# Patient Record
Sex: FEMALE | Race: WHITE | NOT HISPANIC OR LATINO | Employment: OTHER | URBAN - METROPOLITAN AREA
[De-identification: names, ages, dates, MRNs, and addresses within clinical notes are randomized per-mention and may not be internally consistent; named-entity substitution may affect disease eponyms.]

---

## 2017-01-04 ENCOUNTER — OFFICE VISIT (OUTPATIENT)
Dept: PULMONOLOGY | Facility: HOSPICE | Age: 77
End: 2017-01-04
Payer: MEDICARE

## 2017-01-04 VITALS
RESPIRATION RATE: 16 BRPM | HEIGHT: 63 IN | SYSTOLIC BLOOD PRESSURE: 120 MMHG | OXYGEN SATURATION: 94 % | HEART RATE: 80 BPM | TEMPERATURE: 97.9 F | DIASTOLIC BLOOD PRESSURE: 80 MMHG

## 2017-01-04 DIAGNOSIS — J90 PLEURAL EFFUSION, RIGHT: ICD-10-CM

## 2017-01-04 DIAGNOSIS — J94.2 HEMOTHORAX ON RIGHT: ICD-10-CM

## 2017-01-04 DIAGNOSIS — G47.33 OSA (OBSTRUCTIVE SLEEP APNEA): ICD-10-CM

## 2017-01-04 PROCEDURE — 99214 OFFICE O/P EST MOD 30 MIN: CPT | Performed by: NURSE PRACTITIONER

## 2017-01-04 NOTE — PROGRESS NOTES
Chief Complaint   Patient presents with   • Follow-Up     [facility] DC 12/12/16/ Consult tod Wilson and Gonda         HPI:  This is a 76 y.o. 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 The patient underwent CT-guided needle biopsy 11/20/1916. The patient was doing quite well and then suddenly became very short of breath. A chest x-ray indicated complete opacification of the right lung. Patient was intubated and underwent bronchoscopy. Bronchoscopy indicated blood in the right middle and right lower lobe with edematous airways. Pathology indicates no malignancy, however numerous ciliated bronchial columnar cells and histiocytes, a few squamous epithelial cells with associated bacterial organisms, small lymphocytes, and moderately increased numbers of neutrophils with admixed mucus. The patient underwent coil embolization of the right inferior intercostal artery without evidence of significant aneurysmal filling. The patient was found to have a hemothorax thus underwent thoracoscopy with decortication and evacuation of the right hemithorax. During hospitalization the patient was anemic and underwent transfusion of 4 units of packed red blood cells.     The patient also has a history of obstructive sleep apnea. Polysomnogram indicates AHI 31.9 and a minimum saturation of 72%. I believe the patient is on CPAP 9 cm of water pressure. She has had no follow-up for this so we will need to get her in contact with a DME company for new supplies.      Past Medical History   Diagnosis Date   • Hypertension    • Indigestion    • Snoring    • S/P bilateral mastectomy 4/4/2011   • Hyperlipidemia 4/4/2011   • Sleep apnea      CPAP   • Depression with anxiety 5/3/2011   • History of cholecystectomy    • CATARACT      surgical correcttion bilateral   • Heart burn    • Pain      left hip   • Range of motion deficit      left elbow,  unable to completely extend   • Arthritis    • BMI 38.0-38.9,adult 9/18/2013   • Chronic nausea 1/12/2015     Has had GI work up done Dr voss   • MYESHA (obstructive sleep apnea) 4/4/2011     On cPAP    • Pleural effusion, right 1/4/2017     Status post thoracotomy and decortication   • Hemothorax on right 1/4/2017     Status post thoracotomy and decortication       Past Surgical History   Procedure Laterality Date   • Hip arthroplasty total  3/26/2009     Right; Perfmed by THERESE LEMA at SURGERY [facility]   • Lizette by laparoscopy  2/1/2011     Performed by DORON HINOJOSA at SURGERY SAME DAY [facility] ORS   • Other  1988     bilateral mastectomies with implants   • Other  1989     tummy karlack   • Uvulopharyngopalatoplasty  1990   • Cataract phaco with iol  10/20/2011     Performed by DEENA BRADEN at SURGERY SAME DAY [facility] ORS   • Cataract phaco with iol  11/3/2011     Performed by DEENA BRADEN at SURGERY [facility]   • Orif, humerus  1950's     left   • Hip arthroplasty total  12/3/2012     Performed by Jamie Kenney M.D. at SURGERY [facility] ORS   • Breast biopsy  1980     bilateral benign mastectomy   • Pr enlarge breast with implant     • Thoracoscopy Right 12/6/2016     Procedure: RIGHT THORACOSCOPY ,DECORTICATION, EVACUATION OF HEMOTHORAX;  Surgeon: Ehsan De Leon D.O.;  Location: SURGERY [facility];  Service:        Social History   Substance Use Topics   • Smoking status: Former Smoker -- 1.00 packs/day for 15 years     Types: Cigarettes     Quit date: 01/01/1975   • Smokeless tobacco: Never Used   • Alcohol Use: No      Comment: once in a great while       ROS:   Constitutional: Denies fevers, chills, sweats, fatigue, and weight loss.  Eyes: Denies glasses.  Ears/nose/mouth/throat: Denies injury.  Cardiovascular: Denies chest pain, tightness.  Respiratory: See history of present illness.  GI: Denies heartburn, difficulty swallowing, nausea, and  "vomiting.  Neurological: Denies frequent headaches, dizziness, weakness.    Vitals:  Filed Vitals:    01/04/17 0813   Height: 1.6 m (5' 2.99\")   BP: 120/80   Pulse: 80   Resp: 16   Temp: 36.6 °C (97.9 °F)   O2 sat % room air: 94 %       Allergies:  Pcn; Clindamycin; Influenza virus vacc; Norco; Pneumococcal vaccine; Tetracycline; and Xarelto    Medications:  Current Outpatient Prescriptions   Medication Sig Dispense Refill   • metoprolol SR (TOPROL XL) 50 MG TABLET SR 24 HR Take 1 Tab by mouth every day. 30 Tab 2   • oxycodone immediate-release (ROXICODONE) 5 MG Tab Take 1-2 Tabs by mouth every 6 hours as needed. 60 Tab 0   • aspirin (ASA) 325 MG Tab Take 1 Tab by mouth every day. 100 Tab 3   • furosemide (LASIX) 40 MG Tab Take 1 Tab by mouth every day. 30 Tab 2   • potassium chloride SA (K-DUR) 20 MEQ Tab CR Take 2 Tabs by mouth every day. 60 Tab 2   • simethicone (MYLICON) 80 MG Chew Tab Take 1 Tab by mouth 3 times a day as needed (Gas). 30 Tab 3   • busPIRone (BUSPAR) 10 MG Tab TAKE 1 TAB BY MOUTH 2 TIMES A DAY. 180 Tab 0   • acetaminophen (TYLENOL) 500 MG Tab Take 1,000 mg by mouth every 6 hours as needed.     • trazodone (DESYREL) 50 MG Tab Take 2 tablets orally every morning and 1 tablet every evening. 270 Tab 0   • omeprazole (PRILOSEC) 40 MG delayed-release capsule Take 1 Cap by mouth every day. 90 Cap 1   • hydrochlorothiazide (HYDRODIURIL) 25 MG Tab Take 1 Tab by mouth every day. 90 Tab 0   • sertraline (ZOLOFT) 50 MG Tab Take 2 Tabs by mouth every day. 180 Tab 0   • simvastatin (ZOCOR) 40 MG Tab Take 1 Tab by mouth every evening. 90 Tab 2   • diltiazem CD (CARDIZEM CD) 180 MG CAPSULE SR 24 HR Take 1 Cap by mouth every day. 90 Cap 2     No current facility-administered medications for this visit.       PHYSICAL EXAM:  Appearance: Well-developed, well-nourished, no acute distress.  Eyes. PERRL.  Hearing: Grossly intact.  Oropharynx: Tongue normal, posterior pharynx without erythema or exudate.  Respiratory " effort: No intercostal retractions or use of accessory muscles.  Lung auscultation: No crackles, wheezing.  Heart auscultation: No murmur, gallop, or rub. Regular rate and rhythm.  Extremities: No cyanosis or edema.  Gait and Station: Normal  Orientation: Oriented to time, place, and person.    Assessment:  1. MYESHA (obstructive sleep apnea)     2. Pleural effusion, right  AMB PULMONARY FUNCTION TEST/LAB   3. Hemothorax on right  AMB PULMONARY FUNCTION TEST/LAB         Plan:  1. Mask and Supplies to [facility].  2. Follow up in approximately 2 months with pulmonary function tests for evaluation of possible COPD.  3. Make sure to follow-up with Dr. Reyes.    Return in about 2 months (around 3/4/2017) for With results, With JULIO Ledbetter.

## 2017-01-04 NOTE — PATIENT INSTRUCTIONS
1. Mask and Supplies to [facility].  2. Follow up in approximately 2 months with pulmonary function tests.  3. Make sure to follow-up with Dr. Reyes.

## 2017-01-04 NOTE — MR AVS SNAPSHOT
"        Sharon Callahan   2017 8:20 AM   Office Visit   MRN: [id number]    Department:  [facility]   Dept Phone:  [phone]    Description:  Female : 1940   Provider:  JEAN Rush           Reason for Visit     Follow-Up [facility] DC 16/ Consult w/ Steve and Gonda      Allergies as of 2017     Allergen Noted Reactions    Pcn [Penicillins] 2010   Anaphylaxis    Skin turns black    Clindamycin 2015   Rash    Rash      Influenza Virus Vacc 2016   Rash    Red in face    Norco [Hydrocodone-Acetaminophen] 2013       Pneumococcal Vaccine 2012   Rash    Tetracycline 2015   Rash    Rash     Xarelto [Rivaroxaban] 2012   Rash      You were diagnosed with     MYESHA (obstructive sleep apnea)   [488684]       Pleural effusion, right   [939772]       Hemothorax on right   [167597]         Vital Signs     Blood Pressure Pulse Temperature Respirations Height Oxygen Saturation    120/80 mmHg 80 36.6 °C (97.9 °F) 16 1.6 m (5' 2.99\") 94%    Smoking Status                   Former Smoker           Basic Information     Date Of Birth Sex Race Ethnicity Preferred Language    1940 Female White Non- English      Your appointments     2017  4:25 PM   Established Patient with Autumn Yin D.O.   [facility])    [address] [place] [address], [address]  [facility] [address] [phone]           You will be receiving a confirmation call a few days before your appointment from our automated call confirmation system.              Problem List              ICD-10-CM Priority Class Noted - Resolved    Benzodiazepine dependence (HCC) F13.20   2011 - Present    S/P bilateral mastectomy Z90.13   2011 - Present    MYESHA (obstructive sleep apnea) G47.33   2011 - Present    Hypertension I10   2011 - Present    Hyperlipidemia E78.5   2011 - Present    Depression with anxiety F41.8   5/3/2011 - Present   "    Routine health maintenance Z00.00   1/19/2012 - Present    Localized osteoarthrosis not specified whether primary or secondary, pelvic region and thigh M16.9   12/3/2012 - Present    BMI 40.0-44.9, adult (HCC) Z68.41   9/18/2013 - Present    Chronic nausea (Chronic) R11.0   1/12/2015 - Present    Tremor R25.1   1/12/2015 - Present    Osteopenia M85.80   3/25/2016 - Present    Chest pain R07.9   10/7/2016 - Present    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   11/28/2016 - Present    Hypokalemia E87.6   11/29/2016 - Present    Chest pain R07.9   12/1/2016 - Present    Elevated troponin R79.89   12/1/2016 - Present    Hemothorax, right J94.2   12/9/2016 - Present    Pulmonary hypertension, moderate to severe (HCC) I27.2   12/9/2016 - Present    LVH (left ventricular hypertrophy) I51.7   12/14/2016 - Present    Paroxysmal atrial fibrillation (HCC) I48.0   12/14/2016 - Present    Pleural effusion, right J90   1/4/2017 - Present    Hemothorax on right J94.2   1/4/2017 - Present      Health Maintenance        Date Due Completion Dates    IMM DTaP/Tdap/Td Vaccine (1 - Tdap) 10/30/1960 ---    IMM ZOSTER VACCINE 10/30/2001 ---    MAMMOGRAM 5/19/2015 5/19/2014    COLONOSCOPY 4/10/2017 4/10/2014, 3/1/2011    BONE DENSITY 4/16/2020 4/16/2015            Current Immunizations     Influenza Vaccine Quad Inj (Preserved) 11/6/2016, 9/15/2012, 9/1/2011    Pneumococcal polysaccharide vaccine (PPSV-23) 12/4/2012 10:56 AM      Below and/or attached are the medications your provider expects you to take. Review all of your home medications and newly ordered medications with your provider and/or pharmacist. Follow medication instructions as directed by your provider and/or pharmacist. Please keep your medication list with you and share with your provider. Update the information when medications are discontinued, doses are changed, or new medications (including over-the-counter products) are added; and carry medication information at all times in  the event of emergency situations     Allergies:  PCN - Anaphylaxis     CLINDAMYCIN - Rash     INFLUENZA VIRUS VACC - Rash     NORCO - (reactions not documented)     PNEUMOCOCCAL VACCINE - Rash     TETRACYCLINE - Rash     XARELTO - Rash               Medications  Valid as of: January 04, 2017 -  9:21 AM    Generic Name Brand Name Tablet Size Instructions for use    Acetaminophen (Tab) TYLENOL 500 MG Take 1,000 mg by mouth every 6 hours as needed.        Aspirin (Tab)  MG Take 1 Tab by mouth every day.        BusPIRone HCl (Tab) BUSPAR 10 MG TAKE 1 TAB BY MOUTH 2 TIMES A DAY.        DiltiaZEM HCl Coated Beads (CAPSULE SR 24 HR) CARDIZEM  MG Take 1 Cap by mouth every day.        Furosemide (Tab) LASIX 40 MG Take 1 Tab by mouth every day.        HydroCHLOROthiazide (Tab) HYDRODIURIL 25 MG Take 1 Tab by mouth every day.        Metoprolol Succinate (TABLET SR 24 HR) TOPROL XL 50 MG Take 1 Tab by mouth every day.        Omeprazole (CAPSULE DELAYED RELEASE) PRILOSEC 40 MG Take 1 Cap by mouth every day.        OxyCODONE HCl (Tab) ROXICODONE 5 MG Take 1-2 Tabs by mouth every 6 hours as needed.        Potassium Chloride Sweta CR (Tab CR) K-DUR 20 MEQ Take 2 Tabs by mouth every day.        Sertraline HCl (Tab) ZOLOFT 50 MG Take 2 Tabs by mouth every day.        Simethicone (Chew Tab) MYLICON 80 MG Take 1 Tab by mouth 3 times a day as needed (Gas).        Simvastatin (Tab) ZOCOR 40 MG Take 1 Tab by mouth every evening.        TraZODone HCl (Tab) DESYREL 50 MG Take 2 tablets orally every morning and 1 tablet every evening.        .                 Medicines prescribed today were sent to:     [facility]/PHARMACY #[id number] - [place], [place] - [address]    [address] [address]    Phone: [phone] Fax: [phone]    Open 24 Hours?: No      Medication refill instructions:       If your prescription bottle indicates you have medication refills left, it is not necessary to call your provider’s  office. Please contact your pharmacy and they will refill your medication.    If your prescription bottle indicates you do not have any refills left, you may request refills at any time through one of the following ways: The online [employer] system (except Urgent Care), by calling your provider’s office, or by asking your pharmacy to contact your provider’s office with a refill request. Medication refills are processed only during regular business hours and may not be available until the next business day. Your provider may request additional information or to have a follow-up visit with you prior to refilling your medication.   *Please Note: Medication refills are assigned a new Rx number when refilled electronically. Your pharmacy may indicate that no refills were authorized even though a new prescription for the same medication is available at the pharmacy. Please request the medicine by name with the pharmacy before contacting your provider for a refill.        Other Notes About Your Plan     -per OV 4/13/15, BMI 41.77. ? Morbid obesity code 278.01    -patient has been on Klonopin since 2011 without a diagnosis for this medication.                      [employer] Access Code: [email or web address]  Expires: 1/10/2017  3:06 PM    [employer]  A secure, online tool to manage your health information     [employer]’s Quero Rock® is a secure, online tool that connects you to your personalized health information from the privacy of your home -- day or night - making it very easy for you to manage your healthcare. Once the activation process is completed, you can even access your medical information using the [employer] Rock macrina, which is available for free in the Apple Macrina store or Google Play store.     [employer] provides the following levels of access (as shown below):   My Chart Features   [facility] Doctor [facility]  Specialists [facility]  Urgent  Care Non-[facility]  Primary Care  Doctor   Email your healthcare team securely and  privately 24/7 X X X    Manage appointments: schedule your next appointment; view details of past/upcoming appointments X      Request prescription refills. X      View recent personal medical records, including lab and immunizations X X X X   View health record, including health history, allergies, medications X X X X   Read reports about your outpatient visits, procedures, consult and ER notes X X X X   See your discharge summary, which is a recap of your hospital and/or ER visit that includes your diagnosis, lab results, and care plan. X X       How to register for [employer]:  1. Go to  [email or web address].  2. Click on the Sign Up Now box, which takes you to the New Member Sign Up page. You will need to provide the following information:  a. Enter your [employer] Access Code exactly as it appears at the top of this page. (You will not need to use this code after you’ve completed the sign-up process. If you do not sign up before the expiration date, you must request a new code.)   b. Enter your date of birth.   c. Enter your home email address.   d. Click Submit, and follow the next screen’s instructions.  3. Create a ColorPlaza ID. This will be your [employer] login ID and cannot be changed, so think of one that is secure and easy to remember.  4. Create a [employer] password. You can change your password at any time.  5. Enter your Password Reset Question and Answer. This can be used at a later time if you forget your password.   6. Enter your e-mail address. This allows you to receive e-mail notifications when new information is available in [employer].  7. Click Sign Up. You can now view your health information.    For assistance activating your [employer] account, call [phone]

## 2017-01-05 RX ORDER — DILTIAZEM HYDROCHLORIDE 180 MG/1
CAPSULE, COATED, EXTENDED RELEASE ORAL
Refills: 2 | OUTPATIENT
Start: 2017-01-05

## 2017-01-05 NOTE — TELEPHONE ENCOUNTER
CALLED [facility] 01/05/17 AND CONFIRMED PT STILL HAS 1 MORE REFILL LEFT ON Rx.

## 2017-01-10 DIAGNOSIS — I10 ESSENTIAL HYPERTENSION: ICD-10-CM

## 2017-01-10 RX ORDER — METOPROLOL SUCCINATE 50 MG/1
50 TABLET, EXTENDED RELEASE ORAL DAILY
Qty: 90 TAB | Refills: 1 | Status: SHIPPED | OUTPATIENT
Start: 2017-01-10 | End: 2017-06-16 | Stop reason: SDUPTHER

## 2017-01-11 RX ORDER — DILTIAZEM HYDROCHLORIDE 180 MG/1
CAPSULE, COATED, EXTENDED RELEASE ORAL
Qty: 90 CAP | Refills: 0 | Status: SHIPPED | OUTPATIENT
Start: 2017-01-11 | End: 2017-05-18

## 2017-01-11 NOTE — TELEPHONE ENCOUNTER
1. Caller Name: Sharon                                            Call Back Number: [phone] (home)         Patient approves a detailed voicemail message: N\A    Medication problem: Pt is not sure what medication she should be on for High BP. Metoprolol was last prescribed medication but Diltiazem was prescribed last 6/2016. Please advise.

## 2017-01-11 NOTE — TELEPHONE ENCOUNTER
Was the patient seen in the last year in this department? Yes     Does patient have an active prescription for medications requested? No     Received Request Via: Patient      Pt met protocol?: Yes  OV 3/25/16  BP Readings from Last 1 Encounters:   01/04/17 120/80

## 2017-01-12 NOTE — TELEPHONE ENCOUNTER
D/C med list from hospitalization has her to continue Diltiazem and Metoprolol - but dosage of metoprolol reduced at hospital follow-up appointment with Dr. Freeman on 12/14.

## 2017-01-16 ENCOUNTER — OFFICE VISIT (OUTPATIENT)
Dept: MEDICAL GROUP | Facility: PHYSICIAN GROUP | Age: 77
End: 2017-01-16
Payer: MEDICARE

## 2017-01-16 VITALS
OXYGEN SATURATION: 95 % | WEIGHT: 194 LBS | HEART RATE: 73 BPM | RESPIRATION RATE: 16 BRPM | DIASTOLIC BLOOD PRESSURE: 82 MMHG | SYSTOLIC BLOOD PRESSURE: 120 MMHG | BODY MASS INDEX: 36.63 KG/M2 | HEIGHT: 61 IN | TEMPERATURE: 99.2 F

## 2017-01-16 DIAGNOSIS — D64.9 NORMOCYTIC ANEMIA: ICD-10-CM

## 2017-01-16 DIAGNOSIS — E78.5 HYPERLIPIDEMIA, UNSPECIFIED HYPERLIPIDEMIA TYPE: ICD-10-CM

## 2017-01-16 DIAGNOSIS — G47.33 OSA (OBSTRUCTIVE SLEEP APNEA): ICD-10-CM

## 2017-01-16 DIAGNOSIS — F41.8 DEPRESSION WITH ANXIETY: ICD-10-CM

## 2017-01-16 DIAGNOSIS — I10 ESSENTIAL HYPERTENSION: ICD-10-CM

## 2017-01-16 DIAGNOSIS — E87.6 HYPOKALEMIA: ICD-10-CM

## 2017-01-16 DIAGNOSIS — D3A.090 CARCINOID TUMOR OF LUNG: ICD-10-CM

## 2017-01-16 PROCEDURE — 99214 OFFICE O/P EST MOD 30 MIN: CPT | Performed by: INTERNAL MEDICINE

## 2017-01-16 RX ORDER — ATORVASTATIN CALCIUM 20 MG/1
20 TABLET, FILM COATED ORAL NIGHTLY
COMMUNITY
End: 2017-05-18

## 2017-01-16 ASSESSMENT — PATIENT HEALTH QUESTIONNAIRE - PHQ9: CLINICAL INTERPRETATION OF PHQ2 SCORE: 0

## 2017-01-16 NOTE — MR AVS SNAPSHOT
"        Sharon Callahan   2017 4:25 PM   Office Visit   MRN: [id number]    Department:  [facility]   Dept Phone:  [phone]    Description:  Female : 1940   Provider:  Autumn Yin D.O.           Reason for Visit     Follow-Up HFU Lung mass      Allergies as of 2017     Allergen Noted Reactions    Pcn [Penicillins] 2010   Anaphylaxis    Skin turns black    Clindamycin 2015   Rash    Rash      Influenza Virus Vacc 2016   Rash    Red in face    Norco [Hydrocodone-Acetaminophen] 2013       Pneumococcal Vaccine 2012   Rash    Tetracycline 2015   Rash    Rash     Xarelto [Rivaroxaban] 2012   Rash      You were diagnosed with     Benzodiazepine dependence (CMS-HCC)   [775999]       Carcinoid tumor of lung   [851421]         Vital Signs     Blood Pressure Pulse Temperature Respirations Height Weight    120/82 mmHg 73 37.3 °C (99.2 °F) 16 1.549 m (5' 1\") 87.998 kg (194 lb)    Body Mass Index Oxygen Saturation Smoking Status             36.67 kg/m2 95% Former Smoker         Basic Information     Date Of Birth Sex Race Ethnicity Preferred Language    1940 Female White Non- English      Problem List              ICD-10-CM Priority Class Noted - Resolved    Benzodiazepine dependence (CMS-HCC) F13.20   2011 - Present    S/P bilateral mastectomy Z90.13   2011 - Present    MYESHA (obstructive sleep apnea) G47.33   2011 - Present    Hypertension I10   2011 - Present    Hyperlipidemia E78.5   2011 - Present    Depression with anxiety F41.8   5/3/2011 - Present    Routine health maintenance Z00.00   2012 - Present    Localized osteoarthrosis not specified whether primary or secondary, pelvic region and thigh M16.9   12/3/2012 - Present    BMI 36.0-36.9,adult Z68.36   2013 - Present    Chronic nausea (Chronic) R11.0   2015 - Present    Tremor R25.1   2015 - Present    Osteopenia M85.80   3/25/2016 - Present   " Chest pain R07.9   10/7/2016 - Present    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   11/28/2016 - Present    Hypokalemia E87.6   11/29/2016 - Present    Chest pain R07.9   12/1/2016 - Present    Elevated troponin R79.89   12/1/2016 - Present    Hemothorax, right J94.2   12/9/2016 - Present    Pulmonary hypertension, moderate to severe (CMS-HCC) I27.2   12/9/2016 - Present    LVH (left ventricular hypertrophy) I51.7   12/14/2016 - Present    Paroxysmal atrial fibrillation (CMS-HCC) I48.0   12/14/2016 - Present    Pleural effusion, right J90   1/4/2017 - Present    Hemothorax on right J94.2   1/4/2017 - Present      Health Maintenance        Date Due Completion Dates    IMM DTaP/Tdap/Td Vaccine (1 - Tdap) 10/30/1960 ---    IMM ZOSTER VACCINE 10/30/2001 ---    MAMMOGRAM 5/19/2015 5/19/2014    COLONOSCOPY 4/10/2017 4/10/2014, 3/1/2011    BONE DENSITY 4/16/2020 4/16/2015            Current Immunizations     Influenza Vaccine Quad Inj (Preserved) 11/6/2016, 9/15/2012, 9/1/2011    Pneumococcal polysaccharide vaccine (PPSV-23) 12/4/2012 10:56 AM      Below and/or attached are the medications your provider expects you to take. Review all of your home medications and newly ordered medications with your provider and/or pharmacist. Follow medication instructions as directed by your provider and/or pharmacist. Please keep your medication list with you and share with your provider. Update the information when medications are discontinued, doses are changed, or new medications (including over-the-counter products) are added; and carry medication information at all times in the event of emergency situations     Allergies:  PCN - Anaphylaxis     CLINDAMYCIN - Rash     INFLUENZA VIRUS VACC - Rash     NORCO - (reactions not documented)     PNEUMOCOCCAL VACCINE - Rash     TETRACYCLINE - Rash     XARELTO - Rash               Medications  Valid as of: January 16, 2017 -  6:46 PM    Generic Name Brand Name Tablet Size Instructions for use     Acetaminophen (Tab) TYLENOL 500 MG Take 1,000 mg by mouth every 6 hours as needed.        Aspirin (Tab)  MG Take 1 Tab by mouth every day.        Atorvastatin Calcium (Tab) LIPITOR 20 MG Take 20 mg by mouth every evening. Indications: Elevation of Both Cholesterol and Triglycerides in Blood        BusPIRone HCl (Tab) BUSPAR 10 MG TAKE 1 TAB BY MOUTH 2 TIMES A DAY.        DiltiaZEM HCl Coated Beads (CAPSULE SR 24 HR) CARDIZEM  MG TAKE 1 CAP BY MOUTH EVERY DAY.        Furosemide (Tab) LASIX 40 MG Take 1 Tab by mouth every day.        HydroCHLOROthiazide (Tab) HYDRODIURIL 25 MG Take 1 Tab by mouth every day.        Metoprolol Succinate (TABLET SR 24 HR) TOPROL XL 50 MG Take 1 Tab by mouth every day.        Omeprazole (CAPSULE DELAYED RELEASE) PRILOSEC 40 MG Take 1 Cap by mouth every day.        Potassium Chloride Sweta CR (Tab CR) K-DUR 20 MEQ Take 2 Tabs by mouth every day.        Sertraline HCl (Tab) ZOLOFT 50 MG Take 2 Tabs by mouth every day.        TraZODone HCl (Tab) DESYREL 50 MG Take 2 tablets orally every morning and 1 tablet every evening.        .                 Medicines prescribed today were sent to:     [id number] - [place], [place] - [id number] [facility] [address]    Phone: [phone] Fax: [phone]    Open 24 Hours?: No      Medication refill instructions:       If your prescription bottle indicates you have medication refills left, it is not necessary to call your provider’s office. Please contact your pharmacy and they will refill your medication.    If your prescription bottle indicates you do not have any refills left, you may request refills at any time through one of the following ways: The online [employer] system (except Urgent Care), by calling your provider’s office, or by asking your pharmacy to contact your provider’s office with a refill request. Medication refills are processed only during regular business hours and may not be  available until the next business day. Your provider may request additional information or to have a follow-up visit with you prior to refilling your medication.   *Please Note: Medication refills are assigned a new Rx number when refilled electronically. Your pharmacy may indicate that no refills were authorized even though a new prescription for the same medication is available at the pharmacy. Please request the medicine by name with the pharmacy before contacting your provider for a refill.        Other Notes About Your Plan     -per OV 4/13/15, BMI 41.77. ? Morbid obesity code 278.01    -patient has been on Klonopin since 2011 without a diagnosis for this medication.                      [email or web address] Access Code: [email or web address]  Expires: 2/15/2017  6:46 PM    [email or web address]  A secure, online tool to manage your health information     [employer]Â®’s [employer]com® is a secure, online tool that connects you to your personalized health information from the privacy of your home -- day or night - making it very easy for you to manage your healthcare. Once the activation process is completed, you can even access your medical information using the [employer]com macrina, which is available for free in the Apple Macrina store or Google Play store.     [email or web address] provides the following levels of access (as shown below):   My Chart Features   [facility] Doctor [facility]  Specialists [facility]  Urgent  Care Non-[facility]  Primary Care  Doctor   Email your healthcare team securely and privately 24/7 X X X    Manage appointments: schedule your next appointment; view details of past/upcoming appointments X      Request prescription refills. X      View recent personal medical records, including lab and immunizations X X X X   View health record, including health history, allergies, medications X X X X   Read reports about your outpatient visits, procedures, consult and ER notes X X X X   See your discharge summary, which is a recap of your hospital and/or  ER visit that includes your diagnosis, lab results, and care plan. X X       How to register for [employer]:  1. Go to  [email or web address].  2. Click on the Sign Up Now box, which takes you to the New Member Sign Up page. You will need to provide the following information:  a. Enter your [employer] Access Code exactly as it appears at the top of this page. (You will not need to use this code after you’ve completed the sign-up process. If you do not sign up before the expiration date, you must request a new code.)   b. Enter your date of birth.   c. Enter your home email address.   d. Click Submit, and follow the next screen’s instructions.  3. Create a [employer] ID. This will be your [employer] login ID and cannot be changed, so think of one that is secure and easy to remember.  4. Create a [employer] password. You can change your password at any time.  5. Enter your Password Reset Question and Answer. This can be used at a later time if you forget your password.   6. Enter your e-mail address. This allows you to receive e-mail notifications when new information is available in [employer].  7. Click Sign Up. You can now view your health information.    For assistance activating your [employer] account, call [phone]

## 2017-01-17 PROBLEM — J94.2 HEMOTHORAX ON RIGHT: Status: RESOLVED | Noted: 2017-01-04 | Resolved: 2017-01-17

## 2017-01-17 PROBLEM — J90 PLEURAL EFFUSION, RIGHT: Status: RESOLVED | Noted: 2017-01-04 | Resolved: 2017-01-17

## 2017-01-17 NOTE — PROGRESS NOTES
Subjective:   Sharon Callahan is a 76 y.o. female here today for multiple problems as listed below.      Carcinoid tumor of lung  Patient has followed up with Dr. Reyes about carcinoid tumor-no progress notes on file. She states that no surgery recommended at this time-he is monitoring the tumor and she has a follow-up appointment in February. No flushing, diarrhea or cramping.    Hypertension  Chronic condition. She indicates that she is feeling well and denies any symptoms referable to her elevated blood pressure. Specifically denies chest pain, palpitations, dyspnea, orthopnea, PND or peripheral edema. Current medication regimen is as listed below. Taking medicines as directed daily and is taking aspirin. She is monitoring BP at home. Patient has these side effects of medication: none. Currently on metoprolol SR 50mg daily. Furosemide 40mg daily + K-dur 20Meq, HCTZ 25mg daily, ditiazem CD 180mg daily.   Lab Results   Component Value Date/Time    SODIUM 135 12/12/2016 04:55 AM    POTASSIUM 3.1* 12/12/2016 04:55 AM    CHLORIDE 97 12/12/2016 04:55 AM    CO2 34* 12/12/2016 04:55 AM    GLUCOSE 100* 12/12/2016 04:55 AM    BUN 13 12/12/2016 04:55 AM    CREATININE 0.47* 12/12/2016 04:55 AM    BUN-CREATININE RATIO 13 04/04/2011 12:00 AM    ALKALINE PHOSPHATASE 44 12/12/2016 04:55 AM    AST(SGOT) 22 12/12/2016 04:55 AM    ALT(SGPT) 27 12/12/2016 04:55 AM    TOTAL BILIRUBIN 0.5 12/12/2016 04:55 AM       Hyperlipidemia  Chronic condition. She is taking medicine as directed. Current side effects: none. Atorvastatin 20mg daily.  Patients most recent cholesterol was reviewed:  Lab Results   Component Value Date/Time    CHOLESTEROL, 10/08/2016 04:10 AM    CHOLESTEROL, 02/25/2016 07:09 AM     Lab Results   Component Value Date/Time    LDL 56 10/08/2016 04:10 AM    LDL 67 02/25/2016 07:09 AM     Lab Results   Component Value Date/Time    HDL 59 10/08/2016 04:10 AM    HDL 66 02/25/2016 07:09 AM     Lab Results    Component Value Date/Time    TRIGLYCERIDES 142 12/07/2016 05:00 AM    TRIGLYCERIDES 109 12/04/2016 01:32 PM         Depression with anxiety  Not following with  at this time. Continues to be on sertraline, buspirone. Reports her mood is overall well controlled on this regimen. She also reports anxiety is manageable. Was previously on clonazepam but no longer on this. Pt also has insomnia which is managed with trazodone which she has been using nightly. Denies side effects.    MYESHA (obstructive sleep apnea)  Chronic condition. Compliant with CPAP. Following with pulmonology.      Lab Results   Component Value Date/Time    WBC 11.0* 12/12/2016 04:55 AM    RBC 3.27* 12/12/2016 04:55 AM    HEMOGLOBIN 8.8* 12/12/2016 04:55 AM    HEMATOCRIT 28.7* 12/12/2016 04:55 AM    MCV 87.8 12/12/2016 04:55 AM    MCH 26.9* 12/12/2016 04:55 AM    MCHC 30.7* 12/12/2016 04:55 AM    MPV 9.5 12/12/2016 04:55 AM    NEUTROPHILS-POLYS 76.90* 12/12/2016 04:55 AM    LYMPHOCYTES 11.00* 12/12/2016 04:55 AM    MONOCYTES 7.20 12/12/2016 04:55 AM    EOSINOPHILS 2.40 12/12/2016 04:55 AM    BASOPHILS 0.50 12/12/2016 04:55 AM    HYPOCHROMIA 2+ 07/04/2013 07:30 AM    ANISOCYTOSIS 1+ 12/01/2016 06:45 AM      Current medicines (including changes today)  Current Outpatient Prescriptions   Medication Sig Dispense Refill   • atorvastatin (LIPITOR) 20 MG Tab Take 20 mg by mouth every evening. Indications: Elevation of Both Cholesterol and Triglycerides in Blood     • diltiazem CD (CARDIZEM CD) 180 MG CAPSULE SR 24 HR TAKE 1 CAP BY MOUTH EVERY DAY. 90 Cap 0   • metoprolol SR (TOPROL XL) 50 MG TABLET SR 24 HR Take 1 Tab by mouth every day. 90 Tab 1   • aspirin (ASA) 325 MG Tab Take 1 Tab by mouth every day. 100 Tab 3   • furosemide (LASIX) 40 MG Tab Take 1 Tab by mouth every day. 30 Tab 2   • potassium chloride SA (K-DUR) 20 MEQ Tab CR Take 2 Tabs by mouth every day. 60 Tab 2   • busPIRone (BUSPAR) 10 MG Tab TAKE 1 TAB BY MOUTH 2 TIMES A DAY. 180 Tab 0   •  "acetaminophen (TYLENOL) 500 MG Tab Take 1,000 mg by mouth every 6 hours as needed.     • trazodone (DESYREL) 50 MG Tab Take 2 tablets orally every morning and 1 tablet every evening. 270 Tab 0   • omeprazole (PRILOSEC) 40 MG delayed-release capsule Take 1 Cap by mouth every day. 90 Cap 1   • hydrochlorothiazide (HYDRODIURIL) 25 MG Tab Take 1 Tab by mouth every day. 90 Tab 0   • sertraline (ZOLOFT) 50 MG Tab Take 2 Tabs by mouth every day. 180 Tab 0     No current facility-administered medications for this visit.     She  has a past medical history of Hypertension; Indigestion; Snoring; S/P bilateral mastectomy (4/4/2011); Hyperlipidemia (4/4/2011); Sleep apnea; Depression with anxiety (5/3/2011); History of cholecystectomy; CATARACT; Heart burn; Pain; Range of motion deficit; Arthritis; BMI 38.0-38.9,adult (9/18/2013); Chronic nausea (1/12/2015); MYESHA (obstructive sleep apnea) (4/4/2011); Pleural effusion, right (1/4/2017); and Hemothorax on right (1/4/2017).    ROS   No chest pain, no shortness of breath, no abdominal pain, no diarrhea/constipation, no urinary symptoms.     Objective:     Blood pressure 120/82, pulse 73, temperature 37.3 °C (99.2 °F), resp. rate 16, height 1.549 m (5' 1\"), weight 87.998 kg (194 lb), SpO2 95 %. Body mass index is 36.67 kg/(m^2).   Physical Exam:  Alert, oriented in no acute distress.  Eye contact is good, speech goal directed, affect calm  HEENT: conjunctiva non-injected, sclera non-icteric.  Neck No adenopathy or masses in the neck or supraclavicular regions.  Lungs: clear to auscultation bilaterally with good excursion.  CV: regular rate and rhythm.  Ext: no edema, color normal, vascularity normal, temperature normal    Assessment and Plan:   The following treatment plan was discussed   1. Carcinoid tumor of lung  Follow-up with Dr. Reyes as scheduled - records requested.    2. Essential hypertension  Controlled on current medications - continue.    3. Hyperlipidemia, unspecified " hyperlipidemia type  Controlled on current medications - continue.  - atorvastatin (LIPITOR) 20 MG Tab; Take 20 mg by mouth every evening. Indications: Elevation of Both Cholesterol and Triglycerides in Blood    4. Hypokalemia  On furosemide and potassium - [facility], follow-up results.     5. Depression with anxiety  Controlled on current medications - continue.    6. MYESHA (obstructive sleep apnea)  Managed on CPAP.     7. Normocytic anemia  Patient reports [facility]. Follow-up results when they come in for resolution of anemia.    Reviewed and reconciled med list - reviewed with patient indications for medications.     Followup: Return in about 6 months (around 7/16/2017) for follow-up with PCP.         Please note that dictation has been dictated using voice recognition soft ware. I have made every reasonable attempt to correct obvious errors, but I expect that there are errors of grammar and possibly content that I did not discover before finalizing the note.

## 2017-01-17 NOTE — ASSESSMENT & PLAN NOTE
Patient has followed up with Dr. Reyes about carcinoid tumor-no progress notes on file. She states that no surgery recommended at this time-he is monitoring the tumor and she has a follow-up appointment in February. No flushing, diarrhea or cramping.

## 2017-01-18 NOTE — ASSESSMENT & PLAN NOTE
Chronic condition. She indicates that she is feeling well and denies any symptoms referable to her elevated blood pressure. Specifically denies chest pain, palpitations, dyspnea, orthopnea, PND or peripheral edema. Current medication regimen is as listed below. Taking medicines as directed daily and is taking aspirin. She is monitoring BP at home. Patient has these side effects of medication: none. Currently on metoprolol SR 50mg daily. Furosemide 40mg daily + K-dur 20Meq, HCTZ 25mg daily, ditiazem CD 180mg daily.   Lab Results   Component Value Date/Time    SODIUM 135 12/12/2016 04:55 AM    POTASSIUM 3.1* 12/12/2016 04:55 AM    CHLORIDE 97 12/12/2016 04:55 AM    CO2 34* 12/12/2016 04:55 AM    GLUCOSE 100* 12/12/2016 04:55 AM    BUN 13 12/12/2016 04:55 AM    CREATININE 0.47* 12/12/2016 04:55 AM    BUN-CREATININE RATIO 13 04/04/2011 12:00 AM    ALKALINE PHOSPHATASE 44 12/12/2016 04:55 AM    AST(SGOT) 22 12/12/2016 04:55 AM    ALT(SGPT) 27 12/12/2016 04:55 AM    TOTAL BILIRUBIN 0.5 12/12/2016 04:55 AM

## 2017-01-18 NOTE — ASSESSMENT & PLAN NOTE
Not following with  at this time. Continues to be on sertraline, buspirone. Reports her mood is overall well controlled on this regimen. She also reports anxiety is manageable. Was previously on clonazepam but no longer on this. Pt also has insomnia which is managed with trazodone which she has been using nightly. Denies side effects.

## 2017-01-18 NOTE — ASSESSMENT & PLAN NOTE
Chronic condition. She is taking medicine as directed. Current side effects: none. Atorvastatin 20mg daily.  Patients most recent cholesterol was reviewed:  Lab Results   Component Value Date/Time    CHOLESTEROL, 10/08/2016 04:10 AM    CHOLESTEROL, 02/25/2016 07:09 AM     Lab Results   Component Value Date/Time    LDL 56 10/08/2016 04:10 AM    LDL 67 02/25/2016 07:09 AM     Lab Results   Component Value Date/Time    HDL 59 10/08/2016 04:10 AM    HDL 66 02/25/2016 07:09 AM     Lab Results   Component Value Date/Time    TRIGLYCERIDES 142 12/07/2016 05:00 AM    TRIGLYCERIDES 109 12/04/2016 01:32 PM

## 2017-01-30 ENCOUNTER — HOSPITAL ENCOUNTER (OUTPATIENT)
Dept: RADIOLOGY | Facility: MEDICAL CENTER | Age: 77
End: 2017-01-30
Attending: INTERNAL MEDICINE
Payer: MEDICARE

## 2017-01-30 DIAGNOSIS — C7A.090 MALIGNANT CARCINOID TUMOR OF THE BRONCHUS AND LUNG (HCC): ICD-10-CM

## 2017-01-31 ENCOUNTER — HOSPITAL ENCOUNTER (OUTPATIENT)
Dept: RADIOLOGY | Facility: MEDICAL CENTER | Age: 77
End: 2017-01-31
Attending: INTERNAL MEDICINE
Payer: MEDICARE

## 2017-02-01 ENCOUNTER — HOSPITAL ENCOUNTER (OUTPATIENT)
Dept: RADIOLOGY | Facility: MEDICAL CENTER | Age: 77
End: 2017-02-01
Attending: INTERNAL MEDICINE
Payer: MEDICARE

## 2017-02-01 NOTE — TELEPHONE ENCOUNTER
Was the patient seen in the last year in this department? Yes     Does patient have an active prescription for medications requested? No     Received Request Via: Pharmacy      Pt met protocol?: Yes  OV 1/16/17

## 2017-02-02 ENCOUNTER — HOSPITAL ENCOUNTER (OUTPATIENT)
Dept: RADIOLOGY | Facility: MEDICAL CENTER | Age: 77
End: 2017-02-02
Attending: INTERNAL MEDICINE
Payer: MEDICARE

## 2017-02-02 DIAGNOSIS — I10 ESSENTIAL HYPERTENSION: ICD-10-CM

## 2017-02-02 PROCEDURE — A9572 INDIUM IN-111 PENTETREOTIDE: HCPCS

## 2017-02-02 RX ORDER — HYDROCHLOROTHIAZIDE 25 MG/1
25 TABLET ORAL
Qty: 90 TAB | Refills: 1 | Status: SHIPPED | OUTPATIENT
Start: 2017-02-02 | End: 2017-05-18

## 2017-02-02 NOTE — TELEPHONE ENCOUNTER
Refill X 6 months, sent to pharmacy.Pt. Seen in the last 6 months per protocol.   Lab Results   Component Value Date/Time    SODIUM 135 12/12/2016 04:55 AM    POTASSIUM 3.1* 12/12/2016 04:55 AM    CHLORIDE 97 12/12/2016 04:55 AM    CO2 34* 12/12/2016 04:55 AM    GLUCOSE 100* 12/12/2016 04:55 AM    BUN 13 12/12/2016 04:55 AM    CREATININE 0.47* 12/12/2016 04:55 AM    BUN-CREATININE RATIO 13 04/04/2011 12:00 AM

## 2017-02-02 NOTE — TELEPHONE ENCOUNTER
Was the patient seen in the last year in this department? Yes     Does patient have an active prescription for medications requested? No     Received Request Via: Pharmacy      Pt met protocol?: Yes    LAST OV 01/16/17    BP Readings from Last 1 Encounters:   01/16/17 120/82

## 2017-02-06 ENCOUNTER — TELEPHONE (OUTPATIENT)
Dept: URGENT CARE | Facility: PHYSICIAN GROUP | Age: 77
End: 2017-02-06

## 2017-02-06 NOTE — TELEPHONE ENCOUNTER
Trice Boyd  Female, 76 y.o., 1940  Last Weight:  87.998 kg (194 lb)  Phone:  [phone]  PCP:  Autumn Yin  Language:  English  Need Interp:  No  AllergiesPcn [Penicillins]  Clindamycin  Influenza Virus Vacc  Norco [Hydrocodone-acetaminophen]  Pneumococcal Vaccine  Tetracycline  Xarelto [Rivaroxaban]  Health Maintenance:  Due  FYIGeneral  Primary Ins:  MEDICARE  MRN:  [id number]  MyChart:  Pending  Next Appt:  None    Message  Received: Today       KAILA Silverman       Caller: Unspecified (Today, 11:59 AM)                     Patient had a lung scan ordered by Dr. Reyes recommend patient follow-up with Dr. Reyes for results.            Previous Messages       ----- Message -----      From: Vannessa Bustillo      Sent: 2/6/2017  12:01 PM        To: Autumn Yin D.O.                         Reason for Call      Results     PT calling for results of her scan           Call Documentation      Vannessa Bustillo at 2/6/2017 12:00 PM      Status: Signed         Expand All Collapse All    PT called requesting results of her scan. Please advise.                       Contacts         Type     02/06/2017 11:59 AM Phone (Incoming)     Contact: Trice Boyd (Self)     Phone: [phone] (H)           Routing History      Priority Sent On From To Message Type      2/6/2017 12:37 PM KAILA Silverman Patient Call      2/6/2017 12:01 PM Vannessa Yin D.O. Patient Call       Created by      Vannessa Bustillo on 02/06/2017 11:59 AM         Called pt and advised her to contact Dr Reyes's office for her results.

## 2017-02-23 RX ORDER — TRAZODONE HYDROCHLORIDE 50 MG/1
TABLET ORAL
Qty: 270 TAB | Refills: 0 | Status: SHIPPED | OUTPATIENT
Start: 2017-02-23 | End: 2017-05-29 | Stop reason: SDUPTHER

## 2017-02-23 NOTE — TELEPHONE ENCOUNTER
Was the patient seen in the last year in this department? Yes     Does patient have an active prescription for medications requested? No     Received Request Via: Pharmacy      Pt met protocol?: Yes, OV last month, dx discussed.

## 2017-03-01 RX ORDER — BUSPIRONE HYDROCHLORIDE 10 MG/1
TABLET ORAL
Qty: 180 TAB | Refills: 1 | Status: SHIPPED | OUTPATIENT
Start: 2017-03-01 | End: 2017-07-20

## 2017-03-01 NOTE — TELEPHONE ENCOUNTER
Was the patient seen in the last year in this department? Yes     Does patient have an active prescription for medications requested? No     Received Request Via: Pharmacy      Pt met protocol?: Yes, last ov 1/16/17, last labs 12/16

## 2017-03-20 DIAGNOSIS — I10 ESSENTIAL HYPERTENSION: ICD-10-CM

## 2017-03-20 DIAGNOSIS — R11.0 CHRONIC NAUSEA: Chronic | ICD-10-CM

## 2017-03-20 RX ORDER — FUROSEMIDE 40 MG/1
40 TABLET ORAL DAILY
Qty: 90 TAB | Refills: 1 | Status: SHIPPED | OUTPATIENT
Start: 2017-03-20 | End: 2017-06-01

## 2017-03-20 RX ORDER — OMEPRAZOLE 40 MG/1
40 CAPSULE, DELAYED RELEASE ORAL
Qty: 90 CAP | Refills: 1 | Status: ON HOLD | OUTPATIENT
Start: 2017-03-20 | End: 2019-02-26

## 2017-05-04 ENCOUNTER — TELEPHONE (OUTPATIENT)
Dept: MEDICAL GROUP | Facility: PHYSICIAN GROUP | Age: 77
End: 2017-05-04

## 2017-05-04 NOTE — TELEPHONE ENCOUNTER
ESTABLISHED PATIENT PRE-VISIT PLANNING     Note: Patient will not be contacted if there is no indication to call.     1.  Reviewed note from last office visit with PCP and/or other med group provider: Yes    2.  If any orders were placed at last visit, do we have Results/Consult Notes?        •  Labs - Labs were not ordered at last office visit.       •  Imaging - Imaging was not ordered at last office visit.       •  Referrals - Referral ordered, patient was seen and consult notes are in chart. Care Teams updated  YES.    3.  Immunizations were updated in [facility] using [employer]?: Yes       •  [employer] Recommendations: TDAP ZOSTAVAX (Shingles)    4.  Patient is due for the following Health Maintenance Topics:   Health Maintenance Due   Topic Date Due   • Annual Wellness Visit  1940   • IMM DTaP/Tdap/Td Vaccine (1 - Tdap) 10/30/1960   • IMM ZOSTER VACCINE  10/30/2001   • MAMMOGRAM  05/19/2015     DUE   • COLONOSCOPY  04/10/2017     DUE       5.  Patient was not informed to arrive 15 min prior to their scheduled appointment and bring in their medication bottles.

## 2017-05-05 ENCOUNTER — OFFICE VISIT (OUTPATIENT)
Dept: MEDICAL GROUP | Facility: PHYSICIAN GROUP | Age: 77
End: 2017-05-05
Payer: MEDICARE

## 2017-05-05 ENCOUNTER — HOSPITAL ENCOUNTER (OUTPATIENT)
Dept: LAB | Facility: MEDICAL CENTER | Age: 77
End: 2017-05-05
Attending: INTERNAL MEDICINE
Payer: MEDICARE

## 2017-05-05 VITALS
BODY MASS INDEX: 35.3 KG/M2 | OXYGEN SATURATION: 97 % | TEMPERATURE: 97.5 F | WEIGHT: 187 LBS | HEIGHT: 61 IN | RESPIRATION RATE: 16 BRPM | HEART RATE: 67 BPM | SYSTOLIC BLOOD PRESSURE: 120 MMHG | DIASTOLIC BLOOD PRESSURE: 80 MMHG

## 2017-05-05 DIAGNOSIS — D64.9 ANEMIA, UNSPECIFIED TYPE: ICD-10-CM

## 2017-05-05 DIAGNOSIS — M19.019 SHOULDER ARTHRITIS: ICD-10-CM

## 2017-05-05 DIAGNOSIS — E87.6 HYPOKALEMIA: ICD-10-CM

## 2017-05-05 DIAGNOSIS — Z79.899 POLYPHARMACY: ICD-10-CM

## 2017-05-05 DIAGNOSIS — Z12.31 SCREENING MAMMOGRAM, ENCOUNTER FOR: ICD-10-CM

## 2017-05-05 DIAGNOSIS — Z98.890 H/O BREAST SURGERY: ICD-10-CM

## 2017-05-05 DIAGNOSIS — I10 ESSENTIAL HYPERTENSION: ICD-10-CM

## 2017-05-05 LAB
ANION GAP SERPL CALC-SCNC: 14 MMOL/L (ref 0–11.9)
BASOPHILS # BLD AUTO: 0.6 % (ref 0–1.8)
BASOPHILS # BLD: 0.06 K/UL (ref 0–0.12)
BUN SERPL-MCNC: 12 MG/DL (ref 8–22)
CALCIUM SERPL-MCNC: 9.2 MG/DL (ref 8.5–10.5)
CHLORIDE SERPL-SCNC: 92 MMOL/L (ref 96–112)
CO2 SERPL-SCNC: 29 MMOL/L (ref 20–33)
CREAT SERPL-MCNC: 0.71 MG/DL (ref 0.5–1.4)
EOSINOPHIL # BLD AUTO: 0.12 K/UL (ref 0–0.51)
EOSINOPHIL NFR BLD: 1.3 % (ref 0–6.9)
ERYTHROCYTE [DISTWIDTH] IN BLOOD BY AUTOMATED COUNT: 59.7 FL (ref 35.9–50)
GFR SERPL CREATININE-BSD FRML MDRD: >60 ML/MIN/1.73 M 2
GLUCOSE SERPL-MCNC: 74 MG/DL (ref 65–99)
HCT VFR BLD AUTO: 36.5 % (ref 37–47)
HGB BLD-MCNC: 11 G/DL (ref 12–16)
IMM GRANULOCYTES # BLD AUTO: 0.03 K/UL (ref 0–0.11)
IMM GRANULOCYTES NFR BLD AUTO: 0.3 % (ref 0–0.9)
LYMPHOCYTES # BLD AUTO: 1.07 K/UL (ref 1–4.8)
LYMPHOCYTES NFR BLD: 11.2 % (ref 22–41)
MCH RBC QN AUTO: 23.4 PG (ref 27–33)
MCHC RBC AUTO-ENTMCNC: 30.1 G/DL (ref 33.6–35)
MCV RBC AUTO: 77.7 FL (ref 81.4–97.8)
MONOCYTES # BLD AUTO: 0.6 K/UL (ref 0–0.85)
MONOCYTES NFR BLD AUTO: 6.3 % (ref 0–13.4)
NEUTROPHILS # BLD AUTO: 7.7 K/UL (ref 2–7.15)
NEUTROPHILS NFR BLD: 80.3 % (ref 44–72)
NRBC # BLD AUTO: 0 K/UL
NRBC BLD AUTO-RTO: 0 /100 WBC
PLATELET # BLD AUTO: 368 K/UL (ref 164–446)
PMV BLD AUTO: 10.1 FL (ref 9–12.9)
POTASSIUM SERPL-SCNC: 2.6 MMOL/L (ref 3.6–5.5)
RBC # BLD AUTO: 4.7 M/UL (ref 4.2–5.4)
SODIUM SERPL-SCNC: 135 MMOL/L (ref 135–145)
WBC # BLD AUTO: 9.6 K/UL (ref 4.8–10.8)

## 2017-05-05 PROCEDURE — 36415 COLL VENOUS BLD VENIPUNCTURE: CPT

## 2017-05-05 PROCEDURE — 99214 OFFICE O/P EST MOD 30 MIN: CPT | Performed by: FAMILY MEDICINE

## 2017-05-05 PROCEDURE — 1101F PT FALLS ASSESS-DOCD LE1/YR: CPT | Performed by: FAMILY MEDICINE

## 2017-05-05 PROCEDURE — 4040F PNEUMOC VAC/ADMIN/RCVD: CPT | Performed by: FAMILY MEDICINE

## 2017-05-05 PROCEDURE — 3017F COLORECTAL CA SCREEN DOC REV: CPT | Performed by: FAMILY MEDICINE

## 2017-05-05 PROCEDURE — 80048 BASIC METABOLIC PNL TOTAL CA: CPT

## 2017-05-05 PROCEDURE — 85025 COMPLETE CBC W/AUTO DIFF WBC: CPT

## 2017-05-05 PROCEDURE — 1036F TOBACCO NON-USER: CPT | Performed by: FAMILY MEDICINE

## 2017-05-05 PROCEDURE — G8432 DEP SCR NOT DOC, RNG: HCPCS | Performed by: FAMILY MEDICINE

## 2017-05-05 PROCEDURE — G8419 CALC BMI OUT NRM PARAM NOF/U: HCPCS | Performed by: FAMILY MEDICINE

## 2017-05-05 RX ORDER — TRIAMCINOLONE ACETONIDE 40 MG/ML
40 INJECTION, SUSPENSION INTRA-ARTICULAR; INTRAMUSCULAR ONCE
Status: COMPLETED | OUTPATIENT
Start: 2017-05-05 | End: 2017-05-05

## 2017-05-05 RX ADMIN — TRIAMCINOLONE ACETONIDE 40 MG: 40 INJECTION, SUSPENSION INTRA-ARTICULAR; INTRAMUSCULAR at 11:06

## 2017-05-05 NOTE — PATIENT INSTRUCTIONS
Do labs today:    5/5/2017 Your right shoulder was injected today with cortisone (Kenalog 40 mg)  Call [phone] to schedule your mammogram.  The following referrals were completed:    Orthopedics: Please call Sven Leblanc to schedule.  Pharmacist: My office will contact you to see a pharmacist to review your medicines to see if any reductions can be made.

## 2017-05-05 NOTE — MR AVS SNAPSHOT
"        Sharon Callahan   2017 10:00 AM   Office Visit   MRN: [id number]    Department:  [facility]   Dept Phone:  [phone]    Description:  Female : 1940   Provider:  Analy Gómez M.D.           Reason for Visit     Shoulder Pain Right, bone on bone, 2 days ago couldnt raise arm, x few years      Allergies as of 2017     Allergen Noted Reactions    Pcn [Penicillins] 2010   Anaphylaxis    Skin turns black    Clindamycin 2015   Rash    Rash      Influenza Virus Vacc 2016   Rash    Red in face    Norco [Hydrocodone-Acetaminophen] 2013       Pneumococcal Vaccine 2012   Rash    Tetracycline 2015   Rash    Rash     Xarelto [Rivaroxaban] 2012   Rash      You were diagnosed with     Screening mammogram, encounter for   [1708113]       Polypharmacy   [[id number]]       Shoulder arthritis   [[id number]]         Vital Signs     Blood Pressure Pulse Temperature Respirations Height Weight    120/80 mmHg 67 36.4 °C (97.5 °F) 16 1.549 m (5' 0.98\") 84.823 kg (187 lb)    Body Mass Index Oxygen Saturation Smoking Status             35.35 kg/m2 97% Former Smoker         Basic Information     Date Of Birth Sex Race Ethnicity Preferred Language    1940 Female White Non- English      Your appointments     2017 10:00 AM   CT BODY WITH with [address]   [facility] - CT - [address]    [address]  [place] [id number] [phone]           Taking medications as regularly scheduled is strongly encouraged.  *For Abdominal CT-Patient needs to  oral contrast and instruction from the department at least 2 hours prior to exam. Patient may  contrast at any imaging facility.              Problem List              ICD-10-CM Priority Class Noted - Resolved    Benzodiazepine dependence (CMS-HCC) F13.20   2011 - Present    S/P bilateral mastectomy Z90.13   2011 - Present    MYESHA (obstructive sleep apnea) G47.33   " 4/4/2011 - Present    Hypertension I10   4/4/2011 - Present    Hyperlipidemia E78.5   4/4/2011 - Present    Depression with anxiety F41.8   5/3/2011 - Present    Routine health maintenance Z00.00   1/19/2012 - Present    Localized osteoarthrosis not specified whether primary or secondary, pelvic region and thigh M16.9   12/3/2012 - Present    BMI 36.0-36.9,adult Z68.36   9/18/2013 - Present    Chronic nausea (Chronic) R11.0   1/12/2015 - Present    Tremor R25.1   1/12/2015 - Present    Osteopenia M85.80   3/25/2016 - Present    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   11/28/2016 - Present    Hypokalemia E87.6   11/29/2016 - Present    Elevated troponin R74.8   12/1/2016 - Present    Pulmonary hypertension, moderate to severe (CMS-HCC) I27.2   12/9/2016 - Present    LVH (left ventricular hypertrophy) I51.7   12/14/2016 - Present    Paroxysmal atrial fibrillation (CMS-HCC) I48.0   12/14/2016 - Present      Health Maintenance        Date Due Completion Dates    IMM DTaP/Tdap/Td Vaccine (1 - Tdap) 10/30/1960 ---    IMM ZOSTER VACCINE 10/30/2001 ---    MAMMOGRAM 5/19/2015 5/19/2014    BONE DENSITY 4/16/2020 4/16/2015            Current Immunizations     Influenza Vaccine Quad Inj (Preserved) 11/6/2016, 9/15/2012, 9/1/2011    Pneumococcal polysaccharide vaccine (PPSV-23) 12/4/2012 10:56 AM      Below and/or attached are the medications your provider expects you to take. Review all of your home medications and newly ordered medications with your provider and/or pharmacist. Follow medication instructions as directed by your provider and/or pharmacist. Please keep your medication list with you and share with your provider. Update the information when medications are discontinued, doses are changed, or new medications (including over-the-counter products) are added; and carry medication information at all times in the event of emergency situations     Allergies:  PCN - Anaphylaxis     CLINDAMYCIN - Rash     INFLUENZA VIRUS VACC -  Rash     NORCO - (reactions not documented)     PNEUMOCOCCAL VACCINE - Rash     TETRACYCLINE - Rash     XARELTO - Rash               Medications  Valid as of: May 05, 2017 - 10:49 AM    Generic Name Brand Name Tablet Size Instructions for use    Acetaminophen (Tab) TYLENOL 500 MG Take 1,000 mg by mouth every 6 hours as needed.        Aspirin (Tab)  MG Take 1 Tab by mouth every day.        Atorvastatin Calcium (Tab) LIPITOR 20 MG Take 20 mg by mouth every evening. Indications: Elevation of Both Cholesterol and Triglycerides in Blood        BusPIRone HCl (Tab) BUSPAR 10 MG TAKE 1 TAB BY MOUTH 2 TIMES A DAY.        DilTIAZem HCl Coated Beads (CAPSULE SR 24 HR) CARDIZEM  MG TAKE 1 CAP BY MOUTH EVERY DAY.        Furosemide (Tab) LASIX 40 MG Take 1 Tab by mouth every day.        HydroCHLOROthiazide (Tab) HYDRODIURIL 25 MG Take 1 Tab by mouth every day.        Metoprolol Succinate (TABLET SR 24 HR) TOPROL XL 50 MG Take 1 Tab by mouth every day.        Omeprazole (CAPSULE DELAYED RELEASE) PRILOSEC 40 MG Take 1 Cap by mouth every day.        Potassium Chloride Sweta CR (Tab CR) Kdur 20 MEQ Take 2 Tabs by mouth every day.        Sertraline HCl (Tab) ZOLOFT 50 MG TAKE 2 TABS BY MOUTH EVERY DAY.        TraZODone HCl (Tab) DESYREL 50 MG TAKE 2 TABLETS ORALLY EVERY MORNING AND 1 TABLET EVERY EVENING.        .                 Medicines prescribed today were sent to:     [id number] - [place] - [address]    [address] [address]    Phone: [phone] Fax: [phone]    Open 24 Hours?: No    [facility]    [address] [facility] [address]    Phone: [phone] Fax: [phone]      Medication refill instructions:       If your prescription bottle indicates you have medication refills left, it is not necessary to call your provider’s office. Please contact your pharmacy and they will refill your medication.    If your prescription bottle indicates you do not have any  refills left, you may request refills at any time through one of the following ways: The online [employer] system (except Urgent Care), by calling your provider’s office, or by asking your pharmacy to contact your provider’s office with a refill request. Medication refills are processed only during regular business hours and may not be available until the next business day. Your provider may request additional information or to have a follow-up visit with you prior to refilling your medication.   *Please Note: Medication refills are assigned a new Rx number when refilled electronically. Your pharmacy may indicate that no refills were authorized even though a new prescription for the same medication is available at the pharmacy. Please request the medicine by name with the pharmacy before contacting your provider for a refill.        Your To Do List     Future Labs/Procedures Complete By Expires    MA-MAMMO SCREEN BILAT IMPLANTS SHAN CAD  As directed 5/5/2018      Instructions    Do labs today:    5/5/2017 Your right shoulder was injected today with cortisone (Kenalog 40 mg)  Call [phone] to schedule your mammogram.  The following referrals were completed:    Orthopedics: Please call [place] to schedule.  Pharmacist: My office will contact you to see a pharmacist to review your medicines to see if any reductions can be made.         Other Notes About Your Plan     -per OV 4/13/15, BMI 41.77. ? Morbid obesity code 278.01    -patient has been on Klonopin since 2011 without a diagnosis for this medication.                      [employer] Access Code: [email or web address]  Expires: 5/24/2017  1:27 PM    [employer]  A secure, online tool to manage your health information     [employer]’s [employer]® is a secure, online tool that connects you to your personalized health information from the privacy of your home -- day or night - making it very easy for you to manage your healthcare. Once the activation process is completed, you  can even access your medical information using the [employer] macrina, which is available for free in the Apple Macrina store or Google Play store.     [employer] provides the following levels of access (as shown below):   My Chart Features   [facility] Doctor [facility]  Specialists [facility]  Urgent  Care [facility]  Primary Care  Doctor   Email your healthcare team securely and privately 24/7 X X X    Manage appointments: schedule your next appointment; view details of past/upcoming appointments X      Request prescription refills. X      View recent personal medical records, including lab and immunizations X X X X   View health record, including health history, allergies, medications X X X X   Read reports about your outpatient visits, procedures, consult and ER notes X X X X   See your discharge summary, which is a recap of your hospital and/or ER visit that includes your diagnosis, lab results, and care plan. X X       How to register for [employer]:  1. Go to  [email or web address].  2. Click on the Sign Up Now box, which takes you to the New Member Sign Up page. You will need to provide the following information:  a. Enter your [employer] Access Code exactly as it appears at the top of this page. (You will not need to use this code after you’ve completed the sign-up process. If you do not sign up before the expiration date, you must request a new code.)   b. Enter your date of birth.   c. Enter your home email address.   d. Click Submit, and follow the next screen’s instructions.  3. Create a [employer] ID. This will be your [employer] login ID and cannot be changed, so think of one that is secure and easy to remember.  4. Create a [employer] password. You can change your password at any time.  5. Enter your Password Reset Question and Answer. This can be used at a later time if you forget your password.   6. Enter your e-mail address. This allows you to receive e-mail notifications when new information is available in  [employer].  7. Click Sign Up. You can now view your health information.    For assistance activating your [employer] account, call [phone]

## 2017-05-05 NOTE — PROGRESS NOTES
Subjective:     Chief Complaint   Patient presents with   • Shoulder Pain     Right, bone on bone, 2 days ago couldnt raise arm, x few years     Sharon Callahan is a 76 y.o. female here today for issues listed below    Known extensive degenerative change involving the right glenohumeral joint with near complete loss of the joint space and numerous intra-articular bodies per prior x-ray.   Pt is very unclear: Says unknown provider gave her a right shoulder injection maybe 2-3 months ago.   I am unable to verify who this might have been. No documentation of shoulder injection at this office. And last OV at [facility] was 2015  Was told benefit might last 3-4 months. But only had benefit a few weeks. She is now having severe pain even worse than before the injection. Any and all movement of arm is painful.  Pt is under impression she was told to come here today for another injection.   She has moved to [facility] [place]  HTN - Chronic condition stable. Currently taking all meds  But not certain of names. WOnders why she is on so many meds and if any can be stopped.   She is not monitoring BP at home.   Denies symptoms low BP: light-headed, tunnel-vision, unusual fatigue.   Denies symptoms high BP:pounding headache, visual changes, palpitations, flushed face.   Denies medicine side effects: unusual fatigue, slow heartbeat, foot/leg swelling, cough.   She had hypokalemia on prior labs. Was due for recheck in December but never did the labs.  H/O breast surgery. Pt states she had bilateral mastectomy (nipples retained) then had implants. Last mammo here was 2014. Report indicates scattered fibroglandular densities.       Allergies: Pcn; Clindamycin; Influenza virus vacc; Norco; Pneumococcal vaccine; Tetracycline; and Xarelto  Current medicines (including changes today)  Current Outpatient Prescriptions   Medication Sig Dispense Refill   • furosemide (LASIX) 40 MG Tab Take 1 Tab by mouth every day.  90 Tab 1   • omeprazole (PRILOSEC) 40 MG delayed-release capsule Take 1 Cap by mouth every day. 90 Cap 1   • busPIRone (BUSPAR) 10 MG Tab TAKE 1 TAB BY MOUTH 2 TIMES A DAY. 180 Tab 1   • trazodone (DESYREL) 50 MG Tab TAKE 2 TABLETS ORALLY EVERY MORNING AND 1 TABLET EVERY EVENING. 270 Tab 0   • sertraline (ZOLOFT) 50 MG Tab TAKE 2 TABS BY MOUTH EVERY DAY. 180 Tab 1   • hydrochlorothiazide (HYDRODIURIL) 25 MG Tab Take 1 Tab by mouth every day. 90 Tab 1   • atorvastatin (LIPITOR) 20 MG Tab Take 20 mg by mouth every evening. Indications: Elevation of Both Cholesterol and Triglycerides in Blood     • diltiazem CD (CARDIZEM CD) 180 MG CAPSULE SR 24 HR TAKE 1 CAP BY MOUTH EVERY DAY. 90 Cap 0   • metoprolol SR (TOPROL XL) 50 MG TABLET SR 24 HR Take 1 Tab by mouth every day. 90 Tab 1   • aspirin (ASA) 325 MG Tab Take 1 Tab by mouth every day. 100 Tab 3   • potassium chloride SA (K-DUR) 20 MEQ Tab CR Take 2 Tabs by mouth every day. 60 Tab 2   • acetaminophen (TYLENOL) 500 MG Tab Take 1,000 mg by mouth every 6 hours as needed.       No current facility-administered medications for this visit.       She  has a past medical history of Hypertension; Indigestion; Snoring; S/P bilateral mastectomy (4/4/2011); Hyperlipidemia (4/4/2011); Sleep apnea; Depression with anxiety (5/3/2011); History of cholecystectomy; CATARACT; Heart burn; Pain; Range of motion deficit; Arthritis; BMI 38.0-38.9,adult (9/18/2013); Chronic nausea (1/12/2015); MYESHA (obstructive sleep apnea) (4/4/2011); Pleural effusion, right (1/4/2017); and Hemothorax on right (1/4/2017).  Health Maintenance: due for mammogram  ROS  Constitutional: Negative for fever, chills/sweats   Respiratory: Negative for shortness of breath, CHAIREZ  Cardiovascular: Negative for chest pain or pressure  Denies memory trouble generally but reports can't come up with names on the spot.  All other systems reviewed and are negative except as per HPI.        Objective:     Blood pressure 120/80,  "pulse 67, temperature 36.4 °C (97.5 °F), resp. rate 16, height 1.549 m (5' 0.98\"), weight 84.823 kg (187 lb), SpO2 97 %. Body mass index is 35.35 kg/(m^2).  Physical Exam:  Alert, oriented in no acute distress.  Eye contact is good, speech goal directed, affect calm.  Right shoulder ROM in all directions is limited by pain. Uses no-dominant hand to reach for items.   Lungs: clear to auscultation bilaterally with good excursion.  CV: regular rate and rhythm.      Results reviewed from last labs December 2016 - low K    Discussed various options and patient opts to proceed with right shoulder joint injection.  Risks reviewed including but not limited to: lack of clinical benefit, temporary increased pain or increased pain as medicines wear off, local irritation, allergic reaction, skin or joint infection, tendon disruption.   Procedure:  right shoulder prepped with alcohol and injected with 1mL kenalog, 2mL 5% marcaine and 2mL 2% lidocaine using posterior approach.   Some slight relief in pain immediately and minimal improvement of ROM      Assessment and Plan:     Pt has moved to [facility]. Will establish with new PCP in [facility].  Trice was seen today for shoulder pain.    Diagnoses and all orders for this visit:    Shoulder arthritis  Comments:  Discussed likely lack of benefit from steroid injections with severe arthritis. Injected today and referred back to orthopedic.  Orders:  -     triamcinolone acetonide (KENALOG-40) injection 40 mg; 1 mL by Intra-articular route Once.    Screening mammogram, encounter for  Comments:  Previous mammogram indicates breast tissue present. Updated mammogram ordered  Orders:  -     MA-MAMMO SCREEN BILAT IMPLANTS SHAN CAD; Future    Polypharmacy  Comments:  Patient very confused about her medicines. Refer to pharmacist for polypharmacy review  Orders:  -     REFERRAL TO PHARMACOTHERAPY SERVICE    H/O breast surgery    Essential hypertension  Comments:  At goal. Continue current " medicines.    Hypokalemia  Comments:  Overdue for labs. Previous orders reprinted.        Followup: Return in about 6 weeks (around 6/16/2017) for change PCP to Dr Lou gilliam [facility]. sooner should new symptoms or problems arise.

## 2017-05-12 ENCOUNTER — TELEPHONE (OUTPATIENT)
Dept: MEDICAL GROUP | Facility: CLINIC | Age: 77
End: 2017-05-12

## 2017-05-12 NOTE — TELEPHONE ENCOUNTER
Result Notes      Notes Recorded by Trice Gonzalez M.D. on 5/10/2017 at 5:18 PM  Call patient. Potassium level is very low. Is she taking potassium as listed on her med list??

## 2017-05-18 ENCOUNTER — ANTICOAGULATION MONITORING (OUTPATIENT)
Dept: VASCULAR LAB | Facility: MEDICAL CENTER | Age: 77
End: 2017-05-18

## 2017-05-18 ENCOUNTER — NON-PROVIDER VISIT (OUTPATIENT)
Dept: VASCULAR LAB | Facility: MEDICAL CENTER | Age: 77
End: 2017-05-18
Attending: INTERNAL MEDICINE
Payer: MEDICARE

## 2017-05-18 VITALS — SYSTOLIC BLOOD PRESSURE: 121 MMHG | HEART RATE: 56 BPM | DIASTOLIC BLOOD PRESSURE: 71 MMHG

## 2017-05-18 DIAGNOSIS — E87.6 HYPOKALEMIA: ICD-10-CM

## 2017-05-18 PROCEDURE — 99212 OFFICE O/P EST SF 10 MIN: CPT

## 2017-05-18 RX ORDER — SIMVASTATIN 40 MG
40 TABLET ORAL NIGHTLY
COMMUNITY
End: 2017-08-07 | Stop reason: SDUPTHER

## 2017-05-18 NOTE — PROGRESS NOTES
Start taking     Metoprolol 50 mg - take 1/2 tablet by mouth once daily.   Furosemide 40 mg - take 1/2 tablet by mouth once daily in AM  Potassium 20 mEq - take 2 tablets by mouth once daily     Monitory your blood pressure at home if greater than 150/90 or if heart rate greater than 100 please call our clinic [phone]    Get your labwork done to recheck your potassium in 1 week.     Follow up with our clinic in 2 weeks. June 1st at 9 AM at [facility]    Gloria Baca, Pharm D

## 2017-05-18 NOTE — PROGRESS NOTES
Start taking     Metoprolol 50 mg - take 1/2 tablet by mouth once daily.   Furosemide 40 mg - take 1/2 tablet by mouth once daily in AM   Potassium 20 mEq - take 2 tablets by mouth once daily     Monitory your blood pressure at home if greater than 150/90 or if heart rate greater than 100 please call our clinic [phone]     Get your labwork done to recheck your potassium in 1 week.     Follow up with our clinic in 2 weeks. June 1st at 9 AM at [facility]     Gloria Baca, Pharm D

## 2017-05-18 NOTE — PROGRESS NOTES
Pharmacotherapy     S/O: Patient presents today with her brother for a polypharmacy consult. Patient was referred by Dr. Gómez. Primary purpose for visit today is to discuss indications and help patient organize medications. Patient has expressed concerns about feeling that she is on too many medications. Her brother helps her with her medications weekly and sets up a pill box for pt weekly. She forgets her potassium because it does not fit in pill box.        CrCl: > 60 ml/min  Filed Vitals:    05/18/17 1111   BP: 121/71   Pulse: 56         Current outpatient prescriptions:   •  simvastatin (ZOCOR) 40 MG Tab, Take 40 mg by mouth every evening., Disp: , Rfl:   •  furosemide (LASIX) 40 MG Tab, Take 1 Tab by mouth every day. (Patient taking differently: Take 20 mg by mouth every day.), Disp: 90 Tab, Rfl: 1  •  omeprazole (PRILOSEC) 40 MG delayed-release capsule, Take 1 Cap by mouth every day., Disp: 90 Cap, Rfl: 1  •  busPIRone (BUSPAR) 10 MG Tab, TAKE 1 TAB BY MOUTH 2 TIMES A DAY., Disp: 180 Tab, Rfl: 1  •  trazodone (DESYREL) 50 MG Tab, TAKE 2 TABLETS ORALLY EVERY MORNING AND 1 TABLET EVERY EVENING., Disp: 270 Tab, Rfl: 0  •  sertraline (ZOLOFT) 50 MG Tab, TAKE 2 TABS BY MOUTH EVERY DAY., Disp: 180 Tab, Rfl: 1  •  hydrochlorothiazide (HYDRODIURIL) 25 MG Tab, Take 1 Tab by mouth every day., Disp: 90 Tab, Rfl: 1  •  diltiazem CD (CARDIZEM CD) 180 MG CAPSULE SR 24 HR, TAKE 1 CAP BY MOUTH EVERY DAY., Disp: 90 Cap, Rfl: 0  •  metoprolol SR (TOPROL XL) 50 MG TABLET SR 24 HR, Take 1 Tab by mouth every day. (Patient taking differently: Take 25 mg by mouth every day.), Disp: 90 Tab, Rfl: 1  •  potassium chloride SA (K-DUR) 20 MEQ Tab CR, Take 2 Tabs by mouth every day., Disp: 60 Tab, Rfl: 2  •  acetaminophen (TYLENOL) 500 MG Tab, Take 1,000 mg by mouth every 6 hours as needed., Disp: , Rfl:     Assessment   Reviewed medication for indication, potential drug interactions, side effects and generic switches for cost savings.    · Provided pt with medication list which include why she is taking medications. She will refer to this list to help her. Her brother will continue to set up her medications.   · Patient with hx of atrial fibrillation during hospitalization in December of 2016. She was not started on anticoagulation at the time d/t bleed risk from her hemothorax. She was instructed to start  mg daily upon discharge but appears pt did not. She has had no cardiology follow up  · Patient is on furosemide and metoprolol for blood pressure control. HR today was 56. She is feeling light headed after she takes her medications and some fatigue. Will decrease both metoprolol and furosemide. Plan to taper pt off furosemide if she tolerates. WIll monitor BP. Will start an ACE or ARB if BP not controlled after furosemide stops.    Educated patient on proper storage of medication (cool dry place), to fill all prescription at one pharmacy        Drug/Drug interaction  · Patient is on several drugs which affect serotonin (buspirone, trazodone and sertraline) which increase her risk of serotonin syndrome- patient states anxiety is much more controlled at this time as several factors in her life have resolved her anxiety. Could consider tapering pt off of buspirone then sertraline over time. Will address after adjust blood pressure medications.     Plan  1. Decrease metoprolol to 25 mg (1/2 tablet) and decrease furosemide to 20 mg once daily. Monitor HR and blood pressure at home  2. Pt with hx of atrial fibrillation during hospital stay , not currently on anticoagulation CHADS-VASC = 4. She was discharged on ASA but she never started. She has not had follow up with cardiology. Will verify with PCP if she would like her to have follow up  3. Cut potassium pills in half to fit in your pill box.  Check potassium in 1 week.   4. Use medication list to remember what pills are for. Continue to have brother set up medication box  5. In the  future consider tapering either sertraline or buspirone since stress in life is much better which pt feels was causing anxiety.    Follow up 2 weeks in clinic       JEYSON EstevezD

## 2017-05-18 NOTE — Clinical Note
Dr. Gómez,  I saw this pt today for polypharmacy consult. I noticed the pt was discharged on  mg in December for Atrial fibrillation after she was extubated in the hospital.  She has not started ASA or had follow up with cardiology. CHADS-VASC = 4. Would you like her to start ASA 81 mg and/or follow up with cardiology?   Thank you, Gloria Baca, PHARMD

## 2017-05-18 NOTE — MR AVS SNAPSHOT
Sharon Callahan   2017 11:00 AM   Non-Provider Visit   MRN: [id number]    Department:  Vascular Medicine   Dept Phone:  [phone]    Description:  Female : 1940   Provider:  [facility] EXAM 4           Allergies as of 2017     Allergen Noted Reactions    Pcn [Penicillins] 2010   Anaphylaxis    Skin turns black    Clindamycin 2015   Rash    Rash      Influenza Virus Vacc 2016   Rash    Red in face    Norco [Hydrocodone-Acetaminophen] 2013       Pneumococcal Vaccine 2012   Rash    Tetracycline 2015   Rash    Rash     Xarelto [Rivaroxaban] 2012   Rash      Vital Signs     Blood Pressure Pulse Smoking Status             121/71 mmHg 56 Former Smoker         Basic Information     Date Of Birth Sex Race Ethnicity Preferred Language    1940 Female White Non- English      Your appointments     2017  9:00 AM   Anti-Coag Routine with [facility] PAV PHARMACIST   [facility]    [address]  [place] [address]   [phone]            2017 11:40 AM   NEW TO YOU with Xochitl Franklin M.D.   [facility]    [address]  [address]  [place] [address]   [phone]            2017  1:30 PM   MA SCRN10 with [facility] MG 1   [facility] ([place]    [address]  [place] [address] [phone]           No deodorant, powder, perfume or lotion under the arm or breast area.            2017 10:00 AM   CT BODY WITH with [address]   [facility] IMAGING - CT - [address]    [address]  [place] [address] [phone]           Taking medications as regularly scheduled is strongly encouraged.  *For Abdominal CT-Patient needs to  oral contrast and instruction from the department at least 2 hours prior to exam. Patient may   contrast at any imaging facility.              Problem List              ICD-10-CM Priority Class Noted - Resolved    H/O breast surgery Z98.890   4/4/2011 - Present    MYESHA (obstructive sleep apnea) G47.33   4/4/2011 - Present    Hypertension I10   4/4/2011 - Present    Hyperlipidemia E78.5   4/4/2011 - Present    Depression with anxiety F41.8   5/3/2011 - Present    Routine health maintenance Z00.00   1/19/2012 - Present    Localized osteoarthrosis not specified whether primary or secondary, pelvic region and thigh M16.9   12/3/2012 - Present    BMI 36.0-36.9,adult Z68.36   9/18/2013 - Present    Chronic nausea (Chronic) R11.0   1/12/2015 - Present    Tremor R25.1   1/12/2015 - Present    Osteopenia M85.80   3/25/2016 - Present    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   11/28/2016 - Present    Hypokalemia E87.6   11/29/2016 - Present    Pulmonary hypertension, moderate to severe (CMS-[facility]) I27.2   12/9/2016 - Present    LVH (left ventricular hypertrophy) I51.7   12/14/2016 - Present    Paroxysmal atrial fibrillation (CMS-HCC) I48.0   12/14/2016 - Present      Health Maintenance        Date Due Completion Dates    IMM DTaP/Tdap/Td Vaccine (1 - Tdap) 10/30/1960 ---    IMM ZOSTER VACCINE 10/30/2001 ---    MAMMOGRAM 5/19/2015 5/19/2014    BONE DENSITY 4/16/2020 4/16/2015            Current Immunizations     Influenza Vaccine Quad Inj (Preserved) 11/6/2016, 9/15/2012, 9/1/2011    Pneumococcal polysaccharide vaccine (PPSV-23) 12/4/2012 10:56 AM      Below and/or attached are the medications your provider expects you to take. Review all of your home medications and newly ordered medications with your provider and/or pharmacist. Follow medication instructions as directed by your provider and/or pharmacist. Please keep your medication list with you and share with your provider. Update the information when medications are discontinued, doses are changed, or new medications (including over-the-counter products) are added; and carry  medication information at all times in the event of emergency situations     Allergies:  PCN - Anaphylaxis     CLINDAMYCIN - Rash     INFLUENZA VIRUS VACC - Rash     NORCO - (reactions not documented)     PNEUMOCOCCAL VACCINE - Rash     TETRACYCLINE - Rash     XARELTO - Rash               Medications  Valid as of: May 18, 2017 - 11:57 AM    Generic Name Brand Name Tablet Size Instructions for use    Acetaminophen (Tab) TYLENOL 500 MG Take 1,000 mg by mouth every 6 hours as needed.        BusPIRone HCl (Tab) BUSPAR 10 MG TAKE 1 TAB BY MOUTH 2 TIMES A DAY.        DilTIAZem HCl Coated Beads (CAPSULE SR 24 HR) CARDIZEM  MG TAKE 1 CAP BY MOUTH EVERY DAY.        Furosemide (Tab) LASIX 40 MG Take 1 Tab by mouth every day.        HydroCHLOROthiazide (Tab) HYDRODIURIL 25 MG Take 1 Tab by mouth every day.        Metoprolol Succinate (TABLET SR 24 HR) TOPROL XL 50 MG Take 1 Tab by mouth every day.        Omeprazole (CAPSULE DELAYED RELEASE) PRILOSEC 40 MG Take 1 Cap by mouth every day.        Potassium Chloride Sweta CR (Tab CR) Kdur 20 MEQ Take 2 Tabs by mouth every day.        Sertraline HCl (Tab) ZOLOFT 50 MG TAKE 2 TABS BY MOUTH EVERY DAY.        Simvastatin (Tab) ZOCOR 40 MG Take 40 mg by mouth every evening.        TraZODone HCl (Tab) DESYREL 50 MG TAKE 2 TABLETS ORALLY EVERY MORNING AND 1 TABLET EVERY EVENING.        .                 Medicines prescribed today were sent to:     [id number] - [place] - [address]    [address] [address]    Phone: [phone] Fax: [phone]    Open 24 Hours?: No    [facility]    [address] [facility] [address]    Phone: [phone] Fax: [phone]      Medication refill instructions:       If your prescription bottle indicates you have medication refills left, it is not necessary to call your provider’s office. Please contact your pharmacy and they will refill your medication.    If your prescription bottle indicates you do not  have any refills left, you may request refills at any time through one of the following ways: The online [employer] system (except Urgent Care), by calling your provider’s office, or by asking your pharmacy to contact your provider’s office with a refill request. Medication refills are processed only during regular business hours and may not be available until the next business day. Your provider may request additional information or to have a follow-up visit with you prior to refilling your medication.   *Please Note: Medication refills are assigned a new Rx number when refilled electronically. Your pharmacy may indicate that no refills were authorized even though a new prescription for the same medication is available at the pharmacy. Please request the medicine by name with the pharmacy before contacting your provider for a refill.        Other Notes About Your Plan     -per OV 4/13/15, BMI 41.77. ? Morbid obesity code 278.01    -patient has been on Klonopin since 2011 without a diagnosis for this medication.                      [employer] Access Code: [email or web address]  Expires: 5/24/2017  1:27 PM    [employer]  A secure, online tool to manage your health information     [employer]’s [employer]® is a secure, online tool that connects you to your personalized health information from the privacy of your home -- day or night - making it very easy for you to manage your healthcare. Once the activation process is completed, you can even access your medical information using the [employer] macrina, which is available for free in the Apple Macrina store or Google Play store.     [employer] provides the following levels of access (as shown below):   My Chart Features   [facility]  Specialists [facility]  Urgent  Care Non-[facility]  Primary Care  Doctor   Email your healthcare team securely and privately 24/7 X X X    Manage appointments: schedule your next appointment; view details of past/upcoming appointments X      Request  prescription refills. X      View recent personal medical records, including lab and immunizations X X X X   View health record, including health history, allergies, medications X X X X   Read reports about your outpatient visits, procedures, consult and ER notes X X X X   See your discharge summary, which is a recap of your hospital and/or ER visit that includes your diagnosis, lab results, and care plan. X X       How to register for [facility]:  1. Go to  [email or web address].  2. Click on the Sign Up Now box, which takes you to the New Member Sign Up page. You will need to provide the following information:  a. Enter your [facility] Access Code exactly as it appears at the top of this page. (You will not need to use this code after you’ve completed the sign-up process. If you do not sign up before the expiration date, you must request a new code.)   b. Enter your date of birth.   c. Enter your home email address.   d. Click Submit, and follow the next screen’s instructions.  3. Create a [facility] ID. This will be your [facility] login ID and cannot be changed, so think of one that is secure and easy to remember.  4. Create a [facility] password. You can change your password at any time.  5. Enter your Password Reset Question and Answer. This can be used at a later time if you forget your password.   6. Enter your e-mail address. This allows you to receive e-mail notifications when new information is available in [facility].  7. Click Sign Up. You can now view your health information.    For assistance activating your [facility] account, call [phone]

## 2017-05-19 DIAGNOSIS — I51.7 LVH (LEFT VENTRICULAR HYPERTROPHY): ICD-10-CM

## 2017-05-19 DIAGNOSIS — I48.0 PAROXYSMAL ATRIAL FIBRILLATION (HCC): ICD-10-CM

## 2017-05-22 ENCOUNTER — ANTICOAGULATION MONITORING (OUTPATIENT)
Dept: VASCULAR LAB | Facility: MEDICAL CENTER | Age: 77
End: 2017-05-22

## 2017-05-23 NOTE — PROGRESS NOTES
Left VM for pt instructing to start ASA 81 mg daily for atrial fibrillation. She has follow up with cardiology in 1 month to discuss anticoagulation further.     Gloria Baca, PHARMD

## 2017-05-25 ENCOUNTER — HOSPITAL ENCOUNTER (OUTPATIENT)
Dept: LAB | Facility: MEDICAL CENTER | Age: 77
End: 2017-05-25
Attending: INTERNAL MEDICINE
Payer: MEDICARE

## 2017-05-25 DIAGNOSIS — E87.6 HYPOKALEMIA: ICD-10-CM

## 2017-05-25 LAB
ANION GAP SERPL CALC-SCNC: 14 MMOL/L (ref 0–11.9)
BUN SERPL-MCNC: 17 MG/DL (ref 8–22)
CALCIUM SERPL-MCNC: 9.2 MG/DL (ref 8.5–10.5)
CHLORIDE SERPL-SCNC: 93 MMOL/L (ref 96–112)
CO2 SERPL-SCNC: 27 MMOL/L (ref 20–33)
CREAT SERPL-MCNC: 0.96 MG/DL (ref 0.5–1.4)
GFR SERPL CREATININE-BSD FRML MDRD: 57 ML/MIN/1.73 M 2
GLUCOSE SERPL-MCNC: 66 MG/DL (ref 65–99)
POTASSIUM SERPL-SCNC: 3 MMOL/L (ref 3.6–5.5)
SODIUM SERPL-SCNC: 134 MMOL/L (ref 135–145)

## 2017-05-25 PROCEDURE — 36415 COLL VENOUS BLD VENIPUNCTURE: CPT

## 2017-05-25 PROCEDURE — 80048 BASIC METABOLIC PNL TOTAL CA: CPT

## 2017-05-26 ENCOUNTER — TELEPHONE (OUTPATIENT)
Dept: VASCULAR LAB | Facility: MEDICAL CENTER | Age: 77
End: 2017-05-26

## 2017-05-26 DIAGNOSIS — I10 ESSENTIAL HYPERTENSION: ICD-10-CM

## 2017-05-26 NOTE — TELEPHONE ENCOUNTER
[facility]     Pt recently seen in clinic for polypharmacy.  K+ was being corrected and recent K+ from 5/25/17 was 3.  Pt currently on 40 mEq daily.  Left VM for pt to increase to 80 mEq daily (40 mEq BID) and test BMP in 1 week.    Follow up in 1 week.    Sabas Doyle, PHARMD

## 2017-05-30 RX ORDER — TRAZODONE HYDROCHLORIDE 50 MG/1
TABLET ORAL
Qty: 270 TAB | Refills: 0 | Status: SHIPPED | OUTPATIENT
Start: 2017-05-30 | End: 2017-09-07 | Stop reason: SDUPTHER

## 2017-06-01 ENCOUNTER — HOSPITAL ENCOUNTER (OUTPATIENT)
Dept: LAB | Facility: MEDICAL CENTER | Age: 77
End: 2017-06-01
Attending: INTERNAL MEDICINE
Payer: MEDICARE

## 2017-06-01 ENCOUNTER — TELEPHONE (OUTPATIENT)
Dept: MEDICAL GROUP | Facility: PHYSICIAN GROUP | Age: 77
End: 2017-06-01

## 2017-06-01 ENCOUNTER — ANTICOAGULATION VISIT (OUTPATIENT)
Dept: MEDICAL GROUP | Facility: MEDICAL CENTER | Age: 77
End: 2017-06-01
Payer: MEDICARE

## 2017-06-01 VITALS — HEART RATE: 52 BPM | SYSTOLIC BLOOD PRESSURE: 124 MMHG | DIASTOLIC BLOOD PRESSURE: 89 MMHG

## 2017-06-01 DIAGNOSIS — Z79.899 POLYPHARMACY: ICD-10-CM

## 2017-06-01 DIAGNOSIS — E87.6 HYPOKALEMIA: ICD-10-CM

## 2017-06-01 LAB
ANION GAP SERPL CALC-SCNC: 11 MMOL/L (ref 0–11.9)
BUN SERPL-MCNC: 9 MG/DL (ref 8–22)
CALCIUM SERPL-MCNC: 9.5 MG/DL (ref 8.5–10.5)
CHLORIDE SERPL-SCNC: 100 MMOL/L (ref 96–112)
CO2 SERPL-SCNC: 25 MMOL/L (ref 20–33)
CREAT SERPL-MCNC: 0.84 MG/DL (ref 0.5–1.4)
GFR SERPL CREATININE-BSD FRML MDRD: >60 ML/MIN/1.73 M 2
GLUCOSE SERPL-MCNC: 70 MG/DL (ref 65–99)
POTASSIUM SERPL-SCNC: 3.9 MMOL/L (ref 3.6–5.5)
SODIUM SERPL-SCNC: 136 MMOL/L (ref 135–145)

## 2017-06-01 PROCEDURE — 85610 PROTHROMBIN TIME: CPT | Performed by: FAMILY MEDICINE

## 2017-06-01 PROCEDURE — 36415 COLL VENOUS BLD VENIPUNCTURE: CPT

## 2017-06-01 PROCEDURE — 80048 BASIC METABOLIC PNL TOTAL CA: CPT

## 2017-06-01 RX ORDER — HYDROCHLOROTHIAZIDE 25 MG/1
25 TABLET ORAL DAILY
COMMUNITY
End: 2017-08-10

## 2017-06-01 NOTE — PROGRESS NOTES
Stop taking: Furosemide 40 mg - take 1/2 tablet by mouth once daily in AM      Monitory your blood pressure at home if greater than 150/90 or if heart rate greater than 100 please call our clinic [phone]     Get your labwork done to recheck your potassium today. Depending on the results I may call you to decrease your potassium dose     Follow up with our clinic in 2 weeks. June 15tht at 2 PM at [facility]     Gloria Baca, PHARMD

## 2017-06-01 NOTE — TELEPHONE ENCOUNTER
----- Message from Autumn Yin D.O. sent at 6/1/2017 11:47 AM PDT -----  Lab results show potassium is improved but still low. Pt on HCTZ. Please inquire if she is still taking her potassium supplement (looks like she may need a refill).

## 2017-06-01 NOTE — MR AVS SNAPSHOT
Sharon Callahan   2017 9:00 AM   Anticoagulation Visit   MRN: [id number]    Department:  [facility]   Dept Phone:  [phone]    Description:  Female : 1940   Provider:  [facility]ARMACIST           Allergies as of 2017     Allergen Noted Reactions    Pcn [Penicillins] 2010   Anaphylaxis    Skin turns black    Clindamycin 2015   Rash    Rash      Influenza Virus Vacc 2016   Rash    Red in face    Norco [Hydrocodone-Acetaminophen] 2013       Pneumococcal Vaccine 2012   Rash    Tetracycline 2015   Rash    Rash     Xarelto [Rivaroxaban] 2012   Rash      Vital Signs     Blood Pressure Pulse Smoking Status             124/89 mmHg 53 Former Smoker         Basic Information     Date Of Birth Sex Race Ethnicity Preferred Language    1940 Female White Non- English      Your appointments     2017 10:20 AM   NEW PATIENT with Michael Crockett M.D.   [facility] (--)    [address]  [place] [address]   [phone]            Selvin 15, 2017  2:00 PM   Anti-Coag New Patient with [facility]ARMAC[facility]   [facility] ([place]    [address]  [address]  [place] [address] [phone]            2017 11:40 AM   NEW TO YOU with Xochitl Franklin M.D.   [facility]    [address]  [place] [address] [phone]            2017  1:30 PM   MA SCRN10 with S [facility] MG 1   [facility] ([place]    [address]  [place] [address] [phone]           No deodorant, powder, perfume or lotion under the arm or breast area.            2017 10:00 AM   CT BODY WITH with [address] 1   [facility] - [address]    [address]  [place] [address] [phone]           Some exams require specific prep instructions that would have been given to you at time of scheduling. If you have any additional questions about the prep instructions, please call Imaging Scheduling at [phone] and press #2.              Problem List              ICD-10-CM Priority Class Noted - Resolved    H/O breast surgery Z98.890   4/4/2011 - Present    MYESHA (obstructive sleep apnea) G47.33   4/4/2011 - Present    Hypertension I10   4/4/2011 - Present    Hyperlipidemia E78.5   4/4/2011 - Present    Depression with anxiety F41.8   5/3/2011 - Present    Routine health maintenance Z00.00   1/19/2012 - Present    Localized osteoarthrosis not specified whether primary or secondary, pelvic region and thigh M16.9   12/3/2012 - Present    BMI 36.0-36.9,adult Z68.36   9/18/2013 - Present    Chronic nausea (Chronic) R11.0   1/12/2015 - Present    Tremor R25.1   1/12/2015 - Present    Osteopenia M85.80   3/25/2016 - Present    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   11/28/2016 - Present    Hypokalemia E87.6   11/29/2016 - Present    Pulmonary hypertension, moderate to severe (CMS-HCC) I27.2   12/9/2016 - Present    LVH (left ventricular hypertrophy) I51.7   12/14/2016 - Present    Paroxysmal atrial fibrillation (CMS-HCC) I48.0   12/14/2016 - Present      Health Maintenance        Date Due Completion Dates    IMM DTaP/Tdap/Td Vaccine (1 - Tdap) 10/30/1960 ---    IMM ZOSTER VACCINE 10/30/2001 ---    MAMMOGRAM 5/19/2015 5/19/2014    BONE DENSITY 4/16/2020 4/16/2015            Current Immunizations     Influenza Vaccine Quad Inj (Preserved) 11/6/2016, 9/15/2012, 9/1/2011    Pneumococcal polysaccharide vaccine (PPSV-23) 12/4/2012 10:56 AM      Below and/or attached are the medications your provider expects you to take. Review all of your home medications and newly ordered medications with your provider and/or pharmacist. Follow medication instructions as directed by your provider and/or pharmacist. Please keep your medication list  with you and share with your provider. Update the information when medications are discontinued, doses are changed, or new medications (including over-the-counter products) are added; and carry medication information at all times in the event of emergency situations     Allergies:  PCN - Anaphylaxis     CLINDAMYCIN - Rash     INFLUENZA VIRUS VACC - Rash     NORCO - (reactions not documented)     PNEUMOCOCCAL VACCINE - Rash     TETRACYCLINE - Rash     XARELTO - Rash               Medications  Valid as of: June 01, 2017 -  9:23 AM    Generic Name Brand Name Tablet Size Instructions for use    Acetaminophen (Tab) TYLENOL 500 MG Take 1,000 mg by mouth every 6 hours as needed.        Aspirin (Tablet Delayed Response) ECOTRIN 81 MG Take 81 mg by mouth every day.        BusPIRone HCl (Tab) BUSPAR 10 MG TAKE 1 TAB BY MOUTH 2 TIMES A DAY.        HydroCHLOROthiazide (Tab) HYDRODIURIL 25 MG Take 25 mg by mouth every day.        Metoprolol Succinate (TABLET SR 24 HR) TOPROL XL 50 MG Take 1 Tab by mouth every day.        Omeprazole (CAPSULE DELAYED RELEASE) PRILOSEC 40 MG Take 1 Cap by mouth every day.        Potassium Chloride Sweta CR (Tab CR) Kdur 20 MEQ Take 2 Tabs by mouth every day.        Sertraline HCl (Tab) ZOLOFT 50 MG TAKE 2 TABS BY MOUTH EVERY DAY.        Simvastatin (Tab) ZOCOR 40 MG Take 40 mg by mouth every evening.        TraZODone HCl (Tab) DESYREL 50 MG TAKE 2 TABLETS ORALLY EVERY MORNING AND 1 TABLET EVERY EVENING.        .                 Medicines prescribed today were sent to:     [id number] - [place] - [address]    [address] [address]    Phone: [phone] Fax: [phone]    Open 24 Hours?: No    [facility]    [address] [facility] [address]    Phone: [phone] Fax: [phone]    [id number] [place] - [address]    [address] [address]    Phone: [phone] Fax: [phone]    Open 24 Hours?: No         Medication refill instructions:       If your prescription bottle indicates you have medication refills left, it is not necessary to call your provider’s office. Please contact your pharmacy and they will refill your medication.    If your prescription bottle indicates you do not have any refills left, you may request refills at any time through one of the following ways: The online [employer] system (except Urgent Care), by calling your provider’s office, or by asking your pharmacy to contact your provider’s office with a refill request. Medication refills are processed only during regular business hours and may not be available until the next business day. Your provider may request additional information or to have a follow-up visit with you prior to refilling your medication.   *Please Note: Medication refills are assigned a new Rx number when refilled electronically. Your pharmacy may indicate that no refills were authorized even though a new prescription for the same medication is available at the pharmacy. Please request the medicine by name with the pharmacy before contacting your provider for a refill.        Other Notes About Your Plan     -per OV 4/13/15, BMI 41.77. ? Morbid obesity code 278.01    -patient has been on Klonopin since 2011 without a diagnosis for this medication.                      [employer] Access Code: [email or web address]  Expires: 6/22/2017  4:14 AM    [employer]  A secure, online tool to manage your health information     [employer]® is a secure, online tool that connects you to your personalized health information from the privacy of your home -- day or night - making it very easy for you to manage your healthcare. Once the activation process is completed, you can even access your medical information using the [employer] macrina, which is available for free in the Apple [employer] store or Google Play store.     [employer] provides the following levels of access (as shown below):   My Chart  Features   [facility] Doctor [facility]  Specialists [facility]  Urgent  Care Non-[facility]  Primary Care  Doctor   Email your healthcare team securely and privately 24/7 X X X    Manage appointments: schedule your next appointment; view details of past/upcoming appointments X      Request prescription refills. X      View recent personal medical records, including lab and immunizations X X X X   View health record, including health history, allergies, medications X X X X   Read reports about your outpatient visits, procedures, consult and ER notes X X X X   See your discharge summary, which is a recap of your hospital and/or ER visit that includes your diagnosis, lab results, and care plan. X X       How to register for [employer]:  1. Go to  [email or web address].  2. Click on the Sign Up Now box, which takes you to the New Member Sign Up page. You will need to provide the following information:  a. Enter your [employer] Access Code exactly as it appears at the top of this page. (You will not need to use this code after you’ve completed the sign-up process. If you do not sign up before the expiration date, you must request a new code.)   b. Enter your date of birth.   c. Enter your home email address.   d. Click Submit, and follow the next screen’s instructions.  3. Create a [employer] ID. This will be your [employer] login ID and cannot be changed, so think of one that is secure and easy to remember.  4. Create a [employer] password. You can change your password at any time.  5. Enter your Password Reset Question and Answer. This can be used at a later time if you forget your password.   6. Enter your e-mail address. This allows you to receive e-mail notifications when new information is available in [employer].  7. Click Sign Up. You can now view your health information.    For assistance activating your [employer] account, call [phone]

## 2017-06-01 NOTE — PROGRESS NOTES
Pharmacotherapy     S/O: Patient presents today for a polypharmacy consult follow up. Patient was referred by Dr. Gómez. Primary purpose for visit today is to continue to taper medications. She has logged her blood pressure all are under 140/90, pulse in 50's. She reports feeling some lightheadedness recently while doing yard work. She reports some swelling her her hands and feet related to arthritis. She has started ASA 81 mg as instructed and increased her potasium to 40 mEq BID.       Filed Vitals:    06/01/17 0913   BP: 124/89   Pulse: 52         Current outpatient prescriptions:   •  hydrochlorothiazide (HYDRODIURIL) 25 MG Tab, Take 25 mg by mouth every day., Disp: , Rfl:   •  aspirin EC (ECOTRIN) 81 MG Tablet Delayed Response, Take 81 mg by mouth every day., Disp: , Rfl:   •  trazodone (DESYREL) 50 MG Tab, TAKE 2 TABLETS ORALLY EVERY MORNING AND 1 TABLET EVERY EVENING., Disp: 270 Tab, Rfl: 0  •  simvastatin (ZOCOR) 40 MG Tab, Take 40 mg by mouth every evening., Disp: , Rfl:   •  omeprazole (PRILOSEC) 40 MG delayed-release capsule, Take 1 Cap by mouth every day., Disp: 90 Cap, Rfl: 1  •  busPIRone (BUSPAR) 10 MG Tab, TAKE 1 TAB BY MOUTH 2 TIMES A DAY., Disp: 180 Tab, Rfl: 1  •  sertraline (ZOLOFT) 50 MG Tab, TAKE 2 TABS BY MOUTH EVERY DAY., Disp: 180 Tab, Rfl: 1  •  metoprolol SR (TOPROL XL) 50 MG TABLET SR 24 HR, Take 1 Tab by mouth every day. (Patient taking differently: Take 25 mg by mouth every day.), Disp: 90 Tab, Rfl: 1  •  potassium chloride SA (K-DUR) 20 MEQ Tab CR, Take 2 Tabs by mouth every day., Disp: 60 Tab, Rfl: 2  •  acetaminophen (TYLENOL) 500 MG Tab, Take 1,000 mg by mouth every 6 hours as needed., Disp: , Rfl:     Assessment  1. Blood pressure and pulse within goal after decrease in furosemide and metoprolol dose.   · Will stop furosemide, have pt monitor for swelling and she will monitor her BP. She will notify our clinic if BP is greater than 150/90  · Pt to get potassium checked today, will  follow up with results and will decrease potassium supplementation if potasium WNL today.   · Will not stop metoprolol at this time, she sees cardiology regarding atrial fibrillation in 2 weeks     4. Drug/Drug interaction  · Patient is on several drugs which affect serotonin (buspirone, trazodone and sertraline) which increase her risk of serotonin syndrome- patient states anxiety is much more controlled at this time as several factors in her life have resolved her anxiety. Could consider tapering pt off of buspirone then sertraline over time. Will address after adjust blood pressure medications.     Plan  1. Stop Furosemide. Monitor BP, swelling and weight gain.   2. If Potassium WNL will decrease potassium supplement since she is stopping furosemide  3. After BP medications titrated down, will address anxiety medications.     Follow up 2 weeks    Gloria Baca, PHARMD     CC: Dr. Bloch, Dr. Gómez

## 2017-06-06 ENCOUNTER — ANTICOAGULATION MONITORING (OUTPATIENT)
Dept: VASCULAR LAB | Facility: MEDICAL CENTER | Age: 77
End: 2017-06-06

## 2017-06-07 NOTE — PROGRESS NOTES
Reviewed potassium result. Potassium is up WNL, pt will be stopping furosemide. Called and instructed pt to decrease potasium to 40 mEq once daily. Pt verified understanding.     Gloria Baca, JEYSOND

## 2017-06-14 ENCOUNTER — OFFICE VISIT (OUTPATIENT)
Dept: CARDIOLOGY | Facility: MEDICAL CENTER | Age: 77
End: 2017-06-14
Payer: MEDICARE

## 2017-06-14 VITALS
BODY MASS INDEX: 33.31 KG/M2 | HEART RATE: 54 BPM | SYSTOLIC BLOOD PRESSURE: 122 MMHG | OXYGEN SATURATION: 96 % | HEIGHT: 63 IN | DIASTOLIC BLOOD PRESSURE: 76 MMHG | WEIGHT: 188 LBS

## 2017-06-14 DIAGNOSIS — I48.0 PAROXYSMAL ATRIAL FIBRILLATION (HCC): ICD-10-CM

## 2017-06-14 DIAGNOSIS — I10 ESSENTIAL HYPERTENSION: ICD-10-CM

## 2017-06-14 DIAGNOSIS — D3A.090 CARCINOID TUMOR OF LUNG: ICD-10-CM

## 2017-06-14 LAB — EKG IMPRESSION: NORMAL

## 2017-06-14 PROCEDURE — 99204 OFFICE O/P NEW MOD 45 MIN: CPT | Performed by: INTERNAL MEDICINE

## 2017-06-14 PROCEDURE — 93000 ELECTROCARDIOGRAM COMPLETE: CPT | Performed by: INTERNAL MEDICINE

## 2017-06-14 RX ORDER — FUROSEMIDE 40 MG/1
40 TABLET ORAL
Refills: 1 | COMMUNITY
Start: 2017-03-20 | End: 2017-06-15

## 2017-06-14 ASSESSMENT — ENCOUNTER SYMPTOMS
CONSTIPATION: 1
ORTHOPNEA: 0
BLURRED VISION: 1
LOSS OF CONSCIOUSNESS: 0
PALPITATIONS: 1
MYALGIAS: 0
BRUISES/BLEEDS EASILY: 1
SHORTNESS OF BREATH: 0
FEVER: 0
HEADACHES: 0
PND: 0
COUGH: 0
NAUSEA: 0
DIZZINESS: 0
WEIGHT LOSS: 1
CHILLS: 0
DIARRHEA: 1
ABDOMINAL PAIN: 0

## 2017-06-14 NOTE — PROGRESS NOTES
Subjective:   Sharon Callahan is a 76 y.o. female who presents today being seen in consult on request of Dr Gómez secondary to episode of atrial fib during an intercurrent illness. No palps now. Previous palps occasionally. The patient had no recollection of these events. 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 No chest pain or SOB and no palps at this time. Positive FH of CAD.    Past Medical History   Diagnosis Date   • Hypertension    • Indigestion    • Snoring    • S/P bilateral mastectomy 4/4/2011   • Hyperlipidemia 4/4/2011   • Sleep apnea      CPAP   • Depression with anxiety 5/3/2011   • History of cholecystectomy    • CATARACT      surgical correcttion bilateral   • Heart burn    • Pain      left hip   • Range of motion deficit      left elbow, unable to completely extend   • Arthritis    • BMI 38.0-38.9,adult 9/18/2013   • Chronic nausea 1/12/2015     Has had GI work up done Dr voss   • MYESHA (obstructive sleep apnea) 4/4/2011     On cPAP    • Pleural effusion, right 1/4/2017     Status post thoracotomy and decortication   • Hemothorax on right 1/4/2017     Status post thoracotomy and decortication     Past Surgical History   Procedure Laterality Date   • Hip arthroplasty total  3/26/2009     Right; Perfmed by THERESE LEMA at SURGERY [facility] ORS   • Lizette by laparoscopy  2/1/2011     Performed by DORON HINOJOSA at SURGERY SAME DAY [facility] ORS   • Other  1988     bilateral mastectomies with implants   • Other  1989     tummy tuck   • Uvulopharyngopalatoplasty  1990   • Cataract phaco with iol  10/20/2011     Performed by DEENA BRADEN at SURGERY SAME DAY [facility] ORS   • Cataract phaco with iol  11/3/2011     Performed by DEENA BRADEN at SURGERY [facility] ORS   • Orif, humerus  1950's     left   • Hip arthroplasty total  12/3/2012     Performed by Jamie Kenney M.D. at SURGERY [facility] ORS   • Breast  biopsy  1980     bilateral benign mastectomy   • Pr enlarge breast with implant     • Thoracoscopy Right 12/6/2016     Procedure: RIGHT THORACOSCOPY ,DECORTICATION, EVACUATION OF HEMOTHORAX;  Surgeon: Ehsan De Leon D.O.;  Location: SURGERY [facility];  Service:      Family History   Problem Relation Age of Onset   • Hypertension Mother    • Cancer Father      lung   • Diabetes Father    • Hypertension Father    • Hypertension Brother    • Cancer Paternal Aunt      stomach   • Diabetes Paternal Aunt    • Hypertension Maternal Grandmother    • Hyperlipidemia Neg Hx    • Stroke Neg Hx    • No Known Problems Brother    • Hypertension Brother    • Diabetes Brother      History   Smoking status   • Former Smoker -- 1.00 packs/day for 15 years   • Types: Cigarettes   • Quit date: 01/01/1975   Smokeless tobacco   • Never Used     Allergies   Allergen Reactions   • Pcn [Penicillins] Anaphylaxis     Skin turns black   • Clindamycin Rash     Rash     • Influenza Virus Vacc Rash     Red in face   • Norco [Hydrocodone-Acetaminophen]    • Pneumococcal Vaccine Rash   • Tetracycline Rash     Rash    • Xarelto [Rivaroxaban] Rash     Outpatient Encounter Prescriptions as of 6/14/2017   Medication Sig Dispense Refill   • hydrochlorothiazide (HYDRODIURIL) 25 MG Tab Take 25 mg by mouth every day.     • aspirin EC (ECOTRIN) 81 MG Tablet Delayed Response Take 81 mg by mouth every day.     • trazodone (DESYREL) 50 MG Tab TAKE 2 TABLETS ORALLY EVERY MORNING AND 1 TABLET EVERY EVENING. 270 Tab 0   • simvastatin (ZOCOR) 40 MG Tab Take 40 mg by mouth every evening.     • omeprazole (PRILOSEC) 40 MG delayed-release capsule Take 1 Cap by mouth every day. 90 Cap 1   • busPIRone (BUSPAR) 10 MG Tab TAKE 1 TAB BY MOUTH 2 TIMES A DAY. 180 Tab 1   • sertraline (ZOLOFT) 50 MG Tab TAKE 2 TABS BY MOUTH EVERY DAY. 180 Tab 1   • metoprolol SR (TOPROL XL) 50 MG TABLET SR 24 HR Take 1 Tab by mouth every day. (Patient taking differently: Take 25 mg by  "mouth every day.) 90 Tab 1   • potassium chloride SA (K-DUR) 20 MEQ Tab CR Take 2 Tabs by mouth every day. 60 Tab 2   • furosemide (LASIX) 40 MG Tab Take 40 mg by mouth every day. TAKE 1 TAB BY MOUTH EVERY DAY.  1   • acetaminophen (TYLENOL) 500 MG Tab Take 1,000 mg by mouth every 6 hours as needed.       No facility-administered encounter medications on file as of 6/14/2017.     Review of Systems   Constitutional: Positive for weight loss. Negative for fever and chills.   HENT: Positive for congestion.    Eyes: Positive for blurred vision.   Respiratory: Negative for cough and shortness of breath.    Cardiovascular: Positive for palpitations. Negative for chest pain, orthopnea, leg swelling and PND.   Gastrointestinal: Positive for diarrhea and constipation. Negative for nausea and abdominal pain.   Musculoskeletal: Positive for joint pain. Negative for myalgias.   Skin: Positive for itching. Negative for rash.   Neurological: Negative for dizziness, loss of consciousness and headaches.   Endo/Heme/Allergies: Positive for environmental allergies. Bruises/bleeds easily.        Objective:   /76 mmHg  Pulse 54  Ht 1.6 m (5' 2.99\")  Wt 85.276 kg (188 lb)  BMI 33.31 kg/m2  SpO2 96%    Physical Exam   Constitutional: She is oriented to person, place, and time. She appears well-developed and well-nourished. No distress.   HENT:   Mouth/Throat: Oropharynx is clear and moist.   Eyes: Conjunctivae and EOM are normal.   Neck: Neck supple. No JVD present. No thyroid mass present.   Cardiovascular: Normal rate, regular rhythm, S1 normal, S2 normal and normal pulses.  PMI is not displaced.  Exam reveals no gallop.    No murmur heard.  Pulses:       Carotid pulses are 2+ on the right side, and 2+ on the left side.       Radial pulses are 2+ on the right side, and 2+ on the left side.        Femoral pulses are 2+ on the right side, and 2+ on the left side.       Dorsalis pedis pulses are 2+ on the right side, and 2+ on " the left side.   No peripheral edema.   Pulmonary/Chest: Effort normal and breath sounds normal.   Abdominal: Soft. Normal appearance. She exhibits no abdominal bruit and no mass. There is no hepatosplenomegaly. There is no tenderness.   Musculoskeletal: Normal range of motion. She exhibits no edema.        Lumbar back: She exhibits no tenderness and no spasm.   Neurological: She is alert and oriented to person, place, and time. She has normal strength.   Skin: Skin is warm and dry. No rash noted. No cyanosis. Nails show no clubbing.   Psychiatric: She has a normal mood and affect.       Assessment:     1. Paroxysmal atrial fibrillation (CMS-HCC)  EKG   2. Carcinoid tumor of lung     3. BMI 36.0-36.9,adult     4. Essential hypertension         Medical Decision Making:  Today's Assessment / Status / Plan:     1. Afib in setting of severe intercurrent illness. No recs at this time. If symptomatic in future could consider AA meds and possibly anticoagulation.  2. Carcinoid tumor. Followed.  3. Sinus bradycardia asymptomatic on low dose beta blockers.  4. F/U prn.

## 2017-06-14 NOTE — Clinical Note
[facility]   [address] [place] [address] [address]  [place] [address]  Phone: [phone]  Fax: [phone]              Sharon Callahan  1940    Encounter Date: 6/14/2017    Michael Crockett M.D.          PROGRESS NOTE:  Subjective:   Sharon Callahan is a 76 y.o. female who presents today being seen in consult on request of Dr Gómez secondary to episode of atrial fib during an intercurrent illness. No palps now. Previous palps occasionally. The patient had no recollection of these events. Has a carcinoid tumor which was biopsies and large PTX as a complication which required decortication procedure. No chest p[ain or SOB and no palps at this time. Positive FH of CAD.    Past Medical History   Diagnosis Date   • Hypertension    • Indigestion    • Snoring    • S/P bilateral mastectomy 4/4/2011   • Hyperlipidemia 4/4/2011   • Sleep apnea      CPAP   • Depression with anxiety 5/3/2011   • History of cholecystectomy    • CATARACT      surgical correcttion bilateral   • Heart burn    • Pain      left hip   • Range of motion deficit      left elbow, unable to completely extend   • Arthritis    • BMI 38.0-38.9,adult 9/18/2013   • Chronic nausea 1/12/2015     Has had GI work up done Dr voss   • MYESHA (obstructive sleep apnea) 4/4/2011     On cPAP    • Pleural effusion, right 1/4/2017     Status post thoracotomy and decortication   • Hemothorax on right 1/4/2017     Status post thoracotomy and decortication     Past Surgical History   Procedure Laterality Date   • Hip arthroplasty total  3/26/2009     Right; Perfmed by THERESE LEMA at SURGERY [facility] ORS   • Lizette by laparoscopy  2/1/2011     Performed by DORON HINOJOSA at SURGERY SAME DAY [facility] ORS   • Other  1988     bilateral mastectomies with implants   • Other  1989     tummy tuck   • Uvulopharyngopalatoplasty  1990   • Cataract phaco with iol  10/20/2011     Performed by DEENA BRADEN at SURGERY SAME DAY  [facility] ORS   • Cataract phaco with iol  11/3/2011     Performed by DEENA BRADEN at SURGERY [facility]   • Orif, humerus  1950's     left   • Hip arthroplasty total  12/3/2012     Performed by Jamie Kenney M.D. at SURGERY [facility] ORS   • Breast biopsy  1980     bilateral benign mastectomy   • Pr enlarge breast with implant     • Thoracoscopy Right 12/6/2016     Procedure: RIGHT THORACOSCOPY ,DECORTICATION, EVACUATION OF HEMOTHORAX;  Surgeon: Ehsan De Leon D.O.;  Location: SURGERY [facility];  Service:      Family History   Problem Relation Age of Onset   • Hypertension Mother    • Cancer Father      lung   • Diabetes Father    • Hypertension Father    • Hypertension Brother    • Cancer Paternal Aunt      stomach   • Diabetes Paternal Aunt    • Hypertension Maternal Grandmother    • Hyperlipidemia Neg Hx    • Stroke Neg Hx    • No Known Problems Brother    • Hypertension Brother    • Diabetes Brother      History   Smoking status   • Former Smoker -- 1.00 packs/day for 15 years   • Types: Cigarettes   • Quit date: 01/01/1975   Smokeless tobacco   • Never Used     Allergies   Allergen Reactions   • Pcn [Penicillins] Anaphylaxis     Skin turns black   • Clindamycin Rash     Rash     • Influenza Virus Vacc Rash     Red in face   • Norco [Hydrocodone-Acetaminophen]    • Pneumococcal Vaccine Rash   • Tetracycline Rash     Rash    • Xarelto [Rivaroxaban] Rash     Outpatient Encounter Prescriptions as of 6/14/2017   Medication Sig Dispense Refill   • hydrochlorothiazide (HYDRODIURIL) 25 MG Tab Take 25 mg by mouth every day.     • aspirin EC (ECOTRIN) 81 MG Tablet Delayed Response Take 81 mg by mouth every day.     • trazodone (DESYREL) 50 MG Tab TAKE 2 TABLETS ORALLY EVERY MORNING AND 1 TABLET EVERY EVENING. 270 Tab 0   • simvastatin (ZOCOR) 40 MG Tab Take 40 mg by mouth every evening.     • omeprazole (PRILOSEC) 40 MG delayed-release capsule Take 1 Cap by mouth every day. 90 Cap 1   • busPIRone  "(BUSPAR) 10 MG Tab TAKE 1 TAB BY MOUTH 2 TIMES A DAY. 180 Tab 1   • sertraline (ZOLOFT) 50 MG Tab TAKE 2 TABS BY MOUTH EVERY DAY. 180 Tab 1   • metoprolol SR (TOPROL XL) 50 MG TABLET SR 24 HR Take 1 Tab by mouth every day. (Patient taking differently: Take 25 mg by mouth every day.) 90 Tab 1   • potassium chloride SA (K-DUR) 20 MEQ Tab CR Take 2 Tabs by mouth every day. 60 Tab 2   • furosemide (LASIX) 40 MG Tab Take 40 mg by mouth every day. TAKE 1 TAB BY MOUTH EVERY DAY.  1   • acetaminophen (TYLENOL) 500 MG Tab Take 1,000 mg by mouth every 6 hours as needed.       No facility-administered encounter medications on file as of 6/14/2017.     Review of Systems   Constitutional: Positive for weight loss. Negative for fever and chills.   HENT: Positive for congestion.    Eyes: Positive for blurred vision.   Respiratory: Negative for cough and shortness of breath.    Cardiovascular: Positive for palpitations. Negative for chest pain, orthopnea, leg swelling and PND.   Gastrointestinal: Positive for diarrhea and constipation. Negative for nausea and abdominal pain.   Musculoskeletal: Positive for joint pain. Negative for myalgias.   Skin: Positive for itching. Negative for rash.   Neurological: Negative for dizziness, loss of consciousness and headaches.   Endo/Heme/Allergies: Positive for environmental allergies. Bruises/bleeds easily.        Objective:   /76 mmHg  Pulse 54  Ht 1.6 m (5' 2.99\")  Wt 85.276 kg (188 lb)  BMI 33.31 kg/m2  SpO2 96%    Physical Exam   Constitutional: She is oriented to person, place, and time. She appears well-developed and well-nourished. No distress.   HENT:   Mouth/Throat: Oropharynx is clear and moist.   Eyes: Conjunctivae and EOM are normal.   Neck: Neck supple. No JVD present. No thyroid mass present.   Cardiovascular: Normal rate, regular rhythm, S1 normal, S2 normal and normal pulses.  PMI is not displaced.  Exam reveals no gallop.    No murmur heard.  Pulses:       Carotid " pulses are 2+ on the right side, and 2+ on the left side.       Radial pulses are 2+ on the right side, and 2+ on the left side.        Femoral pulses are 2+ on the right side, and 2+ on the left side.       Dorsalis pedis pulses are 2+ on the right side, and 2+ on the left side.   No peripheral edema.   Pulmonary/Chest: Effort normal and breath sounds normal.   Abdominal: Soft. Normal appearance. She exhibits no abdominal bruit and no mass. There is no hepatosplenomegaly. There is no tenderness.   Musculoskeletal: Normal range of motion. She exhibits no edema.        Lumbar back: She exhibits no tenderness and no spasm.   Neurological: She is alert and oriented to person, place, and time. She has normal strength.   Skin: Skin is warm and dry. No rash noted. No cyanosis. Nails show no clubbing.   Psychiatric: She has a normal mood and affect.       Assessment:     1. Paroxysmal atrial fibrillation (CMS-HCC)  EKG   2. Carcinoid tumor of lung     3. BMI 36.0-36.9,adult     4. Essential hypertension         Medical Decision Making:  Today's Assessment / Status / Plan:     1. Afib in setting of sever intercurrent illness. No recs at this time. If symptomatic in future could consider AA meds and possibly anticoagulation.  2. Carcinoid tumor. Followed.  3. F/U prn.      Autumn Yin D.O.  [address]  [address]  [place] [address]  VIA In Basket     Analy Gómez M.D.  [address] [facility] [address]  [place] [address]  VIA In Basket

## 2017-06-14 NOTE — MR AVS SNAPSHOT
"        Sharon Callahan   2017 10:20 AM   Office Visit   MRN: [id number]    Department:  [facility]   Dept Phone:  [phone]    Description:  Female : 1940   Provider:  Michael Crockett M.D.           Reason for Visit     New Patient           Allergies as of 2017     Allergen Noted Reactions    Pcn [Penicillins] 2010   Anaphylaxis    Skin turns black    Clindamycin 2015   Rash    Rash      Influenza Virus Vacc 2016   Rash    Red in face    Norco [Hydrocodone-Acetaminophen] 2013       Pneumococcal Vaccine 2012   Rash    Tetracycline 2015   Rash    Rash     Xarelto [Rivaroxaban] 2012   Rash      You were diagnosed with     Paroxysmal atrial fibrillation (CMS-HCC)   [578106]       Carcinoid tumor of lung   [674706]       BMI 36.0-36.9,adult   [538654]       Essential hypertension   [7509585]         Vital Signs     Blood Pressure Pulse Height Weight Body Mass Index Oxygen Saturation    122/76 mmHg 54 1.6 m (5' 2.99\") 85.276 kg (188 lb) 33.31 kg/m2 96%    Smoking Status                   Former Smoker           Basic Information     Date Of Birth Sex Race Ethnicity Preferred Language    1940 Female White Non- English      Your appointments     Selvin 15, 2017  2:00 PM   Anti-Coag New Patient with [facility] PAV PHARMACIST   [facility] ([place])    [address]  [place] [address]   [phone]            2017 11:40 AM   NEW TO YOU with Xochitl Franklin M.D.   [facility] ([place]    [address]  [address]  [place] [address]   [phone]            2017  1:30 PM   MA SCRN10 with BRANDON TORRES MG 1   [facility] MAMMOGRAPHY ([place])    [address]  [place] [address]   [phone]           No deodorant, powder, perfume or lotion under the arm or breast area.            2017 " 10:00 AM   CT BODY WITH with [address]   [facility] - [place] - [address]    [address]  [place] [address] [phone]           Some exams require specific prep instructions that would have been given to you at time of scheduling. If you have any additional questions about the prep instructions, please call Imaging Scheduling at [phone] and press #2.              Problem List              ICD-10-CM Priority Class Noted - Resolved    H/O breast surgery Z98.890   4/4/2011 - Present    MYESHA (obstructive sleep apnea) G47.33   4/4/2011 - Present    Hypertension I10   4/4/2011 - Present    Hyperlipidemia E78.5   4/4/2011 - Present    Depression with anxiety F41.8   5/3/2011 - Present    Routine health maintenance Z00.00   1/19/2012 - Present    Localized osteoarthrosis not specified whether primary or secondary, pelvic region and thigh M16.9   12/3/2012 - Present    BMI 36.0-36.9,adult Z68.36   9/18/2013 - Present    Chronic nausea (Chronic) R11.0   1/12/2015 - Present    Tremor R25.1   1/12/2015 - Present    Osteopenia M85.80   3/25/2016 - Present    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   11/28/2016 - Present    Hypokalemia E87.6   11/29/2016 - Present    Pulmonary hypertension, moderate to severe (CMS-HCC) I27.2   12/9/2016 - Present    LVH (left ventricular hypertrophy) I51.7   12/14/2016 - Present    Paroxysmal atrial fibrillation (CMS-HCC) I48.0   12/14/2016 - Present      Health Maintenance        Date Due Completion Dates    IMM DTaP/Tdap/Td Vaccine (1 - Tdap) 10/30/1960 ---    IMM ZOSTER VACCINE 10/30/2001 ---    MAMMOGRAM 5/19/2015 5/19/2014    BONE DENSITY 4/16/2020 4/16/2015            Results       Current Immunizations     Influenza Vaccine Quad Inj (Preserved) 11/6/2016, 9/15/2012, 9/1/2011    Pneumococcal polysaccharide vaccine (PPSV-23) 12/4/2012 10:56 AM      Below and/or attached are the medications your provider expects you to take. Review all of your home medications and newly  ordered medications with your provider and/or pharmacist. Follow medication instructions as directed by your provider and/or pharmacist. Please keep your medication list with you and share with your provider. Update the information when medications are discontinued, doses are changed, or new medications (including over-the-counter products) are added; and carry medication information at all times in the event of emergency situations     Allergies:  PCN - Anaphylaxis     CLINDAMYCIN - Rash     INFLUENZA VIRUS VACC - Rash     NORCO - (reactions not documented)     PNEUMOCOCCAL VACCINE - Rash     TETRACYCLINE - Rash     XARELTO - Rash               Medications  Valid as of: June 14, 2017 - 10:51 AM    Generic Name Brand Name Tablet Size Instructions for use    Acetaminophen (Tab) TYLENOL 500 MG Take 1,000 mg by mouth every 6 hours as needed.        Aspirin (Tablet Delayed Response) ECOTRIN 81 MG Take 81 mg by mouth every day.        BusPIRone HCl (Tab) BUSPAR 10 MG TAKE 1 TAB BY MOUTH 2 TIMES A DAY.        Furosemide (Tab) LASIX 40 MG Take 40 mg by mouth every day. TAKE 1 TAB BY MOUTH EVERY DAY.        HydroCHLOROthiazide (Tab) HYDRODIURIL 25 MG Take 25 mg by mouth every day.        Metoprolol Succinate (TABLET SR 24 HR) TOPROL XL 50 MG Take 1 Tab by mouth every day.        Omeprazole (CAPSULE DELAYED RELEASE) PRILOSEC 40 MG Take 1 Cap by mouth every day.        Potassium Chloride Sweta CR (Tab CR) Kdur 20 MEQ Take 2 Tabs by mouth every day.        Sertraline HCl (Tab) ZOLOFT 50 MG TAKE 2 TABS BY MOUTH EVERY DAY.        Simvastatin (Tab) ZOCOR 40 MG Take 40 mg by mouth every evening.        TraZODone HCl (Tab) DESYREL 50 MG TAKE 2 TABLETS ORALLY EVERY MORNING AND 1 TABLET EVERY EVENING.        .                 Medicines prescribed today were sent to:     [facility]/PHARMACY #[id number] - [place] - [address]    [address] [place] [address]    Phone: [phone] Fax: [phone]    Open 24 Hours?: No    DME  [facility]    [address] [place] [address]    Phone: [phone] Fax: [phone]    [facility]/PHARMACY #[id number] - [place], [place] - [address]    [address] [address]    Phone: [phone] Fax: [phone]    Open 24 Hours?: No      Medication refill instructions:       If your prescription bottle indicates you have medication refills left, it is not necessary to call your provider’s office. Please contact your pharmacy and they will refill your medication.    If your prescription bottle indicates you do not have any refills left, you may request refills at any time through one of the following ways: The online [employer] system (except Urgent Care), by calling your provider’s office, or by asking your pharmacy to contact your provider’s office with a refill request. Medication refills are processed only during regular business hours and may not be available until the next business day. Your provider may request additional information or to have a follow-up visit with you prior to refilling your medication.   *Please Note: Medication refills are assigned a new Rx number when refilled electronically. Your pharmacy may indicate that no refills were authorized even though a new prescription for the same medication is available at the pharmacy. Please request the medicine by name with the pharmacy before contacting your provider for a refill.        Other Notes About Your Plan     -per OV 4/13/15, BMI 41.77. ? Morbid obesity code 278.01    -patient has been on Klonopin since 2011 without a diagnosis for this medication.                      [employer] Access Code: [email or web address]  Expires: 6/22/2017  4:14 AM    [employer]  A secure, online tool to manage your health information     [employer]’s [employer]® is a secure, online tool that connects you to your personalized health information from the privacy of your home -- day or night - making it very easy for you to manage your healthcare.  Once the activation process is completed, you can even access your medical information using the Fetch It macrina, which is available for free in the Apple Macrina store or Google Play store.     Fetch It provides the following levels of access (as shown below):   My Chart Features   [facility] Doctor [facility]  Specialists [facility]  Urgent  Care Non-[facility]  Primary Care  Doctor   Email your healthcare team securely and privately 24/7 X X X    Manage appointments: schedule your next appointment; view details of past/upcoming appointments X      Request prescription refills. X      View recent personal medical records, including lab and immunizations X X X X   View health record, including health history, allergies, medications X X X X   Read reports about your outpatient visits, procedures, consult and ER notes X X X X   See your discharge summary, which is a recap of your hospital and/or ER visit that includes your diagnosis, lab results, and care plan. X X       How to register for Fetch It:  1. Go to  [email or web address].  2. Click on the Sign Up Now box, which takes you to the New Member Sign Up page. You will need to provide the following information:  a. Enter your Fetch It Access Code exactly as it appears at the top of this page. (You will not need to use this code after you’ve completed the sign-up process. If you do not sign up before the expiration date, you must request a new code.)   b. Enter your date of birth.   c. Enter your home email address.   d. Click Submit, and follow the next screen’s instructions.  3. Create a Fetch It ID. This will be your Fetch It login ID and cannot be changed, so think of one that is secure and easy to remember.  4. Create a Fetch It password. You can change your password at any time.  5. Enter your Password Reset Question and Answer. This can be used at a later time if you forget your password.   6. Enter your e-mail address. This allows you to receive e-mail notifications  when new information is available in [employer].  7. Click Sign Up. You can now view your health information.    For assistance activating your [employer] account, call [phone]

## 2017-06-15 ENCOUNTER — ANTICOAGULATION VISIT (OUTPATIENT)
Dept: MEDICAL GROUP | Facility: MEDICAL CENTER | Age: 77
End: 2017-06-15
Payer: MEDICARE

## 2017-06-15 ENCOUNTER — HOSPITAL ENCOUNTER (OUTPATIENT)
Dept: LAB | Facility: MEDICAL CENTER | Age: 77
End: 2017-06-15
Attending: INTERNAL MEDICINE
Payer: MEDICARE

## 2017-06-15 VITALS — SYSTOLIC BLOOD PRESSURE: 124 MMHG | DIASTOLIC BLOOD PRESSURE: 56 MMHG | HEART RATE: 52 BPM

## 2017-06-15 DIAGNOSIS — Z79.899 POLYPHARMACY: ICD-10-CM

## 2017-06-15 DIAGNOSIS — I10 ESSENTIAL HYPERTENSION: ICD-10-CM

## 2017-06-15 LAB
ANION GAP SERPL CALC-SCNC: 8 MMOL/L (ref 0–11.9)
BUN SERPL-MCNC: 9 MG/DL (ref 8–22)
CALCIUM SERPL-MCNC: 8.8 MG/DL (ref 8.5–10.5)
CHLORIDE SERPL-SCNC: 104 MMOL/L (ref 96–112)
CO2 SERPL-SCNC: 26 MMOL/L (ref 20–33)
CREAT SERPL-MCNC: 0.89 MG/DL (ref 0.5–1.4)
GFR SERPL CREATININE-BSD FRML MDRD: >60 ML/MIN/1.73 M 2
GLUCOSE SERPL-MCNC: 86 MG/DL (ref 65–99)
POTASSIUM SERPL-SCNC: 3.7 MMOL/L (ref 3.6–5.5)
SODIUM SERPL-SCNC: 138 MMOL/L (ref 135–145)

## 2017-06-15 PROCEDURE — 36415 COLL VENOUS BLD VENIPUNCTURE: CPT

## 2017-06-15 PROCEDURE — 85610 PROTHROMBIN TIME: CPT | Performed by: INTERNAL MEDICINE

## 2017-06-15 PROCEDURE — 80048 BASIC METABOLIC PNL TOTAL CA: CPT

## 2017-06-15 NOTE — TELEPHONE ENCOUNTER
Was the patient seen in the last year in this department? Yes     Does patient have an active prescription for medications requested? No     Received Request Via: Pharmacy      Pt met protocol?: Yes, LABS 6/17 OV PCP 1/17, OV OP 6/17 /76

## 2017-06-15 NOTE — MR AVS SNAPSHOT
Sharon Callahan   6/15/2017 2:00 PM   Anticoagulation Visit   MRN: [id number]    Department:  [facility]   Dept Phone:  [phone]    Description:  Female : 1940   Provider:  [facility]NICA PHARMACIST           Allergies as of 6/15/2017     Allergen Noted Reactions    Pcn [Penicillins] 2010   Anaphylaxis    Skin turns black    Clindamycin 2015   Rash    Rash      Influenza Virus Vacc 2016   Rash    Red in face    Norco [Hydrocodone-Acetaminophen] 2013       Pneumococcal Vaccine 2012   Rash    Tetracycline 2015   Rash    Rash     Xarelto [Rivaroxaban] 2012   Rash      Vital Signs     Smoking Status                   Former Smoker           Basic Information     Date Of Birth Sex Race Ethnicity Preferred Language    1940 Female White Non- English      Your appointments     2017 11:40 AM   NEW TO YOU with Xochitl Franklin M.D.   [facility] ([place]    [address]  [place] [address] [phone]            2017  1:30 PM   MA SCRN10 with [address] [place] MG 1   [facility] MAMMOGRAPHY ([place])    [address]  [place] [address] [phone]           No deodorant, powder, perfume or lotion under the arm or breast area.            2017 11:30 AM   Anti-Coag New Patient with [facility] P[facility] PHARMACIST   [facility] ([place])    [address]  [place] [address] [phone]            2017 10:00 AM   CT BODY WITH with [address]   [facility] - CT - [address]    [address]  [place] [address] [phone]           Some exams require specific prep instructions that would have been given to you at time of scheduling. If you have any additional questions about the prep instructions, please call Imaging Scheduling at [phone] and  press #2.              Problem List              ICD-10-CM Priority Class Noted - Resolved    H/O breast surgery Z98.890   4/4/2011 - Present    MYESHA (obstructive sleep apnea) G47.33   4/4/2011 - Present    Hypertension I10   4/4/2011 - Present    Hyperlipidemia E78.5   4/4/2011 - Present    Depression with anxiety F41.8   5/3/2011 - Present    Routine health maintenance Z00.00   1/19/2012 - Present    Localized osteoarthrosis not specified whether primary or secondary, pelvic region and thigh M16.9   12/3/2012 - Present    BMI 36.0-36.9,adult Z68.36   9/18/2013 - Present    Chronic nausea (Chronic) R11.0   1/12/2015 - Present    Tremor R25.1   1/12/2015 - Present    Osteopenia M85.80   3/25/2016 - Present    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   11/28/2016 - Present    Hypokalemia E87.6   11/29/2016 - Present    Pulmonary hypertension, moderate to severe (CMS-HCC) I27.2   12/9/2016 - Present    LVH (left ventricular hypertrophy) I51.7   12/14/2016 - Present    Paroxysmal atrial fibrillation (CMS-HCC) I48.0   12/14/2016 - Present      Health Maintenance        Date Due Completion Dates    IMM DTaP/Tdap/Td Vaccine (1 - Tdap) 10/30/1960 ---    IMM ZOSTER VACCINE 10/30/2001 ---    MAMMOGRAM 5/19/2015 5/19/2014    BONE DENSITY 4/16/2020 4/16/2015            Current Immunizations     Influenza Vaccine Quad Inj (Preserved) 11/6/2016, 9/15/2012, 9/1/2011    Pneumococcal polysaccharide vaccine (PPSV-23) 12/4/2012 10:56 AM      Below and/or attached are the medications your provider expects you to take. Review all of your home medications and newly ordered medications with your provider and/or pharmacist. Follow medication instructions as directed by your provider and/or pharmacist. Please keep your medication list with you and share with your provider. Update the information when medications are discontinued, doses are changed, or new medications (including over-the-counter products) are added; and carry medication information  at all times in the event of emergency situations     Allergies:  PCN - Anaphylaxis     CLINDAMYCIN - Rash     INFLUENZA VIRUS VACC - Rash     NORCO - (reactions not documented)     PNEUMOCOCCAL VACCINE - Rash     TETRACYCLINE - Rash     XARELTO - Rash               Medications  Valid as of: Diane 15, 2017 -  2:31 PM    Generic Name Brand Name Tablet Size Instructions for use    Acetaminophen (Tab) TYLENOL 500 MG Take 1,000 mg by mouth every 6 hours as needed.        Aspirin (Tablet Delayed Response) ECOTRIN 81 MG Take 81 mg by mouth every day.        BusPIRone HCl (Tab) BUSPAR 10 MG TAKE 1 TAB BY MOUTH 2 TIMES A DAY.        HydroCHLOROthiazide (Tab) HYDRODIURIL 25 MG Take 25 mg by mouth every day.        Metoprolol Succinate (TABLET SR 24 HR) TOPROL XL 50 MG Take 1 Tab by mouth every day.        Omeprazole (CAPSULE DELAYED RELEASE) PRILOSEC 40 MG Take 1 Cap by mouth every day.        Potassium Chloride Sweta CR (Tab CR) Kdur 20 MEQ Take 2 Tabs by mouth every day.        Sertraline HCl (Tab) ZOLOFT 50 MG TAKE 2 TABS BY MOUTH EVERY DAY.        Simvastatin (Tab) ZOCOR 40 MG Take 40 mg by mouth every evening.        TraZODone HCl (Tab) DESYREL 50 MG TAKE 2 TABLETS ORALLY EVERY MORNING AND 1 TABLET EVERY EVENING.        .                 Medicines prescribed today were sent to:     [id number] - [place] - [address]    [address] [address]    Phone: [phone] Fax: [phone]    Open 24 Hours?: No    DME [facility]    [address] [facility] [address]    Phone: [phone] Fax: [phone]    [id number] - [place], [place] - [address]    [address] [address]    Phone: [phone] Fax: [phone]    Open 24 Hours?: No      Medication refill instructions:       If your prescription bottle indicates you have medication refills left, it is not necessary to call your provider’s office. Please contact your pharmacy and they will refill your  medication.    If your prescription bottle indicates you do not have any refills left, you may request refills at any time through one of the following ways: The online [employer]Sa[employer] system (except Urgent Care), by calling your provider’s office, or by asking your pharmacy to contact your provider’s office with a refill request. Medication refills are processed only during regular business hours and may not be available until the next business day. Your provider may request additional information or to have a follow-up visit with you prior to refilling your medication.   *Please Note: Medication refills are assigned a new Rx number when refilled electronically. Your pharmacy may indicate that no refills were authorized even though a new prescription for the same medication is available at the pharmacy. Please request the medicine by name with the pharmacy before contacting your provider for a refill.        Other Notes About Your Plan     -per OV 4/13/15, BMI 41.77. ? Morbid obesity code 278.01    -patient has been on Klonopin since 2011 without a diagnosis for this medication.                      ZoomSafer Access Code: [email or web address]  Expires: 6/22/2017  4:14 AM    ZoomSafer  A secure, online tool to manage your health information     [employer]’s ZoomSafer® is a secure, online tool that connects you to your personalized health information from the privacy of your home -- day or night - making it very easy for you to manage your healthcare. Once the activation process is completed, you can even access your medical information using the ZoomSafer macrina, which is available for free in the Apple Macrina store or Google Play store.     ZoomSafer provides the following levels of access (as shown below):   My Chart Features   [facility] Doctor [facility]  Specialists [facility]  Urgent  Care Non-[facility]  Primary Care  Doctor   Email your healthcare team securely and privately 24/7 X X X    Manage appointments: schedule your next  appointment; view details of past/upcoming appointments X      Request prescription refills. X      View recent personal medical records, including lab and immunizations X X X X   View health record, including health history, allergies, medications X X X X   Read reports about your outpatient visits, procedures, consult and ER notes X X X X   See your discharge summary, which is a recap of your hospital and/or ER visit that includes your diagnosis, lab results, and care plan. X X       How to register for ENEFpro:  1. Go to  [email or web address].  2. Click on the Sign Up Now box, which takes you to the New Member Sign Up page. You will need to provide the following information:  a. Enter your ENEFpro Access Code exactly as it appears at the top of this page. (You will not need to use this code after you’ve completed the sign-up process. If you do not sign up before the expiration date, you must request a new code.)   b. Enter your date of birth.   c. Enter your home email address.   d. Click Submit, and follow the next screen’s instructions.  3. Create a ENEFpro ID. This will be your ENEFpro login ID and cannot be changed, so think of one that is secure and easy to remember.  4. Create a ENEFpro password. You can change your password at any time.  5. Enter your Password Reset Question and Answer. This can be used at a later time if you forget your password.   6. Enter your e-mail address. This allows you to receive e-mail notifications when new information is available in ENEFpro.  7. Click Sign Up. You can now view your health information.    For assistance activating your ENEFpro account, call [phone]

## 2017-06-15 NOTE — PROGRESS NOTES
Next time you fill your pill box:    Decrease Metoprolol 25 mg to 1/2 tablet every other day for 1 week, then stop (6/28/17 - Wednesday)    Restart Furosmide 40 mg at a lower dose - Furosemide 1/2 tablet (20 mg) once daily in the AM    Stop Aspirin 81 mg daily    Decrease buspirone 10 mg to once daily in the morning.       Monitory your blood pressure at home if greater than 150/90 or if heart rate greater than 100 please call our clinic [phone]     Get your labwork done to recheck your potassium a few days before next appointment.      Follow up with our clinic in 2 weeks. July 6th at 11:30 AM at [facility]     Gloria Baca, JEYSOND

## 2017-06-15 NOTE — PROGRESS NOTES
Pharmacotherapy     S/O: Patient presents today for a follow uppolypharmacy consult. Patient was referred by Dr. Gómez. Primary purpose for visit today is to continue tapering patients medications. She reports since stopping furosemide she notices swelling in her ankles by the end of the day. Confirmed she has made medication adjustment from last visit, her brother helps her manage her medications and sets up her pill box.       Filed Vitals:    06/15/17 1451   BP: 124/56   Pulse: 52         Current outpatient prescriptions:   •  hydrochlorothiazide (HYDRODIURIL) 25 MG Tab, Take 25 mg by mouth every day., Disp: , Rfl:   •  aspirin EC (ECOTRIN) 81 MG Tablet Delayed Response, Take 81 mg by mouth every day., Disp: , Rfl:   •  trazodone (DESYREL) 50 MG Tab, TAKE 2 TABLETS ORALLY EVERY MORNING AND 1 TABLET EVERY EVENING. (Patient taking differently: TAKE 2 TABLETS EVERY EVENING.), Disp: 270 Tab, Rfl: 0  •  simvastatin (ZOCOR) 40 MG Tab, Take 40 mg by mouth every evening., Disp: , Rfl:   •  omeprazole (PRILOSEC) 40 MG delayed-release capsule, Take 1 Cap by mouth every day., Disp: 90 Cap, Rfl: 1  •  busPIRone (BUSPAR) 10 MG Tab, TAKE 1 TAB BY MOUTH 2 TIMES A DAY. (Patient taking differently: TAKE 1 TAB BY MOUTH once daily), Disp: 180 Tab, Rfl: 1  •  sertraline (ZOLOFT) 50 MG Tab, TAKE 2 TABS BY MOUTH EVERY DAY., Disp: 180 Tab, Rfl: 1  •  metoprolol SR (TOPROL XL) 50 MG TABLET SR 24 HR, Take 1 Tab by mouth every day. (Patient taking differently: Take 25 mg by mouth every day.), Disp: 90 Tab, Rfl: 1  •  potassium chloride SA (K-DUR) 20 MEQ Tab CR, Take 2 Tabs by mouth every day., Disp: 60 Tab, Rfl: 2  •  acetaminophen (TYLENOL) 500 MG Tab, Take 1,000 mg by mouth every 6 hours as needed., Disp: , Rfl:     Assessment  1. Blood pressure and pulse within goal after stopping furosemide and decreasing metoprolol. Leg swelling has restarted since stopping furosemide    · Will restart furosemide at lower dose of 20 mg daily. Pt is to  continue potassium 20 mEq - 2 tablets twice daily. Last potassium 3.7 (6-15-17), she does report diarrhea now for about 1 week. She will get potassium checked in 2 weeks since we are restarting furosemdie.     · Will  stop metoprolol at this time, She is to taper 1/2 tablet of metoprolol 50 mg SR every other day x 1 week then stop her metoprolol  · Pt saw cardiologist who was not concerned about bout of atrial fibrillation in hospital. Will stop ASA.   4. Drug/Drug interaction  · Patient is on several drugs which affect serotonin (buspirone, trazodone and sertraline) which increase her risk of serotonin syndrome- patient states anxiety is much more controlled at this time as several factors in her life have resolved her anxiety.  · Will decrease buspirone to 10 mg once daily in the AM x 2 weeks then plan to stop buspirone if pt does not have increase in anxiety symptoms. Once buspirone is tapered if pt tolerates will taper her sertraline to 50 mg x 2 week, then 25 mg x 2 weeks then stop.   · Discussed at length with pt contacting our clinic with any increase in anxiety symptoms, depression or thought of hurting her self or suicide. Pt verbalizes understanding.            Plan  1. Start Furosemide 20 mg daily  2. Taper metoprolol 25 mg to every other day x 1 week  then stop. Contact clinic for BP > 150/90 or HR > 100  3. Stop ASA  4. Decrease buspirone to 10 mg once daily. Contact clinic with any increase in anxiety symptoms, depression or thought of hurting her self or suicide.    Gloria Baca, PHARMD    CC:   Dr. Bloch  Referring provider Dr. Gómez

## 2017-06-16 ENCOUNTER — OFFICE VISIT (OUTPATIENT)
Dept: MEDICAL GROUP | Facility: MEDICAL CENTER | Age: 77
End: 2017-06-16
Payer: MEDICARE

## 2017-06-16 VITALS
DIASTOLIC BLOOD PRESSURE: 60 MMHG | BODY MASS INDEX: 33.52 KG/M2 | HEIGHT: 63 IN | TEMPERATURE: 97.2 F | SYSTOLIC BLOOD PRESSURE: 100 MMHG | RESPIRATION RATE: 16 BRPM | WEIGHT: 189.2 LBS | HEART RATE: 62 BPM | OXYGEN SATURATION: 93 %

## 2017-06-16 DIAGNOSIS — Z00.00 ROUTINE HEALTH MAINTENANCE: ICD-10-CM

## 2017-06-16 DIAGNOSIS — E55.9 HYPOVITAMINOSIS D: ICD-10-CM

## 2017-06-16 DIAGNOSIS — Z12.31 ENCOUNTER FOR SCREENING MAMMOGRAM FOR MALIGNANT NEOPLASM OF BREAST: ICD-10-CM

## 2017-06-16 DIAGNOSIS — I48.0 PAROXYSMAL ATRIAL FIBRILLATION (HCC): ICD-10-CM

## 2017-06-16 DIAGNOSIS — E66.9 OBESITY (BMI 30-39.9): ICD-10-CM

## 2017-06-16 DIAGNOSIS — G47.33 OSA ON CPAP: ICD-10-CM

## 2017-06-16 DIAGNOSIS — I10 ESSENTIAL HYPERTENSION: ICD-10-CM

## 2017-06-16 DIAGNOSIS — I27.20 PULMONARY HYPERTENSION, MODERATE TO SEVERE (HCC): ICD-10-CM

## 2017-06-16 DIAGNOSIS — I51.7 LVH (LEFT VENTRICULAR HYPERTROPHY): ICD-10-CM

## 2017-06-16 DIAGNOSIS — E78.5 DYSLIPIDEMIA: ICD-10-CM

## 2017-06-16 PROCEDURE — 99215 OFFICE O/P EST HI 40 MIN: CPT | Performed by: INTERNAL MEDICINE

## 2017-06-16 RX ORDER — METOPROLOL SUCCINATE 25 MG/1
25 TABLET, EXTENDED RELEASE ORAL DAILY
Qty: 90 TAB | Refills: 1 | Status: SHIPPED | OUTPATIENT
Start: 2017-06-16 | End: 2017-08-10

## 2017-06-16 RX ORDER — METOPROLOL SUCCINATE 50 MG/1
50 TABLET, EXTENDED RELEASE ORAL DAILY
Qty: 90 TAB | Refills: 1 | OUTPATIENT
Start: 2017-06-16

## 2017-06-16 NOTE — MR AVS SNAPSHOT
"        Sharon Callahan   2017 11:40 AM   Office Visit   MRN: [id number]    Department[facility]  [facility]   Dept Phone:  [phone]    Description:  Female : 1940   Provider:  Xochitl Franklin M.D.           Reason for Visit     Establish Care           Allergies as of 2017     Allergen Noted Reactions    Pcn [Penicillins] 2010   Anaphylaxis    Skin turns black    Clindamycin 2015   Rash    Rash      Influenza Virus Vacc 2016   Rash    Red in face    Norco [Hydrocodone-Acetaminophen] 2013       Pneumococcal Vaccine 2012   Rash    Tetracycline 2015   Rash    Rash     Xarelto [Rivaroxaban] 2012   Rash      You were diagnosed with     Essential hypertension   [1537462]       Paroxysmal atrial fibrillation (CMS-HCC)   [613216]       LVH (left ventricular hypertrophy)   [733544]       Pulmonary hypertension, moderate to severe (CMS-HCC)   [970101]       Dyslipidemia   [629578]       MYESHA on CPAP   [876752]       Obesity (BMI 30-39.9)   [835258]       Hypovitaminosis D   [987975]       Routine health maintenance   [942217]       Encounter for screening mammogram for malignant neoplasm of breast   [844529]         Vital Signs     Blood Pressure Pulse Temperature Respirations Height Weight    100/60 mmHg 62 36.2 °C (97.2 °F) 16 1.6 m (5' 2.99\") 85.821 kg (189 lb 3.2 oz)    Body Mass Index Oxygen Saturation Smoking Status             33.52 kg/m2 93% Former Smoker         Basic Information     Date Of Birth Sex Race Ethnicity Preferred Language    1940 Female White Non- English      Your appointments     2017  1:30 PM   MA [id number] with [facility] MG 1   [facility]    [address]  [place] [address] [phone]           No deodorant, powder, perfume or lotion under the arm or breast area.            2017 11:30 AM   Anti-Coag New Patient with [facility] MÓNICA " PHARMACIST   [facility] [place]    [address]  [place] [address] [phone]            Jul 25, 2017 10:00 AM   CT BODY WITH with [address]   [facility] - CT - [address]    [address]  [place] [address] [phone]           Some exams require specific prep instructions that would have been given to you at time of scheduling. If you have any additional questions about the prep instructions, please call Imaging Scheduling at [phone] and press #2.              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   11/28/2016 - Present    Pulmonary hypertension, moderate to severe (CMS-HCC) I27.2   12/9/2016 - Present    LVH (left ventricular hypertrophy) I51.7   12/14/2016 - Present    Paroxysmal atrial fibrillation (CMS-HCC) I48.0   12/14/2016 - Present    Essential hypertension I10   6/16/2017 - Present    MYESHA on CPAP G47.33, Z99.89   6/16/2017 - Present    Dyslipidemia E78.5   6/16/2017 - Present    Obesity (BMI 30-39.9) E66.9   6/16/2017 - Present      Health Maintenance        Date Due Completion Dates    IMM DTaP/Tdap/Td Vaccine (1 - Tdap) 10/30/1960 ---    IMM ZOSTER VACCINE 10/30/2001 ---    MAMMOGRAM 5/19/2015 5/19/2014    BONE DENSITY 4/16/2020 4/16/2015            Current Immunizations     Influenza Vaccine Quad Inj (Preserved) 11/6/2016, 9/15/2012, 9/1/2011    Pneumococcal polysaccharide  vaccine (PPSV-23) 12/4/2012 10:56 AM      Below and/or attached are the medications your provider expects you to take. Review all of your home medications and newly ordered medications with your provider and/or pharmacist. Follow medication instructions as directed by your provider and/or pharmacist. Please keep your medication list with you and share with your provider. Update the information when medications are discontinued, doses are changed, or new medications (including over-the-counter products) are added; and carry medication information at all times in the event of emergency situations     Allergies:  PCN - Anaphylaxis     CLINDAMYCIN - Rash     INFLUENZA VIRUS VACC - Rash     NORCO - (reactions not documented)     PNEUMOCOCCAL VACCINE - Rash     TETRACYCLINE - Rash     XARELTO - Rash               Medications  Valid as of: June 16, 2017 - 12:45 PM    Generic Name Brand Name Tablet Size Instructions for use    Acetaminophen (Tab) TYLENOL 500 MG Take 1,000 mg by mouth every 6 hours as needed.        BusPIRone HCl (Tab) BUSPAR 10 MG TAKE 1 TAB BY MOUTH 2 TIMES A DAY.        HydroCHLOROthiazide (Tab) HYDRODIURIL 25 MG Take 25 mg by mouth every day.        Metoprolol Succinate (TABLET SR 24 HR) TOPROL XL 50 MG Take 1 Tab by mouth every day.        Omeprazole (CAPSULE DELAYED RELEASE) PRILOSEC 40 MG Take 1 Cap by mouth every day.        Potassium Chloride Sweta CR (Tab CR) Kdur 20 MEQ Take 2 Tabs by mouth every day.        Sertraline HCl (Tab) ZOLOFT 50 MG TAKE 2 TABS BY MOUTH EVERY DAY.        Simvastatin (Tab) ZOCOR 40 MG Take 40 mg by mouth every evening.        TraZODone HCl (Tab) DESYREL 50 MG TAKE 2 TABLETS ORALLY EVERY MORNING AND 1 TABLET EVERY EVENING.        .                 Medicines prescribed today were sent to:     [facility]/PHARMACY #[id number] - [place] - [address]    [address] [place] [address]    Phone: [phone] Fax: [phone]    Open 24 Hours?: No    [facility]     [address] [facility] [address] [place] [address]    Phone: [phone] Fax: [phone]    [facility]ACY [facility][id number] - [facility] [place] - [address]    [address] [facility] [address] [facility] [address]    Phone: [phone] Fax: [phone]    Open 24 Hours?: No      Medication refill instructions:       If your prescription bottle indicates you have medication refills left, it is not necessary to call your provider’s office. Please contact your pharmacy and they will refill your medication.    If your prescription bottle indicates you do not have any refills left, you may request refills at any time through one of the following ways: The online TrustAlert system (except Urgent Care), by calling your provider’s office, or by asking your pharmacy to contact your provider’s office with a refill request. Medication refills are processed only during regular business hours and may not be available until the next business day. Your provider may request additional information or to have a follow-up visit with you prior to refilling your medication.   *Please Note: Medication refills are assigned a new Rx number when refilled electronically. Your pharmacy may indicate that no refills were authorized even though a new prescription for the same medication is available at the pharmacy. Please request the medicine by name with the pharmacy before contacting your provider for a refill.        Your To Do List     Future Labs/Procedures Complete By Expires    BASIC METABOLIC PANEL  As directed 6/17/2018    CBC WITH DIFFERENTIAL  As directed 6/17/2018      Referral     A referral request has been sent to our patient care coordination department. Please allow 3-5 business days for us to process this request and contact you either by phone or mail. If you do not hear from us by the 5th business day, please call us at [phone].        Other Notes About Your Plan     -per OV 4/13/15, BMI 41.77. ? Morbid obesity code 278.01    -patient has been on  Klonopin since 2011 without a diagnosis for this medication.                      DescribeMe Access Code: [email or web address]  Expires: 6/22/2017  4:14 AM    DescribeMe  A secure, online tool to manage your health information     [employer]’s DescribeMe® is a secure, online tool that connects you to your personalized health information from the privacy of your home -- day or night - making it very easy for you to manage your healthcare. Once the activation process is completed, you can even access your medical information using the DescribeMe macrina, which is available for free in the Apple Macrina store or Google Play store.     DescribeMe provides the following levels of access (as shown below):   My Chart Features   [facility] Doctor [facility]  Specialists [facility]  Urgent  [facility]  Primary Care  Doctor   Email your healthcare team securely and privately 24/7 X X X    Manage appointments: schedule your next appointment; view details of past/upcoming appointments X      Request prescription refills. X      View recent personal medical records, including lab and immunizations X X X X   View health record, including health history, allergies, medications X X X X   Read reports about your outpatient visits, procedures, consult and ER notes X X X X   See your discharge summary, which is a recap of your hospital and/or ER visit that includes your diagnosis, lab results, and care plan. X X       How to register for DescribeMe:  1. Go to  [email or web address].  2. Click on the Sign Up Now box, which takes you to the New Member Sign Up page. You will need to provide the following information:  a. Enter your DescribeMe Access Code exactly as it appears at the top of this page. (You will not need to use this code after you’ve completed the sign-up process. If you do not sign up before the expiration date, you must request a new code.)   b. Enter your date of birth.   c. Enter your home email address.   d. Click Submit, and follow  the next screen’s instructions.  3. Create a ZoweeTV ID. This will be your ZoweeTV login ID and cannot be changed, so think of one that is secure and easy to remember.  4. Create a Remarkt password. You can change your password at any time.  5. Enter your Password Reset Question and Answer. This can be used at a later time if you forget your password.   6. Enter your e-mail address. This allows you to receive e-mail notifications when new information is available in ZoweeTV.  7. Click Sign Up. You can now view your health information.    For assistance activating your ZoweeTV account, call [phone]

## 2017-06-16 NOTE — PROGRESS NOTES
CHIEF COMPLAINT  Chief Complaint   Patient presents with   • Establish Care   HTN    HPI  Patient is a 76 y.o. female patient who presents today for the following     HYPERTENSION, LVH, pulmonary hypertension  Meds: HCTZ, 25 mg QD, taking as prescribed.   Measuring BP at home: yes, it has been < 150/90.  Denies:  -  headaches, vision problems, tinnitus.                 -  chest pain/pressure, palpitations, irregular heart beats, exertional, dyspnea, peripheral edema.      - medication side effect: unusual fatigue, slow heartbeat, foot/leg swelling, cough.  Low salt diet: yes  Diet: healthy  Exercise: daily yard work  BMI: Body mass index is 33.52 kg/(m^2).  FH of HTN: Multiple    Reviewed the last echocardiography, as below.    Paroxysmal Afib  She has history of afibrillation, no episode for > 5 yrs.   She has been followed up by cardiology, on metoprolol, 50 mg daily.  FH of CAD: father and brother with MI    DYSLIPIDEMIA  The patient is on simvastatin, 40 mg, taking daily, as prescribed. No myalgias, muscle cramps or pain.   Diet /Exercise:  As above  BMI: There is no weight on file to calculate BMI.  FH: neg    MYESHA, on CPAP  She has been on CPAP for > 5 yrs.  She has been compliant with CPAP.  Denies daily fatigue and daytime sleepiness.    No recent pulmonology f/u.      OBESITY, Body mass index is 33.52 kg/(m^2).  Onset: since menopause  Diet: healthy  Exercise:   No temperature intolerance. No change in hair/skin quality, BMs.   No HTN, buffalo hump, purple striae, flushing.  FH of obesity: M-GM,     Hypovitaminosis D  The patient had a vitamin D level at 21 on 2/25/16.  No Vit D supplement.     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     • trazodone (DESYREL) 50 MG Tab TAKE 2 TABLETS ORALLY EVERY MORNING AND 1 TABLET EVERY EVENING. (Patient taking differently: TAKE 2 TABLETS EVERY EVENING.) 270 Tab 0   • simvastatin (ZOCOR) 40 MG Tab Take 40 mg by mouth every evening.     • busPIRone (BUSPAR) 10 MG Tab TAKE 1 TAB BY MOUTH 2 TIMES A DAY. (Patient taking differently: TAKE 1 TAB BY MOUTH once daily) 180 Tab 1   • sertraline (ZOLOFT) 50 MG Tab TAKE 2 TABS BY MOUTH EVERY DAY. 180 Tab 1   • metoprolol SR (TOPROL XL) 50 MG TABLET SR 24 HR Take 1 Tab by mouth every day. (Patient taking differently: Take 25 mg by mouth every day. Every other day x 1 week then stop) 90 Tab 1   • potassium chloride SA (K-DUR) 20 MEQ Tab CR Take 2 Tabs by mouth every day. 60 Tab 2   • omeprazole (PRILOSEC) 40 MG delayed-release capsule Take 1 Cap by mouth every day. 90 Cap 1   • acetaminophen (TYLENOL) 500 MG Tab Take 1,000 mg by mouth every 6 hours as needed.       No current facility-administered medications for this visit.     ALLERGIES  Allergies: Pcn; Clindamycin; Influenza virus vacc; Norco; Pneumococcal vaccine; Tetracycline; and Xarelto  PAST MEDICAL HISTORY  Past Medical History   Diagnosis Date   • Hypertension    • Indigestion    • Snoring    • S/P bilateral mastectomy 4/4/2011   • Hyperlipidemia 4/4/2011   •  Sleep apnea      CPAP   • Depression with anxiety 5/3/2011   • History of cholecystectomy    • CATARACT      surgical correcttion bilateral   • Heart burn    • Pain      left hip   • Range of motion deficit      left elbow, unable to completely extend   • Arthritis    • BMI 38.0-38.9,adult 2013   • Chronic nausea 2015     Has had GI work up done Dr voss   • MYESHA (obstructive sleep apnea) 2011     On cPAP    • Pleural effusion, right 2017     Status post thoracotomy and decortication   • Hemothorax on right 2017     Status post thoracotomy and decortication     SURGICAL HISTORY  She  has past surgical history that includes hip arthroplasty total (3/26/2009); lisa by laparoscopy (2011); other (); other (); uvulopharyngopalatoplasty (); cataract phaco with iol (10/20/2011); cataract phaco with iol (11/3/2011); orif, humerus (s); hip arthroplasty total (12/3/2012); breast biopsy (); enlarge breast with implant; and thoracoscopy (Right, 2016).  SOCIAL HISTORY  Social History   Substance Use Topics   • Smoking status: Former Smoker -- 1.00 packs/day for 15 years     Types: Cigarettes     Quit date: 1975   • Smokeless tobacco: Never Used   • Alcohol Use: No      Comment: once in a great while     Social History     Social History Narrative     FAMILY HISTORY  Family History   Problem Relation Age of Onset   • Hypertension Mother    • Cancer Father      lung   • Diabetes Father    • Hypertension Father    • Hypertension Brother    • Cancer Paternal Aunt      stomach   • Diabetes Paternal Aunt    • Hypertension Maternal Grandmother    • Hyperlipidemia Neg Hx    • Stroke Neg Hx    • No Known Problems Brother    • Hypertension Brother    • Diabetes Brother    • Heart Disease Brother      MI   • Heart Disease Father      MI     Family Status   Relation Status Death Age   • Mother     • Father     • Sister     • Brother Alive    • Maternal  "Grandmother  65   • Brother Alive    • Brother         ROS   Constitutional: Negative for fever, chills.  HENT: Negative for congestion, sore throat.  Eyes: Negative for blurred vision.   Respiratory: Negative for cough, shortness of breath.  Cardiovascular: Negative for chest pain, palpitations.   Gastrointestinal: Negative for heartburn, nausea, abdominal pain.   Genitourinary: Negative for dysuria.  Musculoskeletal: Negative for significant myalgias, back pain and joint pain.   Skin: Negative for rash and itching.   Neuro: Negative for dizziness, weakness and headaches.   Endo/Heme/Allergies: Does not bruise/bleed easily.   Psychiatric/Behavioral: Negative for depression, anxiety    PHYSICAL EXAM   Blood pressure 100/60, pulse 62, temperature 36.2 °C (97.2 °F), resp. rate 16, height 1.6 m (5' 2.99\"), weight 85.821 kg (189 lb 3.2 oz), SpO2 93 %. Body mass index is 33.52 kg/(m^2).  General:  NAD, well appearing.   Obese.  HEENT:   NC/AT, PERRLA, EOMI, TMs are clear. Oropharyngeal mucosa is pink,  without lesions;  no cervical / supraclavicular  lymphadenopathy, no thyromegaly.    Cardiovascular: RRR.   No m/r/g. No carotid bruits .      Lungs:   CTAB, no w/r/r, no respiratory distress.  Abdomen: Soft, NT/ND + BS; could not palpate liver and spleen due to obesity.  Extremities:  2+ DP and radial pulses bilaterally.  No c/c/e.   Skin:  Warm, dry.  No erythema. No rash.   Neurologic: Alert & oriented x 3. No focal deficits.  Psychiatric:  Affect normal, mood normal, judgment normal.    LABS     Labs are reviewed and discussed with a patient    Lab Results   Component Value Date/Time    CHOLESTEROL, 10/08/2016 04:10 AM    LDL 56 10/08/2016 04:10 AM    HDL 59 10/08/2016 04:10 AM    TRIGLYCERIDES 142 2016 05:00 AM       Lab Results   Component Value Date/Time    SODIUM 138 06/15/2017 09:11 AM    POTASSIUM 3.7 06/15/2017 09:11 AM    CHLORIDE 104 06/15/2017 09:11 AM    CO2 26 06/15/2017 09:11 " AM    GLUCOSE 86 06/15/2017 09:11 AM    BUN 9 06/15/2017 09:11 AM    CREATININE 0.89 06/15/2017 09:11 AM    BUN-CREATININE RATIO 13 04/04/2011 12:00 AM     Lab Results   Component Value Date/Time    ALKALINE PHOSPHATASE 44 12/12/2016 04:55 AM    AST(SGOT) 22 12/12/2016 04:55 AM    ALT(SGPT) 27 12/12/2016 04:55 AM    TOTAL BILIRUBIN 0.5 12/12/2016 04:55 AM      Lab Results   Component Value Date/Time    GLYCOHEMOGLOBIN 5.6 02/25/2016 07:09 AM    GLYCOHEMOGLOBIN 5.4 06/25/2012 10:00 PM    GLYCOHEMOGLOBIN 5.4 07/27/2006 03:25 AM     No results found for: TSH  Lab Results   Component Value Date/Time    FREE T-4 0.82 02/14/2013 01:45 PM    FREE T-4 0.95 07/08/2011 03:37 PM     Lab Results   Component Value Date/Time    WBC 9.6 05/05/2017 11:18 AM    RBC 4.70 05/05/2017 11:18 AM    HEMOGLOBIN 11.0* 05/05/2017 11:18 AM    HEMATOCRIT 36.5* 05/05/2017 11:18 AM    MCV 77.7* 05/05/2017 11:18 AM    MCH 23.4* 05/05/2017 11:18 AM    MCHC 30.1* 05/05/2017 11:18 AM    MPV 10.1 05/05/2017 11:18 AM    NEUTROPHILS-POLYS 80.30* 05/05/2017 11:18 AM    LYMPHOCYTES 11.20* 05/05/2017 11:18 AM    MONOCYTES 6.30 05/05/2017 11:18 AM    EOSINOPHILS 1.30 05/05/2017 11:18 AM    BASOPHILS 0.60 05/05/2017 11:18 AM    HYPOCHROMIA 2+ 07/04/2013 07:30 AM    ANISOCYTOSIS 1+ 12/01/2016 06:45 AM      IMAGING     Review the last echocardiography from 12/1/2016:  The left ventricle was normal in size and function.  Left ventricular ejection fraction is visually estimated to be 70%.  Mild concentric left ventricular hypertrophy.  Dilated right ventricle.  Normal right ventricular systolic function.  Estimated right ventricular systolic pressure  is 68 mmHg.  Right heart pressures are consistent with severe pulmonary   hypertension.    ASSESMENT AND PLAN        1. Essential hypertension  Controlled, continue current treatment and monitoring blood pressure at home  - BASIC METABOLIC PANEL; Future    2. LVH (left ventricular hypertrophy)  Blood pressure is  controlled    3. Paroxysmal atrial fibrillation (CMS-HCC)  No recent episode, continue current treatment and cardiology follow-up  - CBC WITH DIFFERENTIAL; Future    4. Pulmonary hypertension, moderate to severe (CMS-HCC)  Echocardiography showed severe pulmonary hypertension in echocardiography.  6 months ago.   Advised to continue cardiology follow-up    5. Dyslipidemia  Controlled, continue current treatment    6. MYESHA on CPAP  Need re-evaluation by pulmonology  - REFERRAL TO PULMONOLOGY    7. Obesity (BMI 30-39.9)  - Patient identified as having weight management issue.  Appropriate orders and counseling given.    8. Hypovitaminosis D  Advised to take 2000 units of vitamin D daily    9. Routine health maintenance  Advise shingles shot.    10. Encounter for screening mammogram for malignant neoplasm of breast  - MA-SCREEN MAMMO W/CAD-BILAT    Counseling:   - Smoking:  Nonsmoker    Followup: Return in about 3 months (around 9/16/2017) for Short.    All questions are answered.    Time spent 40 minutes face to face, with > 50% spent counseling and coordinating care.      Please note that this dictation was created using voice recognition software, and that there might be errors of darling and possibly content.

## 2017-06-29 ENCOUNTER — APPOINTMENT (OUTPATIENT)
Dept: RADIOLOGY | Facility: MEDICAL CENTER | Age: 77
End: 2017-06-29
Attending: FAMILY MEDICINE
Payer: MEDICARE

## 2017-07-05 ENCOUNTER — TELEPHONE (OUTPATIENT)
Dept: VASCULAR LAB | Facility: MEDICAL CENTER | Age: 77
End: 2017-07-05

## 2017-07-05 NOTE — TELEPHONE ENCOUNTER
Patient called today to discuss recent high BP reading.  She reports a BP of 163/97 and HR of 69 on 7-2-17.  She has since restarted her metoprolol after that reading, has not seen high BP since.  Asked that she only dose 1/2 tablet of metoprolol 50mg SR today.  She will be following up in pharmacotherapy clinic tomorrow 7-6-17 to discuss further.  Alvaro Bentley, PHARMD

## 2017-07-06 ENCOUNTER — ANTICOAGULATION VISIT (OUTPATIENT)
Dept: MEDICAL GROUP | Facility: MEDICAL CENTER | Age: 77
End: 2017-07-06
Payer: MEDICARE

## 2017-07-06 ENCOUNTER — HOSPITAL ENCOUNTER (OUTPATIENT)
Dept: LAB | Facility: MEDICAL CENTER | Age: 77
End: 2017-07-06
Attending: INTERNAL MEDICINE
Payer: MEDICARE

## 2017-07-06 ENCOUNTER — APPOINTMENT (OUTPATIENT)
Dept: LAB | Facility: MEDICAL CENTER | Age: 77
End: 2017-07-06
Payer: MEDICARE

## 2017-07-06 VITALS — SYSTOLIC BLOOD PRESSURE: 137 MMHG | HEART RATE: 52 BPM | DIASTOLIC BLOOD PRESSURE: 73 MMHG

## 2017-07-06 DIAGNOSIS — Z79.899 POLYPHARMACY: ICD-10-CM

## 2017-07-06 DIAGNOSIS — E87.6 HYPOKALEMIA: ICD-10-CM

## 2017-07-06 LAB
ANION GAP SERPL CALC-SCNC: 10 MMOL/L (ref 0–11.9)
BUN SERPL-MCNC: 14 MG/DL (ref 8–22)
CALCIUM SERPL-MCNC: 9 MG/DL (ref 8.5–10.5)
CHLORIDE SERPL-SCNC: 99 MMOL/L (ref 96–112)
CO2 SERPL-SCNC: 28 MMOL/L (ref 20–33)
CREAT SERPL-MCNC: 1.04 MG/DL (ref 0.5–1.4)
GFR SERPL CREATININE-BSD FRML MDRD: 52 ML/MIN/1.73 M 2
GLUCOSE SERPL-MCNC: 87 MG/DL (ref 65–99)
POTASSIUM SERPL-SCNC: 3.4 MMOL/L (ref 3.6–5.5)
SODIUM SERPL-SCNC: 137 MMOL/L (ref 135–145)

## 2017-07-06 PROCEDURE — 36415 COLL VENOUS BLD VENIPUNCTURE: CPT

## 2017-07-06 PROCEDURE — 80048 BASIC METABOLIC PNL TOTAL CA: CPT

## 2017-07-06 PROCEDURE — 85610 PROTHROMBIN TIME: CPT | Performed by: PHYSICIAN ASSISTANT

## 2017-07-06 RX ORDER — POTASSIUM CHLORIDE 20 MEQ/1
40 TABLET, EXTENDED RELEASE ORAL DAILY
Qty: 180 TAB | Refills: 0 | Status: SHIPPED | OUTPATIENT
Start: 2017-07-06 | End: 2017-07-10 | Stop reason: SDUPTHER

## 2017-07-06 NOTE — MR AVS SNAPSHOT
Sharon Callahan   2017 11:30 AM   Anticoagulation Visit   MRN: [id number]    Department:  [facility]   Dept Phone:  [phone]    Description:  Female : 1940   Provider:  [facility]           Allergies as of 2017     Allergen Noted Reactions    Pcn [Penicillins] 2010   Anaphylaxis    Skin turns black    Clindamycin 2015   Rash    Rash      Influenza Virus Vacc 2016   Rash    Red in face    Norco [Hydrocodone-Acetaminophen] 2013       Pneumococcal Vaccine 2012   Rash    Tetracycline 2015   Rash    Rash     Xarelto [Rivaroxaban] 2012   Rash      Vital Signs     Blood Pressure Pulse Smoking Status             137/73 mmHg 52 Former Smoker         Basic Information     Date Of Birth Sex Race Ethnicity Preferred Language    1940 Female White Non- English      Your appointments     2017 11:30 AM   Anti-Coag New Patient with [facility] MÓNICA PHARMACIST   [facility]    [address]  [place] [address] [phone]            2017 10:30 AM   Anti-Coag Routine with [facility] MÓNICA PHARMACIST   [facility]    [address]  [address]  [place] [id number] [phone]            2017 10:00 AM   CT BODY WITH with [address]   [facility]AGING - CT - [address]    [address]  [place] [id number] [phone]           Some exams require specific prep instructions that would have been given to you at time of scheduling. If you have any additional questions about the prep instructions, please call Imaging Scheduling at [phone] and press #[phone].            Aug 22, 2017 10:20 AM   New Patient with C Rotation   [facility] (--)    [address]  [address]  [place] [id number] [phone]           Please bring enclosed paperwork completed along  with your insurance card and photo ID.              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   11/28/2016 - Present    Pulmonary hypertension, moderate to severe (CMS-HCC) I27.2   12/9/2016 - Present    LVH (left ventricular hypertrophy) I51.7   12/14/2016 - Present    Paroxysmal atrial fibrillation (CMS-HCC) I48.0   12/14/2016 - Present    Essential hypertension I10   6/16/2017 - Present    MYESHA on CPAP G47.33, Z99.89   6/16/2017 - Present    Dyslipidemia E78.5   6/16/2017 - Present    Obesity (BMI 30-39.9) E66.9   6/16/2017 - Present      Health Maintenance        Date Due Completion Dates    IMM DTaP/Tdap/Td Vaccine (1 - Tdap) 10/30/1960 ---    IMM ZOSTER VACCINE 10/30/2001 ---    MAMMOGRAM 5/19/2015 5/19/2014    BONE DENSITY 4/16/2020 4/16/2015            Current Immunizations     Influenza Vaccine Quad Inj (Preserved) 11/6/2016, 9/15/2012, 9/1/2011    Pneumococcal polysaccharide vaccine (PPSV-23) 12/4/2012 10:56 AM      Below and/or attached are the medications your provider expects you to take. Review all of your home medications and newly ordered medications with your provider and/or pharmacist. Follow medication instructions as directed by your provider and/or pharmacist. Please keep your medication list with you and share with your provider. Update the information when medications are discontinued, doses are changed, or new medications (including over-the-counter products) are  added; and carry medication information at all times in the event of emergency situations     Allergies:  PCN - Anaphylaxis     CLINDAMYCIN - Rash     INFLUENZA VIRUS VACC - Rash     NORCO - (reactions not documented)     PNEUMOCOCCAL VACCINE - Rash     TETRACYCLINE - Rash     XARELTO - Rash               Medications  Valid as of: July 06, 2017 - 11:03 AM    Generic Name Brand Name Tablet Size Instructions for use    Acetaminophen (Tab) TYLENOL 500 MG Take 1,000 mg by mouth every 6 hours as needed.        BusPIRone HCl (Tab) BUSPAR 10 MG TAKE 1 TAB BY MOUTH 2 TIMES A DAY.        HydroCHLOROthiazide (Tab) HYDRODIURIL 25 MG Take 25 mg by mouth every day.        Metoprolol Succinate (TABLET SR 24 HR) TOPROL XL 25 MG Take 1 Tab by mouth every day.        Omeprazole (CAPSULE DELAYED RELEASE) PRILOSEC 40 MG Take 1 Cap by mouth every day.        Potassium Chloride Sweta CR (Tab CR) Kdur 20 MEQ Take 2 Tabs by mouth every day.        Sertraline HCl (Tab) ZOLOFT 50 MG TAKE 2 TABS BY MOUTH EVERY DAY.        Simvastatin (Tab) ZOCOR 40 MG Take 40 mg by mouth every evening.        TraZODone HCl (Tab) DESYREL 50 MG TAKE 2 TABLETS ORALLY EVERY MORNING AND 1 TABLET EVERY EVENING.        .                 Medicines prescribed today were sent to:     [id number] - [place], [place] - [address]    [address] [address]    Phone: [phone] Fax: [phone]    Open 24 Hours?: No    [facility]    [address] [facility] [address]    Phone: [phone] Fax: [phone]    [id number] - [place], [place] - [address]    [address] [address]    Phone: [phone] Fax: [phone]    Open 24 Hours?: No      Medication refill instructions:       If your prescription bottle indicates you have medication refills left, it is not necessary to call your provider’s office. Please contact your pharmacy and they will refill your medication.    If your prescription bottle  indicates you do not have any refills left, you may request refills at any time through one of the following ways: The online Clone system (except Urgent Care), by calling your provider’s office, or by asking your pharmacy to contact your provider’s office with a refill request. Medication refills are processed only during regular business hours and may not be available until the next business day. Your provider may request additional information or to have a follow-up visit with you prior to refilling your medication.   *Please Note: Medication refills are assigned a new Rx number when refilled electronically. Your pharmacy may indicate that no refills were authorized even though a new prescription for the same medication is available at the pharmacy. Please request the medicine by name with the pharmacy before contacting your provider for a refill.        Other Notes About Your Plan     -per OV 4/13/15, BMI 41.77. ? Morbid obesity code 278.01    -patient has been on Klonopin since 2011 without a diagnosis for this medication.                      Clone Access Code: [email or web address]  Expires: 7/21/2017  4:01 AM    Clone  A secure, online tool to manage your health information     [employer] Clone® is a secure, online tool that connects you to your personalized health information from the privacy of your home -- day or night - making it very easy for you to manage your healthcare. Once the activation process is completed, you can even access your medical information using the Clone macrina, which is available for free in the Apple Macrina store or Google Play store.     Clone provides the following levels of access (as shown below):   My Chart Features   [facility] Doctor [facility]  Specialists [facility]  Urgent  Care Non-[facility]  Primary Care  Doctor   Email your healthcare team securely and privately 24/7 X X X    Manage appointments: schedule your next appointment; view details of past/upcoming  appointments X      Request prescription refills. X      View recent personal medical records, including lab and immunizations X X X X   View health record, including health history, allergies, medications X X X X   Read reports about your outpatient visits, procedures, consult and ER notes X X X X   See your discharge summary, which is a recap of your hospital and/or ER visit that includes your diagnosis, lab results, and care plan. X X       How to register for [employer] Mobile:  1. Go to  [email or web address].  2. Click on the Sign Up Now box, which takes you to the New Member Sign Up page. You will need to provide the following information:  a. Enter your L4 Mobile Access Code exactly as it appears at the top of this page. (You will not need to use this code after you’ve completed the sign-up process. If you do not sign up before the expiration date, you must request a new code.)   b. Enter your date of birth.   c. Enter your home email address.   d. Click Submit, and follow the next screen’s instructions.  3. Create a L4 Mobile ID. This will be your L4 Mobile login ID and cannot be changed, so think of one that is secure and easy to remember.  4. Create a [employer] Mobile password. You can change your password at any time.  5. Enter your Password Reset Question and Answer. This can be used at a later time if you forget your password.   6. Enter your e-mail address. This allows you to receive e-mail notifications when new information is available in [employer] Mobile.  7. Click Sign Up. You can now view your health information.    For assistance activating your [employer] account, call [phone]

## 2017-07-06 NOTE — PROGRESS NOTES
Pharmacotherapy     S/O: Patient presents today for a polypharmacy consult follow up. She decreased her buspirone to once daily. Her BP was > 160 x 1 so she restarted her metoprolol at the direction of our clinic and has been taking 12.5 mg daily. Her leg swelling is better since restarting furosemide, she went to the lab today for potassium      Filed Vitals:    07/06/17 1047   BP: 137/73   Pulse: 52         Current outpatient prescriptions:   •  potassium chloride SA (KDUR) 20 MEQ Tab CR, Take 2 Tabs by mouth every day., Disp: 180 Tab, Rfl: 0  •  metoprolol SR (TOPROL XL) 25 MG TABLET SR 24 HR, Take 1 Tab by mouth every day. (Patient taking differently: Take 12.5 mg by mouth every day.), Disp: 90 Tab, Rfl: 1  •  hydrochlorothiazide (HYDRODIURIL) 25 MG Tab, Take 25 mg by mouth every day., Disp: , Rfl:   •  trazodone (DESYREL) 50 MG Tab, TAKE 2 TABLETS ORALLY EVERY MORNING AND 1 TABLET EVERY EVENING. (Patient taking differently: TAKE 2 TABLETS EVERY EVENING.), Disp: 270 Tab, Rfl: 0  •  simvastatin (ZOCOR) 40 MG Tab, Take 40 mg by mouth every evening., Disp: , Rfl:   •  omeprazole (PRILOSEC) 40 MG delayed-release capsule, Take 1 Cap by mouth every day., Disp: 90 Cap, Rfl: 1  •  busPIRone (BUSPAR) 10 MG Tab, TAKE 1 TAB BY MOUTH 2 TIMES A DAY. (Patient taking differently: TAKE 1 TAB BY MOUTH once daily), Disp: 180 Tab, Rfl: 1  •  sertraline (ZOLOFT) 50 MG Tab, TAKE 2 TABS BY MOUTH EVERY DAY., Disp: 180 Tab, Rfl: 1  •  acetaminophen (TYLENOL) 500 MG Tab, Take 1,000 mg by mouth every 6 hours as needed., Disp: , Rfl:       Assessment  1. Blood pressure and pulse within goal after stopping furosemide and decreasing metoprolol. Leg swelling has restarted since stopping furosemide    Since restarting furosemide swelling in her legs has improved. She got potassium done today, will follow up when we receive lab.      · Will  will continue with metoprolol 12.5 mg daily. She will monitor her HR as it is 52 today, if below 50 she  will contact clinic. If unable to tolerate will consider switching pt to an ACE  2. Drug/Drug interaction  Patient is on several drugs which affect serotonin (buspirone, trazodone and sertraline) which increase her risk of serotonin syndrome- patient states anxiety is much more controlled at this time as several factors in her life have resolved her anxiety.  · Will stop buspirone as pt has not had increase in anxiety symptoms since decreasing her buspirone to once daily.  Once buspirone is tapered if pt tolerates will taper her sertraline to 50 mg x 2 week, then 25 mg x 2 weeks then stop.    · Discussed at length with pt contacting our clinic with any increase in anxiety symptoms, depression or thought of hurting her self or suicide. Pt verbalizes understanding.            Plan  1. Stop buspirone. Contact clinic with any increase in anxiety symptoms, depression or thought of hurting her self or suicide.    Follow up 2 weeks    Gloria Baca, BELÉN    CC. Dr. Bloch, Dr. Blake

## 2017-07-06 NOTE — PROGRESS NOTES
Stop buspirone. Contact clinic with any increase in anxiety symptoms, depression or thought of hurting yourself or suicide.    [phone]    Gloria Baca, Pharm D

## 2017-07-10 ENCOUNTER — ANTICOAGULATION MONITORING (OUTPATIENT)
Dept: VASCULAR LAB | Facility: MEDICAL CENTER | Age: 77
End: 2017-07-10

## 2017-07-10 DIAGNOSIS — E87.6 HYPOKALEMIA: ICD-10-CM

## 2017-07-10 RX ORDER — POTASSIUM CHLORIDE 20 MEQ/1
TABLET, EXTENDED RELEASE ORAL
Qty: 90 TAB | Refills: 1 | Status: SHIPPED | OUTPATIENT
Start: 2017-07-10 | End: 2017-08-10

## 2017-07-11 NOTE — PROGRESS NOTES
Potassium was low at 3.4. WIll increase potassium to 40 mEq in AM and 20 mEq in evening. Relayed to pt. She will get new lab in 2 weeks.     Gloria Baca, Pharm D

## 2017-07-13 ENCOUNTER — TELEPHONE (OUTPATIENT)
Dept: MEDICAL GROUP | Facility: PHYSICIAN GROUP | Age: 77
End: 2017-07-13

## 2017-07-13 NOTE — TELEPHONE ENCOUNTER
Received a discharge order from [facility] ([phone]) - please have them fax order to Dr. Franklin, her new PCP

## 2017-07-18 NOTE — TELEPHONE ENCOUNTER
Please advise if [employer] has been notified or if this has been fwd to Dr. Núñez-Lou - order was scanned to media. 7/13/2017 8:21 AM : [facility] [employer] [facility] Orders - thanks!

## 2017-07-19 ENCOUNTER — HOSPITAL ENCOUNTER (OUTPATIENT)
Dept: LAB | Facility: MEDICAL CENTER | Age: 77
End: 2017-07-19
Attending: INTERNAL MEDICINE
Payer: MEDICARE

## 2017-07-19 DIAGNOSIS — E87.6 HYPOKALEMIA: ICD-10-CM

## 2017-07-19 LAB
ANION GAP SERPL CALC-SCNC: 9 MMOL/L (ref 0–11.9)
BUN SERPL-MCNC: 14 MG/DL (ref 8–22)
CALCIUM SERPL-MCNC: 9.8 MG/DL (ref 8.5–10.5)
CHLORIDE SERPL-SCNC: 100 MMOL/L (ref 96–112)
CO2 SERPL-SCNC: 28 MMOL/L (ref 20–33)
CREAT SERPL-MCNC: 0.83 MG/DL (ref 0.5–1.4)
GFR SERPL CREATININE-BSD FRML MDRD: >60 ML/MIN/1.73 M 2
GLUCOSE SERPL-MCNC: 110 MG/DL (ref 65–99)
POTASSIUM SERPL-SCNC: 3.8 MMOL/L (ref 3.6–5.5)
SODIUM SERPL-SCNC: 137 MMOL/L (ref 135–145)

## 2017-07-19 PROCEDURE — 80048 BASIC METABOLIC PNL TOTAL CA: CPT

## 2017-07-19 PROCEDURE — 36415 COLL VENOUS BLD VENIPUNCTURE: CPT

## 2017-07-20 ENCOUNTER — ANTICOAGULATION VISIT (OUTPATIENT)
Dept: MEDICAL GROUP | Facility: MEDICAL CENTER | Age: 77
End: 2017-07-20
Payer: MEDICARE

## 2017-07-20 VITALS — SYSTOLIC BLOOD PRESSURE: 156 MMHG | HEART RATE: 47 BPM | DIASTOLIC BLOOD PRESSURE: 74 MMHG

## 2017-07-20 DIAGNOSIS — I10 ESSENTIAL HYPERTENSION: ICD-10-CM

## 2017-07-20 DIAGNOSIS — Z79.899 POLYPHARMACY: ICD-10-CM

## 2017-07-20 PROCEDURE — 85610 PROTHROMBIN TIME: CPT | Performed by: PHYSICIAN ASSISTANT

## 2017-07-20 RX ORDER — LISINOPRIL 20 MG/1
20 TABLET ORAL DAILY
Qty: 30 TAB | Refills: 1 | Status: SHIPPED | OUTPATIENT
Start: 2017-07-20 | End: 2017-08-10

## 2017-07-20 NOTE — MR AVS SNAPSHOT
Sharon Callahan   2017 10:30 AM   Anticoagulation Visit   MRN: [id number]    [facility]   Dept Phone:  [phone]    Description:  Female : 1940   Provider:  [facility]           Allergies as of 2017     Allergen Noted Reactions    Pcn [Penicillins] 2010   Anaphylaxis    Skin turns black    Clindamycin 2015   Rash    Rash      Influenza Virus Vacc 2016   Rash    Red in face    Norco [Hydrocodone-Acetaminophen] 2013       Pneumococcal Vaccine 2012   Rash    Tetracycline 2015   Rash    Rash     Xarelto [Rivaroxaban] 2012   Rash      Vital Signs     Blood Pressure Pulse Smoking Status             156/74 mmHg 47 Former Smoker         Basic Information     Date Of Birth Sex Race Ethnicity Preferred Language    1940 Female White Non- English      Your appointments     2017 10:00 AM   CT BODY WITH with [address]   [facility] IMAGING - CT - [address]    [address]  [place] [id number] [phone]           Some exams require specific prep instructions that would have been given to you at time of scheduling. If you have any additional questions about the prep instructions, please call Imaging Scheduling at [phone] and press #2.            Aug 10, 2017 10:30 AM   Anti-Coag Routine with [facility] PHARMACIST   [facility] ([place]    [address]  [place] [id number] [phone]            Aug 22, 2017 10:20 AM   New Patient with C Rotation   [facility] (--)    [address]  [address]  [place] [id number] [phone]           Please bring enclosed paperwork completed along with your insurance card and photo ID.              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   11/28/2016 - Present    Pulmonary hypertension, moderate to severe (CMS-HCC) I27.2   12/9/2016 - Present    LVH (left ventricular hypertrophy) I51.7   12/14/2016 - Present    Paroxysmal atrial fibrillation (CMS-[facility]) I48.0   12/14/2016 - Present    Essential hypertension I10   6/16/2017 - Present    MYESHA on CPAP G47.33, Z99.89   6/16/2017 - Present    Dyslipidemia E78.5   6/16/2017 - Present    Obesity (BMI 30-39.9) E66.9   6/16/2017 - Present      Health Maintenance        Date Due Completion Dates    IMM DTaP/Tdap/Td Vaccine (1 - Tdap) 10/30/1960 ---    IMM ZOSTER VACCINE 10/30/2001 ---    MAMMOGRAM 5/19/2015 5/19/2014    BONE DENSITY 4/16/2020 4/16/2015            Current Immunizations     Influenza Vaccine Quad Inj (Preserved) 11/6/2016, 9/15/2012, 9/1/2011    Pneumococcal polysaccharide vaccine (PPSV-23) 12/4/2012 10:56 AM      Below and/or attached are the medications your provider expects you to take. Review all of your home medications and newly ordered medications with your provider and/or pharmacist. Follow medication instructions as directed by your provider and/or pharmacist. Please keep your medication list with you and share with your provider. Update the information when medications are discontinued, doses are changed, or new medications (including over-the-counter products) are added; and carry medication information at all times in the event of emergency situations     Allergies:  PCN - Anaphylaxis     CLINDAMYCIN - Rash     INFLUENZA VIRUS VACC - Rash     NORCO - (reactions not documented)      PNEUMOCOCCAL VACCINE - Rash     TETRACYCLINE - Rash     XARELTO - Rash               Medications  Valid as of: July 20, 2017 - 10:52 AM    Generic Name Brand Name Tablet Size Instructions for use    Acetaminophen (Tab) TYLENOL 500 MG Take 1,000 mg by mouth every 6 hours as needed.        HydroCHLOROthiazide (Tab) HYDRODIURIL 25 MG Take 25 mg by mouth every day.        Lisinopril (Tab) PRINIVIL 20 MG Take 1 Tab by mouth every day.        Metoprolol Succinate (TABLET SR 24 HR) TOPROL XL 25 MG Take 1 Tab by mouth every day.        Omeprazole (CAPSULE DELAYED RELEASE) PRILOSEC 40 MG Take 1 Cap by mouth every day.        Potassium Chloride Sweta CR (Tab CR) Kdur 20 MEQ Take 2 tablets by mouth in the morning then 1 tablet by mouth in the evening        Sertraline HCl (Tab) ZOLOFT 50 MG TAKE 2 TABS BY MOUTH EVERY DAY.        Simvastatin (Tab) ZOCOR 40 MG Take 40 mg by mouth every evening.        TraZODone HCl (Tab) DESYREL 50 MG TAKE 2 TABLETS ORALLY EVERY MORNING AND 1 TABLET EVERY EVENING.        .                 Medicines prescribed today were sent to:     [facility][id number] - [place], [place] - [address]    [address] [address]    Phone: [phone] Fax: [phone]    Open 24 Hours?: No    [facility]    [address] [facility] [address]    Phone: [phone] Fax: [phone]    [id number] - [place], [place] - [address]    [address] [address]    Phone: [phone] Fax: [phone]    Open 24 Hours?: No      Medication refill instructions:       If your prescription bottle indicates you have medication refills left, it is not necessary to call your provider’s office. Please contact your pharmacy and they will refill your medication.    If your prescription bottle indicates you do not have any refills left, you may request refills at any time through one of the following ways: The online [employer] system (except Urgent Care), by calling your  provider’s office, or by asking your pharmacy to contact your provider’s office with a refill request. Medication refills are processed only during regular business hours and may not be available until the next business day. Your provider may request additional information or to have a follow-up visit with you prior to refilling your medication.   *Please Note: Medication refills are assigned a new Rx number when refilled electronically. Your pharmacy may indicate that no refills were authorized even though a new prescription for the same medication is available at the pharmacy. Please request the medicine by name with the pharmacy before contacting your provider for a refill.        Other Notes About Your Plan     -per OV 4/13/15, BMI 41.77. ? Morbid obesity code 278.01    -patient has been on Klonopin since 2011 without a diagnosis for this medication.                      [employer] Access Code: Activation code not generated  Current [employer] Status: Active

## 2017-07-20 NOTE — PROGRESS NOTES
Pharmacotherapy     S/O: Patient presents today for a polypharmacy consult. Patient was referred by her PCP. Primary purpose for visit today is to follow up on medication changes at last visit.  At last visit buspirone was stopped. She reports no increase in anxiety, no signs of depression. Her Blood pressure has been in the 140-160s/60-80.     Filed Vitals:    07/20/17 1051   BP: 156/74   Pulse: 47         Current outpatient prescriptions:   •  lisinopril (PRINIVIL) 20 MG Tab, Take 1 Tab by mouth every day., Disp: 30 Tab, Rfl: 1  •  potassium chloride SA (KDUR) 20 MEQ Tab CR, Take 2 tablets by mouth in the morning then 1 tablet by mouth in the evening, Disp: 90 Tab, Rfl: 1  •  metoprolol SR (TOPROL XL) 25 MG TABLET SR 24 HR, Take 1 Tab by mouth every day. (Patient taking differently: Take 12.5 mg by mouth every day.), Disp: 90 Tab, Rfl: 1  •  hydrochlorothiazide (HYDRODIURIL) 25 MG Tab, Take 25 mg by mouth every day., Disp: , Rfl:   •  trazodone (DESYREL) 50 MG Tab, TAKE 2 TABLETS ORALLY EVERY MORNING AND 1 TABLET EVERY EVENING. (Patient taking differently: TAKE 2 TABLETS EVERY EVENING.), Disp: 270 Tab, Rfl: 0  •  simvastatin (ZOCOR) 40 MG Tab, Take 40 mg by mouth every evening., Disp: , Rfl:   •  omeprazole (PRILOSEC) 40 MG delayed-release capsule, Take 1 Cap by mouth every day., Disp: 90 Cap, Rfl: 1  •  sertraline (ZOLOFT) 50 MG Tab, TAKE 2 TABS BY MOUTH EVERY DAY. (Patient taking differently: TAKE 1 TAB BY MOUTH EVERY DAY.), Disp: 180 Tab, Rfl: 1  •  acetaminophen (TYLENOL) 500 MG Tab, Take 1,000 mg by mouth every 6 hours as needed., Disp: , Rfl:     Assessment  1. Blood pressure has slowly increased over the past month. Her HR has been in high 40s to low 50s, she could not tolerate an increase in metoprolol. Today her HR was 47.        · Since unable to increase metoprolol, and pt wanting to minimize medications she takes. Will taper metoprolol and start lisinopril 20 mg once daily for blood pressure control.    2. Low potassium  · Potassium is WNL after increase in potassium. Will continue with increased dose of potassium.   · Will be starting lisinopril 20 mg once daily which will increase potassium. Recheck in 2 weeks after starting lisinopril.   2. Drug/Drug interaction  Patient is on several drugs which affect serotonin (buspirone, trazodone and sertraline) which increase her risk of serotonin syndrome- patient states anxiety is much more controlled at this time as several factors in her life have resolved her anxiety.  · Patient has stopped buspirone with no increase in anxiety or depression or thoughts of hurting her self.     · Will begin tapering sertraline to 50 mg x 2 week, then 25 mg x 2 weeks then stop.    · Discussed at length with pt contacting our clinic with any increase in anxiety symptoms, depression or thought of hurting her self or suicide. Pt verbalizes understanding.            Plan  1. Taper metoprolol to 12.5 mg every other day x 1 week, begin lisinopril 20 mg once daily. Monitor BP daily, contact our clinic for BP > 150/90  2. Recheck potassium in 2 weeks  3. Decrease sertraline to 50 mg once daily.     Gloria Baca, PHARMD    Agree with above.  Agree with addition of lisinopril  Seems reasonable to taper off metoprolol, but will need to watch for any incidence of fast ventricular rate given history of atrial fibrillation  Given history of hypokalemia, would consider stopping hydrochlorothiazide and assuming she has no leg swelling trying amlodipine instead.  Her fairly profound hypokalemia on low-dose Hydrocort thiazide also suggest the possibility of primary aldosteronism. May be reasonable to check a aldosterone and renin level. Can also consider referral to resistant hypertension clinic if PCP desires.  If starts amlodipine would change simvastatin to pravastatin to avoid drug interactions     Michael J. Bloch, MD  [facility]    CC:   Dr. Blake

## 2017-07-20 NOTE — PROGRESS NOTES
Decrease Sertraline 1 tablet (50 mg) by mouth daily. Contact clinic with any increase in anxiety symptoms, depression or thought of hurting yourself or suicide.    Taper metoprolol 12.5 mg - for 1 week take 1/2 tablet by mouth every other day then stop.    Begin Lisinopril 20 mg - 1 tablet once daily.     Monitory Blood pressure, Call if /80 or HR greater than 100    [phone]    Gloria Baca, JEYSOND

## 2017-07-25 ENCOUNTER — HOSPITAL ENCOUNTER (OUTPATIENT)
Dept: RADIOLOGY | Facility: MEDICAL CENTER | Age: 77
End: 2017-07-25
Attending: INTERNAL MEDICINE
Payer: MEDICARE

## 2017-07-25 DIAGNOSIS — C7A.090 MALIGNANT CARCINOID TUMOR OF THE BRONCHUS AND LUNG (HCC): ICD-10-CM

## 2017-07-25 PROCEDURE — 700117 HCHG RX CONTRAST REV CODE 255: Performed by: INTERNAL MEDICINE

## 2017-07-25 PROCEDURE — 71260 CT THORAX DX C+: CPT

## 2017-07-25 RX ADMIN — IOHEXOL 100 ML: 350 INJECTION, SOLUTION INTRAVENOUS at 10:13

## 2017-08-09 ENCOUNTER — HOSPITAL ENCOUNTER (OUTPATIENT)
Dept: LAB | Facility: MEDICAL CENTER | Age: 77
End: 2017-08-09
Attending: INTERNAL MEDICINE
Payer: MEDICARE

## 2017-08-09 DIAGNOSIS — E87.6 HYPOKALEMIA: ICD-10-CM

## 2017-08-09 LAB
ANION GAP SERPL CALC-SCNC: 7 MMOL/L (ref 0–11.9)
BUN SERPL-MCNC: 11 MG/DL (ref 8–22)
CALCIUM SERPL-MCNC: 9.7 MG/DL (ref 8.5–10.5)
CHLORIDE SERPL-SCNC: 94 MMOL/L (ref 96–112)
CO2 SERPL-SCNC: 29 MMOL/L (ref 20–33)
CREAT SERPL-MCNC: 0.79 MG/DL (ref 0.5–1.4)
GFR SERPL CREATININE-BSD FRML MDRD: >60 ML/MIN/1.73 M 2
GLUCOSE SERPL-MCNC: 79 MG/DL (ref 65–99)
POTASSIUM SERPL-SCNC: 4.2 MMOL/L (ref 3.6–5.5)
SODIUM SERPL-SCNC: 130 MMOL/L (ref 135–145)

## 2017-08-09 PROCEDURE — 80048 BASIC METABOLIC PNL TOTAL CA: CPT

## 2017-08-09 PROCEDURE — 36415 COLL VENOUS BLD VENIPUNCTURE: CPT

## 2017-08-09 RX ORDER — SIMVASTATIN 40 MG
TABLET ORAL
Qty: 90 TAB | Refills: 3 | Status: SHIPPED | OUTPATIENT
Start: 2017-08-09 | End: 2017-09-06

## 2017-08-09 NOTE — TELEPHONE ENCOUNTER
Pt currently out of medication     Was the patient seen in the last year in this department? Yes     Does patient have an active prescription for medications requested? No     Received Request Via: Pharmacy

## 2017-08-10 ENCOUNTER — ANTICOAGULATION VISIT (OUTPATIENT)
Dept: MEDICAL GROUP | Facility: MEDICAL CENTER | Age: 77
End: 2017-08-10
Payer: MEDICARE

## 2017-08-10 VITALS — HEART RATE: 92 BPM | SYSTOLIC BLOOD PRESSURE: 117 MMHG | DIASTOLIC BLOOD PRESSURE: 84 MMHG

## 2017-08-10 DIAGNOSIS — E87.6 HYPOKALEMIA: ICD-10-CM

## 2017-08-10 DIAGNOSIS — I10 ESSENTIAL HYPERTENSION: ICD-10-CM

## 2017-08-10 DIAGNOSIS — Z79.899 POLYPHARMACY: ICD-10-CM

## 2017-08-10 PROCEDURE — 85610 PROTHROMBIN TIME: CPT | Performed by: FAMILY MEDICINE

## 2017-08-10 RX ORDER — LISINOPRIL 40 MG/1
40 TABLET ORAL DAILY
Qty: 30 TAB | Refills: 5 | Status: SHIPPED | OUTPATIENT
Start: 2017-08-10 | End: 2018-01-23 | Stop reason: SDUPTHER

## 2017-08-10 RX ORDER — FUROSEMIDE 20 MG/1
20 TABLET ORAL DAILY
COMMUNITY
End: 2018-02-14 | Stop reason: SDUPTHER

## 2017-08-10 RX ORDER — SERTRALINE HYDROCHLORIDE 25 MG/1
25 TABLET, FILM COATED ORAL DAILY
Qty: 14 TAB | Refills: 0 | Status: SHIPPED | OUTPATIENT
Start: 2017-08-10 | End: 2017-10-03

## 2017-08-10 RX ORDER — POTASSIUM CHLORIDE 20 MEQ/1
TABLET, EXTENDED RELEASE ORAL
Qty: 90 TAB | Refills: 1 | Status: SHIPPED | OUTPATIENT
Start: 2017-08-10 | End: 2018-06-29 | Stop reason: SDUPTHER

## 2017-08-10 NOTE — PROGRESS NOTES
Decrease Sertraline to 25 mg (1 tablet) by mouth daily for 2 weeks then stop. Contact clinic with any increase in anxiety symptoms, depression or thought of hurting yourself or suicide.    Stop hydrochlorthiazide     Decrease Potassium to 2 tablets by mouth in the morning    increase Lisinopril to 40 mg -  2 tablets of lisinopril 20 mg daily. Once done with lisinopril 20 mg tablets begin using lisinopril 40 mg tablets - 1 tablet by mouth daily    Monitory Blood pressure, Call if /80 or HR greater than 100    [phone]    Gloria Baca, JEYSOND

## 2017-08-10 NOTE — MR AVS SNAPSHOT
Sharon Callahan   8/10/2017 10:30 AM   Anticoagulation Visit   MRN: [id number]    Department:  [facility]   Dept Phone:  [phone]    Description:  Female : 1940   Provider:  [facility]           Allergies as of 8/10/2017     Allergen Noted Reactions    Pcn [Penicillins] 2010   Anaphylaxis    Skin turns black    Clindamycin 2015   Rash    Rash      Influenza Virus Vacc 2016   Rash    Red in face    Norco [Hydrocodone-Acetaminophen] 2013       Pneumococcal Vaccine 2012   Rash    Tetracycline 2015   Rash    Rash     Xarelto [Rivaroxaban] 2012   Rash      Vital Signs     Smoking Status                   Former Smoker           Basic Information     Date Of Birth Sex Race Ethnicity Preferred Language    1940 Female White Non- English      Your appointments     Aug 22, 2017 10:20 AM   New Patient with C Rotation   [facility] Sleep Medicine (--)    [address]  [address]  [place] [id number] [phone]           Please bring enclosed paperwork completed along with your insurance card and photo ID.            Aug 31, 2017 10:00 AM   Anti-Coag Routine with [facility]   [facility]    [address]  [address]  [place] [id number] [phone]              Problem List              ICD-10-CM Priority Class Noted - Resolved    H/O breast surgery Z98.890   2011 - Present    Depression with anxiety F41.8   5/3/2011 - Present    Routine health maintenance Z00.00   2012 - Present    Localized osteoarthrosis not specified whether primary or secondary, pelvic region and thigh M16.9   12/3/2012 - Present    BMI 36.0-36.9,adult Z68.36   2013 - Present    Chronic nausea (Chronic) R11.0   2015 - Present    Tremor R25.1   2015 - Present    Osteopenia M85.80   3/25/2016 - Present    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   2016 -  Present    Pulmonary hypertension, moderate to severe (CMS-HCC) I27.2   12/9/2016 - Present    LVH (left ventricular hypertrophy) I51.7   12/14/2016 - Present    Paroxysmal atrial fibrillation (CMS-HCC) I48.0   12/14/2016 - Present    Essential hypertension I10   6/16/2017 - Present    MYESHA on CPAP G47.33, Z99.89   6/16/2017 - Present    Dyslipidemia E78.5   6/16/2017 - Present    Obesity (BMI 30-39.9) E66.9   6/16/2017 - Present      Health Maintenance        Date Due Completion Dates    IMM DTaP/Tdap/Td Vaccine (1 - Tdap) 10/30/1960 ---    IMM ZOSTER VACCINE 10/30/2001 ---    MAMMOGRAM 5/19/2015 5/19/2014    BONE DENSITY 4/16/2020 4/16/2015            Current Immunizations     Influenza Vaccine Quad Inj (Preserved) 11/6/2016, 9/15/2012, 9/1/2011    Pneumococcal polysaccharide vaccine (PPSV-23) 12/4/2012 10:56 AM      Below and/or attached are the medications your provider expects you to take. Review all of your home medications and newly ordered medications with your provider and/or pharmacist. Follow medication instructions as directed by your provider and/or pharmacist. Please keep your medication list with you and share with your provider. Update the information when medications are discontinued, doses are changed, or new medications (including over-the-counter products) are added; and carry medication information at all times in the event of emergency situations     Allergies:  PCN - Anaphylaxis     CLINDAMYCIN - Rash     INFLUENZA VIRUS VACC - Rash     NORCO - (reactions not documented)     PNEUMOCOCCAL VACCINE - Rash     TETRACYCLINE - Rash     XARELTO - Rash               Medications  Valid as of: August 10, 2017 - 10:45 AM    Generic Name Brand Name Tablet Size Instructions for use    Acetaminophen (Tab) TYLENOL 500 MG Take 1,000 mg by mouth every 6 hours as needed.        Lisinopril (Tab) PRINIVIL 20 MG Take 1 Tab by mouth every day.        Lisinopril (Tab) PRINIVIL, ZESTRIL 40 MG Take 1 Tab by mouth every  day.        Omeprazole (CAPSULE DELAYED RELEASE) PRILOSEC 40 MG Take 1 Cap by mouth every day.        Potassium Chloride Sweta CR (Tab CR) Kdur 20 MEQ Take 2 tablets by mouth in the morning        Sertraline HCl (Tab) ZOLOFT 25 MG Take 1 Tab by mouth every day.        Simvastatin (Tab) ZOCOR 40 MG TAKE 1 TAB BY MOUTH EVERY EVENING.        TraZODone HCl (Tab) DESYREL 50 MG TAKE 2 TABLETS ORALLY EVERY MORNING AND 1 TABLET EVERY EVENING.        .                 Medicines prescribed today were sent to:     [id number] - [place], [place] - [address]    [address] [address]    Phone: [phone] Fax: [phone]    Open 24 Hours?: No    [facility]    [address] [facility] [address]    Phone: [phone] Fax: [phone]    [id number] - [place], [place] - [address]    [address] [address]    Phone: [phone] Fax: [phone]    Open 24 Hours?: No      Medication refill instructions:       If your prescription bottle indicates you have medication refills left, it is not necessary to call your provider’s office. Please contact your pharmacy and they will refill your medication.    If your prescription bottle indicates you do not have any refills left, you may request refills at any time through one of the following ways: The online [employer] system (except Urgent Care), by calling your provider’s office, or by asking your pharmacy to contact your provider’s office with a refill request. Medication refills are processed only during regular business hours and may not be available until the next business day. Your provider may request additional information or to have a follow-up visit with you prior to refilling your medication.   *Please Note: Medication refills are assigned a new Rx number when refilled electronically. Your pharmacy may indicate that no refills were authorized even though a new prescription for the same medication is available  at the pharmacy. Please request the medicine by name with the pharmacy before contacting your provider for a refill.        Other Notes About Your Plan     -per OV 4/13/15, BMI 41.77. ? Morbid obesity code 278.01    -patient has been on Klonopin since 2011 without a diagnosis for this medication.                      MyChart Access Code: Activation code not generated  Current MyChart Status: Active

## 2017-08-10 NOTE — Clinical Note
Dr. Bloch,  The pt is still on furosemide which is probably why her potassium was low. I did still stop her HCTZ as when I tried to stop her furosemide she felt her ankles swelled. Her BP is well controlled with the addition of lisinoprol. She does not want to try another medication, so I increased the lisinopril with hopes of not needing to add amlodipine.   Thanks,   Gloria

## 2017-08-10 NOTE — PROGRESS NOTES
Pharmacotherapy     S/O: Patient presents today for a polypharmacy consult follow up. Patient was referred by her primary care provider. Primary purpose for visit today is to evaluate changes made at previous visit. She reports no increase in anxiety or depression since tapering her sertraline to 50 mg daily. She reports since stopping metoprolol she feels she thought she felt her HR increase when she was doing yard work but when she checked her HR it was normal. Her HR has been in high 50's since stopping metoprolol. She started the lisinopril her BP is well controlled around 130-140/70-80. Her potassium is WNL at 4.2 which is the highest it has been.     Filed Vitals:    08/10/17 1046   BP: 117/84   Pulse: 92     Lab Results   Component Value Date/Time    SODIUM 130* 08/09/2017 09:17 AM    POTASSIUM 4.2 08/09/2017 09:17 AM    CHLORIDE 94* 08/09/2017 09:17 AM    CO2 29 08/09/2017 09:17 AM    GLUCOSE 79 08/09/2017 09:17 AM    BUN 11 08/09/2017 09:17 AM    CREATININE 0.79 08/09/2017 09:17 AM    BUN-CREATININE RATIO 13 04/04/2011 12:00 AM       Current outpatient prescriptions:   •  furosemide (LASIX) 20 MG Tab, Take 20 mg by mouth every day., Disp: , Rfl:   •  lisinopril (PRINIVIL, ZESTRIL) 40 MG tablet, Take 1 Tab by mouth every day., Disp: 30 Tab, Rfl: 5  •  sertraline (ZOLOFT) 25 MG tablet, Take 1 Tab by mouth every day., Disp: 14 Tab, Rfl: 0  •  potassium chloride SA (KDUR) 20 MEQ Tab CR, Take 2 tablets by mouth in the morning, Disp: 90 Tab, Rfl: 1  •  simvastatin (ZOCOR) 40 MG Tab, TAKE 1 TAB BY MOUTH EVERY EVENING., Disp: 90 Tab, Rfl: 3  •  trazodone (DESYREL) 50 MG Tab, TAKE 2 TABLETS ORALLY EVERY MORNING AND 1 TABLET EVERY EVENING. (Patient taking differently: TAKE 2 TABLETS EVERY EVENING.), Disp: 270 Tab, Rfl: 0  •  omeprazole (PRILOSEC) 40 MG delayed-release capsule, Take 1 Cap by mouth every day., Disp: 90 Cap, Rfl: 1  •  acetaminophen (TYLENOL) 500 MG Tab, Take 1,000 mg by mouth every 6 hours as needed.,  Disp: , Rfl:       Assessment  1. Blood pressure is well controlled since starting lisinopril.  2. Heart rate has remained normal since stopping metoprolol.       3. Potassium WNL.   · Patient is still taking furosemide 20 mg once daily as she feels it helps with swelling in her ankles.   · How ever given hx of hypokalemia will stop HCTZ. Will increase lisinopril to 40 mg once daily as she would like to minimize medications instead of adding amlodipine . Will decrease potassium to 40 mEq daily as anticipate Potassium to increase from stopping HCTZ and increasing lisinopril.   4. Drug/Drug interaction  Patient is on several drugs which affect serotonin (trazodone and sertraline) which increase her risk of serotonin syndrome- patient states anxiety is much more controlled at this time as several factors in her life have resolved her anxiety.  · Patient has tapered sertraline to 50 mg daily with no increase in anxiety or depression or thoughts of hurting her self.   Will decrease setraline to 25 mg daily x 2 weeks then stop if  no increase in anxiety or depression or thoughts of hurting her self  · Discussed at length with pt contacting our clinic with any increase in anxiety symptoms, depression or thought of hurting her self or suicide. Pt verbalizes understanding.            Plan  1. Stop HCTZ,  Increase lisinopril to 40 mg daily  2. Decrease potassium to 2 tablets in AM  3. Decrease sertraline to 25 mg daily x 2 weeks then stop  5. Recheck potassium in 2 weeks        Gloria Baca, JEYSOND

## 2017-08-30 ENCOUNTER — HOSPITAL ENCOUNTER (OUTPATIENT)
Dept: LAB | Facility: MEDICAL CENTER | Age: 77
End: 2017-08-30
Attending: INTERNAL MEDICINE
Payer: MEDICARE

## 2017-08-30 DIAGNOSIS — E87.6 HYPOKALEMIA: ICD-10-CM

## 2017-08-30 LAB
ANION GAP SERPL CALC-SCNC: 8 MMOL/L (ref 0–11.9)
BUN SERPL-MCNC: 10 MG/DL (ref 8–22)
CALCIUM SERPL-MCNC: 9.9 MG/DL (ref 8.5–10.5)
CHLORIDE SERPL-SCNC: 104 MMOL/L (ref 96–112)
CO2 SERPL-SCNC: 28 MMOL/L (ref 20–33)
CREAT SERPL-MCNC: 0.85 MG/DL (ref 0.5–1.4)
GFR SERPL CREATININE-BSD FRML MDRD: >60 ML/MIN/1.73 M 2
GLUCOSE SERPL-MCNC: 84 MG/DL (ref 65–99)
POTASSIUM SERPL-SCNC: 4.8 MMOL/L (ref 3.6–5.5)
SODIUM SERPL-SCNC: 140 MMOL/L (ref 135–145)

## 2017-08-30 PROCEDURE — 36415 COLL VENOUS BLD VENIPUNCTURE: CPT

## 2017-08-30 PROCEDURE — 80048 BASIC METABOLIC PNL TOTAL CA: CPT

## 2017-08-31 ENCOUNTER — ANTICOAGULATION VISIT (OUTPATIENT)
Dept: MEDICAL GROUP | Facility: MEDICAL CENTER | Age: 77
End: 2017-08-31
Payer: MEDICARE

## 2017-08-31 VITALS — DIASTOLIC BLOOD PRESSURE: 72 MMHG | SYSTOLIC BLOOD PRESSURE: 159 MMHG | HEART RATE: 56 BPM

## 2017-08-31 DIAGNOSIS — Z79.899 POLYPHARMACY: ICD-10-CM

## 2017-08-31 PROCEDURE — 85610 PROTHROMBIN TIME: CPT | Performed by: INTERNAL MEDICINE

## 2017-08-31 PROCEDURE — 99211 OFF/OP EST MAY X REQ PHY/QHP: CPT | Performed by: INTERNAL MEDICINE

## 2017-08-31 RX ORDER — AMLODIPINE BESYLATE 5 MG/1
5 TABLET ORAL DAILY
Qty: 30 TAB | Refills: 5 | Status: SHIPPED | OUTPATIENT
Start: 2017-08-31 | End: 2018-02-17 | Stop reason: SDUPTHER

## 2017-08-31 NOTE — PROGRESS NOTES
Start amlodipine 5 mg - take 1 tablet daily for high blood pressure.   Please call our clinic if you notice increased swelling in your legs or dizziness.     Decrease potassium to 1 tablet in the morning    Continue to monitor daily blood pressure. Get lab done prior to next visit.     [phone]    Follow up 3 weeks.     Gloria Baca, Pharm D

## 2017-08-31 NOTE — PROGRESS NOTES
Pharmacotherapy     S/O: Patient presents today for a polypharmacy consult. Patient was referred by her PCP. Primary purpose for visit today is to follow up on medication changes made at previous viti. Patient has expressed concerns about blood pressure elevation since last visit. Home blood pressure has been 160-140s/80s-60s.       Vitals:    08/31/17 1005   BP: 159/72   Pulse: (!) 56     Lab Results   Component Value Date/Time    SODIUM 140 08/30/2017 08:45 AM    POTASSIUM 4.8 08/30/2017 08:45 AM    CHLORIDE 104 08/30/2017 08:45 AM    CO2 28 08/30/2017 08:45 AM    GLUCOSE 84 08/30/2017 08:45 AM    BUN 10 08/30/2017 08:45 AM    CREATININE 0.85 08/30/2017 08:45 AM    CREATININE 0.79 04/04/2011 12:00 AM    BUNCREATRAT 13 04/04/2011 12:00 AM          Current Outpatient Prescriptions:   •  amlodipine (NORVASC) 5 MG Tab, Take 1 Tab by mouth every day., Disp: 30 Tab, Rfl: 5  •  lisinopril (PRINIVIL, ZESTRIL) 40 MG tablet, Take 1 Tab by mouth every day., Disp: 30 Tab, Rfl: 5  •  sertraline (ZOLOFT) 25 MG tablet, Take 1 Tab by mouth every day., Disp: 14 Tab, Rfl: 0  •  potassium chloride SA (KDUR) 20 MEQ Tab CR, Take 2 tablets by mouth in the morning, Disp: 90 Tab, Rfl: 1  •  furosemide (LASIX) 20 MG Tab, Take 20 mg by mouth every day., Disp: , Rfl:   •  simvastatin (ZOCOR) 40 MG Tab, TAKE 1 TAB BY MOUTH EVERY EVENING., Disp: 90 Tab, Rfl: 3  •  trazodone (DESYREL) 50 MG Tab, TAKE 2 TABLETS ORALLY EVERY MORNING AND 1 TABLET EVERY EVENING. (Patient taking differently: TAKE 2 TABLETS EVERY EVENING.), Disp: 270 Tab, Rfl: 0  •  omeprazole (PRILOSEC) 40 MG delayed-release capsule, Take 1 Cap by mouth every day., Disp: 90 Cap, Rfl: 1  •  acetaminophen (TYLENOL) 500 MG Tab, Take 1,000 mg by mouth every 6 hours as needed., Disp: , Rfl:     Assessment  1. Blood pressure has been elevated since stopping HCTZ and beginning increased dose of lisinopril 40 mg daily.   · Will have pt start amlodipine 5 mg daily for blood pressure  control. Pt is to monitor for leg swelling and dizziness.     2. Potassium WNL.   ? Patient is still taking furosemide 20 mg once daily as she feels it helps with swelling in her ankles.   ? Patient does not like taking potassium pills. Potassium WNL, will decrease to potassium 20 mEq once daily. Follow up lab in 3 weeks  4. Drug/Drug interaction  Patient is on was on several drugs which affect serotonin (trazodone and sertraline) which increase her risk of serotonin syndrome- patient states anxiety is much more controlled at this time as several factors in her life have resolved her anxiety.  · Patient has stopped sertraline with no increase in depression, anxiety or thought of hurting herself or suicide.  She does report she had to recently give up her dog which did cause some anxiety and anger but this was short lived   Plan  1. Start amlodipine 5 mg once daily.   2. Decrease potassium to 1 tablet in the AM  3. Recheck potassium in 2 weeks      Gloria Baca, PharmD

## 2017-09-06 ENCOUNTER — TELEPHONE (OUTPATIENT)
Dept: VASCULAR LAB | Facility: MEDICAL CENTER | Age: 77
End: 2017-09-06

## 2017-09-06 DIAGNOSIS — E78.5 DYSLIPIDEMIA: ICD-10-CM

## 2017-09-06 RX ORDER — ATORVASTATIN CALCIUM 20 MG/1
20 TABLET, FILM COATED ORAL
Qty: 30 TAB | Refills: 6 | Status: SHIPPED | OUTPATIENT
Start: 2017-09-06 | End: 2017-10-03

## 2017-09-06 NOTE — TELEPHONE ENCOUNTER
Pt recently started on amlodipine, which has an interaction with simvastatin 40mg, increasing the potential for myopathy/rhabdo.  Will change pt's statin therapy from simvastatin to atorvastatin, both moderate intensity therapies.  Spoke with pt who is in agreement and understands this change.  She is to continue all other medications as prescribed.  Will f/u on 9/21 at [facility].      JULIO Montoya  [facility]

## 2017-09-07 RX ORDER — TRAZODONE HYDROCHLORIDE 50 MG/1
TABLET ORAL
Qty: 90 TAB | Refills: 0 | Status: SHIPPED | OUTPATIENT
Start: 2017-09-07 | End: 2017-09-28 | Stop reason: SDUPTHER

## 2017-09-21 ENCOUNTER — APPOINTMENT (OUTPATIENT)
Dept: MEDICAL GROUP | Facility: MEDICAL CENTER | Age: 77
End: 2017-09-21
Payer: MEDICARE

## 2017-09-28 RX ORDER — TRAZODONE HYDROCHLORIDE 50 MG/1
50 TABLET ORAL 3 TIMES DAILY
Qty: 90 TAB | Refills: 0 | Status: SHIPPED | OUTPATIENT
Start: 2017-09-28 | End: 2018-04-04 | Stop reason: SDUPTHER

## 2017-10-02 ENCOUNTER — HOSPITAL ENCOUNTER (OUTPATIENT)
Dept: LAB | Facility: MEDICAL CENTER | Age: 77
End: 2017-10-02
Attending: INTERNAL MEDICINE
Payer: MEDICARE

## 2017-10-02 LAB
ANION GAP SERPL CALC-SCNC: 8 MMOL/L (ref 0–11.9)
BUN SERPL-MCNC: 9 MG/DL (ref 8–22)
CALCIUM SERPL-MCNC: 9.7 MG/DL (ref 8.5–10.5)
CHLORIDE SERPL-SCNC: 102 MMOL/L (ref 96–112)
CO2 SERPL-SCNC: 26 MMOL/L (ref 20–33)
CREAT SERPL-MCNC: 0.71 MG/DL (ref 0.5–1.4)
GFR SERPL CREATININE-BSD FRML MDRD: >60 ML/MIN/1.73 M 2
GLUCOSE SERPL-MCNC: 80 MG/DL (ref 65–99)
POTASSIUM SERPL-SCNC: 4.1 MMOL/L (ref 3.6–5.5)
SODIUM SERPL-SCNC: 136 MMOL/L (ref 135–145)

## 2017-10-02 PROCEDURE — 80048 BASIC METABOLIC PNL TOTAL CA: CPT

## 2017-10-02 PROCEDURE — 36415 COLL VENOUS BLD VENIPUNCTURE: CPT

## 2017-10-03 ENCOUNTER — OFFICE VISIT (OUTPATIENT)
Dept: MEDICAL GROUP | Facility: MEDICAL CENTER | Age: 77
End: 2017-10-03
Payer: MEDICARE

## 2017-10-03 VITALS
WEIGHT: 181 LBS | SYSTOLIC BLOOD PRESSURE: 134 MMHG | HEIGHT: 63 IN | BODY MASS INDEX: 32.07 KG/M2 | RESPIRATION RATE: 16 BRPM | DIASTOLIC BLOOD PRESSURE: 68 MMHG | TEMPERATURE: 97.9 F | HEART RATE: 96 BPM | OXYGEN SATURATION: 96 %

## 2017-10-03 DIAGNOSIS — G47.33 OSA ON CPAP: ICD-10-CM

## 2017-10-03 DIAGNOSIS — I27.20 PULMONARY HYPERTENSION, MODERATE TO SEVERE (HCC): ICD-10-CM

## 2017-10-03 DIAGNOSIS — I51.7 LVH (LEFT VENTRICULAR HYPERTROPHY): ICD-10-CM

## 2017-10-03 DIAGNOSIS — I10 ESSENTIAL HYPERTENSION: ICD-10-CM

## 2017-10-03 DIAGNOSIS — G47.9 SLEEP DISORDER: ICD-10-CM

## 2017-10-03 DIAGNOSIS — Z00.00 HEALTH CARE MAINTENANCE: ICD-10-CM

## 2017-10-03 DIAGNOSIS — Z23 NEEDS FLU SHOT: ICD-10-CM

## 2017-10-03 DIAGNOSIS — I48.0 PAROXYSMAL A-FIB (HCC): ICD-10-CM

## 2017-10-03 DIAGNOSIS — Z12.31 ENCOUNTER FOR SCREENING MAMMOGRAM FOR MALIGNANT NEOPLASM OF BREAST: ICD-10-CM

## 2017-10-03 PROBLEM — E78.5 DYSLIPIDEMIA: Status: RESOLVED | Noted: 2017-06-16 | Resolved: 2017-10-03

## 2017-10-03 PROCEDURE — G0008 ADMIN INFLUENZA VIRUS VAC: HCPCS | Performed by: INTERNAL MEDICINE

## 2017-10-03 PROCEDURE — 90662 IIV NO PRSV INCREASED AG IM: CPT | Performed by: INTERNAL MEDICINE

## 2017-10-03 PROCEDURE — 99214 OFFICE O/P EST MOD 30 MIN: CPT | Performed by: INTERNAL MEDICINE

## 2017-10-03 RX ORDER — POTASSIUM CHLORIDE 20 MEQ/1
40 TABLET, EXTENDED RELEASE ORAL DAILY
Qty: 180 TAB | Refills: 1 | Status: SHIPPED | OUTPATIENT
Start: 2017-10-03 | End: 2017-10-05

## 2017-10-03 NOTE — PROGRESS NOTES
CHIEF COMPLAINT  Chief Complaint   Patient presents with   • Follow-Up     review labs    HTN    HPI  Patient is a 76 y.o. female patient who presents today for the following     HYPERTENSION, LVH, pulmonary hypertension  Meds: HCTZ, 25 mg QD, taking as prescribed.   Measuring BP at home: yes, it has been < 150/90.  Denies:  -  headaches, vision problems, tinnitus.                 -  chest pain/pressure, palpitations, irregular heart beats, exertional, dyspnea, peripheral edema.                           - medication side effect: unusual fatigue, slow heartbeat, foot/leg swelling, cough.  Low salt diet: yes  Diet: healthy  Exercise: daily yard work  BMI: Body mass index is 32 kg/(m^2).  FH of HTN: Multiple   She has been followed up by cardiology     Paroxysmal Afib  Interval course:  - Resolved, no medications.    Background:  She has history of afibrillation, no episode for > 5 yrs.   She has been followed up by cardiology, on metoprolol, 50 mg daily.  FH of CAD: father and brother with MI     Sleep disorder  The patient has a tablet to go to sleep, use trazodone, 50 mg at bedtime, helped.   Discussed sleep hygiene:   - Go to bed and wake up at consistent times whether work/school day or not.   - Keep room dark, quiet, and comfortable.   - Don't have naps.  - Increase exposure to sunlight during awake times and avoid bright lights before going to sleep.   - Avoid stimulant or caffeine use more than 4 hours after wake time.   - Avoid meal or exercise 2-3 hours prior to going to bed.  Handout provided.     MYESHA, on CPAP  She has been on CPAP for > 5 yrs.  She has been compliant with CPAP.  Denies daily fatigue and daytime sleepiness.    No recent pulmonology f/u.    GERD  On omeprazole, 20 mg prn. Takes medication as prescribed, that controls sx.   Denies:   - heartburn, epigastric pain.   -  nausea, vomiting, or diarrhea  - dysphagia  - abnormal cough  - blood in the stool or dark tarry stools.  - early  satiety.  Nonsmoker.     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 90 Tab 0   • atorvastatin (LIPITOR) 20 MG Tab Take 1 Tab by mouth every bedtime. 30 Tab 6   • amlodipine (NORVASC) 5 MG Tab Take 1 Tab by mouth every day. 30 Tab 5   • lisinopril (PRINIVIL, ZESTRIL) 40 MG tablet Take 1 Tab by mouth every day. 30 Tab 5   • potassium chloride SA (KDUR) 20 MEQ Tab CR Take 2 tablets by mouth in the morning 90 Tab 1   • furosemide (LASIX) 20 MG Tab Take 20 mg by mouth every day.     • acetaminophen (TYLENOL) 500 MG Tab Take 1,000 mg by mouth every 6 hours as needed.     • omeprazole (PRILOSEC) 40 MG delayed-release capsule Take 1 Cap by mouth every day. 90 Cap 1     No current facility-administered medications for this visit.      ALLERGIES  Allergies: Pcn [penicillins]; Clindamycin; Influenza virus vacc; Norco [hydrocodone-acetaminophen]; Pneumococcal vaccine; Tetracycline; and Xarelto [rivaroxaban]    PAST MEDICAL HISTORY  Past Medical History:   Diagnosis Date   • Pleural effusion, right 1/4/2017    Status post thoracotomy and decortication   • Hemothorax on right 1/4/2017    Status post thoracotomy and decortication   • Chronic nausea 1/12/2015    Has had GI work up  done Dr voss   • BMI 38.0-38.9,adult 2013   • Depression with anxiety 5/3/2011   • S/P bilateral mastectomy 2011   • Hyperlipidemia 2011   • MYESHA (obstructive sleep apnea) 2011    On cPAP    • Arthritis    • CATARACT     surgical correcttion bilateral   • Heart burn    • History of cholecystectomy    • Hypertension    • Indigestion    • Pain     left hip   • Range of motion deficit     left elbow, unable to completely extend   • Sleep apnea     CPAP   • Snoring      SURGICAL HISTORY  She  has a past surgical history that includes hip arthroplasty total (3/26/2009); lisa by laparoscopy (2011); other (); other (); uvulopharyngopalatoplasty (); cataract phaco with iol (10/20/2011); cataract phaco with iol (11/3/2011); orif, humerus (s); hip arthroplasty total (12/3/2012); breast biopsy (); enlarge breast with implant; and thoracoscopy (Right, 2016).    SOCIAL HISTORY  Social History   Substance Use Topics   • Smoking status: Former Smoker     Packs/day: 1.00     Years: 15.00     Types: Cigarettes     Quit date: 1975   • Smokeless tobacco: Never Used   • Alcohol use No      Comment: once in a great while     Social History     Social History Narrative   • No narrative on file     FAMILY HISTORY  Family History   Problem Relation Age of Onset   • Hypertension Mother    • Cancer Father      lung   • Diabetes Father    • Hypertension Father    • Heart Disease Father      MI   • Hypertension Brother    • Hypertension Maternal Grandmother    • No Known Problems Brother    • Hypertension Brother    • Diabetes Brother    • Cancer Paternal Aunt      stomach   • Diabetes Paternal Aunt    • Heart Disease Brother      MI   • Hyperlipidemia Neg Hx    • Stroke Neg Hx      Family Status   Relation Status   • Mother    • Father    • Sister    • Brother Alive   • Maternal Grandmother  at age 65   • Brother Alive   • Brother    • Paternal Aunt    •  "Brother    • Neg Hx      ROS   Constitutional: Negative for fever, chills.  HENT: Negative for congestion, sore throat.  Eyes: Negative for blurred vision.   Respiratory: Negative for cough, shortness of breath.  Cardiovascular: Negative for chest pain, palpitations. And per HPI.  Gastrointestinal: Negative for heartburn, nausea, abdominal pain.   Genitourinary: Negative for dysuria.  Musculoskeletal: Negative for significant myalgias, back pain and joint pain.   Skin: Negative for rash and itching.   Neuro: Negative for dizziness, weakness and headaches.   Endo/Heme/Allergies: Does not bruise/bleed easily.   Psychiatric/Behavioral: Negative for depression, anxiety. And per HPI.    PHYSICAL EXAM   Blood pressure 134/68, pulse 96, temperature 36.6 °C (97.9 °F), resp. rate 16, height 1.6 m (5' 2.99\"), weight 82.1 kg (181 lb), SpO2 96 %. Body mass index is 32.07 kg/m².  General:  NAD, well appearing.   Obese.  HEENT:   NC/AT, PERRLA, EOMI, TMs are clear. Oropharyngeal mucosa is pink,  without lesions;  no cervical / supraclavicular  lymphadenopathy, no thyromegaly.    Cardiovascular: RRR.   No m/r/g. No carotid bruits .      Lungs:   CTAB, no w/r/r, no respiratory distress.  Abdomen: Soft, NT/ND + BS; could not palpate liver/spleen due to obesity.  Extremities:  2+ DP and radial pulses bilaterally.  No c/c/e.   Skin:  Warm, dry.  No erythema. No rash.   Neurologic: Alert & oriented x 3. No focal deficits.  Psychiatric:  Affect normal, mood normal, judgment normal.    LABS     Labs are reviewed and discussed with a patient    Lab Results   Component Value Date/Time    CHOLSTRLTOT 130 10/08/2016 04:10 AM    LDL 56 10/08/2016 04:10 AM    HDL 59 10/08/2016 04:10 AM    TRIGLYCERIDE 142 12/07/2016 05:00 AM       Lab Results   Component Value Date/Time    SODIUM 136 10/02/2017 08:42 AM    POTASSIUM 4.1 10/02/2017 08:42 AM    CHLORIDE 102 10/02/2017 08:42 AM    CO2 26 10/02/2017 08:42 AM    GLUCOSE 80 10/02/2017 08:42 AM    " BUN 9 10/02/2017 08:42 AM    CREATININE 0.71 10/02/2017 08:42 AM    CREATININE 0.79 04/04/2011 12:00 AM    BUNCREATRAT 13 04/04/2011 12:00 AM     Lab Results   Component Value Date/Time    ALKPHOSPHAT 44 12/12/2016 04:55 AM    ASTSGOT 22 12/12/2016 04:55 AM    ALTSGPT 27 12/12/2016 04:55 AM    TBILIRUBIN 0.5 12/12/2016 04:55 AM      Lab Results   Component Value Date/Time    HBA1C 5.6 02/25/2016 07:09 AM    HBA1C 5.4 06/25/2012 10:00 PM    HBA1C 5.4 07/27/2006 03:25 AM     No results found for: TSH  Lab Results   Component Value Date/Time    FREET4 0.82 02/14/2013 01:45 PM    FREET4 0.95 07/08/2011 03:37 PM     Lab Results   Component Value Date/Time    WBC 9.6 05/05/2017 11:18 AM    RBC 4.70 05/05/2017 11:18 AM    HEMOGLOBIN 11.0 (L) 05/05/2017 11:18 AM    HEMATOCRIT 36.5 (L) 05/05/2017 11:18 AM    MCV 77.7 (L) 05/05/2017 11:18 AM    MCH 23.4 (L) 05/05/2017 11:18 AM    MCHC 30.1 (L) 05/05/2017 11:18 AM    MPV 10.1 05/05/2017 11:18 AM    NEUTSPOLYS 80.30 (H) 05/05/2017 11:18 AM    LYMPHOCYTES 11.20 (L) 05/05/2017 11:18 AM    MONOCYTES 6.30 05/05/2017 11:18 AM    EOSINOPHILS 1.30 05/05/2017 11:18 AM    BASOPHILS 0.60 05/05/2017 11:18 AM    HYPOCHROMIA 2+ 07/04/2013 07:30 AM    ANISOCYTOSIS 1+ 12/01/2016 06:45 AM      IMAGING     None    ASSESMENT AND PLAN        1. Essential hypertension  Controlled, continue current treatment    2. Pulmonary hypertension, moderate to severe  3. LVH (left ventricular hypertrophy)  Stable, asymptomatic, continue cardiology follow-up and current treatment.   Blood pressure is controlled    4. Paroxysmal a-fib (CMS-HCC)  Resolved    5. Sleep disorder  Stable, controlled, continue trazodone as needed.    6. MYESHA on CPAP  She didn't have recent follow-up, given referral:  - REFERRAL TO PULMONOLOGY    7. Health care maintenance  Pending goal records    8. Encounter for screening mammogram for malignant neoplasm of breast  - MA-SCREEN MAMMO W/CAD-BILAT    9. Needs flu shot  - INFLUENZA  VACCINE, HIGH DOSE (65+ ONLY)    Counseling:   - Smoking:  Nonsmoker    Followup: Return in about 2 months (around 12/3/2017) for Medicare annual.    All questions are answered.    Please note that this dictation was created using voice recognition software, and that there might be errors of darling and possibly content.

## 2017-10-05 ENCOUNTER — ANTICOAGULATION VISIT (OUTPATIENT)
Dept: MEDICAL GROUP | Facility: MEDICAL CENTER | Age: 77
End: 2017-10-05
Payer: MEDICARE

## 2017-10-05 ENCOUNTER — APPOINTMENT (OUTPATIENT)
Dept: MEDICAL GROUP | Facility: MEDICAL CENTER | Age: 77
End: 2017-10-05
Payer: MEDICARE

## 2017-10-05 VITALS — SYSTOLIC BLOOD PRESSURE: 124 MMHG | HEART RATE: 62 BPM | DIASTOLIC BLOOD PRESSURE: 72 MMHG

## 2017-10-05 DIAGNOSIS — Z79.899 POLYPHARMACY: ICD-10-CM

## 2017-10-05 PROCEDURE — 99211 OFF/OP EST MAY X REQ PHY/QHP: CPT | Performed by: INTERNAL MEDICINE

## 2017-10-05 RX ORDER — ATORVASTATIN CALCIUM 20 MG/1
20 TABLET, FILM COATED ORAL NIGHTLY
COMMUNITY
End: 2019-01-09

## 2017-10-05 NOTE — PROGRESS NOTES
Pharmacotherapy     S/O: Patient presents today for a polypharmacy consult. Patient was referred by PCP. Primary purpose for visit today is to review medication changes from her last visit. She brings in blood pressure log today, all BPs below 140/90, most around 120/80    She states she is feeling well.     Vitals:    10/05/17 1554   BP: 124/72   Pulse: 62     Lab Results   Component Value Date/Time    SODIUM 136 10/02/2017 08:42 AM    POTASSIUM 4.1 10/02/2017 08:42 AM    CHLORIDE 102 10/02/2017 08:42 AM    CO2 26 10/02/2017 08:42 AM    GLUCOSE 80 10/02/2017 08:42 AM    BUN 9 10/02/2017 08:42 AM    CREATININE 0.71 10/02/2017 08:42 AM    CREATININE 0.79 04/04/2011 12:00 AM    BUNCREATRAT 13 04/04/2011 12:00 AM          Current Outpatient Prescriptions:   •  atorvastatin (LIPITOR) 20 MG Tab, Take 20 mg by mouth every evening., Disp: , Rfl:   •  trazodone (DESYREL) 50 MG Tab, Take 1 Tab by mouth 3 times a day., Disp: 90 Tab, Rfl: 0  •  amlodipine (NORVASC) 5 MG Tab, Take 1 Tab by mouth every day., Disp: 30 Tab, Rfl: 5  •  lisinopril (PRINIVIL, ZESTRIL) 40 MG tablet, Take 1 Tab by mouth every day., Disp: 30 Tab, Rfl: 5  •  potassium chloride SA (KDUR) 20 MEQ Tab CR, Take 2 tablets by mouth in the morning (Patient taking differently: 20 mEq every day. Take 1 tablets by mouth in the morning), Disp: 90 Tab, Rfl: 1  •  furosemide (LASIX) 20 MG Tab, Take 20 mg by mouth every day., Disp: , Rfl:   •  omeprazole (PRILOSEC) 40 MG delayed-release capsule, Take 1 Cap by mouth every day., Disp: 90 Cap, Rfl: 1  •  acetaminophen (TYLENOL) 500 MG Tab, Take 1,000 mg by mouth every 6 hours as needed., Disp: , Rfl:     Assessment  1. Blood pressure is controlled at this time. Will continue current blood presssure regimen.     2. Potassium WNL.     4. Drug/Drug interaction. Pt is off all serotonin medications except trazodone    Plan  Continue current medication regimen. Blood pressure controlled. Will refer pt back to PCP for further  medical management.       Gloria Baca, PharmD

## 2017-11-15 ENCOUNTER — APPOINTMENT (OUTPATIENT)
Dept: MEDICAL GROUP | Facility: MEDICAL CENTER | Age: 77
End: 2017-11-15
Payer: MEDICARE

## 2017-12-12 DIAGNOSIS — I10 ESSENTIAL HYPERTENSION: ICD-10-CM

## 2017-12-12 RX ORDER — METOPROLOL SUCCINATE 25 MG/1
25 TABLET, EXTENDED RELEASE ORAL DAILY
Qty: 90 TAB | Refills: 1 | Status: SHIPPED | OUTPATIENT
Start: 2017-12-12 | End: 2018-03-05

## 2017-12-12 NOTE — TELEPHONE ENCOUNTER
Was the patient seen in the last year in this department? Yes     Does patient have an active prescription for medications requested? No     Received Request Via: Pharmacy     Last Visit:10/3/17    Last Labs:10/2/17

## 2018-01-04 ENCOUNTER — APPOINTMENT (OUTPATIENT)
Dept: MEDICAL GROUP | Facility: MEDICAL CENTER | Age: 78
End: 2018-01-04
Payer: MEDICARE

## 2018-01-05 ENCOUNTER — HOSPITAL ENCOUNTER (OUTPATIENT)
Dept: RADIOLOGY | Facility: MEDICAL CENTER | Age: 78
End: 2018-01-05
Attending: INTERNAL MEDICINE
Payer: MEDICARE

## 2018-01-05 ENCOUNTER — OFFICE VISIT (OUTPATIENT)
Dept: MEDICAL GROUP | Facility: MEDICAL CENTER | Age: 78
End: 2018-01-05
Payer: MEDICARE

## 2018-01-05 VITALS
OXYGEN SATURATION: 95 % | BODY MASS INDEX: 31.08 KG/M2 | HEART RATE: 63 BPM | HEIGHT: 63 IN | SYSTOLIC BLOOD PRESSURE: 126 MMHG | TEMPERATURE: 97.9 F | WEIGHT: 175.4 LBS | RESPIRATION RATE: 16 BRPM | DIASTOLIC BLOOD PRESSURE: 64 MMHG

## 2018-01-05 DIAGNOSIS — D3A.090 CARCINOID TUMOR OF LUNG: ICD-10-CM

## 2018-01-05 DIAGNOSIS — R05.9 COUGH: ICD-10-CM

## 2018-01-05 DIAGNOSIS — R06.2 WHEEZING: ICD-10-CM

## 2018-01-05 PROCEDURE — 94640 AIRWAY INHALATION TREATMENT: CPT | Performed by: INTERNAL MEDICINE

## 2018-01-05 PROCEDURE — 99214 OFFICE O/P EST MOD 30 MIN: CPT | Mod: 25 | Performed by: INTERNAL MEDICINE

## 2018-01-05 PROCEDURE — 71046 X-RAY EXAM CHEST 2 VIEWS: CPT

## 2018-01-05 RX ORDER — ALBUTEROL SULFATE 2.5 MG/3ML
2.5 SOLUTION RESPIRATORY (INHALATION) ONCE
Status: COMPLETED | OUTPATIENT
Start: 2018-01-05 | End: 2018-01-05

## 2018-01-05 RX ORDER — TRIAMCINOLONE ACETONIDE 40 MG/ML
40 INJECTION, SUSPENSION INTRA-ARTICULAR; INTRAMUSCULAR ONCE
Status: COMPLETED | OUTPATIENT
Start: 2018-01-05 | End: 2018-01-05

## 2018-01-05 RX ORDER — ALBUTEROL SULFATE 90 UG/1
2 AEROSOL, METERED RESPIRATORY (INHALATION) EVERY 6 HOURS PRN
Qty: 8.5 G | Refills: 3 | Status: SHIPPED | OUTPATIENT
Start: 2018-01-05 | End: 2018-01-05

## 2018-01-05 RX ORDER — AZITHROMYCIN 250 MG/1
TABLET, FILM COATED ORAL
Qty: 6 TAB | Refills: 0 | Status: SHIPPED | OUTPATIENT
Start: 2018-01-05 | End: 2018-03-05

## 2018-01-05 RX ORDER — BENZONATATE 100 MG/1
100 CAPSULE ORAL 3 TIMES DAILY PRN
Qty: 60 CAP | Refills: 1 | Status: SHIPPED | OUTPATIENT
Start: 2018-01-05 | End: 2018-03-05

## 2018-01-05 RX ORDER — ALBUTEROL SULFATE 90 UG/1
2 AEROSOL, METERED RESPIRATORY (INHALATION) EVERY 6 HOURS PRN
Qty: 8.5 G | Refills: 3 | Status: SHIPPED | OUTPATIENT
Start: 2018-01-05 | End: 2019-01-09

## 2018-01-05 RX ADMIN — ALBUTEROL SULFATE 2.5 MG: 2.5 SOLUTION RESPIRATORY (INHALATION) at 10:15

## 2018-01-05 RX ADMIN — TRIAMCINOLONE ACETONIDE 40 MG: 40 INJECTION, SUSPENSION INTRA-ARTICULAR; INTRAMUSCULAR at 10:16

## 2018-01-05 ASSESSMENT — PATIENT HEALTH QUESTIONNAIRE - PHQ9: CLINICAL INTERPRETATION OF PHQ2 SCORE: 0

## 2018-01-05 NOTE — PROGRESS NOTES
CHIEF COMPLAINT  Chief Complaint   Patient presents with   • Cough     x 4 weeks with phlem      HPI  Patient is a 77 y.o. female patient who presents today for the following     Cough, lungs carcinoid tu  77-year-old, female, with history of carcinoid of the right lower lungs    She got sick 4 weeks ago with:  · Nasal congestion w clear d/c  · Postnasal drip, pain in sinus areas  · Cough with thick/white sputum, worsening    Denies:   · Fever, chills,  body aches, HA,   · Sore throat  · Swollen glands, difficulty swallowing  · Earache  · Wheezing, chest pain  · Decreased appetite, nausea, vomiting, diarrhea, abdominal pain  · Skin rash.    · Meds used:   Tylenol, dyquil, nyquil  · Sick contact:  N  · Flu vaccine:    Y    Reviewed PMH, PSH, FH, SH, ALL, HCM/IMM, no changes  Reviewed MEDS, no changes    Patient Active Problem List    Diagnosis Date Noted   • Essential hypertension 06/16/2017   • MYESHA on CPAP 06/16/2017   • Obesity (BMI 30-39.9) 06/16/2017   • LVH (left ventricular hypertrophy) 12/14/2016   • Pulmonary hypertension, moderate to severe 12/09/2016   • Carcinoid tumor of lung 11/28/2016   • Osteopenia 03/25/2016   • Tremor 01/12/2015   • BMI 36.0-36.9,adult 09/18/2013   • Localized osteoarthrosis not specified whether primary or secondary, pelvic region and thigh 12/03/2012   • Routine health maintenance 01/19/2012   • H/O breast surgery 04/04/2011     CURRENT MEDICATIONS  Current Outpatient Prescriptions   Medication Sig Dispense Refill   • metoprolol SR (TOPROL XL) 25 MG TABLET SR 24 HR TAKE 1 TAB BY MOUTH EVERY DAY. 90 Tab 1   • atorvastatin (LIPITOR) 20 MG Tab Take 20 mg by mouth every evening.     • trazodone (DESYREL) 50 MG Tab Take 1 Tab by mouth 3 times a day. 90 Tab 0   • amlodipine (NORVASC) 5 MG Tab Take 1 Tab by mouth every day. 30 Tab 5   • lisinopril (PRINIVIL, ZESTRIL) 40 MG tablet Take 1 Tab by mouth every day. 30 Tab 5   • potassium chloride SA (KDUR) 20 MEQ Tab CR Take 2 tablets by mouth  in the morning (Patient taking differently: 20 mEq every day. Take 1 tablets by mouth in the morning) 90 Tab 1   • furosemide (LASIX) 20 MG Tab Take 20 mg by mouth every day.     • acetaminophen (TYLENOL) 500 MG Tab Take 1,000 mg by mouth every 6 hours as needed.     • omeprazole (PRILOSEC) 40 MG delayed-release capsule Take 1 Cap by mouth every day. 90 Cap 1     No current facility-administered medications for this visit.      ALLERGIES  Allergies: Pcn [penicillins]; Clindamycin; Influenza virus vacc; Norco [hydrocodone-acetaminophen]; Pneumococcal vaccine; Tetracycline; and Xarelto [rivaroxaban]  PAST MEDICAL HISTORY  Past Medical History:   Diagnosis Date   • Pleural effusion, right 1/4/2017    Status post thoracotomy and decortication   • Hemothorax on right 1/4/2017    Status post thoracotomy and decortication   • Chronic nausea 1/12/2015    Has had GI work up done Dr voss   • BMI 38.0-38.9,adult 9/18/2013   • Depression with anxiety 5/3/2011   • S/P bilateral mastectomy 4/4/2011   • Hyperlipidemia 4/4/2011   • MYESHA (obstructive sleep apnea) 4/4/2011    On cPAP    • Arthritis    • CATARACT     surgical correcttion bilateral   • Heart burn    • History of cholecystectomy    • Hypertension    • Indigestion    • Pain     left hip   • Range of motion deficit     left elbow, unable to completely extend   • Sleep apnea     CPAP   • Snoring      SURGICAL HISTORY  She  has a past surgical history that includes hip arthroplasty total (3/26/2009); lisa by laparoscopy (2/1/2011); other (1988); other (1989); uvulopharyngopalatoplasty (1990); cataract phaco with iol (10/20/2011); cataract phaco with iol (11/3/2011); orif, humerus (1950's); hip arthroplasty total (12/3/2012); breast biopsy (1980); pr enlarge breast with implant; and thoracoscopy (Right, 12/6/2016).  SOCIAL HISTORY  Social History   Substance Use Topics   • Smoking status: Former Smoker     Packs/day: 1.00     Years: 15.00     Types: Cigarettes     Quit  "date: 1975   • Smokeless tobacco: Never Used   • Alcohol use No      Comment: once in a great while     Social History     Social History Narrative   • No narrative on file     FAMILY HISTORY  Family History   Problem Relation Age of Onset   • Hypertension Mother    • Cancer Father      lung   • Diabetes Father    • Hypertension Father    • Heart Disease Father      MI   • Hypertension Brother    • Hypertension Maternal Grandmother    • No Known Problems Brother    • Hypertension Brother    • Diabetes Brother    • Cancer Paternal Aunt      stomach   • Diabetes Paternal Aunt    • Heart Disease Brother      MI   • Hyperlipidemia Neg Hx    • Stroke Neg Hx      Family Status   Relation Status   • Mother    • Father    • Sister    • Brother Alive   • Maternal Grandmother  at age 65   • Brother Alive   • Brother    • Paternal Aunt    • Brother    • Neg Hx      ROS   Constitutional: as above.  HENT: as above.  Eyes: Negative for blurred vision.   Respiratory: as above.  Cardiovascular: Negative for chest pain, palpitations.   Gastrointestinal: Negative for heartburn, nausea, abdominal pain.   Genitourinary: Negative for dysuria.  Musculoskeletal: Negative for significant myalgias, back pain and joint pain.   Skin: Negative for rash and itching.   Neuro: Negative for dizziness, weakness and headaches.   Endo/Heme/Allergies: Does not bruise/bleed easily.   Psychiatric/Behavioral: Negative for depression.    PHYSICAL EXAM   Blood pressure 126/64, pulse 63, temperature 36.6 °C (97.9 °F), resp. rate 16, height 1.6 m (5' 2.99\"), weight 79.6 kg (175 lb 6.4 oz), SpO2 95 %. Body mass index is 31.08 kg/m².  General:  NAD, appears acutely sick  HEENT:   NC/AT, PERRLA, EOMI, TMs are clear. Pharyngeal mucosa is erythematous,  without lesions;  no cervical / supraclavicular  lymphadenopathy, no thyromegaly.    Cardiovascular: RRR.   No m/r/g. No carotid bruits .      Lungs:   Decreased air entry, " bilateral expiratory wheezing; no r/r, no respiratory distress.  Abdomen: Soft, NT/ND + BS; no suprapubic tenderness; no masses or hepatosplenomegaly.  Extremities:  2+ DP and radial pulses bilaterally.  No c/c/e.   Skin:  Warm, dry.  No erythema. No rash.   Neurologic: Alert & oriented x 3. No focal deficits.  Psychiatric:  Affect normal, mood normal, judgment normal.    LABS     Labs are reviewed and discussed with a patient  Lab Results   Component Value Date/Time    CHOLSTRLTOT 130 10/08/2016 04:10 AM    LDL 56 10/08/2016 04:10 AM    HDL 59 10/08/2016 04:10 AM    TRIGLYCERIDE 142 12/07/2016 05:00 AM       Lab Results   Component Value Date/Time    SODIUM 136 10/02/2017 08:42 AM    POTASSIUM 4.1 10/02/2017 08:42 AM    CHLORIDE 102 10/02/2017 08:42 AM    CO2 26 10/02/2017 08:42 AM    GLUCOSE 80 10/02/2017 08:42 AM    BUN 9 10/02/2017 08:42 AM    CREATININE 0.71 10/02/2017 08:42 AM    CREATININE 0.79 04/04/2011 12:00 AM    BUNCREATRAT 13 04/04/2011 12:00 AM     Lab Results   Component Value Date/Time    ALKPHOSPHAT 44 12/12/2016 04:55 AM    ASTSGOT 22 12/12/2016 04:55 AM    ALTSGPT 27 12/12/2016 04:55 AM    TBILIRUBIN 0.5 12/12/2016 04:55 AM      Lab Results   Component Value Date/Time    HBA1C 5.6 02/25/2016 07:09 AM    HBA1C 5.4 06/25/2012 10:00 PM    HBA1C 5.4 07/27/2006 03:25 AM     No results found for: TSH  Lab Results   Component Value Date/Time    FREET4 0.82 02/14/2013 01:45 PM    FREET4 0.95 07/08/2011 03:37 PM     Lab Results   Component Value Date/Time    WBC 9.6 05/05/2017 11:18 AM    RBC 4.70 05/05/2017 11:18 AM    HEMOGLOBIN 11.0 (L) 05/05/2017 11:18 AM    HEMATOCRIT 36.5 (L) 05/05/2017 11:18 AM    MCV 77.7 (L) 05/05/2017 11:18 AM    MCH 23.4 (L) 05/05/2017 11:18 AM    MCHC 30.1 (L) 05/05/2017 11:18 AM    MPV 10.1 05/05/2017 11:18 AM    NEUTSPOLYS 80.30 (H) 05/05/2017 11:18 AM    LYMPHOCYTES 11.20 (L) 05/05/2017 11:18 AM    MONOCYTES 6.30 05/05/2017 11:18 AM    EOSINOPHILS 1.30 05/05/2017 11:18 AM     BASOPHILS 0.60 05/05/2017 11:18 AM    HYPOCHROMIA 2+ 07/04/2013 07:30 AM    ANISOCYTOSIS 1+ 12/01/2016 06:45 AM      IMAGING     Reviewed the following imaging:    The last CT chest from 7/25/17:  1.  Stable bilobed posterior medial right lower lobe lung mass consistent with the known previously biopsied carcinoid.  2.  There is no evidence of metastatic disease in the chest, abdomen, or pelvis.  3.  Other areas of nodular and linear scarring in both lungs again noted.  4.  There are embolization coils in the posterior medial right hemithorax.  5.  There is sigmoid diverticulosis with bowel wall thickening which is probably chronic.    The last chest x-ray, 12/12/16  Unchanged partially loculated RIGHT pleural fluid collection . This would be unusual appearance for airspace disease. Further assessment can be performed with CT.    ASSESMENT AND PLAN        1. Cough  - DX-CHEST-2 VIEWS; Future  - azithromycin (ZITHROMAX) 250 MG Tab; Take 1 tabl twice daily the first day, then 1 tabl daily, PO.  Dispense: 6 Tab; Refill: 0    Advised to take abx after a meal.   Side effects of abx use discussed with a patient. Advised to stop abx and call if develop any side effect, including diarrhea.     - benzonatate (TESSALON) 100 MG Cap; Take 1 Cap by mouth 3 times a day as needed for Cough.  Dispense: 60 Cap; Refill: 1  - albuterol 108 (90 Base) MCG/ACT Aero Soln inhalation aerosol; Inhale 2 Puffs by mouth every 6 hours as needed for Shortness of Breath.  Dispense: 8.5 g; Refill: 3    2. Wheezing  - DX-CHEST-2 VIEWS; Future  - azithromycin (ZITHROMAX) 250 MG Tab; Take 1 tabl twice daily the first day, then 1 tabl daily, PO.  Dispense: 6 Tab; Refill: 0  - benzonatate (TESSALON) 100 MG Cap; Take 1 Cap by mouth 3 times a day as needed for Cough.  Dispense: 60 Cap; Refill: 1  - albuterol 108 (90 Base) MCG/ACT Aero Soln inhalation aerosol; Inhale 2 Puffs by mouth every 6 hours as needed for Shortness of Breath.  Dispense: 8.5 g;  Refill: 3  - triamcinolone acetonide (KENALOG-40) injection 40 mg; 1 mL by Intramuscular route Once.      Advised   - increase fluid intake;   - may take Tylenol/ibuprofen for fever, pain; nasal decongestant   - may apply warm packs over sinus areas     3. Carcinoid tumor of lung  - DX-CHEST-2 VIEWS; Future    Counseling:   - Smoking:  Nonsmoker    Followup: as needed    All questions are answered.    Please note that this dictation was created using voice recognition software, and that there might be errors of darling and possibly content.

## 2018-01-10 ENCOUNTER — TELEPHONE (OUTPATIENT)
Dept: MEDICAL GROUP | Facility: MEDICAL CENTER | Age: 78
End: 2018-01-10

## 2018-01-11 NOTE — TELEPHONE ENCOUNTER
Discussed with patient about chest x-ray finding in that area is no metal on the x-ray.   She verbalized understanding.  Dr Blake

## 2018-01-11 NOTE — TELEPHONE ENCOUNTER
Phone Number Called: [phone] (home)        Message: Pt called stated that on her x-ray they found metal on her lungs.  She would like you to call her back as soon as possible please.    Thank you     Called patient back, Per Dr. Blake the findings below doesn't state metal in the lungs.    Pt was advised and understands.    Left Message for patient to call back: N\A    Cardiomediastinal contours are normal.    No pulmonary consolidation is seen. Lung volumes are large.    No pleural space process is evident.    Right lower thoracic posterior coil embolization    Mamillary implants

## 2018-02-14 ENCOUNTER — TELEPHONE (OUTPATIENT)
Dept: VASCULAR LAB | Facility: MEDICAL CENTER | Age: 78
End: 2018-02-14

## 2018-02-14 DIAGNOSIS — I51.7 LVH (LEFT VENTRICULAR HYPERTROPHY): ICD-10-CM

## 2018-02-14 DIAGNOSIS — I10 ESSENTIAL HYPERTENSION: ICD-10-CM

## 2018-02-14 RX ORDER — FUROSEMIDE 20 MG/1
20 TABLET ORAL DAILY
Qty: 30 TAB | Refills: 0 | Status: SHIPPED | OUTPATIENT
Start: 2018-02-14 | End: 2018-03-15 | Stop reason: SDUPTHER

## 2018-02-14 NOTE — TELEPHONE ENCOUNTER
[facility]    Pt called stating her BP has been consistently in 170's /90's the past few days.  States she has gained 5 lbs in the past couple of days.  She discontinued Lasix on her own.  Sent new Rx for pt to take 20 mg Lasix today and tomorrow morning and follow up in 48 hours.      Sabas Doyle, PharmD   CC Gloria Gurries PharmD

## 2018-02-20 DIAGNOSIS — I10 ESSENTIAL HYPERTENSION: ICD-10-CM

## 2018-02-20 RX ORDER — AMLODIPINE BESYLATE 5 MG/1
5 TABLET ORAL DAILY
Qty: 30 TAB | Refills: 5 | Status: SHIPPED | OUTPATIENT
Start: 2018-02-20 | End: 2018-03-05

## 2018-02-20 NOTE — PROGRESS NOTES
[facility]    Pt states her BP has improved to <140/80 and all her symptoms have improved.  Will have pt follow up with a new YANETH ASAP and follow up in our clinic on 2/26/18.    Sabas Doyle, PharmD

## 2018-02-23 ENCOUNTER — EH NON-PROVIDER (OUTPATIENT)
Dept: OCCUPATIONAL MEDICINE | Facility: CLINIC | Age: 78
End: 2018-02-23

## 2018-02-23 ENCOUNTER — HOSPITAL ENCOUNTER (OUTPATIENT)
Facility: MEDICAL CENTER | Age: 78
End: 2018-02-23
Attending: PREVENTIVE MEDICINE
Payer: COMMERCIAL

## 2018-02-23 DIAGNOSIS — Z02.89 ENCOUNTER FOR OCCUPATIONAL HEALTH EXAMINATION: ICD-10-CM

## 2018-02-23 LAB
MEV IGG SER IA-ACNC: POSITIVE
MUV IGG SER IA-ACNC: POSITIVE
RUBV AB SER QL: >500 IU/ML
VZV IGG SER IA-ACNC: POSITIVE

## 2018-02-23 PROCEDURE — 86480 TB TEST CELL IMMUN MEASURE: CPT | Performed by: PREVENTIVE MEDICINE

## 2018-02-23 PROCEDURE — 90715 TDAP VACCINE 7 YRS/> IM: CPT | Performed by: PREVENTIVE MEDICINE

## 2018-02-23 PROCEDURE — 86735 MUMPS ANTIBODY: CPT | Performed by: PREVENTIVE MEDICINE

## 2018-02-23 PROCEDURE — 86765 RUBEOLA ANTIBODY: CPT | Performed by: PREVENTIVE MEDICINE

## 2018-02-23 PROCEDURE — 86762 RUBELLA ANTIBODY: CPT | Performed by: PREVENTIVE MEDICINE

## 2018-02-23 PROCEDURE — 86787 VARICELLA-ZOSTER ANTIBODY: CPT | Performed by: PREVENTIVE MEDICINE

## 2018-02-24 LAB
M TB TUBERC IFN-G BLD QL: NEGATIVE
M TB TUBERC IFN-G/MITOGEN IGNF BLD: -0
M TB TUBERC IGNF/MITOGEN IGNF CONTROL: 42.41 [IU]/ML
MITOGEN IGNF BCKGRD COR BLD-ACNC: 0.02 [IU]/ML

## 2018-02-26 ENCOUNTER — EH NON-PROVIDER (OUTPATIENT)
Dept: OCCUPATIONAL MEDICINE | Facility: CLINIC | Age: 78
End: 2018-02-26

## 2018-02-26 DIAGNOSIS — Z71.85 IMMUNIZATION COUNSELING: ICD-10-CM

## 2018-02-26 PROCEDURE — 8916 PR CLEARANCE ONLY: Performed by: PREVENTIVE MEDICINE

## 2018-02-27 DIAGNOSIS — I10 ESSENTIAL HYPERTENSION: ICD-10-CM

## 2018-02-27 RX ORDER — LISINOPRIL 40 MG/1
TABLET ORAL
Qty: 30 TAB | Refills: 0 | Status: SHIPPED | OUTPATIENT
Start: 2018-02-27 | End: 2018-03-28 | Stop reason: SDUPTHER

## 2018-03-05 ENCOUNTER — ANTICOAGULATION VISIT (OUTPATIENT)
Dept: MEDICAL GROUP | Facility: MEDICAL CENTER | Age: 78
End: 2018-03-05
Payer: MEDICARE

## 2018-03-05 VITALS — SYSTOLIC BLOOD PRESSURE: 147 MMHG | HEIGHT: 63 IN | HEART RATE: 58 BPM | DIASTOLIC BLOOD PRESSURE: 100 MMHG

## 2018-03-05 DIAGNOSIS — E78.5 DYSLIPIDEMIA: ICD-10-CM

## 2018-03-05 DIAGNOSIS — I10 ESSENTIAL HYPERTENSION: ICD-10-CM

## 2018-03-05 PROCEDURE — 99999 PR NO CHARGE: CPT | Performed by: INTERNAL MEDICINE

## 2018-03-05 RX ORDER — AMLODIPINE BESYLATE 10 MG/1
10 TABLET ORAL DAILY
Qty: 90 TAB | Refills: 1 | Status: SHIPPED | OUTPATIENT
Start: 2018-03-05 | End: 2018-08-25 | Stop reason: SDUPTHER

## 2018-03-05 NOTE — PROGRESS NOTES
Date of Service: 03/05/2018    XXXFollow-up    Home BP readings   160s/80s    Current side effects potentially related to antihypertensive medications: none*    Current Adherence to Blood Pressure Medications complete (complete, partial, completely non-adherent) however, pt was on Toprol, it got left off her dosing sheet so she hasnt been taking it for at least two months.  As her HR is <60 will dc and increase amlodipine.    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    Most recent electrolytes and ClCr pending  Other pertinent blood work  Most recent 24 hours ABPM results if available     ASSESSMENT AND PLAN    JNC8 BP Goal in Office (150/90 or 140/90)135/70    Home BP Goal (145/85 or 135/85)120/70    BP CONSIDERED CONTROLLED: NO      Lifestyle recommendations from today's visit: increase walking daily, happily, she will begin volunteering here at [facility] and will increase her daily walking    Medication recommendations from today's visit: Increase amlodipine to 10mg daily  ARB/ACEI: lisinopril 40mg daily  Diuretic: furosemide 40mg daily  Calcium Channel Blocker: Amlodipine  Other class:      Other recommendations:      XX Importance of adherence discussed  XX Medications Reconciled    Patient Referred to resistant hypertension service  Cmp, lipid panel Blood work ordered at today's visit     24 hours ABPM ordered at today's visit    Follow Up:     4 weeks.  Lashae Mcgrath, PharmD

## 2018-03-15 ENCOUNTER — TELEPHONE (OUTPATIENT)
Dept: VASCULAR LAB | Facility: MEDICAL CENTER | Age: 78
End: 2018-03-15

## 2018-03-15 RX ORDER — FUROSEMIDE 20 MG/1
20 TABLET ORAL DAILY
Qty: 30 TAB | Refills: 0 | Status: SHIPPED | OUTPATIENT
Start: 2018-03-15 | End: 2018-03-29 | Stop reason: SDUPTHER

## 2018-03-15 NOTE — TELEPHONE ENCOUNTER
[facility]    Reminded pt to obtain labs that were ordered.  She states she had a death in the family but will try to get them in 1-2 weeks.    Sabas Doyle, PharmD

## 2018-03-27 DIAGNOSIS — E78.5 DYSLIPIDEMIA: ICD-10-CM

## 2018-03-27 RX ORDER — ATORVASTATIN CALCIUM 20 MG/1
TABLET, FILM COATED ORAL
Qty: 30 TAB | Refills: 6 | Status: SHIPPED | OUTPATIENT
Start: 2018-03-27 | End: 2018-06-29 | Stop reason: SDUPTHER

## 2018-03-28 DIAGNOSIS — I10 ESSENTIAL HYPERTENSION: ICD-10-CM

## 2018-03-28 RX ORDER — LISINOPRIL 40 MG/1
TABLET ORAL
Qty: 30 TAB | Refills: 0 | Status: SHIPPED | OUTPATIENT
Start: 2018-03-28 | End: 2018-04-24 | Stop reason: SDUPTHER

## 2018-03-29 ENCOUNTER — HOSPITAL ENCOUNTER (OUTPATIENT)
Dept: LAB | Facility: MEDICAL CENTER | Age: 78
End: 2018-03-29
Attending: NURSE PRACTITIONER
Payer: MEDICARE

## 2018-03-29 LAB
ANION GAP SERPL CALC-SCNC: 7 MMOL/L (ref 0–11.9)
BUN SERPL-MCNC: 13 MG/DL (ref 8–22)
CALCIUM SERPL-MCNC: 9.5 MG/DL (ref 8.5–10.5)
CHLORIDE SERPL-SCNC: 105 MMOL/L (ref 96–112)
CHOLEST SERPL-MCNC: 144 MG/DL (ref 100–199)
CO2 SERPL-SCNC: 29 MMOL/L (ref 20–33)
CREAT SERPL-MCNC: 0.7 MG/DL (ref 0.5–1.4)
GLUCOSE SERPL-MCNC: 76 MG/DL (ref 65–99)
HDLC SERPL-MCNC: 68 MG/DL
LDLC SERPL CALC-MCNC: 63 MG/DL
POTASSIUM SERPL-SCNC: 3.9 MMOL/L (ref 3.6–5.5)
SODIUM SERPL-SCNC: 141 MMOL/L (ref 135–145)
TRIGL SERPL-MCNC: 66 MG/DL (ref 0–149)

## 2018-03-29 PROCEDURE — 80048 BASIC METABOLIC PNL TOTAL CA: CPT

## 2018-03-29 PROCEDURE — 80061 LIPID PANEL: CPT

## 2018-03-29 PROCEDURE — 36415 COLL VENOUS BLD VENIPUNCTURE: CPT

## 2018-03-29 RX ORDER — FUROSEMIDE 20 MG/1
TABLET ORAL
Qty: 15 TAB | Refills: 0 | Status: SHIPPED | OUTPATIENT
Start: 2018-03-29 | End: 2018-04-02 | Stop reason: SDUPTHER

## 2018-04-02 ENCOUNTER — APPOINTMENT (OUTPATIENT)
Dept: MEDICAL GROUP | Facility: MEDICAL CENTER | Age: 78
End: 2018-04-02
Payer: MEDICARE

## 2018-04-02 ENCOUNTER — TELEPHONE (OUTPATIENT)
Dept: MEDICAL GROUP | Facility: MEDICAL CENTER | Age: 78
End: 2018-04-02

## 2018-04-02 DIAGNOSIS — I10 ESSENTIAL HYPERTENSION: ICD-10-CM

## 2018-04-02 RX ORDER — FUROSEMIDE 20 MG/1
20 TABLET ORAL
Qty: 60 TAB | Refills: 0 | Status: SHIPPED | OUTPATIENT
Start: 2018-04-02 | End: 2018-06-29

## 2018-04-03 NOTE — TELEPHONE ENCOUNTER
Pt called and states that Eliza and Lashae (pharmastis) changed her furosemide medication to 40 mg tablets, not 20 mg. So she has been taking 2 of the 20 mg a day and will now need a new prescription. Please advise,     Thank you    Nina Rhodes  Medical Assistant

## 2018-04-04 RX ORDER — TRAZODONE HYDROCHLORIDE 50 MG/1
50 TABLET ORAL 3 TIMES DAILY
Qty: 90 TAB | Refills: 0 | Status: SHIPPED | OUTPATIENT
Start: 2018-04-04 | End: 2018-05-12 | Stop reason: SDUPTHER

## 2018-04-06 ENCOUNTER — TELEPHONE (OUTPATIENT)
Dept: MEDICAL GROUP | Facility: MEDICAL CENTER | Age: 78
End: 2018-04-06

## 2018-04-06 NOTE — TELEPHONE ENCOUNTER
Phone Number Called: [phone]    Message: Pharmacy sent fax regarding pt refill for furosemide.  Pt was written prescription for 20 mg but was told by other pharmacist to take 2 and up her dosage to 40 mg. Pt is coming in for appt to discuss dosage later so I did not refill    Left Message for patient to call back: N\A

## 2018-04-10 ENCOUNTER — TELEPHONE (OUTPATIENT)
Dept: MEDICAL GROUP | Facility: MEDICAL CENTER | Age: 78
End: 2018-04-10

## 2018-04-11 NOTE — TELEPHONE ENCOUNTER
Phone Number Called: [phone] [facility] on [address], [place]    Message: Pharmacy called regarding ferosemide medication again. Pt is still demanding a refill even though you told her she had to come in for an OV to get more refills.  This is the pt that doubled up her medication because you prescribed her 20mg and the pharmacy told her to double up so she could be taking 40 mg. Pt already has OV schedule on 4/20 but pharmacist wants to know if it is safe for her to go without her medication until then    Left Message for patient to call back: N\A

## 2018-04-19 ENCOUNTER — ANTICOAGULATION VISIT (OUTPATIENT)
Dept: MEDICAL GROUP | Facility: MEDICAL CENTER | Age: 78
End: 2018-04-19
Payer: MEDICARE

## 2018-04-19 VITALS — SYSTOLIC BLOOD PRESSURE: 137 MMHG | HEART RATE: 63 BPM | DIASTOLIC BLOOD PRESSURE: 78 MMHG

## 2018-04-19 DIAGNOSIS — I10 ESSENTIAL HYPERTENSION: ICD-10-CM

## 2018-04-19 PROCEDURE — 99211 OFF/OP EST MAY X REQ PHY/QHP: CPT | Performed by: FAMILY MEDICINE

## 2018-04-19 NOTE — PROGRESS NOTES
Pharmacotherapy Hypertension Visit  04/19/18     Sharon Callahan has been referred for evaluation and management of hypertension    HPI:   There were no vitals filed for this visit.  Both arms - in future use arm with higher reading    Age at Initial Diagnosis: about 71 per pt      Home BP readings:   140-155 /   Any readings above  180/110 : NONE  NONE any symptoms of high blood pressure: pt reports occasional headache and light sensitivity.       Current Prescription HTN Medications - including dose:    Furosemide 40 mg  Lisinopril 40 mg  Amlodipine 10 mg    Current HTN  and Related Supplements:   None    Current side effects potentially related to antihypertensive medications (lightheaded, dizziness, leg swelling): None  Current Adherence to Blood Pressure Medications complete    Previusly attempted BP medications:   Metoprolol - HR was too low on it, even a small dose.     Any current interfering substances: Caffeine in the AM, which is something she does regularly  Any current lifestyle issues affecting BP: She eats a lot of pre-prepped frozen meals high in salt.     Since last visit any of the below  - NONE  Creatinine increase > 0.5  Potassium > 5 or < 3.5  New edema present  Hyponatremia    Lab Results   Component Value Date/Time    SODIUM 141 03/29/2018 07:21 AM    POTASSIUM 3.9 03/29/2018 07:21 AM    CHLORIDE 105 03/29/2018 07:21 AM    CO2 29 03/29/2018 07:21 AM    GLUCOSE 76 03/29/2018 07:21 AM    BUN 13 03/29/2018 07:21 AM    CREATININE 0.70 03/29/2018 07:21 AM    CREATININE 0.79 04/04/2011 12:00 AM    BUNCREATRAT 13 04/04/2011 12:00 AM        Medications Reconciled  CURRENT MEDICATIONS:   Current Outpatient Prescriptions:   •  traZODone, 50 mg, Oral, TID  •  furosemide, 20 mg, Oral, QDAY  •  lisinopril, TAKE 1 TABLET BY MOUTH EVERY DAY.  •  atorvastatin, TAKE 1 TABLET BY MOUTH EVERY BEDTIME.  •  amLODIPine, 10 mg, Oral, DAILY  •  albuterol, 2 Puff, Inhalation, Q6HRS PRN  •  atorvastatin, 20 mg,  Oral, Nightly, Taking  •  potassium chloride SA, Take 2 tablets by mouth in the morning, Taking  •  omeprazole, 40 mg, Oral, QDAY, PRN  •  acetaminophen, 1,000 mg, Oral, Q6HRS PRN, PRN  ALLERGIES: Pcn [penicillins]; Clindamycin; Influenza virus vacc; Norco [hydrocodone-acetaminophen]; Pneumococcal vaccine; Tetracycline; and Xarelto [rivaroxaban]    SOCIAL HISTORY   History   Smoking Status   • Former Smoker   • Packs/day: 1.00   • Years: 15.00   • Types: Cigarettes   • Quit date: 1/1/1975   Smokeless Tobacco   • Never Used     Change in weight: Stable  Exercise habits: minimal exercise  Diet: pt eats several high salt food        ASSESSMENT AND PLAN    BP is elevated at home but pt always checks at home. In office today BP is within goal of <130/80    BP Goal : 130/80      BP Monitoring Recommendations - Home BP     Proper technique for blood pressure monitoring reviewed with patient.  Pt instructed to check BP at varying times through out the day 4 times per week.  Provided pt log for blood pressure monitoring and pt instructed to bring in at each visit for reviews    Educated pt that in the morning our BP can be elevated from release of adrenal hormones. Recommend she check her BP at varying times through out the day to determine if really elevated at home since her BP is at goal in office today    Lifestyle Recommendations From Today’s Visit:    Eating Plan: Concentrate on  DASH/Med Style diet and Pt will start trying to cook from scratch more. She is going to eliminate back or find a low salt version. She is going to look for low salt alternative to pre=prepped meals or try to start cooking from scratch.     Medication recommendations from today's visit: continue current medication. Try therapeutic lifestyle changes to get BP down further and check BP at home at different times of the day to see if really elevated at home.   ARB/ACEI: Lisinopril 40 mg  Diuretic: Furosemide 40 mg - have tried tapering in the past  but edema recurred in her legs, this was prior to starting amlodipine.   Calcium Channel Blocker: Amlodipine 10 mg      Studies Ordered at Todays Visit: None  Blood Work Ordered At Today’s visit: None Follow-Up: 2 weeks (every 1-4 weeks until at goal)     Gloria Baca, PharmD     Complicated decision making  Furosemide not a very effective BP medication in patients with normal renal function. Can consider change to hctz if volume control favorable  If furosemide is needed for volume control, would consider adding low dose of spironolactone if BP elevated.  Unclear if patient currently has leg edema but if she does amlodipine may be playing a role and she may benefit from decrease in dose  Although she does not meet the strict definition, she may benefit from follow up in resistant hypertension clinic (perhaps with Dr. Lao) in the future.     Michael Bloch, MD  [facility]    CC:  Xochitl Franklin M.D.  Dr. Michael Bloch

## 2018-04-20 ENCOUNTER — APPOINTMENT (OUTPATIENT)
Dept: MEDICAL GROUP | Facility: MEDICAL CENTER | Age: 78
End: 2018-04-20
Payer: MEDICARE

## 2018-04-24 DIAGNOSIS — I10 ESSENTIAL HYPERTENSION: ICD-10-CM

## 2018-04-24 RX ORDER — LISINOPRIL 40 MG/1
TABLET ORAL
Qty: 30 TAB | Refills: 0 | Status: SHIPPED | OUTPATIENT
Start: 2018-04-24 | End: 2018-05-23 | Stop reason: SDUPTHER

## 2018-04-30 RX ORDER — POTASSIUM CHLORIDE 20 MEQ/1
TABLET, EXTENDED RELEASE ORAL
Qty: 60 TAB | Refills: 0 | Status: ON HOLD | OUTPATIENT
Start: 2018-04-30 | End: 2019-02-26

## 2018-05-01 ENCOUNTER — TELEPHONE (OUTPATIENT)
Dept: MEDICAL GROUP | Facility: MEDICAL CENTER | Age: 78
End: 2018-05-01

## 2018-05-01 NOTE — TELEPHONE ENCOUNTER
Phone Number Called: [phone] (home)     Message: Pharmacy requesting 90 day supply for furosemide, lisinopril, and trazodone.  Please advise.    Left Message for patient to call back: N\A

## 2018-05-03 ENCOUNTER — ANTICOAGULATION VISIT (OUTPATIENT)
Dept: MEDICAL GROUP | Facility: MEDICAL CENTER | Age: 78
End: 2018-05-03
Payer: MEDICARE

## 2018-05-03 VITALS — SYSTOLIC BLOOD PRESSURE: 116 MMHG | DIASTOLIC BLOOD PRESSURE: 53 MMHG | HEART RATE: 61 BPM | HEIGHT: 63 IN

## 2018-05-03 DIAGNOSIS — I10 ESSENTIAL HYPERTENSION: ICD-10-CM

## 2018-05-03 DIAGNOSIS — I10 BENIGN ESSENTIAL HTN: ICD-10-CM

## 2018-05-03 PROCEDURE — 99211 OFF/OP EST MAY X REQ PHY/QHP: CPT | Performed by: INTERNAL MEDICINE

## 2018-05-03 NOTE — PROGRESS NOTES
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   Continue to monitor sodium intake,  She is trying to eat less prepared food.  Really enjoying life more now that she is volunteering a few days each week.      Most recent electrolytes and ClCr >60ml/min  Other pertinent blood work chemistry WNL  Lab Results   Component Value Date/Time    SODIUM 141 03/29/2018 07:21 AM    POTASSIUM 3.9 03/29/2018 07:21 AM    CHLORIDE 105 03/29/2018 07:21 AM    CO2 29 03/29/2018 07:21 AM    GLUCOSE 76 03/29/2018 07:21 AM    BUN 13 03/29/2018 07:21 AM    CREATININE 0.70 03/29/2018 07:21 AM    CREATININE 0.79 04/04/2011 12:00 AM    BUNCREATRAT 13 04/04/2011 12:00 AM            ASSESSMENT AND PLAN    ACC AHA 2017 BP Goal in Office 130/80    Home BP Goal <130/80    BP CONSIDERED CONTROLLED: yes      Lifestyle recommendations from today's visit: continue to increase daily walking and exercise tolerance.     Pt has been watching sodium intake lately, reading labels more.   Pt only has ETOH at family gatherings, which is occasionally.    Medication recommendations from today's visit: none  ARB/ACEI: lisinopril 40mg  Diuretic: furosemide 40mg  Calcium Channel Blocker: amlodipine 10mg daily now that she's walking more, she has no more LE edema       Other recommendations:      Importance of adherence discussed    Medications Reconciled   Patient Referred to resistant hypertension service   Blood work ordered at today's visit   24 hours ABPM ordered at today's visit    Follow Up:   6 months  Lashae Mcgrath, PharmD

## 2018-05-14 RX ORDER — FUROSEMIDE 40 MG/1
TABLET ORAL
Qty: 30 TAB | Refills: 0 | Status: SHIPPED | OUTPATIENT
Start: 2018-05-14 | End: 2018-06-07 | Stop reason: SDUPTHER

## 2018-05-14 RX ORDER — TRAZODONE HYDROCHLORIDE 50 MG/1
TABLET ORAL
Qty: 60 TAB | Refills: 0 | Status: SHIPPED | OUTPATIENT
Start: 2018-05-14 | End: 2018-05-30 | Stop reason: SDUPTHER

## 2018-05-21 RX ORDER — POTASSIUM CHLORIDE 20 MEQ/1
TABLET, EXTENDED RELEASE ORAL
Qty: 180 TAB | Refills: 0 | Status: SHIPPED | OUTPATIENT
Start: 2018-05-21 | End: 2018-06-29

## 2018-05-23 DIAGNOSIS — I10 ESSENTIAL HYPERTENSION: ICD-10-CM

## 2018-05-23 RX ORDER — LISINOPRIL 40 MG/1
TABLET ORAL
Qty: 30 TAB | Refills: 0 | Status: SHIPPED | OUTPATIENT
Start: 2018-05-23 | End: 2018-06-19 | Stop reason: SDUPTHER

## 2018-05-30 RX ORDER — TRAZODONE HYDROCHLORIDE 50 MG/1
TABLET ORAL
Qty: 60 TAB | Refills: 0 | Status: SHIPPED | OUTPATIENT
Start: 2018-05-30 | End: 2018-06-16 | Stop reason: SDUPTHER

## 2018-06-07 RX ORDER — FUROSEMIDE 40 MG/1
TABLET ORAL
Qty: 30 TAB | Refills: 0 | Status: SHIPPED | OUTPATIENT
Start: 2018-06-07 | End: 2018-06-29

## 2018-06-11 RX ORDER — FUROSEMIDE 40 MG/1
TABLET ORAL
Qty: 30 TAB | Refills: 0 | Status: SHIPPED | OUTPATIENT
Start: 2018-06-11 | End: 2018-06-29 | Stop reason: SDUPTHER

## 2018-06-18 RX ORDER — TRAZODONE HYDROCHLORIDE 50 MG/1
TABLET ORAL
Qty: 60 TAB | Refills: 0 | Status: SHIPPED | OUTPATIENT
Start: 2018-06-18 | End: 2018-07-07 | Stop reason: SDUPTHER

## 2018-06-19 DIAGNOSIS — I10 ESSENTIAL HYPERTENSION: ICD-10-CM

## 2018-06-19 RX ORDER — LISINOPRIL 40 MG/1
TABLET ORAL
Qty: 30 TAB | Refills: 0 | Status: SHIPPED | OUTPATIENT
Start: 2018-06-19 | End: 2018-06-29 | Stop reason: SDUPTHER

## 2018-06-27 ENCOUNTER — TELEPHONE (OUTPATIENT)
Dept: MEDICAL GROUP | Facility: MEDICAL CENTER | Age: 78
End: 2018-06-27

## 2018-06-27 DIAGNOSIS — D64.9 ANEMIA, UNSPECIFIED TYPE: ICD-10-CM

## 2018-06-27 DIAGNOSIS — I10 ESSENTIAL HYPERTENSION: ICD-10-CM

## 2018-06-27 DIAGNOSIS — E55.9 HYPOVITAMINOSIS D: ICD-10-CM

## 2018-06-27 NOTE — TELEPHONE ENCOUNTER
1. Caller Name: Sharon Callahan       Call Back Number: [phone] (home)         Patient approves a detailed voicemail message: yes    Dr Blake can you please order labs for patient to do before visit friday. this is the patient with swelling. Thank you

## 2018-06-28 ENCOUNTER — HOSPITAL ENCOUNTER (OUTPATIENT)
Dept: LAB | Facility: MEDICAL CENTER | Age: 78
End: 2018-06-28
Attending: INTERNAL MEDICINE
Payer: MEDICARE

## 2018-06-28 DIAGNOSIS — D64.9 ANEMIA, UNSPECIFIED TYPE: ICD-10-CM

## 2018-06-28 DIAGNOSIS — E55.9 HYPOVITAMINOSIS D: ICD-10-CM

## 2018-06-28 DIAGNOSIS — I10 ESSENTIAL HYPERTENSION: ICD-10-CM

## 2018-06-28 LAB
25(OH)D3 SERPL-MCNC: 20 NG/ML (ref 30–100)
ALBUMIN SERPL BCP-MCNC: 3.7 G/DL (ref 3.2–4.9)
ALBUMIN/GLOB SERPL: 1.3 G/DL
ALP SERPL-CCNC: 64 U/L (ref 30–99)
ALT SERPL-CCNC: 9 U/L (ref 2–50)
ANION GAP SERPL CALC-SCNC: 6 MMOL/L (ref 0–11.9)
AST SERPL-CCNC: 15 U/L (ref 12–45)
BASOPHILS # BLD AUTO: 0.8 % (ref 0–1.8)
BASOPHILS # BLD: 0.05 K/UL (ref 0–0.12)
BILIRUB SERPL-MCNC: 0.5 MG/DL (ref 0.1–1.5)
BUN SERPL-MCNC: 13 MG/DL (ref 8–22)
CALCIUM SERPL-MCNC: 9 MG/DL (ref 8.5–10.5)
CHLORIDE SERPL-SCNC: 108 MMOL/L (ref 96–112)
CO2 SERPL-SCNC: 28 MMOL/L (ref 20–33)
CREAT SERPL-MCNC: 0.54 MG/DL (ref 0.5–1.4)
EOSINOPHIL # BLD AUTO: 0.13 K/UL (ref 0–0.51)
EOSINOPHIL NFR BLD: 2.1 % (ref 0–6.9)
ERYTHROCYTE [DISTWIDTH] IN BLOOD BY AUTOMATED COUNT: 46.5 FL (ref 35.9–50)
GLOBULIN SER CALC-MCNC: 2.8 G/DL (ref 1.9–3.5)
GLUCOSE SERPL-MCNC: 84 MG/DL (ref 65–99)
HCT VFR BLD AUTO: 41.7 % (ref 37–47)
HGB BLD-MCNC: 13.6 G/DL (ref 12–16)
IMM GRANULOCYTES # BLD AUTO: 0.02 K/UL (ref 0–0.11)
IMM GRANULOCYTES NFR BLD AUTO: 0.3 % (ref 0–0.9)
LYMPHOCYTES # BLD AUTO: 1 K/UL (ref 1–4.8)
LYMPHOCYTES NFR BLD: 16.5 % (ref 22–41)
MCH RBC QN AUTO: 29.6 PG (ref 27–33)
MCHC RBC AUTO-ENTMCNC: 32.6 G/DL (ref 33.6–35)
MCV RBC AUTO: 90.7 FL (ref 81.4–97.8)
MONOCYTES # BLD AUTO: 0.53 K/UL (ref 0–0.85)
MONOCYTES NFR BLD AUTO: 8.7 % (ref 0–13.4)
NEUTROPHILS # BLD AUTO: 4.33 K/UL (ref 2–7.15)
NEUTROPHILS NFR BLD: 71.6 % (ref 44–72)
NRBC # BLD AUTO: 0 K/UL
NRBC BLD-RTO: 0 /100 WBC
PLATELET # BLD AUTO: 187 K/UL (ref 164–446)
PMV BLD AUTO: 11.7 FL (ref 9–12.9)
POTASSIUM SERPL-SCNC: 4.1 MMOL/L (ref 3.6–5.5)
PROT SERPL-MCNC: 6.5 G/DL (ref 6–8.2)
RBC # BLD AUTO: 4.6 M/UL (ref 4.2–5.4)
SODIUM SERPL-SCNC: 142 MMOL/L (ref 135–145)
WBC # BLD AUTO: 6.1 K/UL (ref 4.8–10.8)

## 2018-06-28 PROCEDURE — 36415 COLL VENOUS BLD VENIPUNCTURE: CPT

## 2018-06-28 PROCEDURE — 85025 COMPLETE CBC W/AUTO DIFF WBC: CPT

## 2018-06-28 PROCEDURE — 82306 VITAMIN D 25 HYDROXY: CPT

## 2018-06-28 PROCEDURE — 80053 COMPREHEN METABOLIC PANEL: CPT

## 2018-06-29 ENCOUNTER — OFFICE VISIT (OUTPATIENT)
Dept: MEDICAL GROUP | Facility: MEDICAL CENTER | Age: 78
End: 2018-06-29
Payer: MEDICARE

## 2018-06-29 VITALS
DIASTOLIC BLOOD PRESSURE: 80 MMHG | HEIGHT: 62 IN | WEIGHT: 181.44 LBS | HEART RATE: 72 BPM | SYSTOLIC BLOOD PRESSURE: 130 MMHG | BODY MASS INDEX: 33.39 KG/M2 | OXYGEN SATURATION: 97 % | TEMPERATURE: 98.9 F

## 2018-06-29 DIAGNOSIS — J01.00 ACUTE NON-RECURRENT MAXILLARY SINUSITIS: ICD-10-CM

## 2018-06-29 DIAGNOSIS — I27.20 PULMONARY HYPERTENSION, MODERATE TO SEVERE (HCC): ICD-10-CM

## 2018-06-29 DIAGNOSIS — E78.5 DYSLIPIDEMIA: ICD-10-CM

## 2018-06-29 DIAGNOSIS — Z00.00 HEALTH CARE MAINTENANCE: ICD-10-CM

## 2018-06-29 DIAGNOSIS — E66.9 OBESITY (BMI 30-39.9): ICD-10-CM

## 2018-06-29 DIAGNOSIS — I48.0 PAROXYSMAL A-FIB (HCC): ICD-10-CM

## 2018-06-29 DIAGNOSIS — M79.89 LEG SWELLING: ICD-10-CM

## 2018-06-29 DIAGNOSIS — I10 ESSENTIAL HYPERTENSION: ICD-10-CM

## 2018-06-29 DIAGNOSIS — Z12.31 ENCOUNTER FOR SCREENING MAMMOGRAM FOR MALIGNANT NEOPLASM OF BREAST: ICD-10-CM

## 2018-06-29 DIAGNOSIS — I51.7 LVH (LEFT VENTRICULAR HYPERTROPHY): ICD-10-CM

## 2018-06-29 DIAGNOSIS — E55.9 HYPOVITAMINOSIS D: ICD-10-CM

## 2018-06-29 DIAGNOSIS — G47.33 OSA ON CPAP: ICD-10-CM

## 2018-06-29 PROCEDURE — 99214 OFFICE O/P EST MOD 30 MIN: CPT | Performed by: INTERNAL MEDICINE

## 2018-06-29 RX ORDER — ATORVASTATIN CALCIUM 20 MG/1
20 TABLET, FILM COATED ORAL
Qty: 90 TAB | Refills: 0 | Status: SHIPPED | OUTPATIENT
Start: 2018-06-29 | End: 2018-12-05 | Stop reason: SDUPTHER

## 2018-06-29 RX ORDER — AZITHROMYCIN 250 MG/1
TABLET, FILM COATED ORAL
Qty: 6 TAB | Refills: 0 | Status: SHIPPED | OUTPATIENT
Start: 2018-06-29 | End: 2019-01-09

## 2018-06-29 RX ORDER — POTASSIUM CHLORIDE 20 MEQ/1
20 TABLET, EXTENDED RELEASE ORAL DAILY
Qty: 90 TAB | Refills: 0 | Status: SHIPPED | OUTPATIENT
Start: 2018-06-29 | End: 2018-09-24 | Stop reason: SDUPTHER

## 2018-06-29 RX ORDER — FUROSEMIDE 40 MG/1
40 TABLET ORAL
Qty: 90 TAB | Refills: 0 | Status: SHIPPED | OUTPATIENT
Start: 2018-06-29 | End: 2018-11-08 | Stop reason: SDUPTHER

## 2018-06-29 RX ORDER — LISINOPRIL 40 MG/1
40 TABLET ORAL
Qty: 90 TAB | Refills: 0 | Status: SHIPPED | OUTPATIENT
Start: 2018-06-29 | End: 2018-10-18 | Stop reason: SDUPTHER

## 2018-06-29 NOTE — PROGRESS NOTES
CHIEF COMPLAINT  Chief Complaint   Patient presents with   • Leg Swelling     leg and head swelling x 1 weeks     HPI  Patient is a 77 y.o. female patient who presents today for the following     HYPERTENSION, LVH, pulmonary hypertension,  B/L LE swelling  77-year-old, female with history of severe pulmonary hypertension, MYESHA on CPAP that she has not used recently  Complains of bilateral ankle swelling for the last 1-2 weeks    Meds: amlodipine, 10 mg QD, Lasix, 40 mg daily with potassium 20 mEq daily QD, taking as prescribed.   Measuring BP at home: yes, it has been < 150/90.  Denies:  -  headaches, vision problems, tinnitus.                 -  chest pain/pressure, palpitations, irregular heart beats, exertional, dyspnea, peripheral edema.                           - medication side effect: unusual fatigue, slow heartbeat, foot/leg swelling, cough.  Low salt diet: yes  Diet: healthy  Exercise: daily yard work  BMI: Body mass index is 33 kg/(m^2).  FH of HTN: Multiple   She has been followed up by cardiology, not recently..     Paroxysmal Afib  She has history of afibrillation, no medications     Background:  She has history of afibrillation, no episode for > 5 yrs.   She has been followed up by cardiology, on metoprolol, 50 mg daily.  FH of CAD: father and brother with MI     DYSLIPIDEMIA  The patient is on atorvastatin 20 mg, taking daily, as prescribed. No myalgias, muscle cramps or pain.   Diet /Exercise/BMI:  As above  FH: neg    MYESHA, on CPAP  She has been compliant with CPAP.  Started: > 5 yrs.  No recent pulmonology f/u.     Hypovitaminosis D  The patient had a low vitamin D level..   She has not been on vitamin D supplement    Sinus infectin  The patient got sick 2-3 weeks ago with:  · Nasal congestion w green yellow d/c  · Postnasal drip, pain in sinus areas  · Fatigue, headaches    Denies:   · Fever, chills,  body aches,   · Sore throat  · Swollen glands, difficulty swallowing  · Earache  · Cough with   sputum  · Wheezing, chest pain  · Decreased appetite, nausea, vomiting, diarrhea, abdominal pain  · Skin rash.    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 90 Tab 0   • furosemide (LASIX) 40 MG Tab Take 1 Tab by mouth every day. 90 Tab 0   • potassium chloride SA (KDUR) 20 MEQ Tab CR Take 1 Tab by mouth every day. Take 1 tablets by mouth in the morning 90 Tab 0   • azithromycin (ZITHROMAX) 250 MG Tab Take 1 tabl twice daily the first day, then 1 tabl daily, PO. 6 Tab 0   • atorvastatin (LIPITOR) 20 MG Tab Take 1 Tab by mouth every bedtime. 90 Tab 0   • traZODone (DESYREL) 50 MG Tab TAKE 1 TABLET BY MOUTH 3 TIMES A DAY 60 Tab 0   • amLODIPine (NORVASC) 10 MG Tab Take 1 Tab by mouth every day. 90 Tab 1   • omeprazole (PRILOSEC) 40 MG delayed-release capsule Take 1 Cap by mouth every day. 90 Cap 1   • potassium chloride SA (KDUR) 20 MEQ Tab CR TAKE 2 TABLETS BY MOUTH EVERY DAY. 60 Tab 0   • albuterol 108 (90 Base) MCG/ACT Aero Soln inhalation aerosol Inhale 2 Puffs by mouth every 6 hours as needed for Shortness of Breath. 8.5 g 3   • atorvastatin (LIPITOR) 20 MG Tab Take 20 mg by mouth every  evening.     • acetaminophen (TYLENOL) 500 MG Tab Take 1,000 mg by mouth every 6 hours as needed.       No current facility-administered medications for this visit.      ALLERGIES  Allergies: Pcn [penicillins]; Clindamycin; Influenza virus vacc; Norco [hydrocodone-acetaminophen]; Pneumococcal vaccine; Tetracycline; and Xarelto [rivaroxaban]  PAST MEDICAL HISTORY  Past Medical History:   Diagnosis Date   • Pleural effusion, right 1/4/2017    Status post thoracotomy and decortication   • Hemothorax on right 1/4/2017    Status post thoracotomy and decortication   • Chronic nausea 1/12/2015    Has had GI work up done Dr voss   • BMI 38.0-38.9,adult 9/18/2013   • Depression with anxiety 5/3/2011   • S/P bilateral mastectomy 4/4/2011   • Hyperlipidemia 4/4/2011   • MYESHA (obstructive sleep apnea) 4/4/2011    On cPAP    • Arthritis    • CATARACT     surgical correcttion bilateral   • Heart burn    • History of cholecystectomy    • Hypertension    • Indigestion    • Pain     left hip   • Range of motion deficit     left elbow, unable to completely extend   • Sleep apnea     CPAP   • Snoring      SURGICAL HISTORY  She  has a past surgical history that includes hip arthroplasty total (3/26/2009); lisa by laparoscopy (2/1/2011); other (1988); other (1989); uvulopharyngopalatoplasty (1990); cataract phaco with iol (10/20/2011); cataract phaco with iol (11/3/2011); orif, humerus (1950's); hip arthroplasty total (12/3/2012); breast biopsy (1980); pr enlarge breast with implant; and thoracoscopy (Right, 12/6/2016).  SOCIAL HISTORY  Social History   Substance Use Topics   • Smoking status: Former Smoker     Packs/day: 1.00     Years: 15.00     Types: Cigarettes     Quit date: 1/1/1975   • Smokeless tobacco: Never Used   • Alcohol use No      Comment: once in a great while     Social History     Social History Narrative   • No narrative on file     FAMILY HISTORY  Family History   Problem Relation Age of Onset   • Hypertension  "Mother    • Cancer Father      lung   • Diabetes Father    • Hypertension Father    • Heart Disease Father      MI   • Hypertension Brother    • Hypertension Maternal Grandmother    • No Known Problems Brother    • Hypertension Brother    • Diabetes Brother    • Cancer Paternal Aunt      stomach   • Diabetes Paternal Aunt    • Heart Disease Brother      MI   • Hyperlipidemia Neg Hx    • Stroke Neg Hx      Family Status   Relation Status   • Mother    • Father    • Sister    • Brother Alive   • Maternal Grandmother  at age 65   • Brother Alive   • Brother    • Paternal Aunt    • Brother    • Neg Hx        ROS   Constitutional: Negative for fever, chills.  HENT: As above  Eyes: Negative for blurred vision.   Respiratory: As above  Cardiovascular: Negative for CP; complaints of intermittent palpitations  Gastrointestinal: Negative for nausea, abdominal pain.   Genitourinary: Negative for dysuria.  Musculoskeletal: Negative for significant myalgias, back pain and joint pain.   Skin: Negative for rash and itching.   Neuro: Negative for dizziness, weakness and headaches.   Endo/Heme/Allergies: Does not bruise/bleed easily.   Psychiatric/Behavioral: Negative for depression.    PHYSICAL EXAM   Blood pressure 130/80, pulse 72, temperature 37.2 °C (98.9 °F), height 1.575 m (5' 2\"), weight 82.3 kg (181 lb 7 oz), SpO2 97 %. Body mass index is 33.19 kg/m².  General:  NAD, well appearing  HEENT:   NC/AT, PERRLA, EOMI, TMs are clear. Oropharyngeal mucosa is pink,  without lesions;  no cervical / supraclavicular  lymphadenopathy, no thyromegaly.    Cardiovascular: RRR.   No m/r/g. No carotid bruits .      Lungs:   CTAB, no w/r/r, no respiratory distress.  Abdomen: Soft, NT/ND + BS; no suprapubic tenderness; no masses or hepatosplenomegaly.  Extremities:  2+ DP and radial pulses bilaterally.  No c/c/e.   Skin:  Warm, dry.  No erythema. No rash.   Neurologic: Alert & oriented x 3. No focal " deficits.  Psychiatric:  Affect normal, mood normal, judgment normal.    LABS     Labs are reviewed and discussed with a patient  Lab Results   Component Value Date/Time    CHOLSTRLTOT 144 03/29/2018 07:21 AM    LDL 63 03/29/2018 07:21 AM    HDL 68 03/29/2018 07:21 AM    TRIGLYCERIDE 66 03/29/2018 07:21 AM       Lab Results   Component Value Date/Time    SODIUM 142 06/28/2018 07:02 AM    POTASSIUM 4.1 06/28/2018 07:02 AM    CHLORIDE 108 06/28/2018 07:02 AM    CO2 28 06/28/2018 07:02 AM    GLUCOSE 84 06/28/2018 07:02 AM    BUN 13 06/28/2018 07:02 AM    CREATININE 0.54 06/28/2018 07:02 AM    CREATININE 0.79 04/04/2011 12:00 AM    BUNCREATRAT 13 04/04/2011 12:00 AM     Lab Results   Component Value Date/Time    ALKPHOSPHAT 64 06/28/2018 07:02 AM    ASTSGOT 15 06/28/2018 07:02 AM    ALTSGPT 9 06/28/2018 07:02 AM    TBILIRUBIN 0.5 06/28/2018 07:02 AM      Lab Results   Component Value Date/Time    HBA1C 5.6 02/25/2016 07:09 AM    HBA1C 5.4 06/25/2012 10:00 PM    HBA1C 5.4 07/27/2006 03:25 AM     No results found for: TSH  Lab Results   Component Value Date/Time    FREET4 0.82 02/14/2013 01:45 PM    FREET4 0.95 07/08/2011 03:37 PM       Lab Results   Component Value Date/Time    WBC 6.1 06/28/2018 07:02 AM    RBC 4.60 06/28/2018 07:02 AM    HEMOGLOBIN 13.6 06/28/2018 07:02 AM    HEMATOCRIT 41.7 06/28/2018 07:02 AM    MCV 90.7 06/28/2018 07:02 AM    MCH 29.6 06/28/2018 07:02 AM    MCHC 32.6 (L) 06/28/2018 07:02 AM    MPV 11.7 06/28/2018 07:02 AM    NEUTSPOLYS 71.60 06/28/2018 07:02 AM    LYMPHOCYTES 16.50 (L) 06/28/2018 07:02 AM    MONOCYTES 8.70 06/28/2018 07:02 AM    EOSINOPHILS 2.10 06/28/2018 07:02 AM    BASOPHILS 0.80 06/28/2018 07:02 AM    HYPOCHROMIA 2+ 07/04/2013 07:30 AM    ANISOCYTOSIS 1+ 12/01/2016 06:45 AM        IMAGING     None    ASSESMENT AND PLAN        1. Essential hypertension  Controlled, continue current treatment and monitoring blood pressure at home.  - lisinopril (PRINIVIL, ZESTRIL) 40 MG tablet;  Take 1 Tab by mouth every day.  Dispense: 90 Tab; Refill: 0  - furosemide (LASIX) 40 MG Tab; Take 1 Tab by mouth every day.  Dispense: 90 Tab; Refill: 0  - potassium chloride SA (KDUR) 20 MEQ Tab CR; Take 1 Tab by mouth every day. Take 1 tablets by mouth in the morning  Dispense: 90 Tab; Refill: 0  - BASIC METABOLIC PANEL; Future  - ECHOCARDIOGRAM COMP W/O CONT; Future    2. LVH (left ventricular hypertrophy)  Need echocardiography follow-up  - ECHOCARDIOGRAM COMP W/O CONT; Future    3. Pulmonary hypertension, moderate to severe (HCC)  Advised to use irregularity CPAP.   Need follow-up echocardiography and cardiology  - REFERRAL TO CARDIOLOGY  - REFERRAL TO PULMONOLOGY  - ECHOCARDIOGRAM COMP W/O CONT; Future    4. Leg swelling  New problem.   She will increase Lasix with extra 20 mg as needed in the morning  - furosemide (LASIX) 40 MG Tab; Take 1 Tab by mouth every day.  Dispense: 90 Tab; Refill: 0  - ECHOCARDIOGRAM COMP W/O CONT; Future    5. Paroxysmal A-fib (HCC)  She has had irregular heartbeats, needs to schedule appointment within the next few days for EKG.  Advised to go to the ER if persistent palpitations/irregular heartbeats or chest pain and pressure    6. Dyslipidemia  Advised to be compliant with medication, also low calorie diet, daily exercise, weight control  - atorvastatin (LIPITOR) 20 MG Tab; Take 1 Tab by mouth every bedtime.  Dispense: 90 Tab; Refill: 0    7. MYESHA on CPAP  Advised to be compliant with CPAP  - REFERRAL TO PULMONOLOGY  - ECHOCARDIOGRAM COMP W/O CONT; Future    8. Hypovitaminosis D  Advised to take 2000 units of vitamin D daily.   -Vitamin D, future, in 3 months    9. Acute non-recurrent maxillary sinusitis  - azithromycin (ZITHROMAX) 250 MG Tab; Take 1 tabl twice daily the first day, then 1 tabl daily, PO.  Dispense: 6 Tab; Refill: 0    Advised to take abx after a meal.   Side effects of abx use discussed with a patient. Advised to stop abx and call if develop any side effect,  including diarrhea.     Also advised to increase fluid intake, nasal saline rinse.    10. Obesity (BMI 30-39.9)  - Patient identified as having weight management issue.  Appropriate orders and counseling given.    11. Encounter for screening mammogram for malignant neoplasm of breast  - MA-SCREEN MAMMO W/CAD-BILAT; Future    12. Health care maintenance  As above    Counseling:   - Smoking:  Nonsmoker    Followup: Return in about 3 months (around 9/29/2018).    Time spent 40 minutes face to face, with > 50% spent counseling and coordinating care.  High complexity.  All questions are answered.    Please note that this dictation was created using voice recognition software, and that there might be errors of darling and possibly content.

## 2018-07-02 ENCOUNTER — OFFICE VISIT (OUTPATIENT)
Dept: MEDICAL GROUP | Facility: MEDICAL CENTER | Age: 78
End: 2018-07-02
Payer: MEDICARE

## 2018-07-02 VITALS
OXYGEN SATURATION: 96 % | TEMPERATURE: 98.3 F | WEIGHT: 181.66 LBS | HEIGHT: 62 IN | HEART RATE: 62 BPM | BODY MASS INDEX: 33.43 KG/M2 | DIASTOLIC BLOOD PRESSURE: 58 MMHG | SYSTOLIC BLOOD PRESSURE: 110 MMHG

## 2018-07-02 DIAGNOSIS — I48.0 PAROXYSMAL A-FIB (HCC): ICD-10-CM

## 2018-07-02 DIAGNOSIS — I10 ESSENTIAL HYPERTENSION: ICD-10-CM

## 2018-07-02 DIAGNOSIS — G47.33 OSA ON CPAP: ICD-10-CM

## 2018-07-02 DIAGNOSIS — I51.7 LVH (LEFT VENTRICULAR HYPERTROPHY): ICD-10-CM

## 2018-07-02 DIAGNOSIS — Z00.00 HEALTH CARE MAINTENANCE: ICD-10-CM

## 2018-07-02 DIAGNOSIS — I27.20 PULMONARY HYPERTENSION, MODERATE TO SEVERE (HCC): ICD-10-CM

## 2018-07-02 DIAGNOSIS — Z12.31 ENCOUNTER FOR SCREENING MAMMOGRAM FOR MALIGNANT NEOPLASM OF BREAST: ICD-10-CM

## 2018-07-02 PROBLEM — E66.9 OBESITY (BMI 30-39.9): Status: RESOLVED | Noted: 2017-06-16 | Resolved: 2018-07-02

## 2018-07-02 PROCEDURE — 99214 OFFICE O/P EST MOD 30 MIN: CPT | Performed by: INTERNAL MEDICINE

## 2018-07-02 PROCEDURE — 93000 ELECTROCARDIOGRAM COMPLETE: CPT | Performed by: INTERNAL MEDICINE

## 2018-07-02 NOTE — PROGRESS NOTES
CHIEF COMPLAINT  Chief Complaint   Patient presents with   • Other     ekg     HPI  Patient is a 77 y.o. female patient who presents today for the following     Paroxysmal Afib  77 year old, female with history of A. fib, no anticoagulation; history of hypertension, LVH, pulmonary hypertension, dyslipidemia, MYESHA on CPAP non compliant.    Irregular heart rhythm was found on exam, and on questioning, she disclosed that she has had:  - occasional short lasting palpitations  - sometimes accompanied with lighheadedness    She had been followed up by cardiology, on metoprolol, 50 mg daily.  FH of CAD: father and brother with MI     HYPERTENSION, LVH, pulmonary hypertension  Meds: HCTZ, 25 mg QD, taking as prescribed.   Measuring BP at home: yes, it has been < 150/90.  Denies: - headaches, vision problems, tinnitus.  - chest pain/pressure, palpitations, irregular heart beats, exertional, dyspnea, peripheral edema.              - medication side effect: unusual fatigue, slow heartbeat, foot/leg swelling.  Low salt diet: yes  Diet: healthy  Exercise: daily yard work  BMI: Body mass index is 33 kg/(m^2).  FH of HTN: Multiple     Reviewed the last echocardiography, as below.    DYSLIPIDEMIA  The patient is on simvastatin, 40 mg, taking daily, as prescribed. No myalgias, muscle cramps or pain.   Diet /Exercise: As above  BMI: There is no weight on file to calculate BMI.  FH: neg     MYESHA, on CPAP  She has been on CPAP for > 5 yrs.  She has been compliant with CPAP.  Denies daily fatigue and daytime sleepiness.   No recent pulmonology f/u.     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 90 Tab 0   • furosemide (LASIX) 40 MG Tab Take 1 Tab by mouth every day. 90 Tab 0   • potassium chloride SA (KDUR) 20 MEQ Tab CR Take 1 Tab by mouth every day. Take 1 tablets by mouth in the morning 90 Tab 0   • azithromycin (ZITHROMAX) 250 MG Tab Take 1 tabl twice daily the first day, then 1 tabl daily, PO. 6 Tab 0   • atorvastatin (LIPITOR) 20 MG Tab Take 1 Tab by mouth every bedtime. 90 Tab 0   • traZODone (DESYREL) 50 MG Tab TAKE 1 TABLET BY MOUTH 3 TIMES A DAY 60 Tab 0   • potassium chloride SA (KDUR) 20 MEQ Tab CR TAKE 2 TABLETS BY MOUTH EVERY DAY. 60 Tab 0   • amLODIPine (NORVASC) 10 MG Tab Take 1 Tab by mouth every day. 90 Tab 1   • albuterol 108 (90 Base) MCG/ACT Aero Soln inhalation aerosol Inhale 2 Puffs by mouth every 6 hours as needed for Shortness of Breath. 8.5 g 3   • atorvastatin (LIPITOR) 20 MG Tab Take 20 mg by mouth every evening.     • omeprazole (PRILOSEC) 40 MG delayed-release capsule Take 1 Cap by mouth every day. 90 Cap 1   • acetaminophen (TYLENOL) 500 MG Tab Take 1,000 mg by mouth every 6 hours as needed.       No current facility-administered medications for this visit.      ALLERGIES  Allergies: Pcn [penicillins]; Clindamycin; Influenza virus vacc; Norco [hydrocodone-acetaminophen]; Pneumococcal vaccine; Tetracycline; and Xarelto [rivaroxaban]  PAST MEDICAL HISTORY  Past Medical History:   Diagnosis Date   • Pleural effusion, right 1/4/2017    Status post thoracotomy and decortication   • Hemothorax on right 1/4/2017    Status post thoracotomy and decortication   • Chronic nausea 1/12/2015    Has had GI work up done Dr voss   • BMI 38.0-38.9,adult 9/18/2013   • Depression with anxiety 5/3/2011   • S/P bilateral mastectomy  2011   • Hyperlipidemia 2011   • MYESHA (obstructive sleep apnea) 2011    On cPAP    • Arthritis    • CATARACT     surgical correcttion bilateral   • Heart burn    • History of cholecystectomy    • Hypertension    • Indigestion    • Pain     left hip   • Range of motion deficit     left elbow, unable to completely extend   • Sleep apnea     CPAP   • Snoring      SURGICAL HISTORY  She  has a past surgical history that includes hip arthroplasty total (3/26/2009); lisa by laparoscopy (2011); other (); other (); uvulopharyngopalatoplasty (); cataract phaco with iol (10/20/2011); cataract phaco with iol (11/3/2011); orif, humerus (s); hip arthroplasty total (12/3/2012); breast biopsy (); pr enlarge breast with implant; and thoracoscopy (Right, 2016).  SOCIAL HISTORY  Social History   Substance Use Topics   • Smoking status: Former Smoker     Packs/day: 1.00     Years: 15.00     Types: Cigarettes     Quit date: 1975   • Smokeless tobacco: Never Used   • Alcohol use No      Comment: once in a great while     Social History     Social History Narrative   • No narrative on file     FAMILY HISTORY  Family History   Problem Relation Age of Onset   • Hypertension Mother    • Cancer Father      lung   • Diabetes Father    • Hypertension Father    • Heart Disease Father      MI   • Hypertension Brother    • Hypertension Maternal Grandmother    • No Known Problems Brother    • Hypertension Brother    • Diabetes Brother    • Cancer Paternal Aunt      stomach   • Diabetes Paternal Aunt    • Heart Disease Brother      MI   • Hyperlipidemia Neg Hx    • Stroke Neg Hx      Family Status   Relation Status   • Mother    • Father    • Sister    • Brother Alive   • Maternal Grandmother  at age 65   • Brother Alive   • Brother    • Paternal Aunt    • Brother    • Neg Hx      ROS   Constitutional: Negative for fever, chills.  HENT: Negative for congestion, sore  "throat.  Eyes: Negative for blurred vision.   Respiratory: Negative for cough, shortness of breath.  Cardiovascular: as above  Gastrointestinal: Negative for heartburn, nausea, abdominal pain.   Genitourinary: Negative for dysuria.  Musculoskeletal: Negative for significant back pain.   Skin: Negative for rash and itching.   Neuro: Negative for dizziness, weakness and headaches.   Endo/Heme/Allergies: Does not bruise/bleed easily.   Psychiatric/Behavioral: Negative for depression.    PHYSICAL EXAM   Blood pressure 110/58, pulse 62, temperature 36.8 °C (98.3 °F), height 1.575 m (5' 2\"), weight 82.4 kg (181 lb 10.5 oz), SpO2 96 %. Body mass index is 33.23 kg/m².  General:  NAD, well appearing  HEENT:   NC/AT, PERRLA, EOMI, TMs are clear. Oropharyngeal mucosa is pink,  without lesions;  no cervical / supraclavicular  lymphadenopathy, no thyromegaly.    Cardiovascular: RRR.   No m/r/g. No carotid bruits .      Lungs:   CTAB, no w/r/r, no respiratory distress.  Abdomen: Soft, NT/ND + BS; no suprapubic tenderness; no masses or hepatosplenomegaly.  Extremities:  2+ DP and radial pulses bilaterally.  No c/c/e.   Skin:  Warm, dry.  No erythema. No rash.   Neurologic: Alert & oriented x 3. No focal deficits.  Psychiatric:  Affect normal, mood normal, judgment normal.    LABS     Labs are reviewed and discussed with a patient  Lab Results   Component Value Date/Time    CHOLSTRLTOT 144 03/29/2018 07:21 AM    LDL 63 03/29/2018 07:21 AM    HDL 68 03/29/2018 07:21 AM    TRIGLYCERIDE 66 03/29/2018 07:21 AM       Lab Results   Component Value Date/Time    SODIUM 142 06/28/2018 07:02 AM    POTASSIUM 4.1 06/28/2018 07:02 AM    CHLORIDE 108 06/28/2018 07:02 AM    CO2 28 06/28/2018 07:02 AM    GLUCOSE 84 06/28/2018 07:02 AM    BUN 13 06/28/2018 07:02 AM    CREATININE 0.54 06/28/2018 07:02 AM    CREATININE 0.79 04/04/2011 12:00 AM    BUNCREATRAT 13 04/04/2011 12:00 AM     Lab Results   Component Value Date/Time    ALKPHOSPHAT 64 " 06/28/2018 07:02 AM    ASTSGOT 15 06/28/2018 07:02 AM    ALTSGPT 9 06/28/2018 07:02 AM    TBILIRUBIN 0.5 06/28/2018 07:02 AM      Lab Results   Component Value Date/Time    HBA1C 5.6 02/25/2016 07:09 AM    HBA1C 5.4 06/25/2012 10:00 PM    HBA1C 5.4 07/27/2006 03:25 AM     No results found for: TSH  Lab Results   Component Value Date/Time    FREET4 0.82 02/14/2013 01:45 PM    FREET4 0.95 07/08/2011 03:37 PM       Lab Results   Component Value Date/Time    WBC 6.1 06/28/2018 07:02 AM    RBC 4.60 06/28/2018 07:02 AM    HEMOGLOBIN 13.6 06/28/2018 07:02 AM    HEMATOCRIT 41.7 06/28/2018 07:02 AM    MCV 90.7 06/28/2018 07:02 AM    MCH 29.6 06/28/2018 07:02 AM    MCHC 32.6 (L) 06/28/2018 07:02 AM    MPV 11.7 06/28/2018 07:02 AM    NEUTSPOLYS 71.60 06/28/2018 07:02 AM    LYMPHOCYTES 16.50 (L) 06/28/2018 07:02 AM    MONOCYTES 8.70 06/28/2018 07:02 AM    EOSINOPHILS 2.10 06/28/2018 07:02 AM    BASOPHILS 0.80 06/28/2018 07:02 AM    HYPOCHROMIA 2+ 07/04/2013 07:30 AM    ANISOCYTOSIS 1+ 12/01/2016 06:45 AM      EKG   By my interpretation today (no afib)  Rhythm: normal sinus   Rate: normal   Axis: normal   Ectopy: none   Conduction: normal   ST Segments: no acute change   T Waves: no acute change   Q Waves: none     IMAGING     Reviewed echocardiography from 12/1/16:  The left ventricle was normal in size and function.  Left ventricular ejection fraction is visually estimated to be 70%.  Mild concentric left ventricular hypertrophy.  Dilated right ventricle.  Normal right ventricular systolic function.  Estimated right ventricular systolic pressure  is 68 mmHg.  Right heart pressures are consistent with severe pulmonary   hypertension.    ASSESMENT AND PLAN        1. Paroxysmal A-fib (HCC)  Chad2 score 4, she will start ASA  Advised to go to the ER if any further episode, especially with lightheadedness dizziness or lasting longer.  - HOLTER MONITOR / EVENT RECORDER; Future  - REFERRAL TO CARDIOLOGY  - ECHOCARDIOGRAM COMP W/O  CONT; Future    2. Essential hypertension  Controlled, continue current treatment  - EKG; Future  - ECHOCARDIOGRAM COMP W/O CONT; Future    3. LVH (left ventricular hypertrophy)  - EKG; Future  - ECHOCARDIOGRAM COMP W/O CONT; Future    4. Pulmonary hypertension, moderate to severe (HCC)  Pending echocardiography for  - EKG; Future  - ECHOCARDIOGRAM COMP W/O CONT; Future    5. MYESHA on CPAP  Advised to be compliant with CPAP    6. Health care maintenance  UTD    7. Encounter for screening mammogram for malignant neoplasm of breast  - MA-SCREEN MAMMO W/CAD-BILAT; Future    Counseling:   - Smoking:  Nonsmoker    Followup: Return in about 4 months (around 11/2/2018) for Short.    All questions are answered.    Please note that this dictation was created using voice recognition software, and that there might be errors of darling and possibly content.

## 2018-07-06 ENCOUNTER — APPOINTMENT (OUTPATIENT)
Dept: MEDICAL GROUP | Facility: MEDICAL CENTER | Age: 78
End: 2018-07-06
Payer: MEDICARE

## 2018-07-09 ENCOUNTER — HOSPITAL ENCOUNTER (OUTPATIENT)
Dept: RADIOLOGY | Facility: MEDICAL CENTER | Age: 78
End: 2018-07-09
Attending: INTERNAL MEDICINE
Payer: MEDICARE

## 2018-07-09 DIAGNOSIS — Z12.31 ENCOUNTER FOR SCREENING MAMMOGRAM FOR MALIGNANT NEOPLASM OF BREAST: ICD-10-CM

## 2018-07-09 PROCEDURE — 77067 SCR MAMMO BI INCL CAD: CPT

## 2018-07-09 RX ORDER — TRAZODONE HYDROCHLORIDE 50 MG/1
TABLET ORAL
Qty: 60 TAB | Refills: 0 | Status: SHIPPED | OUTPATIENT
Start: 2018-07-09 | End: 2018-08-10 | Stop reason: SDUPTHER

## 2018-07-16 ENCOUNTER — HOSPITAL ENCOUNTER (OUTPATIENT)
Dept: CARDIOLOGY | Facility: MEDICAL CENTER | Age: 78
End: 2018-07-16
Attending: INTERNAL MEDICINE
Payer: MEDICARE

## 2018-07-16 DIAGNOSIS — M79.89 LEG SWELLING: ICD-10-CM

## 2018-07-16 DIAGNOSIS — I51.7 LVH (LEFT VENTRICULAR HYPERTROPHY): ICD-10-CM

## 2018-07-16 DIAGNOSIS — I48.0 PAROXYSMAL A-FIB (HCC): ICD-10-CM

## 2018-07-16 DIAGNOSIS — G47.33 OSA ON CPAP: ICD-10-CM

## 2018-07-16 DIAGNOSIS — I10 ESSENTIAL HYPERTENSION: ICD-10-CM

## 2018-07-16 DIAGNOSIS — I27.20 PULMONARY HYPERTENSION, MODERATE TO SEVERE (HCC): ICD-10-CM

## 2018-07-16 LAB
LV EJECT FRACT  99904: 65
LV EJECT FRACT MOD 2C 99903: 69.41
LV EJECT FRACT MOD 4C 99902: 67.95
LV EJECT FRACT MOD BP 99901: 68.86

## 2018-07-16 PROCEDURE — 93306 TTE W/DOPPLER COMPLETE: CPT | Mod: 26 | Performed by: INTERNAL MEDICINE

## 2018-07-16 PROCEDURE — 93306 TTE W/DOPPLER COMPLETE: CPT

## 2018-07-17 ENCOUNTER — TELEPHONE (OUTPATIENT)
Dept: CARDIOLOGY | Facility: MEDICAL CENTER | Age: 78
End: 2018-07-17

## 2018-07-17 ENCOUNTER — NON-PROVIDER VISIT (OUTPATIENT)
Dept: CARDIOLOGY | Facility: MEDICAL CENTER | Age: 78
End: 2018-07-17
Payer: MEDICARE

## 2018-07-17 DIAGNOSIS — I48.91 ATRIAL FIBRILLATION, UNSPECIFIED TYPE (HCC): ICD-10-CM

## 2018-07-17 DIAGNOSIS — I47.29 NSVT (NONSUSTAINED VENTRICULAR TACHYCARDIA) (HCC): ICD-10-CM

## 2018-07-17 DIAGNOSIS — I48.0 PAROXYSMAL A-FIB (HCC): ICD-10-CM

## 2018-08-02 PROCEDURE — 0298T PR EXT ECG > 48HR TO 21 DAY REVIEW AND INTERPRETATN: CPT | Performed by: INTERNAL MEDICINE

## 2018-08-02 PROCEDURE — 0296T PR EXT ECG > 48HR TO 21 DAY RCRD W/CONECT INTL RCRD: CPT | Performed by: INTERNAL MEDICINE

## 2018-08-10 RX ORDER — TRAZODONE HYDROCHLORIDE 50 MG/1
TABLET ORAL
Qty: 60 TAB | Refills: 0 | Status: ON HOLD | OUTPATIENT
Start: 2018-08-10 | End: 2019-02-26

## 2018-08-14 ENCOUNTER — OFFICE VISIT (OUTPATIENT)
Dept: CARDIOLOGY | Facility: MEDICAL CENTER | Age: 78
End: 2018-08-14
Payer: MEDICARE

## 2018-08-14 VITALS
HEIGHT: 62 IN | HEART RATE: 67 BPM | DIASTOLIC BLOOD PRESSURE: 76 MMHG | SYSTOLIC BLOOD PRESSURE: 122 MMHG | OXYGEN SATURATION: 95 % | WEIGHT: 185 LBS | BODY MASS INDEX: 34.04 KG/M2

## 2018-08-14 DIAGNOSIS — I48.0 PAF (PAROXYSMAL ATRIAL FIBRILLATION) (HCC): ICD-10-CM

## 2018-08-14 DIAGNOSIS — G47.33 OSA (OBSTRUCTIVE SLEEP APNEA): ICD-10-CM

## 2018-08-14 DIAGNOSIS — I47.10 SVT (SUPRAVENTRICULAR TACHYCARDIA): ICD-10-CM

## 2018-08-14 DIAGNOSIS — I10 ESSENTIAL HYPERTENSION: ICD-10-CM

## 2018-08-14 PROCEDURE — 93000 ELECTROCARDIOGRAM COMPLETE: CPT | Performed by: NURSE PRACTITIONER

## 2018-08-14 PROCEDURE — 99214 OFFICE O/P EST MOD 30 MIN: CPT | Performed by: NURSE PRACTITIONER

## 2018-08-14 RX ORDER — CEPHALEXIN 500 MG/1
CAPSULE ORAL
COMMUNITY
Start: 2018-08-13 | End: 2019-01-09

## 2018-08-14 RX ORDER — HYDROCODONE BITARTRATE AND ACETAMINOPHEN 5; 325 MG/1; MG/1
TABLET ORAL
COMMUNITY
Start: 2018-08-13 | End: 2019-01-09

## 2018-08-14 ASSESSMENT — ENCOUNTER SYMPTOMS
CHILLS: 0
SORE THROAT: 0
ORTHOPNEA: 0
HEMOPTYSIS: 0
BLURRED VISION: 0
PALPITATIONS: 1
TINGLING: 0
SPEECH CHANGE: 0
SHORTNESS OF BREATH: 0
DOUBLE VISION: 0
SENSORY CHANGE: 0
NAUSEA: 0
SPUTUM PRODUCTION: 0
COUGH: 0
LOSS OF CONSCIOUSNESS: 0
ABDOMINAL PAIN: 0
WHEEZING: 0
VOMITING: 0
DIARRHEA: 0
WEIGHT LOSS: 0
STRIDOR: 0
HEARTBURN: 0
FEVER: 0
DIZZINESS: 0
FOCAL WEAKNESS: 0
PND: 0
BLOOD IN STOOL: 0
HEADACHES: 0
TREMORS: 0

## 2018-08-14 NOTE — PROGRESS NOTES
Cardiology/Electrophysiology Follow-up Note      Subjective:   Chief Complaint:   Chief Complaint   Patient presents with   • Atrial Fibrillation     PP DX:PAF       Sharon Callahan is a 77 y.o. female who presents today for follow up palpitations.    She is followed by Dr. Crockett.  Last seen 06/2017  Past medical history also significant for paroxysmal afib in the setting of intercurrent illness (no recurrence at last office visit), HTN, Carcinoid tumor of the lung, MYESHA.     Today in follow up she states that she is really overall feeling well.  She denies chest pain, dizziness, pre syncope or syncope, dyspnea, PND, orthopnea, or lower extremity edema.    She states that she does experience palpitations but they are infrequent, and usually only last a few seconds.  She states that they are not overly bothersome at this time.  No associated symptoms with palps.      She had an echo completed recently in which she had a run of tachycardia so a Zaio patch was obtained and she is here today to go over results.      Patient endorses medication compliance      Past Medical History:   Diagnosis Date   • Arthritis    • BMI 38.0-38.9,adult 9/18/2013   • CATARACT     surgical correcttion bilateral   • Chronic nausea 1/12/2015    Has had GI work up done Dr voss   • Depression with anxiety 5/3/2011   • Heart burn    • Hemothorax on right 1/4/2017    Status post thoracotomy and decortication   • History of cholecystectomy    • Hyperlipidemia 4/4/2011   • Hypertension    • Indigestion    • MYESHA (obstructive sleep apnea) 4/4/2011    On cPAP    • Pain     left hip   • Pleural effusion, right 1/4/2017    Status post thoracotomy and decortication   • Range of motion deficit     left elbow, unable to completely extend   • S/P bilateral mastectomy 4/4/2011   • Sleep apnea     CPAP   • Snoring      Past Surgical History:   Procedure Laterality Date   • THORACOSCOPY Right 12/6/2016    Procedure: RIGHT THORACOSCOPY ,DECORTICATION,  EVACUATION OF HEMOTHORAX;  Surgeon: Ehsan De Leon D.O.;  Location: SURGERY [facility];  Service:    • HIP ARTHROPLASTY TOTAL  12/3/2012    Performed by Jamie Kenney M.D. at SURGERY [facility]   • CATARACT PHACO WITH IOL  11/3/2011    Performed by DEENA BRADEN at SURGERY [facility]   • CATARACT PHACO WITH IOL  10/20/2011    Performed by DEENA BRADEN at SURGERY [facility] DAY [facility] ORS   • MAXIM BY LAPAROSCOPY  2/1/2011    Performed by DORON HINOJOSA at SURGERY SAME DAY [facility] ORS   • HIP ARTHROPLASTY TOTAL  3/26/2009    Right; Perfmed by THERESE LEMA at SURGERY [facility] ORS   • UVULOPHARYNGOPALATOPLASTY  1990   • OTHER  1989    tummy reyna   • OTHER  1988    bilateral mastectomies with implants   • BREAST BIOPSY  1980    bilateral benign mastectomy   • ORIF, HUMERUS  1950's    left   • PB ENLARGE BREAST WITH IMPLANT       Family History   Problem Relation Age of Onset   • Hypertension Mother    • Cancer Father         lung   • Diabetes Father    • Hypertension Father    • Heart Disease Father         MI   • Hypertension Brother    • Hypertension Maternal Grandmother    • No Known Problems Brother    • Hypertension Brother    • Diabetes Brother    • Cancer Paternal Aunt         stomach   • Diabetes Paternal Aunt    • Heart Disease Brother         MI   • Hyperlipidemia Neg Hx    • Stroke Neg Hx      Social History     Social History   • Marital status:      Spouse name: N/A   • Number of children: N/A   • Years of education: N/A     Occupational History   • retired       [employer]     Social History Main Topics   • Smoking status: Former Smoker     Packs/day: 1.00     Years: 15.00     Types: Cigarettes     Quit date: 1/1/1975   • Smokeless tobacco: Never Used   • Alcohol use No      Comment: once in a great while   • Drug use: No   • Sexual activity: No      Comment: lives with ex-     Other Topics Concern   • Not on file     Social History Narrative    • No narrative on file     Allergies   Allergen Reactions   • Pcn [Penicillins] Anaphylaxis     Skin turns black   • Clindamycin Rash     Rash     • Influenza Virus Vacc Rash     Red in face   • Norco [Hydrocodone-Acetaminophen]    • Pneumococcal Vaccine Rash   • Tetracycline Rash     Rash    • Xarelto [Rivaroxaban] Rash       Current Outpatient Prescriptions   Medication Sig Dispense Refill   • traZODone (DESYREL) 50 MG Tab TAKE 1 TABLET BY MOUTH THREE TIMES A DAY **NEEDS TO BE SEEN 60 Tab 0   • lisinopril (PRINIVIL, ZESTRIL) 40 MG tablet Take 1 Tab by mouth every day. 90 Tab 0   • furosemide (LASIX) 40 MG Tab Take 1 Tab by mouth every day. 90 Tab 0   • potassium chloride SA (KDUR) 20 MEQ Tab CR Take 1 Tab by mouth every day. Take 1 tablets by mouth in the morning 90 Tab 0   • azithromycin (ZITHROMAX) 250 MG Tab Take 1 tabl twice daily the first day, then 1 tabl daily, PO. 6 Tab 0   • atorvastatin (LIPITOR) 20 MG Tab Take 1 Tab by mouth every bedtime. 90 Tab 0   • potassium chloride SA (KDUR) 20 MEQ Tab CR TAKE 2 TABLETS BY MOUTH EVERY DAY. 60 Tab 0   • amLODIPine (NORVASC) 10 MG Tab Take 1 Tab by mouth every day. 90 Tab 1   • albuterol 108 (90 Base) MCG/ACT Aero Soln inhalation aerosol Inhale 2 Puffs by mouth every 6 hours as needed for Shortness of Breath. 8.5 g 3   • atorvastatin (LIPITOR) 20 MG Tab Take 20 mg by mouth every evening.     • omeprazole (PRILOSEC) 40 MG delayed-release capsule Take 1 Cap by mouth every day. 90 Cap 1   • acetaminophen (TYLENOL) 500 MG Tab Take 1,000 mg by mouth every 6 hours as needed.       No current facility-administered medications for this visit.        Review of Systems   Constitutional: Negative for chills, fever, malaise/fatigue and weight loss.   HENT: Negative for congestion, sore throat and tinnitus.    Eyes: Negative for blurred vision and double vision.   Respiratory: Negative for cough, hemoptysis, sputum production, shortness of breath, wheezing and stridor.   "  Cardiovascular: Positive for palpitations (occasional). Negative for chest pain, orthopnea, leg swelling and PND.   Gastrointestinal: Negative for abdominal pain, blood in stool, diarrhea, heartburn, melena, nausea and vomiting.   Skin: Negative for rash.   Neurological: Negative for dizziness, tingling, tremors, sensory change, speech change, focal weakness, loss of consciousness and headaches.     All others systems reviewed and negative.     Objective:     Blood pressure 122/76, pulse 67, height 1.575 m (5' 2.01\"), weight 83.9 kg (185 lb), SpO2 95 %. Body mass index is 33.83 kg/m².    Physical Exam   Constitutional: She is oriented to person, place, and time and well-developed, well-nourished, and in no distress.   HENT:   Head: Normocephalic and atraumatic.   Eyes: Pupils are equal, round, and reactive to light. Conjunctivae and EOM are normal.   Neck: Normal range of motion. Neck supple. No JVD present.   Cardiovascular: Normal rate, regular rhythm, normal heart sounds and intact distal pulses.  Exam reveals no gallop and no friction rub.    No murmur heard.  Pulmonary/Chest: Effort normal and breath sounds normal. No respiratory distress. She has no wheezes. She has no rales. She exhibits no tenderness.   Abdominal: Soft. Bowel sounds are normal. There is no tenderness.   Musculoskeletal: Normal range of motion. She exhibits no edema.   Neurological: She is alert and oriented to person, place, and time.   Skin: Skin is warm and dry. No rash noted. No erythema.   Psychiatric: Mood, memory, affect and judgment normal.         Cardiac Imaging and Procedures Review:    EKG dated 8/14/18:   Sinus rhythm, rate 67.    Echo dated 7/16/18:   Prior echo 12/1/2016, Severe Pulm HTN not seen on this study (RVSP   could not be calculated), brief run of tachycardia present(see below).  Technically challenging study, some structures not well seen.  Left ventricular ejection fraction is visually estimated to be " 65%.  Moderately dilated left atrium.  Probably mild mitral annular calcification, no MR.  RVSP estimated to be 38 mmHg, poor TR jets in some views, may be   underestimated.  Brief run of tachycardia during exam, rate   125, cannot exclude atrial   fibrillation.    Labs (personally reviewed and notable for):   Lab Results   Component Value Date/Time    SODIUM 142 06/28/2018 07:02 AM    POTASSIUM 4.1 06/28/2018 07:02 AM    CHLORIDE 108 06/28/2018 07:02 AM    CO2 28 06/28/2018 07:02 AM    GLUCOSE 84 06/28/2018 07:02 AM    BUN 13 06/28/2018 07:02 AM    CREATININE 0.54 06/28/2018 07:02 AM      Lab Results   Component Value Date/Time    WBC 6.1 06/28/2018 07:02 AM    RBC 4.60 06/28/2018 07:02 AM    HEMOGLOBIN 13.6 06/28/2018 07:02 AM    HEMATOCRIT 41.7 06/28/2018 07:02 AM    MCV 90.7 06/28/2018 07:02 AM    MCH 29.6 06/28/2018 07:02 AM    MCHC 32.6 (L) 06/28/2018 07:02 AM    MPV 11.7 06/28/2018 07:02 AM    NEUTSPOLYS 71.60 06/28/2018 07:02 AM    LYMPHOCYTES 16.50 (L) 06/28/2018 07:02 AM    MONOCYTES 8.70 06/28/2018 07:02 AM    EOSINOPHILS 2.10 06/28/2018 07:02 AM    BASOPHILS 0.80 06/28/2018 07:02 AM      PT/INR:   Lab Results   Component Value Date/Time    PROTHROMBTM 14.6 12/02/2016 02:10 PM    INR 1.10 12/02/2016 02:10 PM       Assessment:     1. PAF (paroxysmal atrial fibrillation) ([facility])  EKG   2. SVT (supraventricular tachycardia) ([facility])     3. Essential hypertension     4. MYESHA (obstructive sleep apnea)         Medical Decision Making:  Today's Assessment / Status / Plan:   1. Afib:  - No recurrence.  None seen on Zio patch.    2. SVT:  - Short runs of SVT seen on zio patch, longest 15 beats.  - She feels comfortable and states not bothersome at this point.  Will monitor.  If symptomatic or increasing would add back Metoprolol.     3. HTN:  - Blood pressure well controled.     4. MYESHA:  - Needs to get a updated sleep study and new machine.  Hasn't worn in approximately 6 months.  Discussed that treatment could  potentially help to decrease palps.  She will schedule.     Plan reviewed in detail with the patient and she verbalizes understanding and is in agreement.   RTC in one year, sooner if clinical condition changes  Collaborating MD/ADD: MANUELA Sterling.

## 2018-08-14 NOTE — LETTER
[facility]   [address], [address]  [place] [address]  Phone: [phone]  Fax: [phone]              Sharon Callahan  1940    Encounter Date: 8/14/2018    NORY Smith          PROGRESS NOTE:  Cardiology/Electrophysiology Follow-up Note      Subjective:   Chief Complaint: No chief complaint on file.      Sharon Callahan is a 77 y.o. female who presents today for follow up palpitations.    She is followed by Dr. Crockett.  Last seen 06/2017  Past medical history also significant for paroxysmal afib in the setting of intercurrent illness (no recurrence at last office visit), HTN, Carcinoid tumor of the lung, MYESHA.     Today in follow up, ***.  *** denies chest pain, dizziness, palpitations, pre syncope or syncope, dyspnea, PND, orthopnea, or lower extremity edema.      Weight at home ***    Blood pressure at home***    Patient endorses medication compliance      Past Medical History:   Diagnosis Date   • Arthritis    • BMI 38.0-38.9,adult 9/18/2013   • CATARACT     surgical correcttion bilateral   • Chronic nausea 1/12/2015    Has had GI work up done Dr voss   • Depression with anxiety 5/3/2011   • Heart burn    • Hemothorax on right 1/4/2017    Status post thoracotomy and decortication   • History of cholecystectomy    • Hyperlipidemia 4/4/2011   • Hypertension    • Indigestion    • MYESHA (obstructive sleep apnea) 4/4/2011    On cPAP    • Pain     left hip   • Pleural effusion, right 1/4/2017    Status post thoracotomy and decortication   • Range of motion deficit     left elbow, unable to completely extend   • S/P bilateral mastectomy 4/4/2011   • Sleep apnea     CPAP   • Snoring      Past Surgical History:   Procedure Laterality Date   • THORACOSCOPY Right 12/6/2016    Procedure: RIGHT THORACOSCOPY ,DECORTICATION, EVACUATION OF HEMOTHORAX;  Surgeon: Ehsan De Leon D.O.;  Location: SURGERY [facility];  Service:    • HIP ARTHROPLASTY  TOTAL  12/3/2012    Performed by Jamie Kenney M.D. at SURGERY [facility]S   • CATARACT PHACO WITH IOL  11/3/2011    Performed by DEENA BRADEN at SURGERY [facility]   • CATARACT PHACO WITH IOL  10/20/2011    Performed by DEENA BRADEN at SURGERY SAME DAY [facility]S   • MAXIM BY LAPAROSCOPY  2/1/2011    Performed by DORON HINOJOSA at SURGERY SAME DAY [facility]S   • HIP ARTHROPLASTY TOTAL  3/26/2009    Right; Perfmed by THERESE LEMA at SURGERY [facility]S   • UVULOPHARYNGOPALATOPLASTY  1990   • OTHER  1989    tummy karlack   • OTHER  1988    bilateral mastectomies with implants   • BREAST BIOPSY  1980    bilateral benign mastectomy   • ORIF, HUMERUS  1950's    left   • PB ENLARGE BREAST WITH IMPLANT       Family History   Problem Relation Age of Onset   • Hypertension Mother    • Cancer Father         lung   • Diabetes Father    • Hypertension Father    • Heart Disease Father         MI   • Hypertension Brother    • Hypertension Maternal Grandmother    • No Known Problems Brother    • Hypertension Brother    • Diabetes Brother    • Cancer Paternal Aunt         stomach   • Diabetes Paternal Aunt    • Heart Disease Brother         MI   • Hyperlipidemia Neg Hx    • Stroke Neg Hx      Social History     Social History   • Marital status:      Spouse name: N/A   • Number of children: N/A   • Years of education: N/A     Occupational History   • retired       [employer]     Social History Main Topics   • Smoking status: Former Smoker     Packs/day: 1.00     Years: 15.00     Types: Cigarettes     Quit date: 1/1/1975   • Smokeless tobacco: Never Used   • Alcohol use No      Comment: once in a great while   • Drug use: No   • Sexual activity: No      Comment: lives with ex-     Other Topics Concern   • Not on file     Social History Narrative   • No narrative on file     Allergies   Allergen Reactions   • Pcn [Penicillins] Anaphylaxis     Skin turns black   •  Clindamycin Rash     Rash     • Influenza Virus Vacc Rash     Red in face   • Norco [Hydrocodone-Acetaminophen]    • Pneumococcal Vaccine Rash   • Tetracycline Rash     Rash    • Xarelto [Rivaroxaban] Rash       Current Outpatient Prescriptions   Medication Sig Dispense Refill   • traZODone (DESYREL) 50 MG Tab TAKE 1 TABLET BY MOUTH THREE TIMES A DAY **NEEDS TO BE SEEN 60 Tab 0   • lisinopril (PRINIVIL, ZESTRIL) 40 MG tablet Take 1 Tab by mouth every day. 90 Tab 0   • furosemide (LASIX) 40 MG Tab Take 1 Tab by mouth every day. 90 Tab 0   • potassium chloride SA (KDUR) 20 MEQ Tab CR Take 1 Tab by mouth every day. Take 1 tablets by mouth in the morning 90 Tab 0   • azithromycin (ZITHROMAX) 250 MG Tab Take 1 tabl twice daily the first day, then 1 tabl daily, PO. 6 Tab 0   • atorvastatin (LIPITOR) 20 MG Tab Take 1 Tab by mouth every bedtime. 90 Tab 0   • potassium chloride SA (KDUR) 20 MEQ Tab CR TAKE 2 TABLETS BY MOUTH EVERY DAY. 60 Tab 0   • amLODIPine (NORVASC) 10 MG Tab Take 1 Tab by mouth every day. 90 Tab 1   • albuterol 108 (90 Base) MCG/ACT Aero Soln inhalation aerosol Inhale 2 Puffs by mouth every 6 hours as needed for Shortness of Breath. 8.5 g 3   • atorvastatin (LIPITOR) 20 MG Tab Take 20 mg by mouth every evening.     • omeprazole (PRILOSEC) 40 MG delayed-release capsule Take 1 Cap by mouth every day. 90 Cap 1   • acetaminophen (TYLENOL) 500 MG Tab Take 1,000 mg by mouth every 6 hours as needed.       No current facility-administered medications for this visit.        ROS  All others systems reviewed and negative.     Objective:     There were no vitals taken for this visit. There is no height or weight on file to calculate BMI.    Physical Exam      Cardiac Imaging and Procedures Review:    EKG dated ***:   ***  Device Interrogation dated ***  RA: Sensing: ***, Threshold: ***, Impedance: ***.  RV: Sensing: ***, Threshold: ***, Impedance: ***.  LV: Threshold: ***, Impedance: ***.  HV Impedance: ***.          Echo dated ***:   ***    Nuclear Perfusion Imaging (***):   ***    LHC (***):   ***    Radiology test Review:  CXR: ***     Labs (personally reviewed and notable for):   Lab Results   Component Value Date/Time    SODIUM 142 06/28/2018 07:02 AM    POTASSIUM 4.1 06/28/2018 07:02 AM    CHLORIDE 108 06/28/2018 07:02 AM    CO2 28 06/28/2018 07:02 AM    GLUCOSE 84 06/28/2018 07:02 AM    BUN 13 06/28/2018 07:02 AM    CREATININE 0.54 06/28/2018 07:02 AM    CREATININE 0.79 04/04/2011 12:00 AM    BUNCREATRAT 13 04/04/2011 12:00 AM      Lab Results   Component Value Date/Time    WBC 6.1 06/28/2018 07:02 AM    RBC 4.60 06/28/2018 07:02 AM    HEMOGLOBIN 13.6 06/28/2018 07:02 AM    HEMATOCRIT 41.7 06/28/2018 07:02 AM    MCV 90.7 06/28/2018 07:02 AM    MCH 29.6 06/28/2018 07:02 AM    MCHC 32.6 (L) 06/28/2018 07:02 AM    MPV 11.7 06/28/2018 07:02 AM    NEUTSPOLYS 71.60 06/28/2018 07:02 AM    LYMPHOCYTES 16.50 (L) 06/28/2018 07:02 AM    MONOCYTES 8.70 06/28/2018 07:02 AM    EOSINOPHILS 2.10 06/28/2018 07:02 AM    BASOPHILS 0.80 06/28/2018 07:02 AM    HYPOCHROMIA 2+ 07/04/2013 07:30 AM    ANISOCYTOSIS 1+ 12/01/2016 06:45 AM      PT/INR:   Lab Results   Component Value Date/Time    PROTHROMBTM 14.6 12/02/2016 02:10 PM    INR 1.10 12/02/2016 02:10 PM   ]      Assessment:     No diagnosis found.    Medical Decision Making:  Today's Assessment / Status / Plan:             Plan reviewed in detail with the patient and *** verbalizes understanding and is in agreement.   RTC***, sooner if clinical condition changes  Collaborating MD/ADD: ***    NORY Smith M.D.  [address]  [address]  [place] [address]  VIA In Basket

## 2018-08-14 NOTE — LETTER
[facility]   [address], [facility] [address]  [place] [address]  Phone: [phone]  Fax: [phone]              Sharon Callahan  1940    Encounter Date: 8/14/2018    NORY Smith          PROGRESS NOTE:  Cardiology/Electrophysiology Follow-up Note      Subjective:   Chief Complaint:   Chief Complaint   Patient presents with   • Atrial Fibrillation     PP DX:PAF       Sharon Callahan is a 77 y.o. female who presents today for follow up palpitations.    She is followed by Dr. Crockett.  Last seen 06/2017  Past medical history also significant for paroxysmal afib in the setting of intercurrent illness (no recurrence at last office visit), HTN, Carcinoid tumor of the lung, MYESHA.     Today in follow up she states that she is really overall feeling well.  She denies chest pain, dizziness, pre syncope or syncope, dyspnea, PND, orthopnea, or lower extremity edema.    She states that she does experience palpitations but they are infrequent, and usually only last a few seconds.  She states that they are not overly bothersome at this time.  No associated symptoms with palps.      She had an echo completed recently in which she had a run of tachycardia so a Zaio patch was obtained and she is here today to go over results.      Patient endorses medication compliance      Past Medical History:   Diagnosis Date   • Arthritis    • BMI 38.0-38.9,adult 9/18/2013   • CATARACT     surgical correcttion bilateral   • Chronic nausea 1/12/2015    Has had GI work up done Dr voss   • Depression with anxiety 5/3/2011   • Heart burn    • Hemothorax on right 1/4/2017    Status post thoracotomy and decortication   • History of cholecystectomy    • Hyperlipidemia 4/4/2011   • Hypertension    • Indigestion    • MYESHA (obstructive sleep apnea) 4/4/2011    On cPAP    • Pain     left hip   • Pleural effusion, right 1/4/2017    Status post thoracotomy and decortication   • Range of motion  deficit     left elbow, unable to completely extend   • S/P bilateral mastectomy 4/4/2011   • Sleep apnea     CPAP   • Snoring      Past Surgical History:   Procedure Laterality Date   • THORACOSCOPY Right 12/6/2016    Procedure: RIGHT THORACOSCOPY ,DECORTICATION, EVACUATION OF HEMOTHORAX;  Surgeon: Ehsan De Leon D.O.;  Location: SURGERY [facility];  Service:    • HIP ARTHROPLASTY TOTAL  12/3/2012    Performed by Jamie Kenney M.D. at [facility]   • CATARACT PHACO WITH IOL  11/3/2011    Performed by DEENA BRADEN at SURGERY [facility]   • CATARACT PHACO WITH IOL  10/20/2011    Performed by DEENA BRADEN at SURGERY [facility] DAY [facility]   • MAXIM BY LAPAROSCOPY  2/1/2011    Performed by DORON HINOJOSA at SURGERY SAME DAY [facility]   • HIP ARTHROPLASTY TOTAL  3/26/2009    Right; Perfmed by THERESE LEMA at SURGERY [facility]   • UVULOPHARYNGOPALATOPLASTY  1990   • OTHER  1989    tummy reyna   • OTHER  1988    bilateral mastectomies with implants   • BREAST BIOPSY  1980    bilateral benign mastectomy   • ORIF, HUMERUS  1950's    left   • PB ENLARGE BREAST WITH IMPLANT       Family History   Problem Relation Age of Onset   • Hypertension Mother    • Cancer Father         lung   • Diabetes Father    • Hypertension Father    • Heart Disease Father         MI   • Hypertension Brother    • Hypertension Maternal Grandmother    • No Known Problems Brother    • Hypertension Brother    • Diabetes Brother    • Cancer Paternal Aunt         stomach   • Diabetes Paternal Aunt    • Heart Disease Brother         MI   • Hyperlipidemia Neg Hx    • Stroke Neg Hx      Social History     Social History   • Marital status:      Spouse name: N/A   • Number of children: N/A   • Years of education: N/A     Occupational History   • retired       [employer]     Social History Main Topics   • Smoking status: Former Smoker     Packs/day: 1.00     Years: 15.00     Types:  Cigarettes     Quit date: 1/1/1975   • Smokeless tobacco: Never Used   • Alcohol use No      Comment: once in a great while   • Drug use: No   • Sexual activity: No      Comment: lives with ex-     Other Topics Concern   • Not on file     Social History Narrative   • No narrative on file     Allergies   Allergen Reactions   • Pcn [Penicillins] Anaphylaxis     Skin turns black   • Clindamycin Rash     Rash     • Influenza Virus Vacc Rash     Red in face   • Norco [Hydrocodone-Acetaminophen]    • Pneumococcal Vaccine Rash   • Tetracycline Rash     Rash    • Xarelto [Rivaroxaban] Rash       Current Outpatient Prescriptions   Medication Sig Dispense Refill   • traZODone (DESYREL) 50 MG Tab TAKE 1 TABLET BY MOUTH THREE TIMES A DAY **NEEDS TO BE SEEN 60 Tab 0   • lisinopril (PRINIVIL, ZESTRIL) 40 MG tablet Take 1 Tab by mouth every day. 90 Tab 0   • furosemide (LASIX) 40 MG Tab Take 1 Tab by mouth every day. 90 Tab 0   • potassium chloride SA (KDUR) 20 MEQ Tab CR Take 1 Tab by mouth every day. Take 1 tablets by mouth in the morning 90 Tab 0   • azithromycin (ZITHROMAX) 250 MG Tab Take 1 tabl twice daily the first day, then 1 tabl daily, PO. 6 Tab 0   • atorvastatin (LIPITOR) 20 MG Tab Take 1 Tab by mouth every bedtime. 90 Tab 0   • potassium chloride SA (KDUR) 20 MEQ Tab CR TAKE 2 TABLETS BY MOUTH EVERY DAY. 60 Tab 0   • amLODIPine (NORVASC) 10 MG Tab Take 1 Tab by mouth every day. 90 Tab 1   • albuterol 108 (90 Base) MCG/ACT Aero Soln inhalation aerosol Inhale 2 Puffs by mouth every 6 hours as needed for Shortness of Breath. 8.5 g 3   • atorvastatin (LIPITOR) 20 MG Tab Take 20 mg by mouth every evening.     • omeprazole (PRILOSEC) 40 MG delayed-release capsule Take 1 Cap by mouth every day. 90 Cap 1   • acetaminophen (TYLENOL) 500 MG Tab Take 1,000 mg by mouth every 6 hours as needed.       No current facility-administered medications for this visit.        Review of Systems   Constitutional: Negative for chills,  "fever, malaise/fatigue and weight loss.   HENT: Negative for congestion, sore throat and tinnitus.    Eyes: Negative for blurred vision and double vision.   Respiratory: Negative for cough, hemoptysis, sputum production, shortness of breath, wheezing and stridor.    Cardiovascular: Positive for palpitations (occasional). Negative for chest pain, orthopnea, leg swelling and PND.   Gastrointestinal: Negative for abdominal pain, blood in stool, diarrhea, heartburn, melena, nausea and vomiting.   Skin: Negative for rash.   Neurological: Negative for dizziness, tingling, tremors, sensory change, speech change, focal weakness, loss of consciousness and headaches.     All others systems reviewed and negative.     Objective:     Blood pressure 122/76, pulse 67, height 1.575 m (5' 2.01\"), weight 83.9 kg (185 lb), SpO2 95 %. Body mass index is 33.83 kg/m².    Physical Exam   Constitutional: She is oriented to person, place, and time and well-developed, well-nourished, and in no distress.   HENT:   Head: Normocephalic and atraumatic.   Eyes: Pupils are equal, round, and reactive to light. Conjunctivae and EOM are normal.   Neck: Normal range of motion. Neck supple. No JVD present.   Cardiovascular: Normal rate, regular rhythm, normal heart sounds and intact distal pulses.  Exam reveals no gallop and no friction rub.    No murmur heard.  Pulmonary/Chest: Effort normal and breath sounds normal. No respiratory distress. She has no wheezes. She has no rales. She exhibits no tenderness.   Abdominal: Soft. Bowel sounds are normal. There is no tenderness.   Musculoskeletal: Normal range of motion. She exhibits no edema.   Neurological: She is alert and oriented to person, place, and time.   Skin: Skin is warm and dry. No rash noted. No erythema.   Psychiatric: Mood, memory, affect and judgment normal.         Cardiac Imaging and Procedures Review:    EKG dated 8/14/18:   Sinus rhythm, rate 67.    Echo dated 7/16/18:   Prior echo " 12/1/2016, Severe Pulm HTN not seen on this study (RVSP   could not be calculated), brief run of tachycardia present(see below).  Technically challenging study, some structures not well seen.  Left ventricular ejection fraction is visually estimated to be 65%.  Moderately dilated left atrium.  Probably mild mitral annular calcification, no MR.  RVSP estimated to be 38 mmHg, poor TR jets in some views, may be   underestimated.  Brief run of tachycardia during exam, rate   125, cannot exclude atrial   fibrillation.    Labs (personally reviewed and notable for):   Lab Results   Component Value Date/Time    SODIUM 142 06/28/2018 07:02 AM    POTASSIUM 4.1 06/28/2018 07:02 AM    CHLORIDE 108 06/28/2018 07:02 AM    CO2 28 06/28/2018 07:02 AM    GLUCOSE 84 06/28/2018 07:02 AM    BUN 13 06/28/2018 07:02 AM    CREATININE 0.54 06/28/2018 07:02 AM      Lab Results   Component Value Date/Time    WBC 6.1 06/28/2018 07:02 AM    RBC 4.60 06/28/2018 07:02 AM    HEMOGLOBIN 13.6 06/28/2018 07:02 AM    HEMATOCRIT 41.7 06/28/2018 07:02 AM    MCV 90.7 06/28/2018 07:02 AM    MCH 29.6 06/28/2018 07:02 AM    MCHC 32.6 (L) 06/28/2018 07:02 AM    MPV 11.7 06/28/2018 07:02 AM    NEUTSPOLYS 71.60 06/28/2018 07:02 AM    LYMPHOCYTES 16.50 (L) 06/28/2018 07:02 AM    MONOCYTES 8.70 06/28/2018 07:02 AM    EOSINOPHILS 2.10 06/28/2018 07:02 AM    BASOPHILS 0.80 06/28/2018 07:02 AM      PT/INR:   Lab Results   Component Value Date/Time    PROTHROMBTM 14.6 12/02/2016 02:10 PM    INR 1.10 12/02/2016 02:10 PM       Assessment:     1. PAF (paroxysmal atrial fibrillation) (HCC)  EKG   2. SVT (supraventricular tachycardia) (HCC)     3. Essential hypertension     4. MYESHA (obstructive sleep apnea)         Medical Decision Making:  Today's Assessment / Status / Plan:   1. Afib:  - No recurrence.  None seen on Zio patch.    2. SVT:  - Short runs of SVT seen on zio patch, longest 15 beats.  - She feels comfortable and states not bothersome at this point.  Will  monitor.  If symptomatic or increasing would add back Metoprolol.     3. HTN:  - Blood pressure well controled.     4. MYESHA:  - Needs to get a updated sleep study and new machine.  Hasn't worn in approximately 6 months.  Discussed that treatment could potentially help to decrease palps.  She will schedule.     Plan reviewed in detail with the patient and she verbalizes understanding and is in agreement.   RTC in one year, sooner if clinical condition changes  Collaborating MD/ADD: NORY Sterling M.D.  [address]  [address]  [place] [id number]  VIA In Basket

## 2018-08-15 LAB — EKG IMPRESSION: NORMAL

## 2018-09-24 DIAGNOSIS — I10 ESSENTIAL HYPERTENSION: ICD-10-CM

## 2018-09-24 RX ORDER — POTASSIUM CHLORIDE 20 MEQ/1
TABLET, EXTENDED RELEASE ORAL
Qty: 90 TAB | Refills: 1 | Status: SHIPPED | OUTPATIENT
Start: 2018-09-24 | End: 2019-01-09

## 2018-10-18 DIAGNOSIS — I10 ESSENTIAL HYPERTENSION: ICD-10-CM

## 2018-10-18 RX ORDER — LISINOPRIL 40 MG/1
TABLET ORAL
Qty: 90 TAB | Refills: 0 | Status: SHIPPED | OUTPATIENT
Start: 2018-10-18 | End: 2019-01-20 | Stop reason: SDUPTHER

## 2018-11-08 DIAGNOSIS — M79.89 LEG SWELLING: ICD-10-CM

## 2018-11-08 DIAGNOSIS — I10 ESSENTIAL HYPERTENSION: ICD-10-CM

## 2018-11-08 RX ORDER — FUROSEMIDE 40 MG/1
TABLET ORAL
Qty: 90 TAB | Refills: 0 | Status: SHIPPED | OUTPATIENT
Start: 2018-11-08 | End: 2019-01-23 | Stop reason: SDUPTHER

## 2018-12-04 ENCOUNTER — SLEEP CENTER VISIT (OUTPATIENT)
Dept: SLEEP MEDICINE | Facility: MEDICAL CENTER | Age: 78
End: 2018-12-04
Payer: MEDICARE

## 2018-12-04 VITALS
HEART RATE: 80 BPM | OXYGEN SATURATION: 94 % | DIASTOLIC BLOOD PRESSURE: 88 MMHG | BODY MASS INDEX: 34.38 KG/M2 | RESPIRATION RATE: 15 BRPM | SYSTOLIC BLOOD PRESSURE: 134 MMHG | WEIGHT: 194 LBS | HEIGHT: 63 IN

## 2018-12-04 DIAGNOSIS — G47.33 OSA ON CPAP: ICD-10-CM

## 2018-12-04 PROCEDURE — 99203 OFFICE O/P NEW LOW 30 MIN: CPT | Performed by: FAMILY MEDICINE

## 2018-12-04 NOTE — PROGRESS NOTES
"     [facility]  Consult Note     Date: 12/4/2018 / Time: 2:13 PM    Patient ID:   Name:             Trice Boyd     YOB: 1940  Age:                 78 y.o.  female   MRN:               [id number]      Thank you for requesting a sleep medicine consultation on Sharon Callahan at the sleep center. She presents today with the chief complaints of MYESHA and to establish as a new pt. Pt was diagnosed with MYESHA about 5 + years ago and was on CPAP however not been using it for last 6 months. She was on CPAP 9 cm. The initial presenting symptoms were snoring and apneas. She is referred by Dr. Franklin for evaluation and treatment of sleep disorder breathing .     HISTORY OF PRESENT ILLNESS:       At night,  Sharon Callahan goes to bed around 9 pm on weekdays and on the weekends. She gets out of bed at 6 am on weekdays and on the weekends.She averages about 7-8 hrs of sleep on a good night.She falls asleep within few minutes. She awakens 1-2 times during the night due to bathroom use. It takes her few min to fall back asleep. She rarely uses trazodone.    Sheis not aware of snoring/witnessed apneas/gasping or choking in sleep recently even without the CPAP.  She  denies any symptoms of restless legs syndrome such as an \"urge to move\"  She  legs in the evening or bedtime. She  denies any symptoms of narcolepsy such as sleep paralysis or cataplexy, or any symptoms to suggest parasomnias such as sleep walking or acting out of dreams.    Overall,  She does  finds her sleep refreshing. When She  wakes up in the morning, She does not feel tired. In terms of  excessive daytime sleepiness,  She complains of sleepiness while reading or watching TV. She does take regular naps.She drinks about 1 caffeinated beverages per day.    The 30 day compliance was downloaded which shows inadequate compliance. She has not used the CPAP since 12/2017.  The AHI is has improved to 3.1/hr.     REVIEW OF SYSTEMS:    "    Constitutional: Denies fevers, Denies weight changes  Eyes: Denies changes in vision, no eye pain  Ears/Nose/Throat/Mouth: Denies nasal congestion or sore throat   Cardiovascular: Denies chest pain or palpitations   Respiratory: Denies shortness of breath , Denies cough  Gastrointestinal/Hepatic: Denies abdominal pain, nausea, vomiting, diarrhea, constipation or GI bleeding   Genitourinary: Denies bladder dysfunction, dysuria or frequency  Musculoskeletal/Rheum: Denies  joint pain and swelling   Skin/Breast: Denies rash  Neurological: Denies headache, confusion, memory loss or focal weakness/parasthesias  Psychiatric: denies mood disorder     Comprehensive review of systems form is reviewed with the patient and is attached in the EMR.     PMH:  has a past medical history of Arthritis; BMI 38.0-38.9,adult (9/18/2013); CATARACT; Chronic nausea (1/12/2015); Depression with anxiety (5/3/2011); Heart burn; Hemothorax on right (1/4/2017); History of cholecystectomy; Hyperlipidemia (4/4/2011); Hypertension; Indigestion; MYESHA (obstructive sleep apnea) (4/4/2011); Pain; Pleural effusion, right (1/4/2017); Range of motion deficit; S/P bilateral mastectomy (4/4/2011); Sleep apnea; and Snoring.  MEDS:   Current Outpatient Prescriptions:   •  furosemide (LASIX) 40 MG Tab, TAKE 1 TABLET BY MOUTH EVERY DAY, Disp: 90 Tab, Rfl: 0  •  lisinopril (PRINIVIL, ZESTRIL) 40 MG tablet, TAKE 1 TABLET BY MOUTH EVERY DAY, Disp: 90 Tab, Rfl: 0  •  potassium chloride SA (KDUR) 20 MEQ Tab CR, TAKE 1 TABLET BY MOUTH EVERY MORNING, Disp: 90 Tab, Rfl: 1  •  amLODIPine (NORVASC) 10 MG Tab, TAKE 1 TAB BY MOUTH EVERY DAY., Disp: 90 Tab, Rfl: 1  •  HYDROcodone-acetaminophen (NORCO) 5-325 MG Tab per tablet, , Disp: , Rfl:   •  azithromycin (ZITHROMAX) 250 MG Tab, Take 1 tabl twice daily the first day, then 1 tabl daily, PO., Disp: 6 Tab, Rfl: 0  •  atorvastatin (LIPITOR) 20 MG Tab, Take 1 Tab by mouth every bedtime., Disp: 90 Tab, Rfl: 0  •  potassium  chloride SA (KDUR) 20 MEQ Tab CR, TAKE 2 TABLETS BY MOUTH EVERY DAY. (Patient taking differently: TAKE 1 TABLETS BY MOUTH EVERY DAY.), Disp: 60 Tab, Rfl: 0  •  cephALEXin (KEFLEX) 500 MG Cap, , Disp: , Rfl:   •  traZODone (DESYREL) 50 MG Tab, TAKE 1 TABLET BY MOUTH THREE TIMES A DAY **NEEDS TO BE SEEN (Patient taking differently: TAKE 1 TABLET BY MOUTH PRN **NEEDS TO BE SEEN), Disp: 60 Tab, Rfl: 0  •  albuterol 108 (90 Base) MCG/ACT Aero Soln inhalation aerosol, Inhale 2 Puffs by mouth every 6 hours as needed for Shortness of Breath. (Patient not taking: Reported on 12/4/2018), Disp: 8.5 g, Rfl: 3  •  atorvastatin (LIPITOR) 20 MG Tab, Take 20 mg by mouth every evening., Disp: , Rfl:   •  omeprazole (PRILOSEC) 40 MG delayed-release capsule, Take 1 Cap by mouth every day., Disp: 90 Cap, Rfl: 1  •  acetaminophen (TYLENOL) 500 MG Tab, Take 1,000 mg by mouth every 6 hours as needed., Disp: , Rfl:   ALLERGIES:   Allergies   Allergen Reactions   • Pcn [Penicillins] Anaphylaxis     Skin turns black   • Clindamycin Rash     Rash     • Influenza Virus Vacc Rash     Red in face   • Norco [Hydrocodone-Acetaminophen]    • Pneumococcal Vaccine Rash   • Tetracycline Rash     Rash    • Xarelto [Rivaroxaban] Rash     SURGHX:   Past Surgical History:   Procedure Laterality Date   • THORACOSCOPY Right 12/6/2016    Procedure: RIGHT THORACOSCOPY ,DECORTICATION, EVACUATION OF HEMOTHORAX;  Surgeon: Ehsan De Leon D.O.;  Location: [facility];  Service:    • HIP ARTHROPLASTY TOTAL  12/3/2012    Performed by Jamie Kenney M.D. at [facility]   • CATARACT PHACO WITH IOL  11/3/2011    Performed by DEENA BRADEN at SURGERY [facility]   • CATARACT PHACO WITH IOL  10/20/2011    Performed by DEENA BRADEN at SURGERY [facility] DAY [facility]   • MAXIM BY LAPAROSCOPY  2/1/2011    Performed by DORON HINOJOSA at SURGERY SAME DAY [facility]   • HIP ARTHROPLASTY TOTAL  3/26/2009    Right; Perfmed by PITA  "THERESE R at SURGERY [facility]   • UVULOPHARYNGOPALATOPLASTY  1990   • OTHER  1989    tummy tuck   • OTHER  1988    bilateral mastectomies with implants   • BREAST BIOPSY  1980    bilateral benign mastectomy   • ORIF, HUMERUS  1950's    left   • PB ENLARGE BREAST WITH IMPLANT       SOCHX:  reports that she quit smoking about 43 years ago. Her smoking use included Cigarettes. She has a 15.00 pack-year smoking history. She has never used smokeless tobacco. She reports that she does not drink alcohol or use drugs..   FH:   Family History   Problem Relation Age of Onset   • Hypertension Mother    • Cancer Father         lung   • Diabetes Father    • Hypertension Father    • Heart Disease Father         MI   • Hypertension Brother    • Hypertension Maternal Grandmother    • No Known Problems Brother    • Hypertension Brother    • Diabetes Brother    • Cancer Paternal Aunt         stomach   • Diabetes Paternal Aunt    • Heart Disease Brother         MI   • Hyperlipidemia Neg Hx    • Stroke Neg Hx            Physical Exam:  Vitals/ General Appearance:   Weight/BMI: Body mass index is 34.37 kg/m².  Resp. rate 15, height 1.6 m (5' 3\"), weight 88 kg (194 lb), not currently breastfeeding.  Vitals:    12/04/18 1409   Resp: 15   Weight: 88 kg (194 lb)   Height: 1.6 m (5' 3\")       Pt. is alert and oriented to time, place and person. Cooperative and in no apparent distress.       1. Head: Atraumatic, normocephalic.   2. Ears: Normal tympanic membrane and no discharge  3. Nose: No inferior turbinate hypertophy, no septal deviation, no polyp.   4. Throat: Oropharynx appears crowded in that the palate is overhanging (Malam Taylor scale 3) s/p UPPP  5. Neck: Supple. No thyromegaly  6. Chest: Trachea central  7. Lungs auscultation: B/L good air entry, vesicular breath sounds, no wheezing/ronchi sounds  8. Heart auscultation: 1st and 2nd heart sounds normal, regular rhythm. + murmur.  9. Abdomen: Soft, non tender, no " organomegaly. Bowel sounds present  10. Extremities: no pedal edema.   Peripheral pulses felt.  11. Skin: No rash  12. NEUROLOGICAL EXAMINATION: On neurological exam, the patient was alert and oriented x3. speech was clear and fluent without dysarthria.  INVESTIGATIONS:       ASSESSMENT AND PLAN     1. Sleep Apnea (MYESHA).  The pathophysiology of sleep anea and the increased risk of cardiovascular morbidity from untreated sleep apnea is discussed in detail with the patient.  She is urged to avoid supine sleep, weight gain and alcoholic beverages since all of these can worsen sleep apnea. She is cautioned against drowsy driving. If She feels sleepy while driving, She must pull over for a break/nap, rather than persist on the road, in the interest of She own safety and that of others on the road.   Plan   - Continue CPAP at 9 cm however she wants to hold of on treatment for now until the SS   - PSG is ordered to re establish the diagnosis    - compliance download was reviewed and discussed with the pt   - compliance was reinforced     2.  Regarding treatment of other past medical problems and general health maintenance,  She is urged to follow up with PCP.

## 2018-12-05 DIAGNOSIS — E78.5 DYSLIPIDEMIA: ICD-10-CM

## 2018-12-05 RX ORDER — ATORVASTATIN CALCIUM 20 MG/1
TABLET, FILM COATED ORAL
Qty: 90 TAB | Refills: 0 | Status: SHIPPED | OUTPATIENT
Start: 2018-12-05 | End: 2019-03-08 | Stop reason: SDUPTHER

## 2018-12-13 ENCOUNTER — SLEEP STUDY (OUTPATIENT)
Dept: SLEEP MEDICINE | Facility: MEDICAL CENTER | Age: 78
End: 2018-12-13
Payer: MEDICARE

## 2018-12-13 DIAGNOSIS — G47.33 OSA ON CPAP: ICD-10-CM

## 2018-12-13 PROCEDURE — 95811 POLYSOM 6/>YRS CPAP 4/> PARM: CPT | Performed by: INTERNAL MEDICINE

## 2018-12-14 NOTE — PROCEDURES
CLINICAL COMMENTS:  The patient underwent a split night polysomnogram with a CPAP titration using the standard montage for measurement of parameters of sleep, respiratory events, movement abnormalities, heart rate and rhythm. A microphone was used to monitor snoring.    INTERPRETATION: The study was started at 10:09:26 PM.  The diagnostic recording time was 229.3 minutes with a sleep period of 212.2 minutes.  Total sleep time was 144.0 minutes with a sleep efficiency of 62.8%.  The sleep latency was 17.1 minutes, and REM latency was 183.0 minutes.  The patient had 124 arousals in total, for an arousal index of 51.7.        RESPIRATORY: The patient had 106 apneas in total.  Of these, 106 were obstructive apneas, and 0 were central apneas.  This resulted in an apnea index (AI) of 44.2.  The patient had 47 hypopneas in total, which resulted in a hypopnea index of 19.6.  The overall AHI was 63.8, while the AHI during REM was 60.0.  The supine AHI = 63.8.    OXIMETRY: Oxygen saturation monitoring showed a mean SpO2 of 88.1% for the diagnostic part of the study, with a minimum oxygen saturation of 60.0%.  Oxygen saturations were below 89% for 87.1% of sleep time.    CARDIAC: The highest heart rate for the first part of the study was 84.0 beats per minute.  The average heart rate during sleep was 59.5 bpm, while the highest heart rate was 83.0 bpm.    LIMB MOVEMENTS: There were a total of 5 periodic limb movements during sleep, of which 1 were PLMS arousals.  This resulted in a PLMS index of 2.1 and a PLMS arousal index of 0.4.    TREATMENT    Treatment recording time was 174.9 minutes with a total sleep time of 164.0min.  The patient had an arousal index of 4.4.      RESPIRATORY: The patient had 6 obstructive apneas, 4 central apneas, and 1 hypopneas for an overall AHI was 4.0.    OXIMETRY: The mean SpO2 during treatment was 89.1%, with a minimum oxygen saturation of 73.0%.      CPAP was tried from 6 to 9cmH2O.    The  patient slept supine throughout testing.  On the baseline component of the study sleep efficiency was reduced at 63%. Sleep architecture showed reduced stage III sleep, with stage I: 12%, stage II: 68%, stage III: 5%, and REM sleep: 15% of the night.  The sleep latency was normal at 17 minutes.  EKG showed sinus rhythm.  No periodic limb movements were observed.    Once asleep frequent obstructive apneas and hypopneas were noted, with overall AHI 64/h.  There was oxygen desaturation noted below 90% for 70% of the night, to a dino of 60% in REM sleep.    After meeting split-night criteria CPAP therapy was started with sleep efficiency improving to 94%.  CPAP was titrated to 9 cm of water with resultant AHI 0.6 and mean oximetry 89%.  REM rebound was noted on CPAP.    Interpretation:  Severe obstructive sleep apnea syndrome, AHI: 64/h associated with severe hypoxia, with SPO2 dino of 60%.  Excellent response to CPAP therapy with normal AHI and improved oximetry at 9 cm of water.    Recommendations:  Given the severity of sleep apnea and excellent response to airway pressurization, CPAP: 9 cm of water using a medium Eson mask is recommended for treatment. Alternative therapies such as UPPP or a dental appliance are less likely to be effective in treatment.  Verify overnight oximetry on CPAP: 9 cm of water after the patient has acclimated to CPAP therapy.  Weight loss advised secondary to BMI: 34.  Avoidance of alcohol, sedatives and muscle relaxants advised as these can exacerbate sleep apnea.

## 2019-01-09 ENCOUNTER — HOSPITAL ENCOUNTER (OUTPATIENT)
Dept: RADIOLOGY | Facility: MEDICAL CENTER | Age: 79
End: 2019-01-09
Attending: FAMILY MEDICINE
Payer: MEDICARE

## 2019-01-09 ENCOUNTER — OFFICE VISIT (OUTPATIENT)
Dept: MEDICAL GROUP | Facility: MEDICAL CENTER | Age: 79
End: 2019-01-09
Payer: MEDICARE

## 2019-01-09 VITALS
DIASTOLIC BLOOD PRESSURE: 84 MMHG | SYSTOLIC BLOOD PRESSURE: 120 MMHG | HEIGHT: 62 IN | HEART RATE: 86 BPM | OXYGEN SATURATION: 98 % | WEIGHT: 190.48 LBS | TEMPERATURE: 98.2 F | RESPIRATION RATE: 16 BRPM | BODY MASS INDEX: 35.05 KG/M2

## 2019-01-09 DIAGNOSIS — G89.29 CHRONIC PAIN OF BOTH KNEES: ICD-10-CM

## 2019-01-09 DIAGNOSIS — M25.562 CHRONIC PAIN OF BOTH KNEES: ICD-10-CM

## 2019-01-09 DIAGNOSIS — M25.561 CHRONIC PAIN OF BOTH KNEES: ICD-10-CM

## 2019-01-09 DIAGNOSIS — I27.20 PULMONARY HYPERTENSION, MODERATE TO SEVERE (HCC): ICD-10-CM

## 2019-01-09 DIAGNOSIS — I10 ESSENTIAL HYPERTENSION: ICD-10-CM

## 2019-01-09 PROCEDURE — 73562 X-RAY EXAM OF KNEE 3: CPT | Mod: LT

## 2019-01-09 PROCEDURE — 99214 OFFICE O/P EST MOD 30 MIN: CPT | Performed by: FAMILY MEDICINE

## 2019-01-09 PROCEDURE — 73562 X-RAY EXAM OF KNEE 3: CPT | Mod: RT

## 2019-01-09 ASSESSMENT — PATIENT HEALTH QUESTIONNAIRE - PHQ9: CLINICAL INTERPRETATION OF PHQ2 SCORE: 0

## 2019-01-09 NOTE — ASSESSMENT & PLAN NOTE
This is a chronic health problem for this patient adequately controlled with current medications.  I want to be certain that any medication changes or additions do not adversely affect her pulmonary hypertension.

## 2019-01-09 NOTE — PROGRESS NOTES
CC:Diagnoses of Chronic pain of both knees, Essential hypertension, and Pulmonary hypertension, moderate to severe (HCC) were pertinent to this visit.    HISTORY OF PRESENT ILLNESS: Patient is a 78 y.o. female established patient who presents today to discuss her complaint of bilateral knee pain currently right greater than worse good but could be a problem with either knee.  Patient also has chronic health concerns that have to be considered as we look at medications and treatments.    Health Maintenance: Completed    Chronic pain of both knees  This is a new problem for this patient that is actually been ongoing for about 6 months.  She has bilateral knee pain today is right worse than left after shopping yesterday but it could be left worse than right depending on what she is done.  She knows that she has a shoulder that is arthritis and is bone-on-bone and they tried doing injections but it did not help.  She has postponed getting that fixed because she figures she needs her knees fixed first.  She has not had x-rays of her knees.  I would like to start with x-rays of her knees.  I can then send her message on my chart as to how bad they are.    Essential hypertension  This is a chronic health issue for this patient that is adequately controlled with current medication.  Blood pressure 120/84.  I want to be certain that any medications we utilize are not nephrotoxic    Pulmonary hypertension, moderate to severe (CMS-HCC)  This is a chronic health problem for this patient adequately controlled with current medications.  I want to be certain that any medication changes or additions do not adversely affect her pulmonary hypertension.      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 30 Tab 6   • atorvastatin (LIPITOR) 20 MG Tab TAKE 1 TABLET BY MOUTH EVERY BEDTIME 90 Tab 0   • furosemide (LASIX) 40 MG Tab TAKE 1 TABLET BY MOUTH EVERY DAY 90 Tab 0   • lisinopril (PRINIVIL, ZESTRIL) 40 MG tablet TAKE 1 TABLET BY MOUTH EVERY DAY 90 Tab 0   • amLODIPine (NORVASC) 10 MG Tab TAKE 1 TAB BY MOUTH EVERY DAY. 90 Tab 1   • potassium chloride SA (KDUR) 20 MEQ Tab CR TAKE 2 TABLETS BY MOUTH EVERY DAY. (Patient taking differently: TAKE 1 TABLETS BY MOUTH EVERY DAY.) 60 Tab 0   • traZODone (DESYREL) 50 MG Tab TAKE 1 TABLET BY MOUTH THREE TIMES A DAY **NEEDS TO BE SEEN (Patient taking differently: TAKE 1 TABLET BY MOUTH PRN **NEEDS TO BE SEEN) 60 Tab 0   • omeprazole (PRILOSEC) 40 MG delayed-release capsule Take 1 Cap by mouth every day. 90 Cap 1     No current facility-administered medications for this visit.        Social History   Substance Use Topics   • Smoking status: Former Smoker     Packs/day: 1.00     Years: 15.00     Types: Cigarettes     Quit date: 1/1/1975   • Smokeless tobacco: Never Used   • Alcohol use No      Comment: once in a great while     Social History     Social History Narrative   • No narrative on file       Family History   Problem Relation Age of Onset   • Hypertension Mother    • Cancer Father         lung   • Diabetes Father    • Hypertension Father    • Heart Disease Father         MI   • Hypertension Brother    • Sleep Apnea Brother    • Hypertension Maternal Grandmother    • Sleep Apnea Brother   "  • Hypertension Brother    • Diabetes Brother    • Cancer Paternal Aunt         stomach   • Diabetes Paternal Aunt    • Heart Disease Brother         MI   • Hyperlipidemia Neg Hx    • Stroke Neg Hx         ROS:     - Constitutional:  Negative for fever, chills, unexpected weight change, and fatigue/generalized weakness.    - HEENT:  Negative for headaches, vision changes, hearing changes, ear pain, ear discharge, rhinorrhea, sinus congestion, sore throat, and neck pain.      - Respiratory: Negative for cough, sputum production, chest congestion, dyspnea, wheezing, and crackles.      - Cardiovascular: Negative for chest pain, palpitations, orthopnea, and bilateral lower extremity edema.     - Gastrointestinal: Negative for heartburn, nausea, vomiting, abdominal pain, hematochezia, melena, diarrhea, constipation, and greasy/foul-smelling stools.     - Genitourinary: Negative for dysuria, polyuria, hematuria, pyuria, urinary urgency, and urinary incontinence.     - Musculoskeletal: Positive as in HPI    - Skin: Negative for rash, itching, cyanotic skin color change.     - Neurological: Negative for dizziness, tingling, tremors, focal sensory deficit, focal weakness and headaches.     - Endo/Heme/Allergies: Does not bruise/bleed easily.     - Psychiatric/Behavioral: Negative for depression, suicidal/homicidal ideation and memory loss.          - NOTE: All other systems reviewed and are negative, except as in HPI.      Exam:    Blood pressure 120/84, pulse 86, temperature 36.8 °C (98.2 °F), temperature source Temporal, resp. rate 16, height 1.575 m (5' 2.01\"), weight 86.4 kg (190 lb 7.6 oz), SpO2 98 %, not currently breastfeeding. Body mass index is 34.83 kg/m².    General:  Well nourished, well developed female in NAD  Head is grossly normal.  Knee: Patient has bilateral effusions of the medial portion of both knees.  She has full range of motion with both knees but has locking with flexion on the right.  Anterior and " posterior drawer test is negative.  Patient was seen for 25 minutes face to face of which, 20 minutes was spent counseling regarding discussion of medications interactions and side effects and how it could adversely affect other portions of her health.  Also a discussion of why she is holding off on her shoulder and looking at getting these done first..    Please note that this dictation was created using voice recognition software. I have made every reasonable attempt to correct obvious errors, but I expect that there are errors of grammar and possibly content that I did not discover before finalizing the note.    Assessment/Plan:  1. Chronic pain of both knees  Uncontrolled, patient will try Voltaren 100 mg extended release daily with food.  She is going to get x-rays and we will check a comprehensive metabolic panel within 6 weeks.  Plan to see her back at that time to discuss results.  - DX-KNEE 3 VIEWS LEFT; Future  - DX-KNEE 3 VIEWS RIGHT; Future  - COMP METABOLIC PANEL; Future    2. Essential hypertension  Controlled, continue with current meds and lifestyle.      3. Pulmonary hypertension, moderate to severe (HCC)  Controlled, continue with current meds and lifestyle.

## 2019-01-09 NOTE — ASSESSMENT & PLAN NOTE
This is a new problem for this patient that is actually been ongoing for about 6 months.  She has bilateral knee pain today is right worse than left after shopping yesterday but it could be left worse than right depending on what she is done.  She knows that she has a shoulder that is arthritis and is bone-on-bone and they tried doing injections but it did not help.  She has postponed getting that fixed because she figures she needs her knees fixed first.  She has not had x-rays of her knees.  I would like to start with x-rays of her knees.  I can then send her message on my chart as to how bad they are.

## 2019-01-09 NOTE — ASSESSMENT & PLAN NOTE
This is a chronic health issue for this patient that is adequately controlled with current medication.  Blood pressure 120/84.  I want to be certain that any medications we utilize are not nephrotoxic

## 2019-01-20 DIAGNOSIS — I10 ESSENTIAL HYPERTENSION: ICD-10-CM

## 2019-01-21 RX ORDER — LISINOPRIL 40 MG/1
TABLET ORAL
Qty: 90 TAB | Refills: 0 | Status: SHIPPED | OUTPATIENT
Start: 2019-01-21 | End: 2019-04-18 | Stop reason: SDUPTHER

## 2019-01-23 DIAGNOSIS — I10 ESSENTIAL HYPERTENSION: ICD-10-CM

## 2019-01-23 DIAGNOSIS — M79.89 LEG SWELLING: ICD-10-CM

## 2019-01-23 RX ORDER — FUROSEMIDE 40 MG/1
TABLET ORAL
Qty: 90 TAB | Refills: 0 | Status: ON HOLD | OUTPATIENT
Start: 2019-01-23 | End: 2019-02-27

## 2019-02-05 ENCOUNTER — SLEEP CENTER VISIT (OUTPATIENT)
Dept: SLEEP MEDICINE | Facility: MEDICAL CENTER | Age: 79
End: 2019-02-05
Payer: MEDICARE

## 2019-02-05 ENCOUNTER — TELEPHONE (OUTPATIENT)
Dept: MEDICAL GROUP | Facility: MEDICAL CENTER | Age: 79
End: 2019-02-05

## 2019-02-05 VITALS
HEIGHT: 62 IN | DIASTOLIC BLOOD PRESSURE: 70 MMHG | HEART RATE: 72 BPM | TEMPERATURE: 99 F | BODY MASS INDEX: 34.96 KG/M2 | WEIGHT: 190 LBS | OXYGEN SATURATION: 92 % | SYSTOLIC BLOOD PRESSURE: 130 MMHG | RESPIRATION RATE: 16 BRPM

## 2019-02-05 DIAGNOSIS — G47.33 OSA ON CPAP: Chronic | ICD-10-CM

## 2019-02-05 DIAGNOSIS — I27.20 PULMONARY HYPERTENSION, MODERATE TO SEVERE (HCC): ICD-10-CM

## 2019-02-05 DIAGNOSIS — I10 ESSENTIAL HYPERTENSION: ICD-10-CM

## 2019-02-05 DIAGNOSIS — Z87.891 FORMER SMOKER: ICD-10-CM

## 2019-02-05 PROBLEM — Z00.00 HEALTH CARE MAINTENANCE: Status: RESOLVED | Noted: 2018-06-29 | Resolved: 2019-02-05

## 2019-02-05 PROCEDURE — 99214 OFFICE O/P EST MOD 30 MIN: CPT | Performed by: NURSE PRACTITIONER

## 2019-02-05 NOTE — PATIENT INSTRUCTIONS
Patient understands they may have mask fits within first 30 days of therapy covered by insurance to obtain best fit.  Patient understands the need to use device every night for >4hrs to meet compliance standards for insurance purposes.

## 2019-02-05 NOTE — PROGRESS NOTES
Chief Complaint   Patient presents with   • Apnea     SS Results        HPI:  Sharon Callahan is a 78 y.o. year old female here today for follow-up on MYESHA.  Patient completed sleep study 12/13/2018 in order to get updated supplies.  She was diagnosed with sleep apnea in 2010 by [facility]. Original study indicated AHI 33 with low oxygen levels. She had stopped using therapy due to old supplies but was on CPAP 9 cm H2O nightly.  Updated sleep study 12/13/18 indicated severe sleep apnea with an overall AHI of 63.8 and a minimum oxygen saturation of 60%.  PLMS index of 2.1.  She was titrated on CPAP and responded well to 9cm H2O nightly with a normal AHI and improved oximetry.  Recommendation was follow-up oximetry once acclimate to therapy.  Her machine is 8-9yrs old. She would benefit from a new device.    Today she denies significant cardiac or respiratory symptoms.  BMI 34.  She notes chronic pedal edema and on Lasix daily.  She notes intermittent GERD but controlled with omeprazole.  She would like to improve her sleep.    Patient has a history of right lower lobe lung cancer.  This was biopsied previously revealing carcinoid.  Last CT scan of chest/abdomen/pelvis with 7/25/2017 noting stable mass and no evidence of metastatic disease.  She did have linear scarring in both lungs.  Embolization coils noted in her right hemithorax.  Primary care follows her for this.    ROS: As per HPI and otherwise negative if not stated.    Past Medical History:   Diagnosis Date   • Arthritis    • BMI 38.0-38.9,adult 9/18/2013   • CATARACT     surgical correcttion bilateral   • Chronic nausea 1/12/2015    Has had GI work up done Dr voss   • Chronic pain of both knees 1/9/2019   • Depression with anxiety 5/3/2011   • Heart burn    • Hemothorax on right 1/4/2017    Status post thoracotomy and decortication   • History of cholecystectomy    • Hyperlipidemia 4/4/2011   • Hypertension    • Indigestion    • MYESHA (obstructive sleep apnea)  4/4/2011    On cPAP    • Pain     left hip   • Pleural effusion, right 1/4/2017    Status post thoracotomy and decortication   • Range of motion deficit     left elbow, unable to completely extend   • S/P bilateral mastectomy 4/4/2011   • Sleep apnea     CPAP   • Snoring        Past Surgical History:   Procedure Laterality Date   • THORACOSCOPY Right 12/6/2016    Procedure: RIGHT THORACOSCOPY ,DECORTICATION, EVACUATION OF HEMOTHORAX;  Surgeon: Ehsan De Leon D.O.;  Location: SURGERY [facility];  Service:    • HIP ARTHROPLASTY TOTAL  12/3/2012    Performed by Jamie Kenney M.D. at SURGERY [facility]   • CATARACT PHACO WITH IOL  11/3/2011    Performed by DEENA BRADEN at SURGERY [facility]   • CATARACT PHACO WITH IOL  10/20/2011    Performed by DEENA BRADEN at SURGERY SAME DAY [facility]   • MAXIM BY LAPAROSCOPY  2/1/2011    Performed by DORON HINOJOSA at SURGERY SAME DAY [facility]   • HIP ARTHROPLASTY TOTAL  3/26/2009    Right; Perfmed by THERESE LEMA at SURGERY [facility]   • UVULOPHARYNGOPALATOPLASTY  1990   • OTHER  1989    tummy reyna   • OTHER  1988    bilateral mastectomies with implants   • BREAST BIOPSY  1980    bilateral benign mastectomy   • ORIF, HUMERUS  1950's    left   • PB ENLARGE BREAST WITH IMPLANT         Family History   Problem Relation Age of Onset   • Hypertension Mother    • Cancer Father         lung   • Diabetes Father    • Hypertension Father    • Heart Disease Father         MI   • Hypertension Brother    • Sleep Apnea Brother    • Hypertension Maternal Grandmother    • Sleep Apnea Brother    • Hypertension Brother    • Diabetes Brother    • Cancer Paternal Aunt         stomach   • Diabetes Paternal Aunt    • Heart Disease Brother         MI   • Hyperlipidemia Neg Hx    • Stroke Neg Hx        Social History     Social History   • Marital status:      Spouse name: N/A   • Number of children: N/A   • Years of education: N/A     Occupational  "History   • retired       [employer]     Social History Main Topics   • Smoking status: Former Smoker     Packs/day: 1.00     Years: 15.00     Types: Cigarettes     Quit date: 1/1/1975   • Smokeless tobacco: Never Used   • Alcohol use No      Comment: once in a great while   • Drug use: No   • Sexual activity: No      Comment: lives with ex-     Other Topics Concern   • Not on file     Social History Narrative   • No narrative on file       Allergies as of 02/05/2019 - Reviewed 02/05/2019   Allergen Reaction Noted   • Pcn [penicillins] Anaphylaxis 09/23/2010   • Clindamycin Rash 07/27/2015   • Influenza virus vacc Rash 03/25/2016   • Norco [hydrocodone-acetaminophen]  01/23/2013   • Pneumococcal vaccine Rash 12/07/2012   • Tetracycline Rash 07/27/2015   • Xarelto [rivaroxaban] Rash 12/07/2012        @Vital signs for this encounter:  Vitals:    02/05/19 1517   Height: 1.575 m (5' 2\")   Weight: 86.2 kg (190 lb)   Weight % change since last entry.: 0 %   BP: 130/70   Pulse: 72   BMI (Calculated): 34.75   Resp: 16   Temp: 37.2 °C (99 °F)   TempSrc: Temporal       Current medications as of today   Current Outpatient Prescriptions   Medication Sig Dispense Refill   • furosemide (LASIX) 40 MG Tab TAKE 1 TABLET BY MOUTH EVERY DAY 90 Tab 0   • lisinopril (PRINIVIL, ZESTRIL) 40 MG tablet TAKE 1 TABLET BY MOUTH EVERY DAY 90 Tab 0   • diclofenac CR (VOLTAREN-XR) 100 MG TABLET SR 24 HR tablet Take 1 Tab by mouth every day. 30 Tab 6   • atorvastatin (LIPITOR) 20 MG Tab TAKE 1 TABLET BY MOUTH EVERY BEDTIME 90 Tab 0   • amLODIPine (NORVASC) 10 MG Tab TAKE 1 TAB BY MOUTH EVERY DAY. 90 Tab 1   • traZODone (DESYREL) 50 MG Tab TAKE 1 TABLET BY MOUTH THREE TIMES A DAY **NEEDS TO BE SEEN (Patient taking differently: TAKE 1 TABLET BY MOUTH PRN **NEEDS TO BE SEEN) 60 Tab 0   • potassium chloride SA (KDUR) 20 MEQ Tab CR TAKE 2 TABLETS BY MOUTH EVERY DAY. (Patient taking differently: TAKE 1 TABLETS BY MOUTH EVERY DAY.) " 60 Tab 0   • omeprazole (PRILOSEC) 40 MG delayed-release capsule Take 1 Cap by mouth every day. 90 Cap 1     No current facility-administered medications for this visit.          Physical Exam:   Gen:           Alert and oriented, No apparent distress. Mood and affect appropriate, normal interaction with examiner.  Eyes:          PERRL, EOM intact, sclere white, conjunctive moist.  Ears:          Not examined.   Hearing:     Grossly intact.  Nose:          Normal, no lesions or deformities.  Dentition:    Good dentition.  Oropharynx:   Tongue normal.  Mallampati Classification: not examined.  Neck:        Supple, trachea midline, no masses.  Respiratory Effort: No intercostal retractions or use of accessory muscles.   Lung Auscultation:      Clear to auscultation bilaterally; no rales, rhonchi or wheezing.  CV:            Regular rate and rhythm. No murmurs, rubs or gallops.  Abd:           Not examined.   Lymphadenopathy: Not examined.  Gait and Station: Normal.  Digits and Nails: No clubbing, cyanosis, petechiae, or nodes.   Cranial Nerves: II-XII grossly intact.  Skin:        No rashes, lesions or ulcers noted.               Ext:           No cyanosis but BLE 1+ pedal edema.      Assessment:  1. MYESHA on CPAP     2. Pulmonary hypertension, moderate to severe (HCC)     3. Essential hypertension     4. Former smoker     5. BMI 34.0-34.9,adult         Immunizations:    Flu:declines  Pneumovax 23:2012  Prevnar 13:recommend obtaining at local pharmacy    Plan:  1.  DME CPAP 9-12cm nightly.  Patient understands they may have mask fits within first 30 days of therapy covered by insurance to obtain best fit.  Patient understands the need to use device every night for >4hrs to meet compliance standards for insurance purposes.  2.  Discussed sleep hygiene.  3.  Encourage weight loss.  4.  Follow-up with primary care for other health concerns and chronic pedal edema.  5.  Follow-up in 2-3 months for compliance check at sleep  center, sooner if needed.    Please note that this dictation was created using voice recognition software. I have made every reasonable attempt to correct obvious errors, but it is possible there are errors of grammar and possibly content that I did not discover before finalizing the note.

## 2019-02-05 NOTE — TELEPHONE ENCOUNTER
VOICEMAIL  1. Caller Name: Sharon Callahan                        Call Back Number: [phone] (home)      2. Message: Patient called and left a message stating that she needed to know when her appointment is with Dr. Cee. I have call and left her a message letting her know when her appointment is.     3. Patient approves office to leave a detailed voicemail/MyChart message: N\A

## 2019-02-22 RX ORDER — AMLODIPINE BESYLATE 10 MG/1
TABLET ORAL
Qty: 90 TAB | Refills: 1 | Status: ON HOLD | OUTPATIENT
Start: 2019-02-22 | End: 2019-02-27

## 2019-02-25 ENCOUNTER — OFFICE VISIT (OUTPATIENT)
Dept: URGENT CARE | Facility: MEDICAL CENTER | Age: 79
End: 2019-02-25
Payer: MEDICARE

## 2019-02-25 ENCOUNTER — TELEPHONE (OUTPATIENT)
Dept: MEDICAL GROUP | Facility: MEDICAL CENTER | Age: 79
End: 2019-02-25

## 2019-02-25 ENCOUNTER — APPOINTMENT (OUTPATIENT)
Dept: RADIOLOGY | Facility: MEDICAL CENTER | Age: 79
DRG: 948 | End: 2019-02-25
Attending: EMERGENCY MEDICINE
Payer: MEDICARE

## 2019-02-25 ENCOUNTER — APPOINTMENT (OUTPATIENT)
Dept: RADIOLOGY | Facility: MEDICAL CENTER | Age: 79
DRG: 948 | End: 2019-02-25
Payer: MEDICARE

## 2019-02-25 ENCOUNTER — HOSPITAL ENCOUNTER (INPATIENT)
Facility: MEDICAL CENTER | Age: 79
LOS: 2 days | DRG: 948 | End: 2019-02-27
Attending: EMERGENCY MEDICINE | Admitting: FAMILY MEDICINE
Payer: MEDICARE

## 2019-02-25 VITALS
RESPIRATION RATE: 14 BRPM | OXYGEN SATURATION: 94 % | HEIGHT: 62 IN | BODY MASS INDEX: 34.96 KG/M2 | WEIGHT: 190 LBS | TEMPERATURE: 97.8 F | HEART RATE: 55 BPM | SYSTOLIC BLOOD PRESSURE: 124 MMHG | DIASTOLIC BLOOD PRESSURE: 70 MMHG

## 2019-02-25 DIAGNOSIS — M79.89 LEG SWELLING: ICD-10-CM

## 2019-02-25 DIAGNOSIS — R60.0 BILATERAL LOWER EXTREMITY EDEMA: ICD-10-CM

## 2019-02-25 DIAGNOSIS — R42 DIZZINESS: ICD-10-CM

## 2019-02-25 DIAGNOSIS — R06.09 EXERTIONAL DYSPNEA: ICD-10-CM

## 2019-02-25 DIAGNOSIS — I10 ESSENTIAL HYPERTENSION: ICD-10-CM

## 2019-02-25 DIAGNOSIS — R07.9 CHEST PAIN, UNSPECIFIED TYPE: ICD-10-CM

## 2019-02-25 LAB
ALBUMIN SERPL BCP-MCNC: 4.4 G/DL (ref 3.2–4.9)
ALBUMIN/GLOB SERPL: 1.6 G/DL
ALP SERPL-CCNC: 82 U/L (ref 30–99)
ALT SERPL-CCNC: 19 U/L (ref 2–50)
ANION GAP SERPL CALC-SCNC: 9 MMOL/L (ref 0–11.9)
APTT PPP: 28 SEC (ref 24.7–36)
AST SERPL-CCNC: 24 U/L (ref 12–45)
BASOPHILS # BLD AUTO: 0.8 % (ref 0–1.8)
BASOPHILS # BLD: 0.07 K/UL (ref 0–0.12)
BILIRUB SERPL-MCNC: 0.6 MG/DL (ref 0.1–1.5)
BNP SERPL-MCNC: 59 PG/ML (ref 0–100)
BUN SERPL-MCNC: 13 MG/DL (ref 8–22)
CALCIUM SERPL-MCNC: 9.1 MG/DL (ref 8.4–10.2)
CHLORIDE SERPL-SCNC: 100 MMOL/L (ref 96–112)
CO2 SERPL-SCNC: 28 MMOL/L (ref 20–33)
CREAT SERPL-MCNC: 0.75 MG/DL (ref 0.5–1.4)
EKG IMPRESSION: NORMAL
EOSINOPHIL # BLD AUTO: 0.16 K/UL (ref 0–0.51)
EOSINOPHIL NFR BLD: 1.9 % (ref 0–6.9)
ERYTHROCYTE [DISTWIDTH] IN BLOOD BY AUTOMATED COUNT: 46.6 FL (ref 35.9–50)
GLOBULIN SER CALC-MCNC: 2.8 G/DL (ref 1.9–3.5)
GLUCOSE SERPL-MCNC: 85 MG/DL (ref 65–99)
HCT VFR BLD AUTO: 46.6 % (ref 37–47)
HGB BLD-MCNC: 15 G/DL (ref 12–16)
IMM GRANULOCYTES # BLD AUTO: 0.03 K/UL (ref 0–0.11)
IMM GRANULOCYTES NFR BLD AUTO: 0.4 % (ref 0–0.9)
INR PPP: 0.99 (ref 0.87–1.13)
LIPASE SERPL-CCNC: 31 U/L (ref 7–58)
LYMPHOCYTES # BLD AUTO: 1.72 K/UL (ref 1–4.8)
LYMPHOCYTES NFR BLD: 20.3 % (ref 22–41)
MCH RBC QN AUTO: 29 PG (ref 27–33)
MCHC RBC AUTO-ENTMCNC: 32.2 G/DL (ref 33.6–35)
MCV RBC AUTO: 90 FL (ref 81.4–97.8)
MONOCYTES # BLD AUTO: 0.63 K/UL (ref 0–0.85)
MONOCYTES NFR BLD AUTO: 7.4 % (ref 0–13.4)
NEUTROPHILS # BLD AUTO: 5.86 K/UL (ref 2–7.15)
NEUTROPHILS NFR BLD: 69.2 % (ref 44–72)
NRBC # BLD AUTO: 0 K/UL
NRBC BLD-RTO: 0 /100 WBC
PLATELET # BLD AUTO: 207 K/UL (ref 164–446)
PMV BLD AUTO: 11 FL (ref 9–12.9)
POTASSIUM SERPL-SCNC: 3.6 MMOL/L (ref 3.6–5.5)
PROT SERPL-MCNC: 7.2 G/DL (ref 6–8.2)
PROTHROMBIN TIME: 13 SEC (ref 12–14.6)
RBC # BLD AUTO: 5.18 M/UL (ref 4.2–5.4)
SODIUM SERPL-SCNC: 137 MMOL/L (ref 135–145)
TROPONIN I SERPL-MCNC: <0.02 NG/ML (ref 0–0.04)
TROPONIN I SERPL-MCNC: <0.02 NG/ML (ref 0–0.04)
WBC # BLD AUTO: 8.5 K/UL (ref 4.8–10.8)

## 2019-02-25 PROCEDURE — 85730 THROMBOPLASTIN TIME PARTIAL: CPT

## 2019-02-25 PROCEDURE — 93005 ELECTROCARDIOGRAM TRACING: CPT

## 2019-02-25 PROCEDURE — 700117 HCHG RX CONTRAST REV CODE 255: Performed by: EMERGENCY MEDICINE

## 2019-02-25 PROCEDURE — 71275 CT ANGIOGRAPHY CHEST: CPT

## 2019-02-25 PROCEDURE — 85610 PROTHROMBIN TIME: CPT

## 2019-02-25 PROCEDURE — 84484 ASSAY OF TROPONIN QUANT: CPT

## 2019-02-25 PROCEDURE — 85025 COMPLETE CBC W/AUTO DIFF WBC: CPT

## 2019-02-25 PROCEDURE — 99285 EMERGENCY DEPT VISIT HI MDM: CPT

## 2019-02-25 PROCEDURE — 93000 ELECTROCARDIOGRAM COMPLETE: CPT | Performed by: PHYSICIAN ASSISTANT

## 2019-02-25 PROCEDURE — 99223 1ST HOSP IP/OBS HIGH 75: CPT | Mod: AI | Performed by: FAMILY MEDICINE

## 2019-02-25 PROCEDURE — 83880 ASSAY OF NATRIURETIC PEPTIDE: CPT

## 2019-02-25 PROCEDURE — 83690 ASSAY OF LIPASE: CPT

## 2019-02-25 PROCEDURE — 93005 ELECTROCARDIOGRAM TRACING: CPT | Performed by: EMERGENCY MEDICINE

## 2019-02-25 PROCEDURE — 770020 HCHG ROOM/CARE - TELE (206)

## 2019-02-25 PROCEDURE — 99214 OFFICE O/P EST MOD 30 MIN: CPT | Performed by: PHYSICIAN ASSISTANT

## 2019-02-25 PROCEDURE — 80053 COMPREHEN METABOLIC PANEL: CPT

## 2019-02-25 PROCEDURE — 71045 X-RAY EXAM CHEST 1 VIEW: CPT

## 2019-02-25 PROCEDURE — 93970 EXTREMITY STUDY: CPT

## 2019-02-25 RX ORDER — TRAZODONE HYDROCHLORIDE 50 MG/1
50 TABLET ORAL
Status: DISCONTINUED | OUTPATIENT
Start: 2019-02-25 | End: 2019-02-27 | Stop reason: HOSPADM

## 2019-02-25 RX ORDER — POLYETHYLENE GLYCOL 3350 17 G/17G
1 POWDER, FOR SOLUTION ORAL
Status: DISCONTINUED | OUTPATIENT
Start: 2019-02-25 | End: 2019-02-27 | Stop reason: HOSPADM

## 2019-02-25 RX ORDER — HEPARIN SODIUM 5000 [USP'U]/ML
5000 INJECTION, SOLUTION INTRAVENOUS; SUBCUTANEOUS EVERY 8 HOURS
Status: DISCONTINUED | OUTPATIENT
Start: 2019-02-26 | End: 2019-02-27 | Stop reason: HOSPADM

## 2019-02-25 RX ORDER — AMOXICILLIN 250 MG
2 CAPSULE ORAL 2 TIMES DAILY
Status: DISCONTINUED | OUTPATIENT
Start: 2019-02-25 | End: 2019-02-27 | Stop reason: HOSPADM

## 2019-02-25 RX ORDER — ENALAPRILAT 1.25 MG/ML
1.25 INJECTION INTRAVENOUS EVERY 6 HOURS PRN
Status: DISCONTINUED | OUTPATIENT
Start: 2019-02-25 | End: 2019-02-27 | Stop reason: HOSPADM

## 2019-02-25 RX ORDER — ATORVASTATIN CALCIUM 10 MG/1
20 TABLET, FILM COATED ORAL
Status: DISCONTINUED | OUTPATIENT
Start: 2019-02-26 | End: 2019-02-27 | Stop reason: HOSPADM

## 2019-02-25 RX ORDER — DICLOFENAC SODIUM 25 MG/1
50 TABLET, DELAYED RELEASE ORAL 2 TIMES DAILY
Status: DISCONTINUED | OUTPATIENT
Start: 2019-02-26 | End: 2019-02-27 | Stop reason: HOSPADM

## 2019-02-25 RX ORDER — FUROSEMIDE 10 MG/ML
20 INJECTION INTRAMUSCULAR; INTRAVENOUS
Status: DISCONTINUED | OUTPATIENT
Start: 2019-02-25 | End: 2019-02-25

## 2019-02-25 RX ORDER — LISINOPRIL 20 MG/1
40 TABLET ORAL DAILY
Status: DISCONTINUED | OUTPATIENT
Start: 2019-02-26 | End: 2019-02-27 | Stop reason: HOSPADM

## 2019-02-25 RX ORDER — FUROSEMIDE 10 MG/ML
40 INJECTION INTRAMUSCULAR; INTRAVENOUS
Status: DISCONTINUED | OUTPATIENT
Start: 2019-02-25 | End: 2019-02-27 | Stop reason: HOSPADM

## 2019-02-25 RX ORDER — OMEPRAZOLE 20 MG/1
40 CAPSULE, DELAYED RELEASE ORAL
Status: DISCONTINUED | OUTPATIENT
Start: 2019-02-26 | End: 2019-02-27 | Stop reason: HOSPADM

## 2019-02-25 RX ORDER — BISACODYL 10 MG
10 SUPPOSITORY, RECTAL RECTAL
Status: DISCONTINUED | OUTPATIENT
Start: 2019-02-25 | End: 2019-02-27 | Stop reason: HOSPADM

## 2019-02-25 RX ADMIN — IOHEXOL 75 ML: 350 INJECTION, SOLUTION INTRAVENOUS at 23:38

## 2019-02-25 ASSESSMENT — ENCOUNTER SYMPTOMS
WHEEZING: 0
BLURRED VISION: 0
SORE THROAT: 0
FEVER: 0
ORTHOPNEA: 1
VOMITING: 0
HEADACHES: 0
DOUBLE VISION: 0
CHILLS: 0
SPUTUM PRODUCTION: 0
SHORTNESS OF BREATH: 1
COUGH: 0
LEG SWELLING: 1

## 2019-02-25 ASSESSMENT — PAIN SCALES - WONG BAKER: WONGBAKER_NUMERICALRESPONSE: HURTS EVEN MORE

## 2019-02-25 NOTE — TELEPHONE ENCOUNTER
VOICEMAIL  1. Caller Name: Sharon Callahan                        Call Back Number: [phone] (home)      2. Message: Patient called and left a message about her legs swelling really bad. I have called patient to let her know that she needs to go to UC as this has been going on for 2 weeks and she states it is painful. I have told her to go to UC and she understood.     3. Patient approves office to leave a detailed voicemail/MyChart message: N\A

## 2019-02-26 ENCOUNTER — APPOINTMENT (OUTPATIENT)
Dept: CARDIOLOGY | Facility: MEDICAL CENTER | Age: 79
DRG: 948 | End: 2019-02-26
Attending: FAMILY MEDICINE
Payer: MEDICARE

## 2019-02-26 PROBLEM — E66.9 OBESITY (BMI 30.0-34.9): Status: ACTIVE | Noted: 2019-02-26

## 2019-02-26 PROBLEM — E66.811 OBESITY (BMI 30.0-34.9): Status: ACTIVE | Noted: 2019-02-26

## 2019-02-26 LAB
ALBUMIN SERPL BCP-MCNC: 3.3 G/DL (ref 3.2–4.9)
ALBUMIN/GLOB SERPL: 1.3 G/DL
ALP SERPL-CCNC: 68 U/L (ref 30–99)
ALT SERPL-CCNC: 19 U/L (ref 2–50)
ANION GAP SERPL CALC-SCNC: 10 MMOL/L (ref 0–11.9)
AST SERPL-CCNC: 21 U/L (ref 12–45)
BASOPHILS # BLD AUTO: 0.9 % (ref 0–1.8)
BASOPHILS # BLD: 0.06 K/UL (ref 0–0.12)
BILIRUB SERPL-MCNC: 0.6 MG/DL (ref 0.1–1.5)
BUN SERPL-MCNC: 11 MG/DL (ref 8–22)
CALCIUM SERPL-MCNC: 8.6 MG/DL (ref 8.4–10.2)
CHLORIDE SERPL-SCNC: 103 MMOL/L (ref 96–112)
CO2 SERPL-SCNC: 24 MMOL/L (ref 20–33)
CREAT SERPL-MCNC: 0.71 MG/DL (ref 0.5–1.4)
EKG IMPRESSION: NORMAL
EOSINOPHIL # BLD AUTO: 0.17 K/UL (ref 0–0.51)
EOSINOPHIL NFR BLD: 2.5 % (ref 0–6.9)
ERYTHROCYTE [DISTWIDTH] IN BLOOD BY AUTOMATED COUNT: 47 FL (ref 35.9–50)
GLOBULIN SER CALC-MCNC: 2.6 G/DL (ref 1.9–3.5)
GLUCOSE SERPL-MCNC: 91 MG/DL (ref 65–99)
HCT VFR BLD AUTO: 41.2 % (ref 37–47)
HGB BLD-MCNC: 13.4 G/DL (ref 12–16)
IMM GRANULOCYTES # BLD AUTO: 0.02 K/UL (ref 0–0.11)
IMM GRANULOCYTES NFR BLD AUTO: 0.3 % (ref 0–0.9)
LV EJECT FRACT  99904: 60
LV EJECT FRACT MOD 4C 99902: 78.28
LYMPHOCYTES # BLD AUTO: 1.26 K/UL (ref 1–4.8)
LYMPHOCYTES NFR BLD: 18.8 % (ref 22–41)
MCH RBC QN AUTO: 29.6 PG (ref 27–33)
MCHC RBC AUTO-ENTMCNC: 32.5 G/DL (ref 33.6–35)
MCV RBC AUTO: 91.2 FL (ref 81.4–97.8)
MONOCYTES # BLD AUTO: 0.63 K/UL (ref 0–0.85)
MONOCYTES NFR BLD AUTO: 9.4 % (ref 0–13.4)
NEUTROPHILS # BLD AUTO: 4.57 K/UL (ref 2–7.15)
NEUTROPHILS NFR BLD: 68.1 % (ref 44–72)
NRBC # BLD AUTO: 0 K/UL
NRBC BLD-RTO: 0 /100 WBC
PLATELET # BLD AUTO: 177 K/UL (ref 164–446)
PMV BLD AUTO: 11.2 FL (ref 9–12.9)
POTASSIUM SERPL-SCNC: 3.1 MMOL/L (ref 3.6–5.5)
PROT SERPL-MCNC: 5.9 G/DL (ref 6–8.2)
RBC # BLD AUTO: 4.52 M/UL (ref 4.2–5.4)
SODIUM SERPL-SCNC: 137 MMOL/L (ref 135–145)
WBC # BLD AUTO: 6.7 K/UL (ref 4.8–10.8)

## 2019-02-26 PROCEDURE — 99232 SBSQ HOSP IP/OBS MODERATE 35: CPT | Performed by: INTERNAL MEDICINE

## 2019-02-26 PROCEDURE — 93010 ELECTROCARDIOGRAM REPORT: CPT | Performed by: INTERNAL MEDICINE

## 2019-02-26 PROCEDURE — 93306 TTE W/DOPPLER COMPLETE: CPT

## 2019-02-26 PROCEDURE — 700111 HCHG RX REV CODE 636 W/ 250 OVERRIDE (IP): Performed by: FAMILY MEDICINE

## 2019-02-26 PROCEDURE — 700102 HCHG RX REV CODE 250 W/ 637 OVERRIDE(OP): Performed by: FAMILY MEDICINE

## 2019-02-26 PROCEDURE — 85025 COMPLETE CBC W/AUTO DIFF WBC: CPT

## 2019-02-26 PROCEDURE — A9270 NON-COVERED ITEM OR SERVICE: HCPCS

## 2019-02-26 PROCEDURE — 700102 HCHG RX REV CODE 250 W/ 637 OVERRIDE(OP)

## 2019-02-26 PROCEDURE — 80053 COMPREHEN METABOLIC PANEL: CPT

## 2019-02-26 PROCEDURE — 770020 HCHG ROOM/CARE - TELE (206)

## 2019-02-26 PROCEDURE — 93306 TTE W/DOPPLER COMPLETE: CPT | Mod: 26 | Performed by: INTERNAL MEDICINE

## 2019-02-26 PROCEDURE — 94660 CPAP INITIATION&MGMT: CPT

## 2019-02-26 PROCEDURE — 93005 ELECTROCARDIOGRAM TRACING: CPT | Performed by: FAMILY MEDICINE

## 2019-02-26 PROCEDURE — A9270 NON-COVERED ITEM OR SERVICE: HCPCS | Performed by: FAMILY MEDICINE

## 2019-02-26 PROCEDURE — 94760 N-INVAS EAR/PLS OXIMETRY 1: CPT

## 2019-02-26 RX ORDER — ACETAMINOPHEN 325 MG/1
650 TABLET ORAL EVERY 6 HOURS PRN
Status: DISCONTINUED | OUTPATIENT
Start: 2019-02-26 | End: 2019-02-27 | Stop reason: HOSPADM

## 2019-02-26 RX ORDER — TRAZODONE HYDROCHLORIDE 50 MG/1
50-100 TABLET ORAL NIGHTLY PRN
COMMUNITY
End: 2024-03-18

## 2019-02-26 RX ORDER — POTASSIUM CHLORIDE 20 MEQ/1
40 TABLET, EXTENDED RELEASE ORAL DAILY
Status: DISCONTINUED | OUTPATIENT
Start: 2019-02-26 | End: 2019-02-27 | Stop reason: HOSPADM

## 2019-02-26 RX ORDER — POTASSIUM CHLORIDE 20 MEQ/1
20 TABLET, EXTENDED RELEASE ORAL DAILY
Status: ON HOLD | COMMUNITY
End: 2019-02-27

## 2019-02-26 RX ORDER — POTASSIUM CHLORIDE 20 MEQ/1
TABLET, EXTENDED RELEASE ORAL
Status: COMPLETED
Start: 2019-02-26 | End: 2019-02-26

## 2019-02-26 RX ORDER — OMEPRAZOLE 40 MG/1
40 CAPSULE, DELAYED RELEASE ORAL PRN
Status: ON HOLD | COMMUNITY
End: 2019-02-27

## 2019-02-26 RX ORDER — NAPROXEN SODIUM 220 MG
220 TABLET ORAL PRN
Status: ON HOLD | COMMUNITY
End: 2019-02-27

## 2019-02-26 RX ADMIN — DICLOFENAC SODIUM 50 MG: 25 TABLET, DELAYED RELEASE ORAL at 07:34

## 2019-02-26 RX ADMIN — ATORVASTATIN CALCIUM 20 MG: 10 TABLET, FILM COATED ORAL at 21:17

## 2019-02-26 RX ADMIN — POTASSIUM CHLORIDE 40 MEQ: 1500 TABLET, EXTENDED RELEASE ORAL at 07:35

## 2019-02-26 RX ADMIN — TRAZODONE HYDROCHLORIDE 50 MG: 50 TABLET ORAL at 21:17

## 2019-02-26 RX ADMIN — HEPARIN SODIUM 5000 UNITS: 5000 INJECTION, SOLUTION INTRAVENOUS; SUBCUTANEOUS at 14:26

## 2019-02-26 RX ADMIN — DOCUSATE SODIUM AND SENNOSIDES 2 TABLET: 8.6; 5 TABLET, FILM COATED ORAL at 17:08

## 2019-02-26 RX ADMIN — TRAZODONE HYDROCHLORIDE 50 MG: 50 TABLET ORAL at 00:48

## 2019-02-26 RX ADMIN — FUROSEMIDE 40 MG: 10 INJECTION, SOLUTION INTRAVENOUS at 00:48

## 2019-02-26 RX ADMIN — HEPARIN SODIUM 5000 UNITS: 5000 INJECTION, SOLUTION INTRAVENOUS; SUBCUTANEOUS at 06:10

## 2019-02-26 RX ADMIN — DICLOFENAC SODIUM 50 MG: 25 TABLET, DELAYED RELEASE ORAL at 17:16

## 2019-02-26 RX ADMIN — FUROSEMIDE 40 MG: 10 INJECTION, SOLUTION INTRAVENOUS at 06:10

## 2019-02-26 RX ADMIN — HEPARIN SODIUM 5000 UNITS: 5000 INJECTION, SOLUTION INTRAVENOUS; SUBCUTANEOUS at 21:17

## 2019-02-26 RX ADMIN — LISINOPRIL 40 MG: 20 TABLET ORAL at 06:11

## 2019-02-26 RX ADMIN — FUROSEMIDE 40 MG: 10 INJECTION, SOLUTION INTRAVENOUS at 17:08

## 2019-02-26 ASSESSMENT — ENCOUNTER SYMPTOMS
FEVER: 0
FOCAL WEAKNESS: 0
DIARRHEA: 0
SHORTNESS OF BREATH: 1
VOMITING: 0
BACK PAIN: 0
WEAKNESS: 0
SORE THROAT: 0
HEADACHES: 0
NAUSEA: 0
ABDOMINAL PAIN: 0
PALPITATIONS: 0
HALLUCINATIONS: 0
BLOOD IN STOOL: 0
DEPRESSION: 0
MYALGIAS: 1
COUGH: 0
HEARTBURN: 0
DIZZINESS: 0
CHILLS: 0

## 2019-02-26 ASSESSMENT — COGNITIVE AND FUNCTIONAL STATUS - GENERAL
SUGGESTED CMS G CODE MODIFIER DAILY ACTIVITY: CH
DAILY ACTIVITIY SCORE: 24
SUGGESTED CMS G CODE MODIFIER MOBILITY: CH
MOBILITY SCORE: 24

## 2019-02-26 ASSESSMENT — COPD QUESTIONNAIRES
IN THE PAST 12 MONTHS DO YOU DO LESS THAN YOU USED TO BECAUSE OF YOUR BREATHING PROBLEMS: DISAGREE/UNSURE
DURING THE PAST 4 WEEKS HOW MUCH DID YOU FEEL SHORT OF BREATH: NONE/LITTLE OF THE TIME
COPD SCREENING SCORE: 2
HAVE YOU SMOKED AT LEAST 100 CIGARETTES IN YOUR ENTIRE LIFE: NO/DON'T KNOW
DO YOU EVER COUGH UP ANY MUCUS OR PHLEGM?: NO/ONLY WITH OCCASIONAL COLDS OR INFECTIONS

## 2019-02-26 ASSESSMENT — PATIENT HEALTH QUESTIONNAIRE - PHQ9
2. FEELING DOWN, DEPRESSED, IRRITABLE, OR HOPELESS: NOT AT ALL
1. LITTLE INTEREST OR PLEASURE IN DOING THINGS: NOT AT ALL
2. FEELING DOWN, DEPRESSED, IRRITABLE, OR HOPELESS: NOT AT ALL
1. LITTLE INTEREST OR PLEASURE IN DOING THINGS: NOT AT ALL
SUM OF ALL RESPONSES TO PHQ9 QUESTIONS 1 AND 2: 0
SUM OF ALL RESPONSES TO PHQ9 QUESTIONS 1 AND 2: 0

## 2019-02-26 ASSESSMENT — LIFESTYLE VARIABLES
ON A TYPICAL DAY WHEN YOU DRINK ALCOHOL HOW MANY DRINKS DO YOU HAVE: 2
EVER_SMOKED: YES
CONSUMPTION TOTAL: NEGATIVE
TOTAL SCORE: 0
HOW MANY TIMES IN THE PAST YEAR HAVE YOU HAD 5 OR MORE DRINKS IN A DAY: 0
TOTAL SCORE: 0
AVERAGE NUMBER OF DAYS PER WEEK YOU HAVE A DRINK CONTAINING ALCOHOL: 2
HAVE YOU EVER FELT YOU SHOULD CUT DOWN ON YOUR DRINKING: NO
EVER FELT BAD OR GUILTY ABOUT YOUR DRINKING: NO
EVER HAD A DRINK FIRST THING IN THE MORNING TO STEADY YOUR NERVES TO GET RID OF A HANGOVER: NO
TOTAL SCORE: 0
HAVE PEOPLE ANNOYED YOU BY CRITICIZING YOUR DRINKING: NO
ALCOHOL_USE: YES

## 2019-02-26 NOTE — FLOWSHEET NOTE
02/26/19 0332   Events/Summary/Plan   Events/Summary/Plan Patient is on CPAP   General Vent Information   Pulse Oximetry 96 %   Heart Rate (Monitored) 68   CPAP/BiPAP [facility]HA [facility]   Nocturnal CPAP or BiPAP CPAP   Home Unit Used? Yes   Settings (If Known) 9-12   Home Mask Used? Yes

## 2019-02-26 NOTE — ED PROVIDER NOTES
ED Provider Note    CHIEF COMPLAINT  Leg swelling    HPI  Sharon Callahan is a 78 y.o. female who presents with leg swelling. She states that she has had swelling of bilateral lower extremities over the last 2 weeks. She states that the swelling has gotten worse and she states that sometime she has felt that her legs were going to sleep. She takes lasix but this has not been helping over the last 2 weeks. She has not increased the dose and is currently on 40mg. She denies any history of blood clots. She has not had any recent hospitalizations or surgeries. She states that she has a history of carcinoid tumor of the lung but is not undergoing any treatment. She denies any trauma to the lower extremities. She does admit to shortness of breath with exertion. She also admits to lightheadedness while walking. She does admit to chest heaviness intermittently over the last couple of days. She denies any nausea, vomiting, or abdominal pain. She has had diarrhea. She has been coughing but has not coughed up any blood. She does also admit to nasal congestion.     REVIEW OF SYSTEMS  See HPI for further details. All other systems are negative.     PAST MEDICAL HISTORY   has a past medical history of Arthritis; BMI 38.0-38.9,adult (9/18/2013); CATARACT; Chronic nausea (1/12/2015); Chronic pain of both knees (1/9/2019); Depression with anxiety (5/3/2011); Heart burn; Hemothorax on right (1/4/2017); History of cholecystectomy; Hyperlipidemia (4/4/2011); Hypertension; Indigestion; MYESHA (obstructive sleep apnea) (4/4/2011); Pain; Pleural effusion, right (1/4/2017); Range of motion deficit; S/P bilateral mastectomy (4/4/2011); Sleep apnea; and Snoring.    SOCIAL HISTORY  Social History     Social History Main Topics   • Smoking status: Former Smoker     Packs/day: 1.00     Years: 15.00     Types: Cigarettes     Quit date: 1/1/1975   • Smokeless tobacco: Never Used   • Alcohol use No      Comment: once in a great while   • Drug use: No  "  • Sexual activity: No      Comment: lives with ex-       SURGICAL HISTORY   has a past surgical history that includes hip arthroplasty total (3/26/2009); lisa by laparoscopy (2/1/2011); other (1988); other (1989); uvulopharyngopalatoplasty (1990); cataract phaco with iol (10/20/2011); cataract phaco with iol (11/3/2011); orif, humerus (1950's); hip arthroplasty total (12/3/2012); breast biopsy (1980); enlarge breast with implant; and thoracoscopy (Right, 12/6/2016).    CURRENT MEDICATIONS  Home Medications    **Home medications have not yet been reviewed for this encounter**       ALLERGIES  Allergies   Allergen Reactions   • Pcn [Penicillins] Anaphylaxis     Skin turns black   • Clindamycin Rash     Rash     • Influenza Virus Vacc Rash     Red in face   • Norco [Hydrocodone-Acetaminophen]    • Pneumococcal Vaccine Rash   • Tetracycline Rash     Rash    • Xarelto [Rivaroxaban] Rash       PHYSICAL EXAM  VITAL SIGNS: /67   Pulse 61   Temp 36.6 °C (97.9 °F) (Oral)   Resp 14   Ht 1.6 m (5' 3\")   Wt 87.3 kg (192 lb 7.4 oz)   SpO2 96%   BMI 34.09 kg/m²      Constitutional: Alert and in no apparent distress.  HENT: Normocephalic atraumatic. Bilateral external ears normal. Nose normal. Mucous membranes are moist.  Eyes: Pupils are equal and reactive. Conjunctiva normal. Non-icteric sclera.   Neck: Normal range of motion without tenderness. Supple. No meningeal signs.  Cardiovascular: Bradycardic rate and regular rhythm. No murmurs, gallops or rubs.  Thorax & Lungs: Breath sounds are clear to auscultation bilaterally. No wheezing, rhonchi or rales.  Abdomen: Soft, nontender and nondistended. No peritoneal signs noted.  Skin: Warm and dry. No rashes are noted.  Back: No bony tenderness, No CVA tenderness.   Extremities: 2+ peripheral pulses. Cap refill is less than 2 seconds.  There is nonpitting edema present to the knee involving bilateral lower extremities.  Musculoskeletal: Good range of motion in " all major joints. No tenderness to palpation or major deformities noted.   Neurologic: Alert and oriented ×3. The patient moves all 4 extremities and follows commands.  Psychiatric: Affect is normal. Judgment appears to be intact.    DIAGNOSTIC STUDIES / PROCEDURES    EKG  EKG was performed at 17:10.  It shows a sinus bradycardia with a heart rate of 59.  MN interval is 176.  QRS durations 92.  QT/QTc is 480/476.  Axis is normal.  No acute ST elevation or depression is noted.  Previous EKG was reviewed and today's EKG is similar.  Impression: Normal EKG.    LABS  Results for orders placed or performed during the hospital encounter of 02/25/19   Troponin   Result Value Ref Range    Troponin I <0.02 0.00 - 0.04 ng/mL   Btype Natriuretic Peptide   Result Value Ref Range    B Natriuretic Peptide 59 0 - 100 pg/mL   CBC with Differential   Result Value Ref Range    WBC 8.5 4.8 - 10.8 K/uL    RBC 5.18 4.20 - 5.40 M/uL    Hemoglobin 15.0 12.0 - 16.0 g/dL    Hematocrit 46.6 37.0 - 47.0 %    MCV 90.0 81.4 - 97.8 fL    MCH 29.0 27.0 - 33.0 pg    MCHC 32.2 (L) 33.6 - 35.0 g/dL    RDW 46.6 35.9 - 50.0 fL    Platelet Count 207 164 - 446 K/uL    MPV 11.0 9.0 - 12.9 fL    Neutrophils-Polys 69.20 44.00 - 72.00 %    Lymphocytes 20.30 (L) 22.00 - 41.00 %    Monocytes 7.40 0.00 - 13.40 %    Eosinophils 1.90 0.00 - 6.90 %    Basophils 0.80 0.00 - 1.80 %    Immature Granulocytes 0.40 0.00 - 0.90 %    Nucleated RBC 0.00 /100 WBC    Neutrophils (Absolute) 5.86 2.00 - 7.15 K/uL    Lymphs (Absolute) 1.72 1.00 - 4.80 K/uL    Monos (Absolute) 0.63 0.00 - 0.85 K/uL    Eos (Absolute) 0.16 0.00 - 0.51 K/uL    Baso (Absolute) 0.07 0.00 - 0.12 K/uL    Immature Granulocytes (abs) 0.03 0.00 - 0.11 K/uL    NRBC (Absolute) 0.00 K/uL   Complete Metabolic Panel (CMP)   Result Value Ref Range    Sodium 137 135 - 145 mmol/L    Potassium 3.6 3.6 - 5.5 mmol/L    Chloride 100 96 - 112 mmol/L    Co2 28 20 - 33 mmol/L    Anion Gap 9.0 0.0 - 11.9    Glucose 85  65 - 99 mg/dL    Bun 13 8 - 22 mg/dL    Creatinine 0.75 0.50 - 1.40 mg/dL    Calcium 9.1 8.4 - 10.2 mg/dL    AST(SGOT) 24 12 - 45 U/L    ALT(SGPT) 19 2 - 50 U/L    Alkaline Phosphatase 82 30 - 99 U/L    Total Bilirubin 0.6 0.1 - 1.5 mg/dL    Albumin 4.4 3.2 - 4.9 g/dL    Total Protein 7.2 6.0 - 8.2 g/dL    Globulin 2.8 1.9 - 3.5 g/dL    A-G Ratio 1.6 g/dL   Prothrombin Time   Result Value Ref Range    PT 13.0 12.0 - 14.6 sec    INR 0.99 0.87 - 1.13   APTT   Result Value Ref Range    APTT 28.0 24.7 - 36.0 sec   Lipase   Result Value Ref Range    Lipase 31 7 - 58 U/L   ESTIMATED GFR   Result Value Ref Range    GFR If African American >60 >60 mL/min/1.73 m 2    GFR If Non African American >60 >60 mL/min/1.73 m 2   TROPONIN   Result Value Ref Range    Troponin I <0.02 0.00 - 0.04 ng/mL   EKG   Result Value Ref Range    Report       [facility] Emergency Dept.    Test Date:  2019  Pt Name:    SHARON CALLAHAN              Department: [facility]  MRN:        [id number]                      Room:  Gender:     Female                       Technician: MLV  :        [id number]                   Requested By:ER TRIAGE PROTOCOL  Order #:    [id number]                    Reading MD: Nelda Zambrano    Measurements  Intervals                                Axis  Rate:       59                           P:          50  AR:         176                          QRS:        15  QRSD:       92                           T:          40  QT:         480  QTc:        476    Interpretive Statements  SINUS BRADYCARDIA  Compared to ECG 2018 13:21:33  Sinus rhythm no longer present  T-wave abnormality no longer present    Electronically Signed On 2019 21:56:27 PST by Nelda Zambrano           RADIOLOGY  CT-CTA CHEST PULMONARY ARTERY W/ RECONS   Final Result         1. No CT evidence of pulmonary embolism.      2. Unchanged bilobed posterior medial right lower lobe lung nodule since 2017, measuring 6 x 9 mm.      3. Linear  and patchy opacities in the bilateral lung bases, right more than left, likely atelectasis or scarring.      US-EXTREMITY VENOUS LOWER BILAT   Final Result         1. No evidence of bilateral lower extremity deep venous thrombosis.      DX-CHEST-LIMITED (1 VIEW)   Final Result      No acute cardiopulmonary disease.      EC-ECHOCARDIOGRAM LTD W/O CONT    (Results Pending)           COURSE & MEDICAL DECISION MAKING  Pertinent Labs & Imaging studies reviewed. (See chart for details)    This is a 78-year-old female presenting to the emergency department for the evaluation of bilateral lower extremity swelling, exertional dyspnea, and chest heaviness.  On initial evaluation, the patient appeared comfortable and in no acute distress.  Her vital signs were reassuring.  She was noted to have nonpitting edema bilateral lower extremities with no calf tenderness.  The remainder of her exam was reassuring.  I did obtain an EKG which did not reveal any evidence of acute ischemia or arrhythmia.  Her first troponin was negative, and chest x-ray did not reveal any evidence of pulmonary edema or pleural effusions. Her cardiac silhouette did appear to be within normal limits as well. I did obtain a BNP given her history of heart failure and swelling.  This was within normal limits.  CBC was performed and reassuring with a normal white blood cell count.  With no evidence of focal opacities on chest x-ray, I have low clinical suspicion for pneumonia.  I did obtain a Doppler of bilateral lower extremities given the swelling.  No evidence of DVT was noted on this imaging.  Given her history of carcinoid tumor of the lung and persistent exertional dyspnea as well as chest pressure, a CT of the chest was ordered. This did not reveal any evidence of pulmonary embolism.  The bilobed posterior medial right lower lobe lung nodule was unchanged from her previous imaging. Given her history of significant pulmonary edema and progressive worsening  exertional dyspnea and chest pressure over the last day or so, I am concerned for potential anginal equivalent. The plan was made to admit the patient for [facility].  I discussed this plan with her and she is agreeable.  She remained stable while in the emergency department.    10:24 PM - I called and discussed the case with , hospitalist, who agreed with the plan and accepted the patient.    FINAL IMPRESSION  1. Chest pain, unspecified type    2. Exertional dyspnea      -ADMIT-      Electronically signed by: Nelda Zambrano, 2/25/2019 7:55 PM

## 2019-02-26 NOTE — PROGRESS NOTES
Tele strip at 0136 shows SB w/ HR of 56    TN 0.18  QRS 0.10  QT 0.46     Telemetry Summary    Rhythm Asymptomatic SB, NSR  Rate upper 40s - 60s (unsusutained low of 42 bpm)  Ectopy Frequent PACs, Rare PVCs    See prior note regarding rhythm change - noted to be in 2nd degree heart block, type II, then back to SB.    Telemetry monitoring strips placed in patient's chart.

## 2019-02-26 NOTE — RESPIRATORY CARE
COPD EDUCATION by COPD CLINICAL EDUCATOR  2/26/2019 at 7:44 AM by Ivette Cm     Patient reviewed by COPD education team. Patient does not qualify for COPD program.

## 2019-02-26 NOTE — PROGRESS NOTES
Hospital Medicine Daily Progress Note    Date of Service  2/26/2019    Chief Complaint  78 y.o. female admitted 2/25/2019 with leg swelling    Hospital Course    History of obesity, hypertension, paroxysmal atrial fibrillation, obstructive sleep apnea on CPAP, pulmonary hypertension admitted for leg swelling.      Interval Problem Update  2/26: Amlodipine discontinued, swelling has improved slightly.  Doppler study negative for DVT.  Tolerating IV Lasix.  Echo repeated and shows normal RVSP, normal EF.    Consultants/Specialty  None    Code Status  Full    Disposition  Recommend outpatient cardiology f/u for possible R heart cath to get confirmation of RVSP    Review of Systems  Review of Systems   Constitutional: Negative for chills, fever and malaise/fatigue.   HENT: Negative for sore throat.    Respiratory: Positive for shortness of breath (W exertion). Negative for cough.    Cardiovascular: Positive for leg swelling. Negative for chest pain and palpitations.   Gastrointestinal: Negative for abdominal pain, blood in stool, diarrhea, heartburn, nausea and vomiting.   Genitourinary: Negative for dysuria and frequency.   Musculoskeletal: Positive for myalgias. Negative for back pain.   Neurological: Negative for dizziness, focal weakness, weakness and headaches.   Psychiatric/Behavioral: Negative for depression and hallucinations.   All other systems reviewed and are negative.       Physical Exam  Temp:  [36.2 °C (97.2 °F)-36.9 °C (98.4 °F)] 36.2 °C (97.2 °F)  Pulse:  [50-68] 62  Resp:  [14-18] 18  BP: (110-152)/(51-77) 112/51  SpO2:  [90 %-97 %] 92 %    Physical Exam   Constitutional: She appears well-developed and well-nourished. No distress.   HENT:   Head: Normocephalic.   Mouth/Throat: No oropharyngeal exudate.   Eyes: Pupils are equal, round, and reactive to light. No scleral icterus.   Neck: No JVD present. No thyromegaly present.   Cardiovascular: Normal rate and regular rhythm.    No murmur  heard.  Pulmonary/Chest: Effort normal and breath sounds normal. No respiratory distress.   Abdominal: She exhibits no distension. There is no tenderness.   Musculoskeletal: She exhibits edema (Trace) and tenderness (Calves).   Skin: Skin is warm and dry. No rash noted. No erythema.   Psychiatric: She has a normal mood and affect. Her behavior is normal.       Fluids    Intake/Output Summary (Last 24 hours) at 02/26/19 1434  Last data filed at 02/26/19 1002   Gross per 24 hour   Intake              250 ml   Output             2000 ml   Net            -1750 ml       Laboratory  Recent Labs      02/25/19   1803  02/26/19   0343   WBC  8.5  6.7   RBC  5.18  4.52   HEMOGLOBIN  15.0  13.4   HEMATOCRIT  46.6  41.2   MCV  90.0  91.2   MCH  29.0  29.6   MCHC  32.2*  32.5*   RDW  46.6  47.0   PLATELETCT  207  177   MPV  11.0  11.2     Recent Labs      02/25/19   1803  02/26/19   0343   SODIUM  137  137   POTASSIUM  3.6  3.1*   CHLORIDE  100  103   CO2  28  24   GLUCOSE  85  91   BUN  13  11   CREATININE  0.75  0.71   CALCIUM  9.1  8.6     Recent Labs      02/25/19   1803   APTT  28.0   INR  0.99     Recent Labs      02/25/19   1803   BNPBTYPENAT  59           Imaging  EC-ECHOCARDIOGRAM COMPLETE W/O CONT   Final Result      CT-CTA CHEST PULMONARY ARTERY W/ RECONS   Final Result         1. No CT evidence of pulmonary embolism.      2. Unchanged bilobed posterior medial right lower lobe lung nodule since 2017, measuring 6 x 9 mm.      3. Linear and patchy opacities in the bilateral lung bases, right more than left, likely atelectasis or scarring.      US-EXTREMITY VENOUS LOWER BILAT   Final Result         1. No evidence of bilateral lower extremity deep venous thrombosis.      DX-CHEST-LIMITED (1 VIEW)   Final Result      No acute cardiopulmonary disease.           Assessment/Plan  Obesity (BMI 30.0-34.9)   Assessment & Plan    Weight loss will help with LE swelling symptoms     Lower extremity edema   Assessment & Plan     Suspect due to amlodipine, echo 2/26 normal.  She hx hx of PAH but wears CPAP now  Technically challenging study, some structures not well seen.  Left ventricular ejection fraction is visually estimated to be 65%.  Moderately dilated left atrium.  RVSP estimated to be 25 mmHg, poor TR jets in some views, may be   underestimated.    Lasix iv 40mg bid  LE duplex negative for DVT  dced amlodipine 2nd to edema     MYESHA on CPAP- (present on admission)   Assessment & Plan    She has had significant decrease in her RVSP since initiating CPAP  Current estimation is 25     HTN (hypertension)- (present on admission)   Assessment & Plan    Will continue with lisinopril  vasotec iv prn  Amlodipine is dced 2nd to lpwer ext edema     Paroxysmal A-fib (HCC)- (present on admission)   Assessment & Plan    Pt's ekg is in sinus  Not on any anticoagulation from home  Cardiac echo shows normal EF, normal RV pressure, trace MR     Hypokalemia- (present on admission)   Assessment & Plan    Replace PO  Repeat in AM          VTE prophylaxis: Heparin

## 2019-02-26 NOTE — PROGRESS NOTES
Appearing to be in and out of 2nd degree heart block, type II, converting back to Sinus Bradycardia.   Patient resting comfortably in bed at the time. BP normotensive. Asymptomatic.  Electrodes changed and repositioned. Rhythm unchanged.  EKG obtained and only showing SB at the time. Strips of heart block printed from telemetry monitor and placed in chart.  Dr. Edgar paged at [phone] to notify. Return call received at 0342. Updated on patient condition. No changes at this time.

## 2019-02-26 NOTE — PROGRESS NOTES
· 2 RN skin check complete with MASSIMO Reynolds.  · Devices in place None.  · Skin assessed, CDI. BLE edematous, dry, flaky.  · Confirmed pressure ulcers found on: None  · New potential pressure ulcers noted on: None, no s/s of breakdown. Wound consult: N/A  · The following interventions in place: Turns self in bed, encourage ambulation at least TID, maintain adequate PO intake, skin assessment qshift and PRN.

## 2019-02-26 NOTE — ASSESSMENT & PLAN NOTE
Suspect due to amlodipine, echo 2/26 normal.  She hx hx of PAH but wears CPAP now  Technically challenging study, some structures not well seen.  Left ventricular ejection fraction is visually estimated to be 65%.  Moderately dilated left atrium.  RVSP estimated to be 25 mmHg, poor TR jets in some views, may be   underestimated.    Lasix iv 40mg bid  LE duplex negative for DVT  dced amlodipine 2nd to edema

## 2019-02-26 NOTE — PROGRESS NOTES
Received report from Maddi KEYS. Discussed plan of care and safety measures in place. No further needs at this time.

## 2019-02-26 NOTE — PROGRESS NOTES
"Patient arrived to unit from ED. Ambulated independently from stretcher to bathroom then back to bed, with steady gait.  Denies SOB or CHAIREZ. Reports feeling \"a little lightheaded\", but states her last PO intake was 0830.    Placed on telemetry, HR SB/NSR 50s - 60s. VSS and afebrile. SpO2 WNL on RA. Home CPAP at bedside, will notify RT.   "

## 2019-02-26 NOTE — ASSESSMENT & PLAN NOTE
Pt's ekg is in sinus  Not on any anticoagulation from home  Cardiac echo shows normal EF, normal RV pressure, trace MR

## 2019-02-26 NOTE — ED TRIAGE NOTES
"Chief Complaint   Patient presents with   • Leg Swelling     Both legs, headache, light headedness, intermittent SOB for a couple weeks.   \"I am retaining water\". Also states she will experience some chest heaviness.   "

## 2019-02-26 NOTE — H&P
DATE OF ADMISSION  02/25/2019    HISTORY OF PRESENT ILLNESS:  This is a 78-year-old female who comes to the   emergency room with a complaint of lower extremity edema.  The patient states   that has been going on for the past week.  Patient states that she has been   having difficulty secondary to edema.  Patient states that she was hoping that   edema would go away, but this time did not go away.  The patient states that   she has had similar symptoms in both lower extremities in the past as well and   they usually go away with Lasix.  Patient also states that from time to time   has had some shortness of breath with exertion and also with what she   describes as upper epigastric and lower sternum pain from time to time with or   without exertion.  However, at this time, she denies any chest pain.  She   also denies any shortness of breath at this time.  The patient also denies any   fever.  Denies any chills.    PAST MEDICAL HISTORY:  Pulmonary hypertension, atrial fibrillation,   hypertension, carcinoid syndrome and lower extremity edema.    PAST SURGICAL HISTORY:  Bilateral hip replacement and also bilateral   mastectomy secondary to what she described as cyst in her both breasts.    SOCIAL HISTORY:  Denies any tobacco use, admits to occasional alcohol.    FAMILY HISTORY:  Positive for cancer.    MEDICATIONS:  Amlodipine 10 mg p.o. daily, furosemide 40 mg p.o. daily,   potassium chloride 20 mEq 2 tablets daily, lisinopril 40 mg p.o. daily,   Voltaren 1 tab p.o. daily, Lipitor 20 mg p.o. daily, trazodone 50 mg p.o.   p.r.n., omeprazole 40 mg p.o. daily.    REVIEW OF SYSTEMS:  All 14 systems reviewed, all of them were negatives except   what I mentioned in the history of present illness.    PHYSICAL EXAMINATION:  VITAL SIGNS:  Temperature of 36.9, pulse 58, respiratory rate 14, blood   pressure 141/56, O2 saturation is 93-96% on room air.  GENERAL APPEARANCE:  Patient is obese, awake, alert, oriented x3, in no  acute   distress.  NEUROLOGIC:  Cranial nerves II-XII intact.  HEAD AND NECK:  Supple.  Oral cavity is moist.  CARDIOVASCULAR:  S1, S2, regular.  LUNGS:  Decreased breath sounds, otherwise clear.  ABDOMEN:  Obese, soft, and nontender.  EXTREMITIES:  3+ pitting edema noted of the knees bilaterally.    LABORATORY DATA:  WBC of 8.5, H and H 15 and 46.6, and platelets of 207,000.    Sodium is 137, potassium 3.6, chloride 100, bicarbonate 28, glucose of 85, BUN   of 13, creatinine 0.75.  Troponin is less than 0.02 x2.  INR 0.99.    Chest x-ray was  done, no acute cardiopulmonary disease noted.  Ultrasound of   lower extremity was done.  Impression, no evidence of bilateral lower   extremity deep venous thrombosis.  EKG shows, the patient in sinus rhythm,   rate of 59, rhythm is regular, axis is normal.  Q-waves noted in V3 only.    ASSESSMENT AND PLAN:  This is a 78-year-old female with lower extremity edema.  1.  Mostly secondary to pulmonary hypertension.  2.  We will start the patient on Lasix 40 mg IV b.i.d.  3.  Also I will discontinue the amlodipine.  4.  Patient's cardiac echo from 07/2018 showed ejection fraction of 65%.  3.  We will get a cardiac echo on the patient as well.    For atrial fibrillation,  1.  Noted in the nurse practitioner from the cardiology service.  2.  The patient is not on any anticoagulation.  3.  This probably is a paroxysmal atrial fibrillation.  4.  EKG at this time is in sinus rhythm.  5.  Cardiac echo is pending.    For hypertension:  1.  Lisinopril.  2.  Vasotec p.r.n. IV.  3.  Amlodipine discontinue secondary to lower extremity edema.  4.  Deep venous thrombosis prophylaxis.  The patient will, heparin 5000 units   subcutaneously q. 8 hours.       ____________________________________     Chrissy Edgar MD    [facility]    DD:  02/25/2019 23:37:46  DT:  02/26/2019 00:03:14    D#:  [id number]  Job#:  [id number]

## 2019-02-26 NOTE — PROGRESS NOTES
Telemetry Shift Summary    Rhythm SB/SR  HR Range 40-60  Ectopy Rare couplet  Measurements 0.18/0.10/0.48        Normal Values  Rhythm SR  HR Range    Measurements 0.12-0.20 / 0.06-0.10  / 0.30-0.52SB/SR

## 2019-02-26 NOTE — CARE PLAN
Problem: Safety  Goal: Will remain free from falls  Outcome: PROGRESSING AS EXPECTED  Pt states she sometimes feels weak when ambulating. Pt is aware to call staff before getting up out of bed if becoming too weak or lightheaded.     Problem: Infection  Goal: Will remain free from infection  Outcome: PROGRESSING AS EXPECTED  Discussed infection prevention with patient and went over hand hygiene. All questions answered.

## 2019-02-26 NOTE — PROGRESS NOTES
Pt is A&Ox4, resting comfortably in bed. Assessment completed and pain level 2/10. Due medication have been given. Bed is in lowest postion, and side rails up x2, pt calls when needs assistance and call light within reach.    Pt states she has a small, 2/10 HA.

## 2019-02-26 NOTE — PROGRESS NOTES
"Subjective:      Sharon Callahan is a 78 y.o. female who presents with Leg Swelling (x2weeks, swelling legs and feet, head pressure, light headed off and on)            Leg Swelling   This is a new problem. The current episode started 1 to 4 weeks ago (2 weeks). The problem occurs constantly. The problem has been unchanged. Associated symptoms include congestion. Pertinent negatives include no chest pain, chills, coughing, fever, headaches, rash, sore throat or vomiting. Nothing aggravates the symptoms. She has tried nothing for the symptoms.     Patient presents to urgent care reporting a 2 week history of worsening bilateral lower extremity swelling, despite taking Lasix 40 mg daily. She denies history of the same. She also reports intermittent head pressure, SOB, visual change, and chest pressure. She denies chest pain or palpitations. PMH is significant for HTN, LVH, and pulmonary HTN.     Review of Systems   Constitutional: Negative for chills and fever.   HENT: Positive for congestion. Negative for ear pain and sore throat.    Eyes: Negative for blurred vision and double vision.   Respiratory: Positive for shortness of breath. Negative for cough, sputum production and wheezing.    Cardiovascular: Positive for orthopnea and leg swelling. Negative for chest pain.   Gastrointestinal: Negative for vomiting.   Skin: Negative for rash.   Neurological: Negative for headaches.        Objective:     /70   Pulse (!) 55   Temp 36.6 °C (97.8 °F) (Temporal)   Resp 14   Ht 1.575 m (5' 2\")   Wt 86.2 kg (190 lb)   SpO2 94%   BMI 34.75 kg/m²      Physical Exam   Constitutional: She is oriented to person, place, and time. She appears well-developed and well-nourished. No distress.   HENT:   Head: Normocephalic and atraumatic.   Right Ear: Hearing, tympanic membrane, external ear and ear canal normal.   Left Ear: Hearing, tympanic membrane, external ear and ear canal normal.   Mouth/Throat: Oropharynx is clear and " moist. No oropharyngeal exudate, posterior oropharyngeal edema or posterior oropharyngeal erythema.   Eyes: Pupils are equal, round, and reactive to light. Conjunctivae are normal. Right eye exhibits no discharge. Left eye exhibits no discharge.   Neck: Normal range of motion.   Cardiovascular: Regular rhythm and normal heart sounds.    bradycardic   Pulmonary/Chest: Effort normal. No respiratory distress. She has no wheezes.   Musculoskeletal: Normal range of motion.   Lymphadenopathy:     She has no cervical adenopathy.   Neurological: She is alert and oriented to person, place, and time.   Skin: Skin is warm and dry. She is not diaphoretic.   Psychiatric: She has a normal mood and affect. Her behavior is normal.   Nursing note and vitals reviewed.       PMH:  has a past medical history of Arthritis; BMI 38.0-38.9,adult (9/18/2013); CATARACT; Chronic nausea (1/12/2015); Chronic pain of both knees (1/9/2019); Depression with anxiety (5/3/2011); Heart burn; Hemothorax on right (1/4/2017); History of cholecystectomy; Hyperlipidemia (4/4/2011); Hypertension; Indigestion; MYESHA (obstructive sleep apnea) (4/4/2011); Pain; Pleural effusion, right (1/4/2017); Range of motion deficit; S/P bilateral mastectomy (4/4/2011); Sleep apnea; and Snoring.  MEDS:   Current Outpatient Prescriptions:   •  amLODIPine (NORVASC) 10 MG Tab, TAKE 1 TABLET BY MOUTH EVERY DAY, Disp: 90 Tab, Rfl: 1  •  furosemide (LASIX) 40 MG Tab, TAKE 1 TABLET BY MOUTH EVERY DAY, Disp: 90 Tab, Rfl: 0  •  lisinopril (PRINIVIL, ZESTRIL) 40 MG tablet, TAKE 1 TABLET BY MOUTH EVERY DAY, Disp: 90 Tab, Rfl: 0  •  diclofenac CR (VOLTAREN-XR) 100 MG TABLET SR 24 HR tablet, Take 1 Tab by mouth every day., Disp: 30 Tab, Rfl: 6  •  atorvastatin (LIPITOR) 20 MG Tab, TAKE 1 TABLET BY MOUTH EVERY BEDTIME, Disp: 90 Tab, Rfl: 0  •  traZODone (DESYREL) 50 MG Tab, TAKE 1 TABLET BY MOUTH THREE TIMES A DAY **NEEDS TO BE SEEN (Patient taking differently: TAKE 1 TABLET BY MOUTH PRN  **NEEDS TO BE SEEN), Disp: 60 Tab, Rfl: 0  •  potassium chloride SA (KDUR) 20 MEQ Tab CR, TAKE 2 TABLETS BY MOUTH EVERY DAY. (Patient taking differently: TAKE 1 TABLETS BY MOUTH EVERY DAY.), Disp: 60 Tab, Rfl: 0  •  omeprazole (PRILOSEC) 40 MG delayed-release capsule, Take 1 Cap by mouth every day., Disp: 90 Cap, Rfl: 1  ALLERGIES:   Allergies   Allergen Reactions   • Pcn [Penicillins] Anaphylaxis     Skin turns black   • Clindamycin Rash     Rash     • Influenza Virus Vacc Rash     Red in face   • Norco [Hydrocodone-Acetaminophen]    • Pneumococcal Vaccine Rash   • Tetracycline Rash     Rash    • Xarelto [Rivaroxaban] Rash     SURGHX:   Past Surgical History:   Procedure Laterality Date   • THORACOSCOPY Right 12/6/2016    Procedure: RIGHT THORACOSCOPY ,DECORTICATION, EVACUATION OF HEMOTHORAX;  Surgeon: Ehsan De Leon D.O.;  Location: SURGERY [facility];  Service:    • HIP ARTHROPLASTY TOTAL  12/3/2012    Performed by Jamie Kenney M.D. at [facility]   • CATARACT PHACO WITH IOL  11/3/2011    Performed by DEENA BRADEN at SURGERY [facility]   • CATARACT PHACO WITH IOL  10/20/2011    Performed by DEENA BRADEN at SURGERY SAME DAY [facility]S   • MAXIM BY LAPAROSCOPY  2/1/2011    Performed by DORON HINOJOSA at SURGERY SAME DAY [facility] ORS   • HIP ARTHROPLASTY TOTAL  3/26/2009    Right; Perfmed by THERESE LEMA at SURGERY [facility]   • UVULOPHARYNGOPALATOPLASTY  1990   • OTHER  1989    tummy tuck   • OTHER  1988    bilateral mastectomies with implants   • BREAST BIOPSY  1980    bilateral benign mastectomy   • ORIF, HUMERUS  1950's    left   • PB ENLARGE BREAST WITH IMPLANT       SOCHX:  reports that she quit smoking about 44 years ago. Her smoking use included Cigarettes. She has a 15.00 pack-year smoking history. She has never used smokeless tobacco. She reports that she does not drink alcohol or use drugs.  FH: family history includes Cancer in her father and paternal  aunt; Diabetes in her brother, father, and paternal aunt; Heart Disease in her brother and father; Hypertension in her brother, brother, father, maternal grandmother, and mother; Sleep Apnea in her brother and brother.       Assessment/Plan:     1. Bilateral lower extremity edema  - EKG - Clinic Performed --> sinus bradycardia, no evidence of acute ischemic changes     2. Dizziness    Unable to obtain stat labs or imaging at today's visit. Encouraged to go straight to the ED for further evaluation and management. She is agreeable to this plan. She left urgent care in stable condition and will go next door to the ED for evaluation.

## 2019-02-26 NOTE — PROGRESS NOTES
Med rec updated and complete  Allergies reviewed  Pt reports that she just started DICLOFENAC 100MG about 30 days ago..  Pt reports no antibiotics in the last 30 days.

## 2019-02-26 NOTE — FLOWSHEET NOTE
02/26/19 0018   Events/Summary/Plan   Events/Summary/Plan Set up CPAP   General Vent Information   Pulse Oximetry 95 %   Heart Rate (Monitored) (!) 52   CPAP/BiPAP MYESHA Group   Nocturnal CPAP or BiPAP CPAP   #System Evaluation Yes   Home Unit Used? Yes   Equipment Inspected for Cleanliness, Operation, and Safety? Yes   Settings (If Known) 9-12   Home Mask Used? Yes

## 2019-02-27 ENCOUNTER — PATIENT OUTREACH (OUTPATIENT)
Dept: HEALTH INFORMATION MANAGEMENT | Facility: OTHER | Age: 79
End: 2019-02-27

## 2019-02-27 VITALS
BODY MASS INDEX: 34.1 KG/M2 | HEIGHT: 63 IN | HEART RATE: 50 BPM | RESPIRATION RATE: 18 BRPM | TEMPERATURE: 98.1 F | SYSTOLIC BLOOD PRESSURE: 107 MMHG | WEIGHT: 192.46 LBS | DIASTOLIC BLOOD PRESSURE: 59 MMHG | OXYGEN SATURATION: 93 %

## 2019-02-27 LAB
ALBUMIN SERPL BCP-MCNC: 3.7 G/DL (ref 3.2–4.9)
BASOPHILS # BLD AUTO: 1.1 % (ref 0–1.8)
BASOPHILS # BLD: 0.06 K/UL (ref 0–0.12)
BUN SERPL-MCNC: 22 MG/DL (ref 8–22)
CALCIUM SERPL-MCNC: 8.6 MG/DL (ref 8.4–10.2)
CHLORIDE SERPL-SCNC: 103 MMOL/L (ref 96–112)
CO2 SERPL-SCNC: 25 MMOL/L (ref 20–33)
CREAT SERPL-MCNC: 0.77 MG/DL (ref 0.5–1.4)
EOSINOPHIL # BLD AUTO: 0.2 K/UL (ref 0–0.51)
EOSINOPHIL NFR BLD: 3.6 % (ref 0–6.9)
ERYTHROCYTE [DISTWIDTH] IN BLOOD BY AUTOMATED COUNT: 47.2 FL (ref 35.9–50)
GLUCOSE SERPL-MCNC: 90 MG/DL (ref 65–99)
HCT VFR BLD AUTO: 41.8 % (ref 37–47)
HGB BLD-MCNC: 13.5 G/DL (ref 12–16)
IMM GRANULOCYTES # BLD AUTO: 0.08 K/UL (ref 0–0.11)
IMM GRANULOCYTES NFR BLD AUTO: 1.4 % (ref 0–0.9)
LYMPHOCYTES # BLD AUTO: 1.39 K/UL (ref 1–4.8)
LYMPHOCYTES NFR BLD: 24.8 % (ref 22–41)
MCH RBC QN AUTO: 29.2 PG (ref 27–33)
MCHC RBC AUTO-ENTMCNC: 32.3 G/DL (ref 33.6–35)
MCV RBC AUTO: 90.5 FL (ref 81.4–97.8)
MONOCYTES # BLD AUTO: 0.53 K/UL (ref 0–0.85)
MONOCYTES NFR BLD AUTO: 9.4 % (ref 0–13.4)
NEUTROPHILS # BLD AUTO: 3.35 K/UL (ref 2–7.15)
NEUTROPHILS NFR BLD: 59.7 % (ref 44–72)
NRBC # BLD AUTO: 0 K/UL
NRBC BLD-RTO: 0 /100 WBC
PHOSPHATE SERPL-MCNC: 5.3 MG/DL (ref 2.5–4.5)
PLATELET # BLD AUTO: 183 K/UL (ref 164–446)
PMV BLD AUTO: 11.2 FL (ref 9–12.9)
POTASSIUM SERPL-SCNC: 3.3 MMOL/L (ref 3.6–5.5)
RBC # BLD AUTO: 4.62 M/UL (ref 4.2–5.4)
SODIUM SERPL-SCNC: 138 MMOL/L (ref 135–145)
WBC # BLD AUTO: 5.6 K/UL (ref 4.8–10.8)

## 2019-02-27 PROCEDURE — 99239 HOSP IP/OBS DSCHRG MGMT >30: CPT | Performed by: INTERNAL MEDICINE

## 2019-02-27 PROCEDURE — 700102 HCHG RX REV CODE 250 W/ 637 OVERRIDE(OP): Performed by: FAMILY MEDICINE

## 2019-02-27 PROCEDURE — 85025 COMPLETE CBC W/AUTO DIFF WBC: CPT

## 2019-02-27 PROCEDURE — 94760 N-INVAS EAR/PLS OXIMETRY 1: CPT

## 2019-02-27 PROCEDURE — 80069 RENAL FUNCTION PANEL: CPT

## 2019-02-27 PROCEDURE — 700111 HCHG RX REV CODE 636 W/ 250 OVERRIDE (IP): Performed by: FAMILY MEDICINE

## 2019-02-27 PROCEDURE — A9270 NON-COVERED ITEM OR SERVICE: HCPCS | Performed by: FAMILY MEDICINE

## 2019-02-27 RX ORDER — OMEPRAZOLE 40 MG/1
40 CAPSULE, DELAYED RELEASE ORAL DAILY
Qty: 30 CAP | Refills: 2 | Status: SHIPPED | OUTPATIENT
Start: 2019-02-27 | End: 2020-04-17 | Stop reason: SDUPTHER

## 2019-02-27 RX ORDER — FUROSEMIDE 40 MG/1
40 TABLET ORAL 2 TIMES DAILY
Qty: 60 TAB | Refills: 2 | Status: SHIPPED | OUTPATIENT
Start: 2019-02-27 | End: 2019-05-03 | Stop reason: SDUPTHER

## 2019-02-27 RX ORDER — POTASSIUM CHLORIDE 20 MEQ/1
40 TABLET, EXTENDED RELEASE ORAL DAILY
Qty: 60 TAB | Refills: 11 | Status: SHIPPED | OUTPATIENT
Start: 2019-02-27 | End: 2019-04-18 | Stop reason: SDUPTHER

## 2019-02-27 RX ADMIN — POTASSIUM CHLORIDE 40 MEQ: 1500 TABLET, EXTENDED RELEASE ORAL at 06:10

## 2019-02-27 RX ADMIN — HEPARIN SODIUM 5000 UNITS: 5000 INJECTION, SOLUTION INTRAVENOUS; SUBCUTANEOUS at 06:10

## 2019-02-27 RX ADMIN — DICLOFENAC SODIUM 50 MG: 25 TABLET, DELAYED RELEASE ORAL at 06:12

## 2019-02-27 RX ADMIN — LISINOPRIL 40 MG: 20 TABLET ORAL at 06:10

## 2019-02-27 RX ADMIN — FUROSEMIDE 40 MG: 10 INJECTION, SOLUTION INTRAVENOUS at 06:10

## 2019-02-27 ASSESSMENT — PATIENT HEALTH QUESTIONNAIRE - PHQ9
SUM OF ALL RESPONSES TO PHQ9 QUESTIONS 1 AND 2: 0
2. FEELING DOWN, DEPRESSED, IRRITABLE, OR HOPELESS: NOT AT ALL
1. LITTLE INTEREST OR PLEASURE IN DOING THINGS: NOT AT ALL

## 2019-02-27 NOTE — PROGRESS NOTES
Telemetry Shift Summary    Rhythm SB  HR Range 47-58  Ectopy Occasional PAC and rare PVC  Measurements 0.20/0.08/0.44        Normal Values  Rhythm SR  HR Range    Measurements 0.12-0.20 / 0.06-0.10  / 0.30-0.52

## 2019-02-27 NOTE — FLOWSHEET NOTE
02/26/19 2134   Events/Summary/Plan   Events/Summary/Plan Patient placed on home CPAP unit   General Vent Information   Pulse Oximetry 91 %   Heart Rate (Monitored) 66   CPAP/BiPAP MYESHA Group   Nocturnal CPAP or BiPAP CPAP   #System Evaluation Yes   Home Unit Used? Yes   Equipment Inspected for Cleanliness, Operation, and Safety? Yes   Settings (If Known) 9-12   FiO2 or LPM .21   Home Mask Used? Yes   Breath Sounds   RUL Breath Sounds Clear   RML Breath Sounds Clear   RLL Breath Sounds Clear   SIS Breath Sounds Clear   LLL Breath Sounds Clear

## 2019-02-27 NOTE — PROGRESS NOTES
Pt is A&Ox4, resting comfortably in bed. Assessment completed. Due medication have been given. Bed is in lowest postion, and side rails up x2, pt calls when needs assistance and call light within reach.

## 2019-02-27 NOTE — FLOWSHEET NOTE
02/27/19 0300   Events/Summary/Plan   Events/Summary/Plan pt asleep wearing home cpap unit   General Vent Information   Pulse Oximetry 91 %   CPAP/BiPAP MYESHA Group   Nocturnal CPAP or BiPAP CPAP   Home Unit Used? Yes   Settings (If Known) 9-12   FiO2 or LPM 0   Home Mask Used? Yes   Chest Exam   Work Of Breathing / Effort Mild

## 2019-02-27 NOTE — PROGRESS NOTES
Tele strip at 1849 shows SB w/ HR of 54    WY 0.18  QRS 0.08  QT 0.44    Telemetry Summary    Rhythm Asymptomatic SB, NSR  Rate 40s - 80s  Ectopy Frequent PACs, occasional PVCs, per [facility]    Telemetry monitoring strips placed in patient's chart.

## 2019-02-27 NOTE — CARE PLAN
Problem: Respiratory:  Goal: Respiratory status will improve  Outcome: PROGRESSING AS EXPECTED  No adventitious LS noted overnight. Denies SOB or CHAIREZ. SpO2 WNL on RA. Using home CPAP at night. BLE edema improved.   Only trace amount present. Remains on scheduled IV Lasix 40mg BID.    Problem: Hemodynamic Status  Goal: Stable Vital Signs and Fluid Balance  Outcome: PROGRESSING AS EXPECTED  Hemodynamically stable overnight. Asymptomatic SB while sleeping. NSR 60s while awake. BP normotensive.  Hypokalemic but on scheduled po KCL. Next draw noted for this AM.

## 2019-02-27 NOTE — DISCHARGE SUMMARY
Discharge Summary    CHIEF COMPLAINT ON ADMISSION  Chief Complaint   Patient presents with   • Leg Swelling     Both legs, headache, light headedness, intermittent SOB for a couple weeks.       Reason for Admission  Sent by , leg swelling     Admission Date  2/25/2019    CODE STATUS  Prior    HPI & HOSPITAL COURSE  This is a 78 y.o. Female with a History of obesity, hypertension, paroxysmal atrial fibrillation, obstructive sleep apnea on CPAP, pulmonary hypertension admitted for leg swelling.  She has had echocardiograms in the past that have confirmed pulmonary hypertension however she has since had her CPAP machine replaced and settings adjusted.  She takes amlodipine for blood pressure which is a known cause of lower extremity edema therefore this was discontinued.  She takes diuretics at home Lasix 40 mg daily and has been compliant.  Her diuretic was increased to 40 IV twice daily in addition to holding her amlodipine.  A repeat echocardiogram was performed and showed an normal estimated RVSP, trace tricuspid regurgitation and a normal ejection fraction.  A bilateral lower extremity duplex was performed to rule out DVT.    Her swelling improved significantly with increase Lasix dosing as well as holding amlodipine.  Dose was increased on discharge to 40 mg p.o. twice daily, potassium was increased to 40 mg daily.  She will hold amlodipine and have blood pressure monitored as her BP was stable without this medication during her stay therefore no additional medication was prescribed for blood pressure.    Regarding the variation in her RVSP seen on echocardiogram, it was recommended to her that she follow-up with outpatient cardiology in order to discuss the possibility of indication for right heart cath in order to confirm right-sided heart pressures    Therefore, she is discharged in good and stable condition to home with close outpatient follow-up.    The patient met 2-midnight criteria for an inpatient stay  at the time of discharge.    Discharge Date  2/27/2019    FOLLOW UP ITEMS POST DISCHARGE  2/27/2019    DISCHARGE DIAGNOSES  Active Problems:    Hypokalemia POA: Yes    Paroxysmal A-fib (HCC) POA: Yes    HTN (hypertension) POA: Yes    MYESHA on CPAP (Chronic) POA: Yes    Lower extremity edema POA: Unknown    Obesity (BMI 30.0-34.9) POA: Unknown  Resolved Problems:    * No resolved hospital problems. *      FOLLOW UP  Future Appointments  Date Time Provider Department Center   3/1/2019 8:40 AM TAVON Gonzalez   3/6/2019 2:40 PM NORY Smith [facility] None   4/15/2019 2:00 PM NORY José [facility] None     No follow-up provider specified.    MEDICATIONS ON DISCHARGE     Medication List      CHANGE how you take these medications      Instructions   furosemide 40 MG Tabs  What changed:  when to take this  Commonly known as:  LASIX   Take 1 Tab by mouth 2 Times a Day.  Dose:  40 mg     omeprazole 40 MG delayed-release capsule  What changed:  · when to take this  · reasons to take this  Commonly known as:  PRILOSEC   Take 1 Cap by mouth every day.  Dose:  40 mg     potassium chloride SA 20 MEQ Tbcr  What changed:  how much to take  Commonly known as:  Kdur   Take 2 Tabs by mouth every day.  Dose:  40 mEq        CONTINUE taking these medications      Instructions   atorvastatin 20 MG Tabs  Commonly known as:  LIPITOR   TAKE 1 TABLET BY MOUTH EVERY BEDTIME     diclofenac  MG Tb24 tablet  Commonly known as:  VOLTAREN-XR   Take 1 Tab by mouth every day.  Dose:  100 mg     lisinopril 40 MG tablet  Commonly known as:  PRINIVIL, ZESTRIL   TAKE 1 TABLET BY MOUTH EVERY DAY     traZODone 50 MG Tabs  Commonly known as:  DESYREL   Take  mg by mouth at bedtime as needed for Sleep.  Dose:   mg        STOP taking these medications    ALEVE 220 MG tablet  Generic drug:  naproxen     amLODIPine 10 MG Tabs  Commonly known as:  NORVASC            Allergies  Allergies   Allergen Reactions    • Pcn [Penicillins] Anaphylaxis     Skin turns black   • Clindamycin Rash     Rash     • Influenza Virus Vacc Rash     Red in face   • Norco [Hydrocodone-Acetaminophen]    • Pneumococcal Vaccine Rash   • Tetracycline Rash     Rash    • Xarelto [Rivaroxaban] Rash       DIET  No orders of the defined types were placed in this encounter.      ACTIVITY  As tolerated.  Weight bearing as tolerated    CONSULTATIONS  None    PROCEDURES  None    LABORATORY  Lab Results   Component Value Date    SODIUM 138 02/27/2019    POTASSIUM 3.3 (L) 02/27/2019    CHLORIDE 103 02/27/2019    CO2 25 02/27/2019    GLUCOSE 90 02/27/2019    BUN 22 02/27/2019    CREATININE 0.77 02/27/2019    CREATININE 0.79 04/04/2011        Lab Results   Component Value Date    WBC 5.6 02/27/2019    HEMOGLOBIN 13.5 02/27/2019    HEMATOCRIT 41.8 02/27/2019    PLATELETCT 183 02/27/2019        Total time of the discharge process exceeds 37 minutes.

## 2019-02-27 NOTE — PROGRESS NOTES
Pt discharged to home. Discharge instructions provided to pt. Pt verbalizes understanding. Pt states all questions have been answered. Signed copy in chart. Pt states that all personal belongings are in possession.

## 2019-02-27 NOTE — DISCHARGE INSTRUCTIONS
Discharge Instructions    Discharged to home by car with relative. Discharged via wheelchair, hospital escort: Yes.  Special equipment needed: Not Applicable    Be sure to schedule a follow-up appointment with your primary care doctor or any specialists as instructed.     Discharge Plan:   Diet Plan: Discussed  Activity Level: Discussed  Confirmed Follow up Appointment: Appointment Scheduled  Confirmed Symptoms Management: Discussed  Medication Reconciliation Updated: Yes  Influenza Vaccine Indication: Not indicated: History of severe allergic reaction to influenza vaccine    I understand that a diet low in cholesterol, fat, and sodium is recommended for good health. Unless I have been given specific instructions below for another diet, I accept this instruction as my diet prescription.   Other diet: Regular    Special Instructions: None    · Is patient discharged on Warfarin / Coumadin?   No     Furosemide tablets  What is this medicine?  FUROSEMIDE (fyoor [place] se mide) is a diuretic. It helps you make more urine and to lose salt and excess water from your body. This medicine is used to treat high blood pressure, and edema or swelling from heart, kidney, or liver disease.  This medicine may be used for other purposes; ask your health care provider or pharmacist if you have questions.  COMMON BRAND NAME(S): Active-Medicated Specimen Kit, Delone, Diuscreen, Lasix, RX Specimen Collection Kit, Specimen Collection Kit, URINX Medicated Specimen Collection  What should I tell my health care provider before I take this medicine?  They need to know if you have any of these conditions:  -abnormal blood electrolytes  -diarrhea or vomiting  -gout  -heart disease  -kidney disease, small amounts of urine, or difficulty passing urine  -liver disease  -thyroid disease  -an unusual or allergic reaction to furosemide, sulfa drugs, other medicines, foods, dyes, or preservatives  -pregnant or trying to get pregnant  -breast-feeding  How  should I use this medicine?  Take this medicine by mouth with a glass of water. Follow the directions on the prescription label. You may take this medicine with or without food. If it upsets your stomach, take it with food or milk. Do not take your medicine more often than directed. Remember that you will need to pass more urine after taking this medicine. Do not take your medicine at a time of day that will cause you problems. Do not take at bedtime.  Talk to your pediatrician regarding the use of this medicine in children. While this drug may be prescribed for selected conditions, precautions do apply.  Overdosage: If you think you have taken too much of this medicine contact a poison control center or emergency room at once.  NOTE: This medicine is only for you. Do not share this medicine with others.  What if I miss a dose?  If you miss a dose, take it as soon as you can. If it is almost time for your next dose, take only that dose. Do not take double or extra doses.  What may interact with this medicine?  -aspirin and aspirin-like medicines  -certain antibiotics  -chloral hydrate  -cisplatin  -cyclosporine  -digoxin  -diuretics  -laxatives  -lithium  -medicines for blood pressure  -medicines that relax muscles for surgery  -methotrexate  -NSAIDs, medicines for pain and inflammation like ibuprofen, naproxen, or indomethacin  -phenytoin  -steroid medicines like prednisone or cortisone  -sucralfate  -thyroid hormones  This list may not describe all possible interactions. Give your health care provider a list of all the medicines, herbs, non-prescription drugs, or dietary supplements you use. Also tell them if you smoke, drink alcohol, or use illegal drugs. Some items may interact with your medicine.  What should I watch for while using this medicine?  Visit your doctor or health care professional for regular checks on your progress. Check your blood pressure regularly. Ask your doctor or health care professional  what your blood pressure should be, and when you should contact him or her. If you are a diabetic, check your blood sugar as directed.  You may need to be on a special diet while taking this medicine. Check with your doctor. Also, ask how many glasses of fluid you need to drink a day. You must not get dehydrated.  You may get drowsy or dizzy. Do not drive, use machinery, or do anything that needs mental alertness until you know how this drug affects you. Do not stand or sit up quickly, especially if you are an older patient. This reduces the risk of dizzy or fainting spells. Alcohol can make you more drowsy and dizzy. Avoid alcoholic drinks.  This medicine can make you more sensitive to the sun. Keep out of the sun. If you cannot avoid being in the sun, wear protective clothing and use sunscreen. Do not use sun lamps or tanning beds/booths.  What side effects may I notice from receiving this medicine?  Side effects that you should report to your doctor or health care professional as soon as possible:  -blood in urine or stools  -dry mouth  -fever or chills  -hearing loss or ringing in the ears  -irregular heartbeat  -muscle pain or weakness, cramps  -skin rash  -stomach upset, pain, or nausea  -tingling or numbness in the hands or feet  -unusually weak or tired  -vomiting or diarrhea  -yellowing of the eyes or skin  Side effects that usually do not require medical attention (report to your doctor or health care professional if they continue or are bothersome):  -headache  -loss of appetite  -unusual bleeding or bruising  This list may not describe all possible side effects. Call your doctor for medical advice about side effects. You may report side effects to FDA at FDA-8473.  Where should I keep my medicine?  Keep out of the reach of children.  Store at room temperature between 15 and 30 degrees C (59 and 86 degrees F). Protect from light. Throw away any unused medicine after the expiration date.  NOTE: This  sheet is a summary. It may not cover all possible information. If you have questions about this medicine, talk to your doctor, pharmacist, or health care provider.  © 2018 Elsevier/Gold Standard (2016-03-09 13:49:50)      Edema  Edema is an abnormal buildup of fluids. It is more common in your legs and thighs. Painless swelling of the feet and ankles is more likely as a person ages. It also is common in looser skin, like around your eyes.  Follow these instructions at home:  · Keep the affected body part above the level of the heart while lying down.  · Do not sit still or stand for a long time.  · Do not put anything right under your knees when you lie down.  · Do not wear tight clothes on your upper legs.  · Exercise your legs to help the puffiness (swelling) go down.  · Wear elastic bandages or support stockings as told by your doctor.  · A low-salt diet may help lessen the puffiness.  · Only take medicine as told by your doctor.  Contact a doctor if:  · Treatment is not working.  · You have heart, liver, or kidney disease and notice that your skin looks puffy or shiny.  · You have puffiness in your legs that does not get better when you raise your legs.  · You have sudden weight gain for no reason.  Get help right away if:  · You have shortness of breath or chest pain.  · You cannot breathe when you lie down.  · You have pain, redness, or warmth in the areas that are puffy.  · You have heart, liver, or kidney disease and get edema all of a sudden.  · You have a fever and your symptoms get worse all of a sudden.  This information is not intended to replace advice given to you by your health care provider. Make sure you discuss any questions you have with your health care provider.  Document Released: 06/05/2009 Document Revised: 05/25/2017 Document Reviewed: 10/10/2014  Elsevier Interactive Patient Education © 2017 Elsevier Inc.      Depression / Suicide Risk    As you are discharged from [facility]  facility, it is important to learn how to keep safe from harming yourself.    Recognize the warning signs:  · Abrupt changes in personality, positive or negative- including increase in energy   · Giving away possessions  · Change in eating patterns- significant weight changes-  positive or negative  · Change in sleeping patterns- unable to sleep or sleeping all the time   · Unwillingness or inability to communicate  · Depression  · Unusual sadness, discouragement and loneliness  · Talk of wanting to die  · Neglect of personal appearance   · Rebelliousness- reckless behavior  · Withdrawal from people/activities they love  · Confusion- inability to concentrate     If you or a loved one observes any of these behaviors or has concerns about self-harm, here's what you can do:  · Talk about it- your feelings and reasons for harming yourself  · Remove any means that you might use to hurt yourself (examples: pills, rope, extension cords, firearm)  · Get professional help from the community (Mental Health, Substance Abuse, psychological counseling)  · Do not be alone:Call your Safe Contact- someone whom you trust who will be there for you.  · Call your local CRISIS HOTLINE 939-8501 or 209-121-1211  · Call your local Children's Mobile Crisis Response Team [place] (545) 764-7665 or www.Biomeme  · Call the toll free National Suicide Prevention Hotlines   · National Suicide Prevention Lifeline 402-197-LVCA (7802)  · National Hope Line Network 800-SUICIDE (510-2403)

## 2019-02-27 NOTE — PROGRESS NOTES
Bedside report received from MASSIMO Galeas. Patient resting comfortably in bed. Denies pain/discomfort.  Plan of care discussed and questions answered. No needs verbalized at this time.   Falls precautions remain in place. Call light within reach.

## 2019-03-08 DIAGNOSIS — E78.5 DYSLIPIDEMIA: ICD-10-CM

## 2019-03-08 RX ORDER — ATORVASTATIN CALCIUM 20 MG/1
TABLET, FILM COATED ORAL
Qty: 90 TAB | Refills: 0 | Status: SHIPPED | OUTPATIENT
Start: 2019-03-08 | End: 2019-04-18 | Stop reason: SDUPTHER

## 2019-03-14 RX ORDER — AMLODIPINE BESYLATE 10 MG/1
TABLET ORAL
Refills: 1 | COMMUNITY
Start: 2019-02-22 | End: 2019-03-19

## 2019-03-19 ENCOUNTER — OFFICE VISIT (OUTPATIENT)
Dept: MEDICAL GROUP | Facility: MEDICAL CENTER | Age: 79
End: 2019-03-19
Payer: MEDICARE

## 2019-03-19 VITALS
SYSTOLIC BLOOD PRESSURE: 138 MMHG | BODY MASS INDEX: 34.88 KG/M2 | HEART RATE: 50 BPM | HEIGHT: 63 IN | DIASTOLIC BLOOD PRESSURE: 70 MMHG | TEMPERATURE: 97.3 F | OXYGEN SATURATION: 99 % | WEIGHT: 196.87 LBS

## 2019-03-19 DIAGNOSIS — I51.7 LVH (LEFT VENTRICULAR HYPERTROPHY): ICD-10-CM

## 2019-03-19 DIAGNOSIS — E87.6 HYPOKALEMIA: ICD-10-CM

## 2019-03-19 DIAGNOSIS — E66.9 OBESITY (BMI 30.0-34.9): ICD-10-CM

## 2019-03-19 DIAGNOSIS — I10 ESSENTIAL HYPERTENSION: ICD-10-CM

## 2019-03-19 DIAGNOSIS — E55.9 HYPOVITAMINOSIS D: ICD-10-CM

## 2019-03-19 DIAGNOSIS — D3A.090 CARCINOID TUMOR OF LUNG: ICD-10-CM

## 2019-03-19 DIAGNOSIS — I48.0 PAROXYSMAL A-FIB (HCC): ICD-10-CM

## 2019-03-19 DIAGNOSIS — R60.0 LOWER EXTREMITY EDEMA: ICD-10-CM

## 2019-03-19 DIAGNOSIS — Z09 HOSPITAL DISCHARGE FOLLOW-UP: ICD-10-CM

## 2019-03-19 PROCEDURE — 99214 OFFICE O/P EST MOD 30 MIN: CPT | Performed by: INTERNAL MEDICINE

## 2019-03-19 RX ORDER — ERGOCALCIFEROL 1.25 MG/1
50000 CAPSULE ORAL
Qty: 12 CAP | Refills: 1 | Status: SHIPPED | OUTPATIENT
Start: 2019-03-19 | End: 2019-06-11 | Stop reason: SDUPTHER

## 2019-03-19 NOTE — PROGRESS NOTES
CHIEF COMPLAINT  Chief Complaint   Patient presents with   • Hospital discharge f/u  Foot Swelling     x 2 weeks     HPI  Patient is a 78 y.o. female patient who presents today for the following     Hospital discharge follow-up, lower extremity edema, PAF, hypertension, sleep apnea on CPAP, obesity  The patient was hospitalized from 2/25/2019 to 2/27/2019, discharge summary was reviewed with the following:  DISCHARGE DIAGNOSES  Active Problems:  Hypokalemia POA: Yes  Paroxysmal A-fib (HCC) POA: Yes  HTN (hypertension) POA: Yes  MYESHA on CPAP (Chronic) POA: Yes  Lower extremity edema POA: Unknown  Obesity (BMI 30.0-34.9) POA: Unknown    Hospitalization course:  The patient is 78 y.o. F, with a h/o obesity, hypertension, paroxysmal atrial fibrillation, OSAon CPAP, admitted for B/L leg swelling.   She has had echocardiogram in the past that have confirmed pulmonary hypertension normalized since had her CPAP machine replaced and settings adjusted.   She takes amlodipine for blood pressure which is a known cause of lower extremity edema therefore this was discontinued.   She takes diuretics at home Lasix 40 mg daily and has been compliant. Her diuretic was increased to 40 IV twice daily in addition to holding her amlodipine.   A repeat echocardiogram showed an normal estimated RVSP, trace tricuspid regurgitation and a normal ejection fraction.   Bilateral lower extremity duplex was performed to rule out DVT, was negative.     Her swelling improved significantly with increase of Lasix dose, and amlodipine;  - D/c on lasix 40 mg p.o. twice daily, potassium was increased to 40 mg daily.   She will hold amlodipine and have blood pressure monitored as her BP was stable without this medication during utilization;  no additional medication was prescribed for blood pressure.     Regarding the variation in her RVSP seen on echocardiogram, it was recommended to  follow-up with outpatient cardiology in order to discuss the  possibility of indication for right heart cath in order to confirm right-sided heart pressures     I reviewed labs and imaging from hospitalization and discussed with patient:  -Troponins x2 and BNP were negative  - CMP: hypokalemia  - Vitamin D 20   - CTA chest neg   - CXR neg   - US DVT neg   - Echocardiography neg    Posthospitalization course:  Patient was compliant with medications.  Blood pressure was < 130/85.  She has not have palpitations, irregular heartbeats, chest pain or pressure, leg swelling.     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    Last CT scan of chest/abdomen/pelvis with 7/25/2017 noting stable mass and no evidence of metastatic disease, and CTA chest on 2/25/19.    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 12 Cap 1   • atorvastatin (LIPITOR) 20 MG Tab TAKE 1 TABLET BY MOUTH EVERY BEDTIME 90 Tab 0   • furosemide (LASIX) 40 MG Tab Take 1 Tab by mouth 2 Times a Day. 60 Tab 2   • potassium chloride SA (KDUR) 20 MEQ Tab CR Take 2 Tabs by mouth every day. 60 Tab 11   • omeprazole (PRILOSEC) 40 MG delayed-release capsule Take 1 Cap by mouth every day. 30 Cap 2   • lisinopril (PRINIVIL,  ZESTRIL) 40 MG tablet TAKE 1 TABLET BY MOUTH EVERY DAY 90 Tab 0   • traZODone (DESYREL) 50 MG Tab Take  mg by mouth at bedtime as needed for Sleep.     • diclofenac CR (VOLTAREN-XR) 100 MG TABLET SR 24 HR tablet Take 1 Tab by mouth every day. 30 Tab 6     No current facility-administered medications for this visit.      ALLERGIES  Allergies: Pcn [penicillins]; Clindamycin; Influenza virus vacc; Norco [hydrocodone-acetaminophen]; Pneumococcal vaccine; Tetracycline; and Xarelto [rivaroxaban]  PAST MEDICAL HISTORY  Past Medical History:   Diagnosis Date   • Chronic pain of both knees 1/9/2019   • Pleural effusion, right 1/4/2017    Status post thoracotomy and decortication   • Hemothorax on right 1/4/2017    Status post thoracotomy and decortication   • Chronic nausea 1/12/2015    Has had GI work up done Dr voss   • BMI 38.0-38.9,adult 9/18/2013   • Depression with anxiety 5/3/2011   • S/P bilateral mastectomy 4/4/2011   • Hyperlipidemia 4/4/2011   • MYESHA (obstructive sleep apnea) 4/4/2011    On cPAP    • Arthritis    • CATARACT     surgical correcttion bilateral   • Heart burn    • History of cholecystectomy    • Hypertension    • Indigestion    • Pain     left hip   • Range of motion deficit     left elbow, unable to completely extend   • Sleep apnea     CPAP   • Snoring      SURGICAL HISTORY  She  has a past surgical history that includes hip arthroplasty total (3/26/2009); lisa by laparoscopy (2/1/2011); other (1988); other (1989); uvulopharyngopalatoplasty (1990); cataract phaco with iol (10/20/2011); cataract phaco with iol (11/3/2011); orif, humerus (1950's); hip arthroplasty total (12/3/2012); breast biopsy (1980); pr enlarge breast with implant; and thoracoscopy (Right, 12/6/2016).  SOCIAL HISTORY  Social History   Substance Use Topics   • Smoking status: Former Smoker     Packs/day: 1.00     Years: 15.00     Types: Cigarettes     Quit date: 1/1/1975   • Smokeless tobacco: Never Used   • Alcohol  "use No      Comment: once in a great while     Social History     Social History Narrative   • No narrative on file     FAMILY HISTORY  Family History   Problem Relation Age of Onset   • Hypertension Mother    • Cancer Father         lung   • Diabetes Father    • Hypertension Father    • Heart Disease Father         MI   • Hypertension Brother    • Sleep Apnea Brother    • Hypertension Maternal Grandmother    • Sleep Apnea Brother    • Hypertension Brother    • Diabetes Brother    • Cancer Paternal Aunt         stomach   • Diabetes Paternal Aunt    • Heart Disease Brother         MI   • Hyperlipidemia Neg Hx    • Stroke Neg Hx      Family Status   Relation Status   • Mo    • Fa    • Sis (Not Specified)   • Bro Alive   • MGMo  at age 65   • Bro Alive   • Bro    • PAunt (Not Specified)   • Bro (Not Specified)   • Neg Hx (Not Specified)     ROS   Constitutional: Negative for fever, chills, fatigue.  HENT: Negative for congestion.  Eyes: Negative for vision problems..   Respiratory: Negative for shortness of breath.  Cardiovascular: Negative for chest pain, palpitations. And per HPI.  Gastrointestinal: Negative for heartburn, abdominal pain.   Genitourinary: Negative for dysuria.  Musculoskeletal: Negative for back  and joint pain.   Skin: Negative for rash.   Neuro: Negative for dizziness,  headaches.   Endo/Heme/Allergies: Does not bruise/bleed easily.   Psychiatric/Behavioral: Negative for depression.    PHYSICAL EXAM   Blood pressure 138/70, pulse (!) 50, temperature 36.3 °C (97.3 °F), temperature source Temporal, height 1.6 m (5' 3\"), weight 89.3 kg (196 lb 13.9 oz), SpO2 99 %, not currently breastfeeding. Body mass index is 34.87 kg/m².  General:  NAD, well appearing  HEENT:   NC/AT, PERRLA, EOMI, TMs are clear. Oropharyngeal mucosa is pink,  without lesions;  no cervical / supraclavicular  lymphadenopathy, no thyromegaly.    Cardiovascular: RRR.   No m/r/g. No carotid bruits .    "   Lungs:   CTAB, no w/r/r, no respiratory distress.  Abdomen: Soft, NT/ND + BS; unable to palpate liver/spleen due to obesity.  Extremities:  2+ DP and radial pulses bilaterally.  No c/c/e.   Skin:  Warm, dry.  No erythema. No rash.   Neurologic: Alert & oriented x 3. CN II-XII grossly intact. No focal deficits.  Psychiatric:  Affect normal, mood normal, judgment normal.    LABS     Labs are reviewed and discussed with a patient  Lab Results   Component Value Date/Time    CHOLSTRLTOT 144 03/29/2018 07:21 AM    LDL 63 03/29/2018 07:21 AM    HDL 68 03/29/2018 07:21 AM    TRIGLYCERIDE 66 03/29/2018 07:21 AM       Lab Results   Component Value Date/Time    SODIUM 138 02/27/2019 04:28 AM    POTASSIUM 3.3 (L) 02/27/2019 04:28 AM    CHLORIDE 103 02/27/2019 04:28 AM    CO2 25 02/27/2019 04:28 AM    GLUCOSE 90 02/27/2019 04:28 AM    BUN 22 02/27/2019 04:28 AM    CREATININE 0.77 02/27/2019 04:28 AM    CREATININE 0.79 04/04/2011 12:00 AM    BUNCREATRAT 13 04/04/2011 12:00 AM     Lab Results   Component Value Date/Time    ALKPHOSPHAT 68 02/26/2019 03:43 AM    ASTSGOT 21 02/26/2019 03:43 AM    ALTSGPT 19 02/26/2019 03:43 AM    TBILIRUBIN 0.6 02/26/2019 03:43 AM      Lab Results   Component Value Date/Time    HBA1C 5.6 02/25/2016 07:09 AM    HBA1C 5.4 06/25/2012 10:00 PM    HBA1C 5.4 07/27/2006 03:25 AM     Lab Results   Component Value Date/Time    FREET4 0.82 02/14/2013 01:45 PM    FREET4 0.95 07/08/2011 03:37 PM      Ref. Range 2/25/2019 2/25/2019   Troponin I 0.00 - 0.04  <0.02 <0.02   BNP  0 - 100  59       Ref. Range 6/28/2018    25-OH   Vit D 25 30 - 100  20 (L)     Lab Results   Component Value Date/Time    WBC 5.6 02/27/2019 04:28 AM    RBC 4.62 02/27/2019 04:28 AM    HEMOGLOBIN 13.5 02/27/2019 04:28 AM    HEMATOCRIT 41.8 02/27/2019 04:28 AM    MCV 90.5 02/27/2019 04:28 AM    MCH 29.2 02/27/2019 04:28 AM    MCHC 32.3 (L) 02/27/2019 04:28 AM    MPV 11.2 02/27/2019 04:28 AM    NEUTSPOLYS 59.70 02/27/2019 04:28 AM     LYMPHOCYTES 24.80 02/27/2019 04:28 AM    MONOCYTES 9.40 02/27/2019 04:28 AM    EOSINOPHILS 3.60 02/27/2019 04:28 AM    BASOPHILS 1.10 02/27/2019 04:28 AM    HYPOCHROMIA 2+ 07/04/2013 07:30 AM    ANISOCYTOSIS 1+ 12/01/2016 06:45 AM        IMAGING     Reviewed and discussed following imaging from hospitalization:    CXR: No acute cardiopulmonary disease.    CTA chest:  1. No CT evidence of pulmonary embolism.  2. Unchanged bilobed posterior medial right lower lobe lung nodule since 2017, measuring 6 x 9 mm.  3. Linear and patchy opacities in the bilateral lung bases, right more than left, likely atelectasis or scarring.    US LE B/L:  No evidence of bilateral lower extremity deep venous thrombosis.    Echocardiography  Technically difficult and incomplete study due to breast implant.   Parasternal long axis view was subopotimal and no parasternal short   axis images.  Grossly normal chamber sizes.  Right heart appeared enlarged in some views but likely from transducer   position and/or locations.  Normal left ventricular systolic function.  Left ventricular ejection fraction is visually estimated to be 60%.  Grossly normal regional wall motion.  The aortic valve is not well visualized.  Aortic sclerosis without stenosis.  No mitral stenosis or regurgitation seen.  Mild tricuspid regurgitation.  Normal inferior vena cava size and inspiratory collapse.  Right ventricular systolic pressure is estimated to be 25 mmHg.  Normal pericardium without effusion.    ASSESMENT AND PLAN        1. Hospital discharge follow-up  The patient has been stable after the discharge, compliant with medication.  Blood pressure has been in 130/80'    2. Essential hypertension  Controlled, continue current treatment  - Comp Metabolic Panel; Future    3. Lower extremity edema  Resolved    4. Paroxysmal A-fib (HCC)  5. LVH (left ventricular hypertrophy)  Stable, continue current treatment and cardiology follow-up    6. Hypokalemia  Potassium dose  was increased, follow-up labs  - Comp Metabolic Panel; Future    7. Carcinoid tumor of lung  Stable, had recent pulmonology follow-up    8. Hypovitaminosis D  Given high-dose of vitamin D  - vitamin D, Ergocalciferol, (DRISDOL) 53361 units Cap capsule; Take 1 Cap by mouth every 7 days.  Dispense: 12 Cap; Refill: 1  - VITAMIN D,25 HYDROXY; Future    9. Obesity (BMI 30.0-34.9)  - OBESITY COUNSELING (No Charge): Patient identified as having weight management issue.  Appropriate orders and counseling given.    Counseling:   - Smoking:  Nonsmoker    Followup: Return in about 4 weeks (around 4/16/2019).    All questions are answered.    HIGH COMPLEXITY    Please note that this dictation was created using voice recognition software, and that there might be errors of darling and possibly content.

## 2019-04-15 ENCOUNTER — APPOINTMENT (OUTPATIENT)
Dept: SLEEP MEDICINE | Facility: MEDICAL CENTER | Age: 79
End: 2019-04-15
Payer: MEDICARE

## 2019-04-15 ENCOUNTER — SLEEP CENTER VISIT (OUTPATIENT)
Dept: SLEEP MEDICINE | Facility: MEDICAL CENTER | Age: 79
End: 2019-04-15
Payer: MEDICARE

## 2019-04-15 VITALS
DIASTOLIC BLOOD PRESSURE: 72 MMHG | RESPIRATION RATE: 16 BRPM | BODY MASS INDEX: 34.38 KG/M2 | OXYGEN SATURATION: 90 % | HEIGHT: 63 IN | HEART RATE: 79 BPM | WEIGHT: 194 LBS | SYSTOLIC BLOOD PRESSURE: 128 MMHG

## 2019-04-15 DIAGNOSIS — D3A.090 CARCINOID TUMOR OF LUNG: ICD-10-CM

## 2019-04-15 DIAGNOSIS — G47.33 OSA (OBSTRUCTIVE SLEEP APNEA): ICD-10-CM

## 2019-04-15 DIAGNOSIS — I48.0 PAROXYSMAL A-FIB (HCC): ICD-10-CM

## 2019-04-15 PROCEDURE — 99214 OFFICE O/P EST MOD 30 MIN: CPT | Performed by: NURSE PRACTITIONER

## 2019-04-15 NOTE — LETTER
JEAN Serra  [facility]   [address] [place] [address]  Phone: [phone] - Fax: [phone]           Encounter Date: 4/15/2019  Provider: JEAN Serra  Location of Care: [facility]      Patient:   Sharon Callahan   MR Number: [id number]   YOB: 1940     PROGRESS NOTE:  Chief Complaint   Patient presents with   • Apnea     Last Seen 2/5/19       HPI:  Sharon Callahan is a 78 y.o. year old female here today for follow-up on her obstructive sleep apnea. She was last seen in the sleep center 2/5/2019. She was initially diagnosed with sleep apnea in 2010 by [facility]. At that time her AHI was 33. She underwent a repeat sleep study in December 2018 to re qualify for new machine and supplies. PSG 12/13/2018 indicates evidence of severe obstructive sleep apnea with an AHI of 63.8 with a low 02 saturation of 60%. She was titrated to a CPAP pressure of 9 CM H20 with a resultant AHI of 0.6. However mean 02 saturation was only 89.4%.   She is currently compliant on Auto CPAP 9-12 CM H20. Compliance card download today in the office indicates an AHI of 1 with an average use of over 6 hours at night. She states her machine stopped working last Thursday. She states it won't turn on. She states if she can get it to turn off it shuts off shortly after. She states prior to Thursday she felt it was working well. She tolerates the pressure of 9 CM. She has nasal pillows which she feels are comfortable. She does feel overall she sleeps better on therapy and wakes more refreshed. She denies any morning headaches.     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 Biopsy was complicated by a right tension hemithorax. She required arteriography of right 9th intercostal artery and right thoracoscopy with decortication and evacuation of the right hemithorax. She was referred to outpatient  Oncology Dr. Reyes. She states her PCP has been following her since.   She notes an occasional dry cough. She denies dyspnea or wheezing. She denies fevers. She does note occasional flushing symptoms which has occurred more often this past month.   She quit smoking in 1975.     She did have a CTA during a recent hospitalization 2/25/2019 which indicates;    1. No CT evidence of pulmonary embolism.  2. Unchanged bilobed posterior medial right lower lobe lung nodule since 2017, measuring 6 x 9 mm.  3. Linear and patchy opacities in the bilateral lung bases, right more than left, likely atelectasis or scarring.    Echocardiogram 2/25/2019 indicates;  CONCLUSIONS    Technically difficult and incomplete study due to breast implant.   Parasternal long axis view was subopotimal and no parasternal short   axis images.  Grossly normal chamber sizes.  Right heart appeared enlarged in some views but likely from transducer   position and/or locations.  Normal left ventricular systolic function.  Left ventricular ejection fraction is visually estimated to be 60%.  Grossly normal regional wall motion.  The aortic valve is not well visualized.  Aortic sclerosis without stenosis.  No mitral stenosis or regurgitation seen.  Mild tricuspid regurgitation.  Normal inferior vena cava size and inspiratory collapse.  Right ventricular systolic pressure is estimated to be 25 mmHg.  Normal pericardium without effusion.      Past Medical History:   Diagnosis Date   • Arthritis    • BMI 38.0-38.9,adult 9/18/2013   • CATARACT     surgical correcttion bilateral   • Chronic nausea 1/12/2015    Has had GI work up done Dr voss   • Chronic pain of both knees 1/9/2019   • Depression with anxiety 5/3/2011   • Heart burn    • Hemothorax on right 1/4/2017    Status post thoracotomy and decortication   • History of cholecystectomy    • Hyperlipidemia 4/4/2011   • Hypertension    • Indigestion    • MYESHA (obstructive sleep apnea) 4/4/2011    On cPAP     • Pain     left hip   • Pleural effusion, right 1/4/2017    Status post thoracotomy and decortication   • Range of motion deficit     left elbow, unable to completely extend   • S/P bilateral mastectomy 4/4/2011   • Sleep apnea     CPAP   • Snoring        Past Surgical History:   Procedure Laterality Date   • THORACOSCOPY Right 12/6/2016    Procedure: RIGHT THORACOSCOPY ,DECORTICATION, EVACUATION OF HEMOTHORAX;  Surgeon: Ehsan De Leon D.O.;  Location: SURGERY [facility];  Service:    • HIP ARTHROPLASTY TOTAL  12/3/2012    Performed by Jamie Kenney M.D. at SURGERY [facility]   • CATARACT PHACO WITH IOL  11/3/2011    Performed by DEENA BRADEN at SURGERY [facility]   • CATARACT PHACO WITH IOL  10/20/2011    Performed by DEENA BRADEN at SURGERY [facility]   • MAXIM BY LAPAROSCOPY  2/1/2011    Performed by DORON HINOJOSA at SURGERY [facility] DAY [facility]   • HIP ARTHROPLASTY TOTAL  3/26/2009    Right; Perfmed by THERESE LEMA at SURGERY [facility]   • UVULOPHARYNGOPALATOPLASTY  1990   • OTHER  1989    tummy karlack   • OTHER  1988    bilateral mastectomies with implants   • BREAST BIOPSY  1980    bilateral benign mastectomy   • ORIF, HUMERUS  1950's    left   • PB ENLARGE BREAST WITH IMPLANT         Family History   Problem Relation Age of Onset   • Hypertension Mother    • Cancer Father         lung   • Diabetes Father    • Hypertension Father    • Heart Disease Father         MI   • Hypertension Brother    • Sleep Apnea Brother    • Hypertension Maternal Grandmother    • Sleep Apnea Brother    • Hypertension Brother    • Diabetes Brother    • Cancer Paternal Aunt         stomach   • Diabetes Paternal Aunt    • Heart Disease Brother         MI   • Hyperlipidemia Neg Hx    • Stroke Neg Hx        Social History     Social History   • Marital status:      Spouse name: N/A   • Number of children: N/A   • Years of education: N/A     Occupational History   • retired        [employer] department     Social History Main Topics   • Smoking status: Former Smoker     Packs/day: 1.00     Years: 15.00     Types: Cigarettes     Quit date: 1/1/1975   • Smokeless tobacco: Never Used   • Alcohol use No      Comment: once in a great while   • Drug use: No   • Sexual activity: No      Comment: lives with ex-     Other Topics Concern   • Not on file     Social History Narrative   • No narrative on file       ROS:  Constitutional: Denies fevers, chills, sweats,  weight loss  Eyes: Denies glasses, vision loss, pain, drainage, double vision  Ears/Nose/Mouth/Throat: Denies rhinitis, nasal congestion, ear ache, difficulty hearing, sore throat, persistent hoarseness, decayed teeth/toothache  Cardiovascular: Denies chest pain, tightness, palpitations, swelling in feet/legs, fainting, difficulty breathing when laying down  Respiratory: Denies cough, sputum, wheezing, painful breathing, coughing up blood  GI: Denies heartburn, difficulty swallowing, nausea, vomiting, abdominal pain, diarrhea, constipation  : Denies frequent urination, painful urination  Integumentary: Denies rashes, lumps or color changes  MSK: Denies painful joints, sore muscles, and back pain.   Neurological: Denies frequent headaches, dizziness, weakness  Sleep: See HPI     Current Outpatient Prescriptions   Medication Sig Dispense Refill   • vitamin D, Ergocalciferol, (DRISDOL) 52195 units Cap capsule Take 1 Cap by mouth every 7 days. 12 Cap 1   • atorvastatin (LIPITOR) 20 MG Tab TAKE 1 TABLET BY MOUTH EVERY BEDTIME 90 Tab 0   • furosemide (LASIX) 40 MG Tab Take 1 Tab by mouth 2 Times a Day. 60 Tab 2   • potassium chloride SA (KDUR) 20 MEQ Tab CR Take 2 Tabs by mouth every day. 60 Tab 11   • omeprazole (PRILOSEC) 40 MG delayed-release capsule Take 1 Cap by mouth every day. 30 Cap 2   • traZODone (DESYREL) 50 MG Tab Take  mg by mouth at bedtime as needed for Sleep.     • lisinopril (PRINIVIL, ZESTRIL) 40 MG tablet  "TAKE 1 TABLET BY MOUTH EVERY DAY 90 Tab 0   • diclofenac CR (VOLTAREN-XR) 100 MG TABLET SR 24 HR tablet Take 1 Tab by mouth every day. 30 Tab 6     No current facility-administered medications for this visit.        Allergies   Allergen Reactions   • Pcn [Penicillins] Anaphylaxis     Skin turns black   • Clindamycin Rash     Rash     • Influenza Virus Vacc Rash     Red in face   • Norco [Hydrocodone-Acetaminophen]    • Pneumococcal Vaccine Rash   • Tetracycline Rash     Rash    • Xarelto [Rivaroxaban] Rash       /72 (BP Location: Left arm, Patient Position: Sitting, BP Cuff Size: Adult)   Pulse 79   Resp 16   Ht 1.6 m (5' 3\")   Wt 88 kg (194 lb)   SpO2 90%     PE:   Appearance: Well developed, well nourished, no acute distress  Eyes: PERRL, EOM intact, sclera white, conjunctiva moist  Ears: no lesions or deformities  Hearing: grossly intact  Nose: no lesions or deformities  Oropharynx: tongue normal, posterior pharynx without erythema or exudate  Neck: supple, trachea midline, no masses   Respiratory effort: no intercostal retractions or use of accessory muscles  Lung auscultation: no rales, rhonchi or wheezes  Heart auscultation: no murmur rub or gallop  Extremities: no cyanosis or edema  Abdomen: soft ,non tender, no masses  Gait and Station: normal  Digits and nails: no clubbing, cyanosis, petechiae or nodes.  Cranial nerves: grossly intact  Skin: no rashes, lesions or ulcers noted  Orientation: Oriented to time, person and place  Mood and affect: mood and affect appropriate, normal interaction with examiner  Judgement: Intact          Assessment:    1. MYESHA (obstructive sleep apnea)  DME Other    Overnight Oximetry   2. BMI 34.0-34.9,adult     3. Carcinoid tumor of lung     4. Paroxysmal A-fib (HCC)           Plan:    1) Continue Auto CPAP @ 9-12 CM H20. Order to DME to repair or replace her CPAP machine. Her AHI has been reduced to 1. Once her machine has been repaired recommended obtaining an " "overnight oximetry on current pressures to ensure adequate 02 saturations.   2) Sleep hygiene discussed.   3) Weight loss recommended.  4) Continue to follow with Cardiology.   5) She had a recent stable CT chest. She states she did see Oncology initially after hospital discharge in 2016. However her PCP has been following her for her history of Carcinoid tumor of the lung. She has had recent \"flushing\" symptoms. She is encouraged to discuss this with her PCP. Consider referral back to Oncologist. She states she an appointment with her PCP pending and will discuss these symptoms.   6) Follow up in 3 months with compliance card download and results of overnight oximetry, sooner if needed.           Electronically signed by JEAN Serra  on 04/15/19    No Recipients                  "

## 2019-04-15 NOTE — PATIENT INSTRUCTIONS
"Plan:    1) Continue Auto CPAP @ 9-12 CM H20. Order to DME to repair or replace her CPAP machine. Her AHI has been reduced to 1. Once her machine has been repaired recommended obtaining an overnight oximetry on current pressures to ensure adequate 02 saturations.   2) Sleep hygiene discussed.   3) Weight loss recommended.  4) Continue to follow with Cardiology.   5) She had a recent stable CT chest. She states she did see Oncology initially after hospital discharge in 2016. However her PCP has been following her for her history of Carcinoid tumor of the lung. She has had recent \"flushing\" symptoms. She is encouraged to discuss this with her PCP. Consider referral back to Oncologist. She states she an appointment with her PCP pending and will discuss these symptoms.   6) Follow up in 3 months with compliance card download and results of overnight oximetry, sooner if needed.   "

## 2019-04-15 NOTE — PROGRESS NOTES
Chief Complaint   Patient presents with   • Apnea     Last Seen 2/5/19       HPI:  Sharon Callahan is a 78 y.o. year old female here today for follow-up on her obstructive sleep apnea. She was last seen in the sleep center 2/5/2019. She was initially diagnosed with sleep apnea in 2010 by [facility]. At that time her AHI was 33. She underwent a repeat sleep study in December 2018 to re qualify for new machine and supplies. PSG 12/13/2018 indicates evidence of severe obstructive sleep apnea with an AHI of 63.8 with a low 02 saturation of 60%. She was titrated to a CPAP pressure of 9 CM H20 with a resultant AHI of 0.6. However mean 02 saturation was only 89.4%.   She is currently compliant on Auto CPAP 9-12 CM H20. Compliance card download today in the office indicates an AHI of 1 with an average use of over 6 hours at night. She states her machine stopped working last Thursday. She states it won't turn on. She states if she can get it to turn off it shuts off shortly after. She states prior to Thursday she felt it was working well. She tolerates the pressure of 9 CM. She has nasal pillows which she feels are comfortable. She does feel overall she sleeps better on therapy and wakes more refreshed. She denies any morning headaches.     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 Biopsy was complicated by a right tension hemithorax. She required arteriography of right 9th intercostal artery and right thoracoscopy with decortication and evacuation of the right hemithorax. She was referred to outpatient Oncology Dr. Reyes. She states her PCP has been following her since.   She notes an occasional dry cough. She denies dyspnea or wheezing. She denies fevers. She does note occasional flushing symptoms which has occurred more often this past month.   She quit smoking in 1975.     She did have a CTA during a recent hospitalization 2/25/2019 which indicates;    1. No CT evidence of pulmonary embolism.  2.  Unchanged bilobed posterior medial right lower lobe lung nodule since 2017, measuring 6 x 9 mm.  3. Linear and patchy opacities in the bilateral lung bases, right more than left, likely atelectasis or scarring.    Echocardiogram 2/25/2019 indicates;  CONCLUSIONS    Technically difficult and incomplete study due to breast implant.   Parasternal long axis view was subopotimal and no parasternal short   axis images.  Grossly normal chamber sizes.  Right heart appeared enlarged in some views but likely from transducer   position and/or locations.  Normal left ventricular systolic function.  Left ventricular ejection fraction is visually estimated to be 60%.  Grossly normal regional wall motion.  The aortic valve is not well visualized.  Aortic sclerosis without stenosis.  No mitral stenosis or regurgitation seen.  Mild tricuspid regurgitation.  Normal inferior vena cava size and inspiratory collapse.  Right ventricular systolic pressure is estimated to be 25 mmHg.  Normal pericardium without effusion.      Past Medical History:   Diagnosis Date   • Arthritis    • BMI 38.0-38.9,adult 9/18/2013   • CATARACT     surgical correcttion bilateral   • Chronic nausea 1/12/2015    Has had GI work up done Dr voss   • Chronic pain of both knees 1/9/2019   • Depression with anxiety 5/3/2011   • Heart burn    • Hemothorax on right 1/4/2017    Status post thoracotomy and decortication   • History of cholecystectomy    • Hyperlipidemia 4/4/2011   • Hypertension    • Indigestion    • MYESHA (obstructive sleep apnea) 4/4/2011    On cPAP    • Pain     left hip   • Pleural effusion, right 1/4/2017    Status post thoracotomy and decortication   • Range of motion deficit     left elbow, unable to completely extend   • S/P bilateral mastectomy 4/4/2011   • Sleep apnea     CPAP   • Snoring        Past Surgical History:   Procedure Laterality Date   • THORACOSCOPY Right 12/6/2016    Procedure: RIGHT THORACOSCOPY ,DECORTICATION, EVACUATION OF  HEMOTHORAX;  Surgeon: Ehsan De Leon D.O.;  Location: SURGERY [facility];  Service:    • HIP ARTHROPLASTY TOTAL  12/3/2012    Performed by Jamie Kenney M.D. at SURGERY [facility] ORS   • CATARACT PHACO WITH IOL  11/3/2011    Performed by DEENA BRADEN at SURGERY [facility] ORS   • CATARACT PHACO WITH IOL  10/20/2011    Performed by DEENA BRADEN at SURGERY [facility] ORS   • MAXIM BY LAPAROSCOPY  2/1/2011    Performed by DORON HINOJOSA at SURGERY [facility] ORS   • HIP ARTHROPLASTY TOTAL  3/26/2009    Right; Perfmed by THERESE LEMA at SURGERY [facility] ORS   • UVULOPHARYNGOPALATOPLASTY  1990   • OTHER  1989    tummy tuck   • OTHER  1988    bilateral mastectomies with implants   • BREAST BIOPSY  1980    bilateral benign mastectomy   • ORIF, HUMERUS  1950's    left   • PB ENLARGE BREAST WITH IMPLANT         Family History   Problem Relation Age of Onset   • Hypertension Mother    • Cancer Father         lung   • Diabetes Father    • Hypertension Father    • Heart Disease Father         MI   • Hypertension Brother    • Sleep Apnea Brother    • Hypertension Maternal Grandmother    • Sleep Apnea Brother    • Hypertension Brother    • Diabetes Brother    • Cancer Paternal Aunt         stomach   • Diabetes Paternal Aunt    • Heart Disease Brother         MI   • Hyperlipidemia Neg Hx    • Stroke Neg Hx        Social History     Social History   • Marital status:      Spouse name: N/A   • Number of children: N/A   • Years of education: N/A     Occupational History   • retired       [employer]     Social History Main Topics   • Smoking status: Former Smoker     Packs/day: 1.00     Years: 15.00     Types: Cigarettes     Quit date: 1/1/1975   • Smokeless tobacco: Never Used   • Alcohol use No      Comment: once in a great while   • Drug use: No   • Sexual activity: No      Comment: lives with ex-     Other Topics Concern   • Not on file     Social History  Narrative   • No narrative on file       ROS:  Constitutional: Denies fevers, chills, sweats,  weight loss  Eyes: Denies glasses, vision loss, pain, drainage, double vision  Ears/Nose/Mouth/Throat: Denies rhinitis, nasal congestion, ear ache, difficulty hearing, sore throat, persistent hoarseness, decayed teeth/toothache  Cardiovascular: Denies chest pain, tightness, palpitations, swelling in feet/legs, fainting, difficulty breathing when laying down  Respiratory: Denies cough, sputum, wheezing, painful breathing, coughing up blood  GI: Denies heartburn, difficulty swallowing, nausea, vomiting, abdominal pain, diarrhea, constipation  : Denies frequent urination, painful urination  Integumentary: Denies rashes, lumps or color changes  MSK: Denies painful joints, sore muscles, and back pain.   Neurological: Denies frequent headaches, dizziness, weakness  Sleep: See HPI     Current Outpatient Prescriptions   Medication Sig Dispense Refill   • vitamin D, Ergocalciferol, (DRISDOL) 93627 units Cap capsule Take 1 Cap by mouth every 7 days. 12 Cap 1   • atorvastatin (LIPITOR) 20 MG Tab TAKE 1 TABLET BY MOUTH EVERY BEDTIME 90 Tab 0   • furosemide (LASIX) 40 MG Tab Take 1 Tab by mouth 2 Times a Day. 60 Tab 2   • potassium chloride SA (KDUR) 20 MEQ Tab CR Take 2 Tabs by mouth every day. 60 Tab 11   • omeprazole (PRILOSEC) 40 MG delayed-release capsule Take 1 Cap by mouth every day. 30 Cap 2   • traZODone (DESYREL) 50 MG Tab Take  mg by mouth at bedtime as needed for Sleep.     • lisinopril (PRINIVIL, ZESTRIL) 40 MG tablet TAKE 1 TABLET BY MOUTH EVERY DAY 90 Tab 0   • diclofenac CR (VOLTAREN-XR) 100 MG TABLET SR 24 HR tablet Take 1 Tab by mouth every day. 30 Tab 6     No current facility-administered medications for this visit.        Allergies   Allergen Reactions   • Pcn [Penicillins] Anaphylaxis     Skin turns black   • Clindamycin Rash     Rash     • Influenza Virus Vacc Rash     Red in face   • Norco  "[Hydrocodone-Acetaminophen]    • Pneumococcal Vaccine Rash   • Tetracycline Rash     Rash    • Xarelto [Rivaroxaban] Rash       /72 (BP Location: Left arm, Patient Position: Sitting, BP Cuff Size: Adult)   Pulse 79   Resp 16   Ht 1.6 m (5' 3\")   Wt 88 kg (194 lb)   SpO2 90%     PE:   Appearance: Well developed, well nourished, no acute distress  Eyes: PERRL, EOM intact, sclera white, conjunctiva moist  Ears: no lesions or deformities  Hearing: grossly intact  Nose: no lesions or deformities  Oropharynx: tongue normal, posterior pharynx without erythema or exudate  Neck: supple, trachea midline, no masses   Respiratory effort: no intercostal retractions or use of accessory muscles  Lung auscultation: no rales, rhonchi or wheezes  Heart auscultation: no murmur rub or gallop  Extremities: no cyanosis or edema  Abdomen: soft ,non tender, no masses  Gait and Station: normal  Digits and nails: no clubbing, cyanosis, petechiae or nodes.  Cranial nerves: grossly intact  Skin: no rashes, lesions or ulcers noted  Orientation: Oriented to time, person and place  Mood and affect: mood and affect appropriate, normal interaction with examiner  Judgement: Intact          Assessment:    1. MYESHA (obstructive sleep apnea)  DME Other    Overnight Oximetry   2. BMI 34.0-34.9,adult     3. Carcinoid tumor of lung     4. Paroxysmal A-fib (HCC)           Plan:    1) Continue Auto CPAP @ 9-12 CM H20. Order to DME to repair or replace her CPAP machine. Her AHI has been reduced to 1. Once her machine has been repaired recommended obtaining an overnight oximetry on current pressures to ensure adequate 02 saturations.   2) Sleep hygiene discussed.   3) Weight loss recommended.  4) Continue to follow with Cardiology.   5) She had a recent stable CT chest. She states she did see Oncology initially after hospital discharge in 2016. However her PCP has been following her for her history of Carcinoid tumor of the lung. She has had recent " "\"flushing\" symptoms. She is encouraged to discuss this with her PCP. Consider referral back to Oncologist. She states she an appointment with her PCP pending and will discuss these symptoms.   6) Follow up in 3 months with compliance card download and results of overnight oximetry, sooner if needed.       "

## 2019-04-18 ENCOUNTER — OFFICE VISIT (OUTPATIENT)
Dept: MEDICAL GROUP | Facility: MEDICAL CENTER | Age: 79
End: 2019-04-18
Payer: MEDICARE

## 2019-04-18 VITALS
DIASTOLIC BLOOD PRESSURE: 74 MMHG | OXYGEN SATURATION: 96 % | TEMPERATURE: 98.1 F | BODY MASS INDEX: 36.15 KG/M2 | HEIGHT: 62 IN | HEART RATE: 53 BPM | WEIGHT: 196.43 LBS | SYSTOLIC BLOOD PRESSURE: 128 MMHG

## 2019-04-18 DIAGNOSIS — I51.7 LVH (LEFT VENTRICULAR HYPERTROPHY): ICD-10-CM

## 2019-04-18 DIAGNOSIS — I47.10 SVT (SUPRAVENTRICULAR TACHYCARDIA): ICD-10-CM

## 2019-04-18 DIAGNOSIS — E87.6 HYPOKALEMIA: ICD-10-CM

## 2019-04-18 DIAGNOSIS — I48.0 PAROXYSMAL A-FIB (HCC): ICD-10-CM

## 2019-04-18 DIAGNOSIS — I10 ESSENTIAL HYPERTENSION: ICD-10-CM

## 2019-04-18 DIAGNOSIS — Z12.31 ENCOUNTER FOR SCREENING MAMMOGRAM FOR MALIGNANT NEOPLASM OF BREAST: ICD-10-CM

## 2019-04-18 DIAGNOSIS — G47.33 OSA ON CPAP: Chronic | ICD-10-CM

## 2019-04-18 DIAGNOSIS — E78.5 DYSLIPIDEMIA: ICD-10-CM

## 2019-04-18 DIAGNOSIS — Z23 NEED FOR VACCINATION: ICD-10-CM

## 2019-04-18 PROCEDURE — 99214 OFFICE O/P EST MOD 30 MIN: CPT | Mod: 25 | Performed by: INTERNAL MEDICINE

## 2019-04-18 PROCEDURE — G0009 ADMIN PNEUMOCOCCAL VACCINE: HCPCS | Performed by: INTERNAL MEDICINE

## 2019-04-18 PROCEDURE — 90670 PCV13 VACCINE IM: CPT | Performed by: INTERNAL MEDICINE

## 2019-04-18 RX ORDER — ATORVASTATIN CALCIUM 20 MG/1
20 TABLET, FILM COATED ORAL
Qty: 90 TAB | Refills: 1 | Status: SHIPPED | OUTPATIENT
Start: 2019-04-18 | End: 2019-11-03 | Stop reason: SDUPTHER

## 2019-04-18 RX ORDER — POTASSIUM CHLORIDE 20 MEQ/1
40 TABLET, EXTENDED RELEASE ORAL DAILY
Qty: 180 TAB | Refills: 1 | Status: SHIPPED | OUTPATIENT
Start: 2019-04-18 | End: 2020-02-05

## 2019-04-18 RX ORDER — LISINOPRIL 40 MG/1
40 TABLET ORAL
Qty: 90 TAB | Refills: 0 | Status: SHIPPED | OUTPATIENT
Start: 2019-04-18 | End: 2019-05-03 | Stop reason: SDUPTHER

## 2019-04-18 NOTE — PROGRESS NOTES
CHIEF COMPLAINT  Chief Complaint   Patient presents with   • Hypertension     HPI  Patient is a 78 y.o. female patient who presents today for the following     HYPERTENSION, LVH, hypokalemia  Meds: Lisinopril 40 mg daily, Lasix 40 mg twice daily, potassium 20 mEq twice daily:  -  taking as prescribed.   Measuring BP at home: yes, it has been < 150/90.  Denies: - headaches, vision problems, tinnitus.  - chest pain/pressure, palpitations, irregular heart beats, exertional, dyspnea, peripheral edema.  - medication side effect: unusual fatigue, slow heartbeat, foot/leg swelling.  Low salt diet: yes  Diet: healthy  Exercise: daily yard work  BMI: 35  FH of HTN: Multiple  Labs showed hypokalemia, borderline, on potassium supplement.    The last echocardiography, 2/26/19:  Technically difficult and incomplete study due to breast implant.   Parasternal long axis view was subopotimal and no parasternal short   axis images.  Grossly normal chamber sizes.  Right heart appeared enlarged in some views but likely from transducer   position and/or locations.  Normal left ventricular systolic function.  Left ventricular ejection fraction is visually estimated to be 60%.  Grossly normal regional wall motion.  The aortic valve is not well visualized.  Aortic sclerosis without stenosis.  No mitral stenosis or regurgitation seen.  Mild tricuspid regurgitation.  Normal inferior vena cava size and inspiratory collapse.  Right ventricular systolic pressure is estimated to be 25 mmHg.  Normal pericardium without effusion.    Paroxysmal Afib, SVT  H/o paroxysmal afib in the setting of intercurrent illness, h/o HTN, Carcinoid tumor of the lung, MYESHA.      She denies chest pain, dizziness, pre syncope or syncope, dyspnea, PND, orthopnea, or lower extremity edema.     She has had occasional palpitations that are infrequent, and usually only last a few seconds. She states that they are not overly bothersome at this time. No associated symptoms  with palps.   Reviewed the last echocardiography.  Ziopatch revealed short runs of SVT, no afib.     Dyslipidemia  On simvastatin, 40 mg, taking daily, as prescribed. No myalgias, muscle cramps or pain.   Diet /Exercise/BMI: As above  FH: neg     MYESHA, on CPAP  She has been on CPAP for > 5 yrs.  She has been compliant with CPAP.  Denies daily fatigue and daytime sleepiness.   No recent pulmonology f/u.    Reviewed PMH, PSH, FH, SH, ALL, HCM/IMM, no changes  Reviewed MEDS, no changes    Patient Active Problem List    Diagnosis Date Noted   • Obesity (BMI 30.0-34.9) 02/26/2019   • Lower extremity edema 02/25/2019   • Chronic pain of both knees 01/09/2019   • Hypovitaminosis D 06/27/2018   • Essential hypertension 06/16/2017   • MYESHA on CPAP 06/16/2017   • LVH (left ventricular hypertrophy) 12/14/2016   • Paroxysmal A-fib (HCC) 12/14/2016   • Hypokalemia 11/29/2016   • Carcinoid tumor of lung 11/28/2016   • Osteopenia 03/25/2016   • H/O breast surgery 04/04/2011     CURRENT MEDICATIONS  Current Outpatient Prescriptions   Medication Sig Dispense Refill   • lisinopril (PRINIVIL, ZESTRIL) 40 MG tablet Take 1 Tab by mouth every day. 90 Tab 0   • potassium chloride SA (KDUR) 20 MEQ Tab CR Take 2 Tabs by mouth every day. 180 Tab 1   • atorvastatin (LIPITOR) 20 MG Tab Take 1 Tab by mouth every bedtime. 90 Tab 1   • vitamin D, Ergocalciferol, (DRISDOL) 90891 units Cap capsule Take 1 Cap by mouth every 7 days. 12 Cap 1   • furosemide (LASIX) 40 MG Tab Take 1 Tab by mouth 2 Times a Day. 60 Tab 2   • omeprazole (PRILOSEC) 40 MG delayed-release capsule Take 1 Cap by mouth every day. 30 Cap 2   • traZODone (DESYREL) 50 MG Tab Take  mg by mouth at bedtime as needed for Sleep.     • diclofenac CR (VOLTAREN-XR) 100 MG TABLET SR 24 HR tablet Take 1 Tab by mouth every day. 30 Tab 6     No current facility-administered medications for this visit.      ALLERGIES  Allergies: Pcn [penicillins]; Clindamycin; Influenza virus vacc; Norco  [hydrocodone-acetaminophen]; Pneumococcal vaccine; Tetracycline; and Xarelto [rivaroxaban]  PAST MEDICAL HISTORY  Past Medical History:   Diagnosis Date   • Chronic pain of both knees 1/9/2019   • Pleural effusion, right 1/4/2017    Status post thoracotomy and decortication   • Hemothorax on right 1/4/2017    Status post thoracotomy and decortication   • Chronic nausea 1/12/2015    Has had GI work up done Dr voss   • BMI 38.0-38.9,adult 9/18/2013   • Depression with anxiety 5/3/2011   • S/P bilateral mastectomy 4/4/2011   • Hyperlipidemia 4/4/2011   • MYESHA (obstructive sleep apnea) 4/4/2011    On cPAP    • Arthritis    • CATARACT     surgical correcttion bilateral   • Heart burn    • History of cholecystectomy    • Hypertension    • Indigestion    • Pain     left hip   • Range of motion deficit     left elbow, unable to completely extend   • Sleep apnea     CPAP   • Snoring      SURGICAL HISTORY  She  has a past surgical history that includes hip arthroplasty total (3/26/2009); lisa by laparoscopy (2/1/2011); other (1988); other (1989); uvulopharyngopalatoplasty (1990); cataract phaco with iol (10/20/2011); cataract phaco with iol (11/3/2011); orif, humerus (1950's); hip arthroplasty total (12/3/2012); breast biopsy (1980); pr enlarge breast with implant; and thoracoscopy (Right, 12/6/2016).  SOCIAL HISTORY  Social History   Substance Use Topics   • Smoking status: Former Smoker     Packs/day: 1.00     Years: 15.00     Types: Cigarettes     Quit date: 1/1/1975   • Smokeless tobacco: Never Used   • Alcohol use No      Comment: once in a great while     Social History     Social History Narrative   • No narrative on file     FAMILY HISTORY  Family History   Problem Relation Age of Onset   • Hypertension Mother    • Cancer Father         lung   • Diabetes Father    • Hypertension Father    • Heart Disease Father         MI   • Hypertension Brother    • Sleep Apnea Brother    • Hypertension Maternal Grandmother   "  • Sleep Apnea Brother    • Hypertension Brother    • Diabetes Brother    • Cancer Paternal Aunt         stomach   • Diabetes Paternal Aunt    • Heart Disease Brother         MI   • Hyperlipidemia Neg Hx    • Stroke Neg Hx      Family Status   Relation Status   • Mo    • Fa    • Sis (Not Specified)   • Bro Alive   • MGMo  at age 65   • Bro Alive   • Bro    • PAunt (Not Specified)   • Bro (Not Specified)   • Neg Hx (Not Specified)     ROS   Constitutional: Negative for fatigue.  HENT: Negative for sore throat.  Eyes: Negative for blurred vision.   Respiratory: Negative for  shortness of breath. And per HPI.  Cardiovascular: Negative for chest pain. And per HPI.  Gastrointestinal: Negative for heartburn, abdominal pain.   Genitourinary: Complains of urinary frequency, on Lasix.  Musculoskeletal: Negative for significant back pain.   Skin: Negative for rash.   Neuro: Negative for dizziness,  headaches.   Endo/Heme/Allergies: Does not bruise/bleed easily.   Psychiatric/Behavioral: Negative for depression.    PHYSICAL EXAM   /74 (BP Location: Left arm, Patient Position: Sitting, BP Cuff Size: Adult)   Pulse (!) 53   Temp 36.7 °C (98.1 °F) (Temporal)   Ht 1.575 m (5' 2\")   Wt 89.1 kg (196 lb 6.9 oz)   SpO2 96%  Body mass index is 35.93 kg/m².  General:  NAD, well appearing  HEENT:   NC/AT, PERRLA, EOMI, TMs are clear. Oropharyngeal mucosa is pink,  without lesions;  no cervical / supraclavicular  lymphadenopathy, no thyromegaly.    Cardiovascular: RRR.   No m/r/g. No carotid bruits .      Lungs:   CTAB, no w/r/r, no respiratory distress.  Abdomen: Soft, NT/ND + BS; unable to palpate liver/spleen due to obesity.  Extremities:  2+ DP and radial pulses bilaterally.  No c/c/e.   Skin:  Warm, dry.  No erythema. No rash.   Neurologic: Alert & oriented x 3. CN II-XII grossly intact. No focal deficits.  Psychiatric:  Affect normal, mood normal, judgment normal.    LABS     Labs are " reviewed and discussed with a patient  Lab Results   Component Value Date/Time    CHOLSTRLTOT 144 03/29/2018 07:21 AM    LDL 63 03/29/2018 07:21 AM    HDL 68 03/29/2018 07:21 AM    TRIGLYCERIDE 66 03/29/2018 07:21 AM       Lab Results   Component Value Date/Time    SODIUM 138 02/27/2019 04:28 AM    POTASSIUM 3.3 (L) 02/27/2019 04:28 AM    CHLORIDE 103 02/27/2019 04:28 AM    CO2 25 02/27/2019 04:28 AM    GLUCOSE 90 02/27/2019 04:28 AM    BUN 22 02/27/2019 04:28 AM    CREATININE 0.77 02/27/2019 04:28 AM    CREATININE 0.79 04/04/2011 12:00 AM    BUNCREATRAT 13 04/04/2011 12:00 AM     Lab Results   Component Value Date/Time    ALKPHOSPHAT 68 02/26/2019 03:43 AM    ASTSGOT 21 02/26/2019 03:43 AM    ALTSGPT 19 02/26/2019 03:43 AM    TBILIRUBIN 0.6 02/26/2019 03:43 AM      Lab Results   Component Value Date/Time    HBA1C 5.6 02/25/2016 07:09 AM    HBA1C 5.4 06/25/2012 10:00 PM    HBA1C 5.4 07/27/2006 03:25 AM     No results found for: TSH  Lab Results   Component Value Date/Time    FREET4 0.82 02/14/2013 01:45 PM    FREET4 0.95 07/08/2011 03:37 PM       Lab Results   Component Value Date/Time    WBC 5.6 02/27/2019 04:28 AM    RBC 4.62 02/27/2019 04:28 AM    HEMOGLOBIN 13.5 02/27/2019 04:28 AM    HEMATOCRIT 41.8 02/27/2019 04:28 AM    MCV 90.5 02/27/2019 04:28 AM    MCH 29.2 02/27/2019 04:28 AM    MCHC 32.3 (L) 02/27/2019 04:28 AM    MPV 11.2 02/27/2019 04:28 AM    NEUTSPOLYS 59.70 02/27/2019 04:28 AM    LYMPHOCYTES 24.80 02/27/2019 04:28 AM    MONOCYTES 9.40 02/27/2019 04:28 AM    EOSINOPHILS 3.60 02/27/2019 04:28 AM    BASOPHILS 1.10 02/27/2019 04:28 AM    HYPOCHROMIA 2+ 07/04/2013 07:30 AM    ANISOCYTOSIS 1+ 12/01/2016 06:45 AM        IMAGING     Reviewed and discussed following imaging:    The last echocardiography, 2/26/19:  Technically difficult and incomplete study due to breast implant.   Parasternal long axis view was subopotimal and no parasternal short   axis images.  Grossly normal chamber sizes.  Right heart  appeared enlarged in some views but likely from transducer   position and/or locations.  Normal left ventricular systolic function.  Left ventricular ejection fraction is visually estimated to be 60%.  Grossly normal regional wall motion.  The aortic valve is not well visualized.  Aortic sclerosis without stenosis.  No mitral stenosis or regurgitation seen.  Mild tricuspid regurgitation.  Normal inferior vena cava size and inspiratory collapse.  Right ventricular systolic pressure is estimated to be 25 mmHg.  Normal pericardium without effusion.    ASSESMENT AND PLAN     1. Essential hypertension  Blood pressure is controlled, leg swelling is resolved.  May have a trial of Lasix, 1/2 tablets twice daily    - lisinopril (PRINIVIL, ZESTRIL) 40 MG tablet; Take 1 Tab by mouth every day.  Dispense: 90 Tab; Refill: 0  - potassium chloride SA (KDUR) 20 MEQ Tab CR; Take 2 Tabs by mouth every day.  Dispense: 180 Tab; Refill: 1  - Comp Metabolic Panel; Future    2. LVH (left ventricular hypertrophy)  Improved    3. Hypokalemia  Controlled, continue current treatment  - potassium chloride SA (KDUR) 20 MEQ Tab CR; Take 2 Tabs by mouth every day.  Dispense: 180 Tab; Refill: 1  - Comp Metabolic Panel; Future    4. Paroxysmal A-fib (HCC)  No recording on Holter, continue to follow    5. SVT (supraventricular tachycardia) (HCC)  Short, no medications    6. Dyslipidemia  Controlled, continue current treatment  - atorvastatin (LIPITOR) 20 MG Tab; Take 1 Tab by mouth every bedtime.  Dispense: 90 Tab; Refill: 1  - Comp Metabolic Panel; Future  - Lipid Profile; Future    7. MYESHA on CPAP  Controlled, continue current treatment and pulmonology follow-up    8. Encounter for screening mammogram for malignant neoplasm of breast  - MA-SCREEN MAMMO W/CAD-BILAT; Future    9. Need for vaccination  - Pneumococcal Conjugate Vaccine 13-Valent <6yo IM    Counseling:   - Smoking:  Nonsmoker    Followup: Return in about 3 months (around  7/18/2019).    All questions are answered.    Please note that this dictation was created using voice recognition software, and that there might be errors of darling and possibly content.

## 2019-05-03 DIAGNOSIS — M79.89 LEG SWELLING: ICD-10-CM

## 2019-05-03 DIAGNOSIS — I10 ESSENTIAL HYPERTENSION: ICD-10-CM

## 2019-05-03 RX ORDER — FUROSEMIDE 40 MG/1
40 TABLET ORAL 2 TIMES DAILY
Qty: 180 TAB | Refills: 0 | Status: SHIPPED | OUTPATIENT
Start: 2019-05-03 | End: 2019-08-03 | Stop reason: SDUPTHER

## 2019-05-03 RX ORDER — LISINOPRIL 40 MG/1
40 TABLET ORAL
Qty: 90 TAB | Refills: 1 | Status: SHIPPED | OUTPATIENT
Start: 2019-05-03 | End: 2019-12-10 | Stop reason: SDUPTHER

## 2019-06-11 DIAGNOSIS — E55.9 HYPOVITAMINOSIS D: ICD-10-CM

## 2019-06-11 RX ORDER — ERGOCALCIFEROL 1.25 MG/1
CAPSULE ORAL
Qty: 12 CAP | Refills: 0 | Status: SHIPPED | OUTPATIENT
Start: 2019-06-11 | End: 2019-10-06 | Stop reason: SDUPTHER

## 2019-07-08 ENCOUNTER — APPOINTMENT (OUTPATIENT)
Dept: SLEEP MEDICINE | Facility: MEDICAL CENTER | Age: 79
End: 2019-07-08
Attending: NURSE PRACTITIONER
Payer: MEDICARE

## 2019-07-12 DIAGNOSIS — E55.9 HYPOVITAMINOSIS D: ICD-10-CM

## 2019-07-12 RX ORDER — ERGOCALCIFEROL 1.25 MG/1
50000 CAPSULE ORAL
Qty: 12 CAP | Refills: 0 | OUTPATIENT
Start: 2019-07-12

## 2019-07-15 ENCOUNTER — SLEEP CENTER VISIT (OUTPATIENT)
Dept: SLEEP MEDICINE | Facility: MEDICAL CENTER | Age: 79
End: 2019-07-15
Payer: MEDICARE

## 2019-07-15 VITALS
OXYGEN SATURATION: 96 % | DIASTOLIC BLOOD PRESSURE: 62 MMHG | RESPIRATION RATE: 16 BRPM | SYSTOLIC BLOOD PRESSURE: 108 MMHG | HEART RATE: 62 BPM | HEIGHT: 62 IN | WEIGHT: 196 LBS | BODY MASS INDEX: 36.07 KG/M2

## 2019-07-15 DIAGNOSIS — D3A.090 CARCINOID TUMOR OF LUNG: ICD-10-CM

## 2019-07-15 DIAGNOSIS — I48.0 PAROXYSMAL A-FIB (HCC): ICD-10-CM

## 2019-07-15 DIAGNOSIS — G47.33 OSA (OBSTRUCTIVE SLEEP APNEA): ICD-10-CM

## 2019-07-15 PROCEDURE — 99214 OFFICE O/P EST MOD 30 MIN: CPT | Performed by: NURSE PRACTITIONER

## 2019-07-15 NOTE — PATIENT INSTRUCTIONS
Plan:    1) Continue Auto CPAP @ 9-12 CM H20.   2) Sleep hygiene discussed.   3) Weight loss recommended.  4) Continue to follow with Cardiology.   5) CT chest stable. Continue to follow with PCP.   6) Annual sleep follow up with compliance card download, sooner if needed.

## 2019-07-15 NOTE — PROGRESS NOTES
Chief Complaint   Patient presents with   • Apnea     Last Seen 4/15/19       HPI:  Sharon Callahan is a 78 y.o. year old female here today for follow-up on her obstructive sleep apnea. She was initially diagnosed with sleep apnea in 2010 by [facility]. At that time her AHI was 33. She underwent a repeat sleep study in December 2018 to re qualify for new machine and supplies. PSG 12/13/2018 indicates evidence of severe obstructive sleep apnea with an AHI of 63.8 with a low 02 saturation of 60%. She was titrated to a CPAP pressure of 9 CM H20 with a resultant AHI of 0.6. However mean 02 saturation was only 89.4%.   She is currently compliant on Auto CPAP 9-12 CM H20. Her machine had broke prior to her last office visit in April 2019. An order was sent to her DME to repair or replace her machine. She states they replaced her machine. Repeat compliance card download today in the office indicates an AHI of 1 with an average use of over 8 hours at night. She tolerates the pressure well. She states the new machine quieter. She has a nasal mask which she feels is a good fit. She does feel she sleeps better on therapy and wakes more refreshed. He denies any morning headaches.      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 Biopsy was complicated by a right tension hemithorax. She required arteriography of right 9th intercostal artery and right thoracoscopy with decortication and evacuation of the right hemithorax. She was referred to outpatient Oncology Dr. Reyes. She states her PCP has been following her since.   She denies significant cough or dyspnea. However noted occasional flushing symptoms. She noted she had a pending appointment with her PCP. She was encouraged to discuss these symptoms with her PCP.   She quit smoking in 1975.      She did have a CTA during a hospitalization 2/25/2019 which indicates;    1. No CT evidence of pulmonary embolism.  2. Unchanged bilobed posterior medial right lower  lobe lung nodule since 2017, measuring 6 x 9 mm.  3. Linear and patchy opacities in the bilateral lung bases, right more than left, likely atelectasis or scarring.     Echocardiogram 2/25/2019 indicates;  CONCLUSIONS    Technically difficult and incomplete study due to breast implant.   Parasternal long axis view was subopotimal and no parasternal short   axis images.  Grossly normal chamber sizes.  Right heart appeared enlarged in some views but likely from transducer   position and/or locations.  Normal left ventricular systolic function.  Left ventricular ejection fraction is visually estimated to be 60%.  Grossly normal regional wall motion.  The aortic valve is not well visualized.  Aortic sclerosis without stenosis.  No mitral stenosis or regurgitation seen.  Mild tricuspid regurgitation.  Normal inferior vena cava size and inspiratory collapse.  Right ventricular systolic pressure is estimated to be 25 mmHg.  Normal pericardium without effusion.         Past Medical History:   Diagnosis Date   • Arthritis    • BMI 38.0-38.9,adult 9/18/2013   • CATARACT     surgical correcttion bilateral   • Chronic nausea 1/12/2015    Has had GI work up done Dr voss   • Chronic pain of both knees 1/9/2019   • Depression with anxiety 5/3/2011   • Heart burn    • Hemothorax on right 1/4/2017    Status post thoracotomy and decortication   • History of cholecystectomy    • Hyperlipidemia 4/4/2011   • Hypertension    • Indigestion    • MYESHA (obstructive sleep apnea) 4/4/2011    On cPAP    • Pain     left hip   • Pleural effusion, right 1/4/2017    Status post thoracotomy and decortication   • Range of motion deficit     left elbow, unable to completely extend   • S/P bilateral mastectomy 4/4/2011   • Sleep apnea     CPAP   • Snoring        Past Surgical History:   Procedure Laterality Date   • THORACOSCOPY Right 12/6/2016    Procedure: RIGHT THORACOSCOPY ,DECORTICATION, EVACUATION OF HEMOTHORAX;  Surgeon: Ehsan De Leon,  KAILA;  Location: SURGERY [facility];  Service:    • HIP ARTHROPLASTY TOTAL  12/3/2012    Performed by Jamie Kenney M.D. at SURGERY [facility] ORS   • CATARACT PHACO WITH IOL  11/3/2011    Performed by DEENA BRADEN at SURGERY [facility] ORS   • CATARACT PHACO WITH IOL  10/20/2011    Performed by DEENA BRADEN at SURGERY SAME DAY [facility] ORS   • MAXIM BY LAPAROSCOPY  2/1/2011    Performed by DORON HINOJOSA at SURGERY SAME DAY [facility] ORS   • HIP ARTHROPLASTY TOTAL  3/26/2009    Right; Perfmed by THERESE LEMA at SURGERY [facility] ORS   • UVULOPHARYNGOPALATOPLASTY  1990   • OTHER  1989    tummy reyna   • OTHER  1988    bilateral mastectomies with implants   • BREAST BIOPSY  1980    bilateral benign mastectomy   • ORIF, HUMERUS  1950's    left   • PB ENLARGE BREAST WITH IMPLANT         Family History   Problem Relation Age of Onset   • Hypertension Mother    • Cancer Father         lung   • Diabetes Father    • Hypertension Father    • Heart Disease Father         MI   • Hypertension Brother    • Sleep Apnea Brother    • Hypertension Maternal Grandmother    • Sleep Apnea Brother    • Hypertension Brother    • Diabetes Brother    • Cancer Paternal Aunt         stomach   • Diabetes Paternal Aunt    • Heart Disease Brother         MI   • Hyperlipidemia Neg Hx    • Stroke Neg Hx        Social History     Social History   • Marital status:      Spouse name: N/A   • Number of children: N/A   • Years of education: N/A     Occupational History   • retired       [employer]     Social History Main Topics   • Smoking status: Former Smoker     Packs/day: 1.00     Years: 15.00     Types: Cigarettes     Quit date: 1/1/1975   • Smokeless tobacco: Never Used   • Alcohol use No      Comment: once in a great while   • Drug use: No   • Sexual activity: No      Comment: lives with ex-     Other Topics Concern   • Not on file     Social History Narrative   • No narrative on file        ROS:  Constitutional: Denies fevers, chills, sweats, weight loss  Eyes: Denies glasses, vision loss, pain, drainage, double vision  Ears/Nose/Mouth/Throat: Denies rhinitis, nasal congestion, ear ache, difficulty hearing, sore throat, persistent hoarseness, decayed teeth/toothache  Cardiovascular: Denies chest pain, tightness, palpitations, swelling in feet/legs, fainting, difficulty breathing when laying down  Respiratory: See HPI   GI: Denies heartburn, difficulty swallowing, nausea, vomiting, abdominal pain, diarrhea, constipation  : Denies frequent urination, painful urination  Integumentary: Denies rashes, lumps or color changes  MSK: Denies painful joints, sore muscles, and back pain.   Neurological: Denies frequent headaches, dizziness, weakness  Sleep: See HPI       Current Outpatient Prescriptions   Medication Sig Dispense Refill   • vitamin D, Ergocalciferol, (DRISDOL) 41793 units Cap capsule TAKE ONE CAPSULE BY MOUTH EVERY 7 DAYS 12 Cap 0   • lisinopril (PRINIVIL, ZESTRIL) 40 MG tablet Take 1 Tab by mouth every day. 90 Tab 1   • furosemide (LASIX) 40 MG Tab Take 1 Tab by mouth 2 Times a Day. 180 Tab 0   • potassium chloride SA (KDUR) 20 MEQ Tab CR Take 2 Tabs by mouth every day. 180 Tab 1   • atorvastatin (LIPITOR) 20 MG Tab Take 1 Tab by mouth every bedtime. 90 Tab 1   • omeprazole (PRILOSEC) 40 MG delayed-release capsule Take 1 Cap by mouth every day. 30 Cap 2   • diclofenac CR (VOLTAREN-XR) 100 MG TABLET SR 24 HR tablet Take 1 Tab by mouth every day. 30 Tab 6   • traZODone (DESYREL) 50 MG Tab Take  mg by mouth at bedtime as needed for Sleep.       No current facility-administered medications for this visit.        Allergies   Allergen Reactions   • Pcn [Penicillins] Anaphylaxis     Skin turns black   • Clindamycin Rash     Rash     • Influenza Virus Vacc Rash     Red in face   • Norco [Hydrocodone-Acetaminophen]    • Pneumococcal Vaccine Rash   • Tetracycline Rash     Rash    • Xarelto  "[Rivaroxaban] Rash       /62 (BP Location: Left arm, Patient Position: Sitting, BP Cuff Size: Adult)   Pulse 62   Resp 16   Ht 1.575 m (5' 2\")   Wt 88.9 kg (196 lb)   SpO2 96%     PE:   Appearance: Well developed, well nourished, no acute distress  Eyes: PERRL, EOM intact, sclera white, conjunctiva moist  Ears: no lesions or deformities  Hearing: grossly intact  Nose: no lesions or deformities  Oropharynx: tongue normal, posterior pharynx without erythema or exudate  Neck: supple, trachea midline, no masses   Respiratory effort: no intercostal retractions or use of accessory muscles  Lung auscultation: no rales, rhonchi or wheezes  Heart auscultation: no murmur rub or gallop  Extremities: no cyanosis or edema  Abdomen: soft ,non tender, no masses  Gait and Station: normal  Digits and nails: no clubbing, cyanosis, petechiae or nodes.  Cranial nerves: grossly intact  Skin: no rashes, lesions or ulcers noted  Orientation: Oriented to time, person and place  Mood and affect: mood and affect appropriate, normal interaction with examiner  Judgement: Intact          Assessment:    1. MYESHA (obstructive sleep apnea)     2. BMI 35.0-35.9,adult     3. Carcinoid tumor of lung     4. Paroxysmal A-fib (HCC)           Plan:    1) Continue Auto CPAP @ 9-12 CM H20.   2) Sleep hygiene discussed.   3) Weight loss recommended.  4) Continue to follow with Cardiology.   5) CT chest stable. Continue to follow with PCP.   6) Annual sleep follow up with compliance card download, sooner if needed.   "

## 2019-07-17 ENCOUNTER — APPOINTMENT (OUTPATIENT)
Dept: MEDICAL GROUP | Facility: MEDICAL CENTER | Age: 79
End: 2019-07-17
Payer: MEDICARE

## 2019-08-03 DIAGNOSIS — M79.89 LEG SWELLING: ICD-10-CM

## 2019-08-03 DIAGNOSIS — I10 ESSENTIAL HYPERTENSION: ICD-10-CM

## 2019-08-05 RX ORDER — FUROSEMIDE 40 MG/1
TABLET ORAL
Qty: 30 TAB | Refills: 0 | Status: SHIPPED | OUTPATIENT
Start: 2019-08-05 | End: 2020-04-17

## 2019-08-05 NOTE — TELEPHONE ENCOUNTER
Was the patient seen in the last year in this department? Yes LOV: 04/18/2019     Does patient have an active prescription for medications requested? Yes Disp:180tab       Refill:0/0      Received Request Via: Pharmacy     FUROSEMIDE 40 MG TABLET

## 2019-08-21 ENCOUNTER — OFFICE VISIT (OUTPATIENT)
Dept: MEDICAL GROUP | Facility: MEDICAL CENTER | Age: 79
End: 2019-08-21
Payer: MEDICARE

## 2019-08-21 VITALS
OXYGEN SATURATION: 97 % | TEMPERATURE: 97.8 F | WEIGHT: 193.12 LBS | BODY MASS INDEX: 34.22 KG/M2 | DIASTOLIC BLOOD PRESSURE: 80 MMHG | HEART RATE: 54 BPM | SYSTOLIC BLOOD PRESSURE: 122 MMHG | HEIGHT: 63 IN

## 2019-08-21 DIAGNOSIS — M19.011 PRIMARY OSTEOARTHRITIS OF RIGHT SHOULDER: ICD-10-CM

## 2019-08-21 DIAGNOSIS — M25.562 PAIN IN BOTH KNEES, UNSPECIFIED CHRONICITY: ICD-10-CM

## 2019-08-21 DIAGNOSIS — M25.561 PAIN IN BOTH KNEES, UNSPECIFIED CHRONICITY: ICD-10-CM

## 2019-08-21 DIAGNOSIS — E66.9 OBESITY (BMI 30.0-34.9): ICD-10-CM

## 2019-08-21 DIAGNOSIS — M17.0 PRIMARY OSTEOARTHRITIS OF BOTH KNEES: ICD-10-CM

## 2019-08-21 PROCEDURE — 99214 OFFICE O/P EST MOD 30 MIN: CPT | Performed by: INTERNAL MEDICINE

## 2019-08-21 RX ORDER — PREDNISONE 20 MG/1
TABLET ORAL
Qty: 7 TAB | Refills: 0 | Status: SHIPPED | OUTPATIENT
Start: 2019-08-21 | End: 2019-10-04

## 2019-08-21 NOTE — PROGRESS NOTES
CHIEF COMPLAINT  Chief Complaint   Patient presents with   • Knee Pain     HPI  Patient is a 78 y.o. female patient who presents today for the following     B/L knee pain and OA, more LT; OA RT shoulder  Obesity  78-year-old, female, history of severe right shoulder osteoarthritis requiring replacement complains of bilateral knee pain    - Onset: remote   - worse for the last 2-3 months  - Triger: OA; no trauma  - located in: both knees, more on LT  - intensity:  Up to 10/10  - quality:  dull and sharp  - radiation:  no  - alleviating factors are:  Rest, diclofenac  -  exacerbating factors are:  activity  - accompanied:    - locking   - no numbness, weakness, tingling, fever, chills  - course: worsening  - imaging: none in Epic  - treatment: as above  - BMI 34    Reviewed PMH, PSH, FH, SH, ALL, HCM/IMM, no changes  Reviewed MEDS, no changes    Patient Active Problem List    Diagnosis Date Noted   • SVT (supraventricular tachycardia) (HCC) 04/18/2019   • Obesity (BMI 30.0-34.9) 02/26/2019   • Lower extremity edema 02/25/2019   • Chronic pain of both knees 01/09/2019   • Hypovitaminosis D 06/27/2018   • Essential hypertension 06/16/2017   • MYESHA on CPAP 06/16/2017   • LVH (left ventricular hypertrophy) 12/14/2016   • Paroxysmal A-fib (HCC) 12/14/2016   • Hypokalemia 11/29/2016   • Carcinoid tumor of lung 11/28/2016   • Osteopenia 03/25/2016   • H/O breast surgery 04/04/2011     CURRENT MEDICATIONS  Current Outpatient Medications   Medication Sig Dispense Refill   • predniSONE (DELTASONE) 20 MG Tab Take 1 tab daily after breakfast for 7 days. 7 Tab 0   • diclofenac CR (VOLTAREN-XR) 100 MG TABLET SR 24 HR tablet TAKE 1 TABLET BY MOUTH EVERY DAY 30 Tab 1   • furosemide (LASIX) 40 MG Tab TAKE 1 TABLET BY MOUTH TWICE A DAY 30 Tab 0   • vitamin D, Ergocalciferol, (DRISDOL) 25425 units Cap capsule TAKE ONE CAPSULE BY MOUTH EVERY 7 DAYS 12 Cap 0   • lisinopril (PRINIVIL, ZESTRIL) 40 MG tablet Take 1 Tab by mouth every day. 90  Tab 1   • potassium chloride SA (KDUR) 20 MEQ Tab CR Take 2 Tabs by mouth every day. 180 Tab 1   • atorvastatin (LIPITOR) 20 MG Tab Take 1 Tab by mouth every bedtime. 90 Tab 1   • omeprazole (PRILOSEC) 40 MG delayed-release capsule Take 1 Cap by mouth every day. 30 Cap 2   • traZODone (DESYREL) 50 MG Tab Take  mg by mouth at bedtime as needed for Sleep.       No current facility-administered medications for this visit.      ALLERGIES  Allergies: Pcn [penicillins]; Clindamycin; Influenza virus vacc; Norco [hydrocodone-acetaminophen]; Pneumococcal vaccine; Tetracycline; and Xarelto [rivaroxaban]  PAST MEDICAL HISTORY  Past Medical History:   Diagnosis Date   • Chronic pain of both knees 1/9/2019   • Pleural effusion, right 1/4/2017    Status post thoracotomy and decortication   • Hemothorax on right 1/4/2017    Status post thoracotomy and decortication   • Chronic nausea 1/12/2015    Has had GI work up done Dr voss   • BMI 38.0-38.9,adult 9/18/2013   • Depression with anxiety 5/3/2011   • S/P bilateral mastectomy 4/4/2011   • Hyperlipidemia 4/4/2011   • MYESHA (obstructive sleep apnea) 4/4/2011    On cPAP    • Arthritis    • CATARACT     surgical correcttion bilateral   • Heart burn    • History of cholecystectomy    • Hypertension    • Indigestion    • Pain     left hip   • Range of motion deficit     left elbow, unable to completely extend   • Sleep apnea     CPAP   • Snoring      SURGICAL HISTORY  She  has a past surgical history that includes hip arthroplasty total (3/26/2009); lisa by laparoscopy (2/1/2011); other (1988); other (1989); uvulopharyngopalatoplasty (1990); cataract phaco with iol (10/20/2011); cataract phaco with iol (11/3/2011); orif, humerus (1950's); hip arthroplasty total (12/3/2012); breast biopsy (1980); pr enlarge breast with implant; and thoracoscopy (Right, 12/6/2016).  SOCIAL HISTORY  Social History     Tobacco Use   • Smoking status: Former Smoker     Packs/day: 1.00     Years:  "15.00     Pack years: 15.00     Types: Cigarettes     Last attempt to quit: 1975     Years since quittin.6   • Smokeless tobacco: Never Used   Substance Use Topics   • Alcohol use: No     Alcohol/week: 0.0 oz     Comment: once in a great while   • Drug use: No     Social History     Social History Narrative   • Not on file     FAMILY HISTORY  Family History   Problem Relation Age of Onset   • Hypertension Mother    • Cancer Father         lung   • Diabetes Father    • Hypertension Father    • Heart Disease Father         MI   • Hypertension Brother    • Sleep Apnea Brother    • Hypertension Maternal Grandmother    • Sleep Apnea Brother    • Hypertension Brother    • Diabetes Brother    • Cancer Paternal Aunt         stomach   • Diabetes Paternal Aunt    • Heart Disease Brother         MI   • Hyperlipidemia Neg Hx    • Stroke Neg Hx      Family Status   Relation Name Status   • Mo     • Fa     • Sis 0 (Not Specified)   • Bro  Alive   • MGMo   at age 65   • Bro  Alive   • Bro older    • PAunt  (Not Specified)   • Bro  (Not Specified)   • Neg Hx  (Not Specified)     ROS   Constitutional: Negative for fever, chills.  Respiratory: Negative for cough, shortness of breath.  Cardiovascular: Negative for chest pain, palpitations.   Gastrointestinal: Negative for heartburn, nausea, abdominal pain.   Musculoskeletal: as above.  Skin: Negative for rash.   Neuro: Negative for dizziness, weakness and headaches.   Endo/Heme/Allergies: Does not bruise/bleed easily.   Psychiatric/Behavioral: Negative for depression.    PHYSICAL EXAM   /80   Pulse (!) 54   Temp 36.6 °C (97.8 °F) (Temporal)   Ht 1.6 m (5' 3\")   Wt 87.6 kg (193 lb 2 oz)   SpO2 97%  Body mass index is 34.21 kg/m².  General:  NAD, well appearing  HEENT:   NC/AT, PERRLA, EOMI, TMs are clear. Oropharyngeal mucosa is pink,  without lesions;  no cervical / supraclavicular  lymphadenopathy, no thyromegaly.    Cardiovascular: " RRR.   No m/r/g. No carotid bruits .      Lungs:   CTAB, no w/r/r, no respiratory distress.  Abdomen: Soft, NT/ND + BS.  Extremities:  2+ DP and radial pulses bilaterally.  No c/c/e.   Knees exam: no redness, swelling, effusion B/L.  Skin:  Warm, dry.  No erythema. No rash.   Neurologic: Alert & oriented x 3. CN II-XII grossly intact. No focal deficits.  Psychiatric:  Affect normal, mood normal, judgment normal.    LABS     Labs are reviewed and discussed with a patient  Lab Results   Component Value Date/Time    CHOLSTRLTOT 144 03/29/2018 07:21 AM    LDL 63 03/29/2018 07:21 AM    HDL 68 03/29/2018 07:21 AM    TRIGLYCERIDE 66 03/29/2018 07:21 AM       Lab Results   Component Value Date/Time    SODIUM 138 02/27/2019 04:28 AM    POTASSIUM 3.3 (L) 02/27/2019 04:28 AM    CHLORIDE 103 02/27/2019 04:28 AM    CO2 25 02/27/2019 04:28 AM    GLUCOSE 90 02/27/2019 04:28 AM    BUN 22 02/27/2019 04:28 AM    CREATININE 0.77 02/27/2019 04:28 AM    CREATININE 0.79 04/04/2011 12:00 AM    BUNCREATRAT 13 04/04/2011 12:00 AM     Lab Results   Component Value Date/Time    ALKPHOSPHAT 68 02/26/2019 03:43 AM    ASTSGOT 21 02/26/2019 03:43 AM    ALTSGPT 19 02/26/2019 03:43 AM    TBILIRUBIN 0.6 02/26/2019 03:43 AM      Lab Results   Component Value Date/Time    HBA1C 5.6 02/25/2016 07:09 AM    HBA1C 5.4 06/25/2012 10:00 PM    HBA1C 5.4 07/27/2006 03:25 AM     No results found for: TSH  Lab Results   Component Value Date/Time    FREET4 0.82 02/14/2013 01:45 PM    FREET4 0.95 07/08/2011 03:37 PM       Lab Results   Component Value Date/Time    WBC 5.6 02/27/2019 04:28 AM    RBC 4.62 02/27/2019 04:28 AM    HEMOGLOBIN 13.5 02/27/2019 04:28 AM    HEMATOCRIT 41.8 02/27/2019 04:28 AM    MCV 90.5 02/27/2019 04:28 AM    MCH 29.2 02/27/2019 04:28 AM    MCHC 32.3 (L) 02/27/2019 04:28 AM    MPV 11.2 02/27/2019 04:28 AM    NEUTSPOLYS 59.70 02/27/2019 04:28 AM    LYMPHOCYTES 24.80 02/27/2019 04:28 AM    MONOCYTES 9.40 02/27/2019 04:28 AM    EOSINOPHILS  3.60 02/27/2019 04:28 AM    BASOPHILS 1.10 02/27/2019 04:28 AM    HYPOCHROMIA 2+ 07/04/2013 07:30 AM    ANISOCYTOSIS 1+ 12/01/2016 06:45 AM      IMAGING     Reviewed and discussed XR knee from 1/9/2019    Left:  1.  No evidence of acute fracture or dislocation.  2.  Tricompartment degenerative change which involves the medial femorotibial articulation to the greatest degree.  Right:  1.  Tricompartment degenerative change which involves the medial femorotibial articulation to the greatest degree.    Right  2.  No evidence of fracture.  3.  Benign-appearing intramedullary sclerotic lesion involving the distal femoral diaphysis most likely representing enchondroma.    ASSESMENT AND PLAN        1. Primary osteoarthritis of both knees  Advised to refrain from activity/continue as tolerated  Continue diclofenac.  Given small dose of prednisone.  Knee brace/Ace wraps may help.  Also may try ice packs.  - REFERRAL TO ORTHOPEDICS  - predniSONE (DELTASONE) 20 MG Tab; Take 1 tab daily after breakfast for 7 days.  Dispense: 7 Tab; Refill: 0    2. Pain in both knees, unspecified chronicity  As above  - REFERRAL TO ORTHOPEDICS  - predniSONE (DELTASONE) 20 MG Tab; Take 1 tab daily after breakfast for 7 days.  Dispense: 7 Tab; Refill: 0    3. Primary osteoarthritis of right shoulder  As above    4. Obesity (BMI 30.0-34.9)  Risk for weight bearing joints OA  - OBESITY COUNSELING (No Charge): Patient identified as having weight management issue.  Appropriate orders and counseling given.    Counseling:   - Smoking:  Nonsmoker    Followup: prn    All questions are answered.    Please note that this dictation was created using voice recognition software, and that there might be errors of darling and possibly content.

## 2019-08-28 RX ORDER — AMLODIPINE BESYLATE 10 MG/1
TABLET ORAL
Qty: 90 TAB | Refills: 1 | Status: SHIPPED | OUTPATIENT
Start: 2019-08-28 | End: 2019-10-04

## 2019-09-05 ENCOUNTER — TELEPHONE (OUTPATIENT)
Dept: MEDICAL GROUP | Facility: MEDICAL CENTER | Age: 79
End: 2019-09-05

## 2019-09-05 NOTE — TELEPHONE ENCOUNTER
1. Caller Name: Sharon Callahan                       Call Back Number: [phone]      Patient approves a detailed voicemail message: N\A    Patient is requesting for you to call Dr. Evans at [facility] as soon as possible.You saw her on 8/21/2019 about her knees.  Dr. Evans would like to talk to you directly, per Trice.

## 2019-09-24 ENCOUNTER — TELEPHONE (OUTPATIENT)
Dept: MEDICAL GROUP | Facility: MEDICAL CENTER | Age: 79
End: 2019-09-24

## 2019-09-25 NOTE — TELEPHONE ENCOUNTER
1. Caller Name: Patricia christine MA at [facility]                                         Call Back Number: [phone]      Patient approves a detailed voicemail message: yes    Office called needing surgical clearance paul Carnes.  I faxed over today the last office note, but it is not technically surgery clearance.

## 2019-10-01 ENCOUNTER — OFFICE VISIT (OUTPATIENT)
Dept: MEDICAL GROUP | Facility: MEDICAL CENTER | Age: 79
End: 2019-10-01
Payer: MEDICARE

## 2019-10-01 VITALS
HEART RATE: 64 BPM | BODY MASS INDEX: 34.2 KG/M2 | RESPIRATION RATE: 14 BRPM | OXYGEN SATURATION: 97 % | HEIGHT: 63 IN | SYSTOLIC BLOOD PRESSURE: 122 MMHG | DIASTOLIC BLOOD PRESSURE: 82 MMHG | TEMPERATURE: 97.7 F | WEIGHT: 193 LBS

## 2019-10-01 DIAGNOSIS — M25.562 CHRONIC PAIN OF BOTH KNEES: ICD-10-CM

## 2019-10-01 DIAGNOSIS — I51.7 LVH (LEFT VENTRICULAR HYPERTROPHY): ICD-10-CM

## 2019-10-01 DIAGNOSIS — I47.10 SVT (SUPRAVENTRICULAR TACHYCARDIA): ICD-10-CM

## 2019-10-01 DIAGNOSIS — I48.0 PAROXYSMAL A-FIB (HCC): ICD-10-CM

## 2019-10-01 DIAGNOSIS — E87.6 HYPOKALEMIA: ICD-10-CM

## 2019-10-01 DIAGNOSIS — G47.33 OSA ON CPAP: Chronic | ICD-10-CM

## 2019-10-01 DIAGNOSIS — M85.80 OSTEOPENIA, UNSPECIFIED LOCATION: ICD-10-CM

## 2019-10-01 DIAGNOSIS — G89.29 CHRONIC PAIN OF BOTH KNEES: ICD-10-CM

## 2019-10-01 DIAGNOSIS — D3A.090 CARCINOID TUMOR OF LUNG, UNSPECIFIED WHETHER MALIGNANT: ICD-10-CM

## 2019-10-01 DIAGNOSIS — M17.0 PRIMARY OSTEOARTHRITIS OF BOTH KNEES: ICD-10-CM

## 2019-10-01 DIAGNOSIS — E55.9 HYPOVITAMINOSIS D: ICD-10-CM

## 2019-10-01 DIAGNOSIS — M25.561 CHRONIC PAIN OF BOTH KNEES: ICD-10-CM

## 2019-10-01 DIAGNOSIS — Z96.643 H/O BILATERAL HIP REPLACEMENTS: ICD-10-CM

## 2019-10-01 DIAGNOSIS — E66.9 OBESITY (BMI 30.0-34.9): ICD-10-CM

## 2019-10-01 DIAGNOSIS — Z01.818 PREOP EXAMINATION: ICD-10-CM

## 2019-10-01 DIAGNOSIS — I10 ESSENTIAL HYPERTENSION: ICD-10-CM

## 2019-10-01 DIAGNOSIS — E66.01 MORBID (SEVERE) OBESITY DUE TO EXCESS CALORIES (HCC): ICD-10-CM

## 2019-10-01 PROBLEM — R60.0 LOWER EXTREMITY EDEMA: Status: RESOLVED | Noted: 2019-02-25 | Resolved: 2019-10-01

## 2019-10-01 PROCEDURE — 99214 OFFICE O/P EST MOD 30 MIN: CPT | Performed by: INTERNAL MEDICINE

## 2019-10-01 NOTE — PROGRESS NOTES
REASON FOR VISIT: Pre-Op Consultation  Consultation Requested by: Dr. Roque Edmond MD, Former[facility]  Procedure date and type: Left hip replacement    History of condition for which surgery is planned: Bilateral knee osteoarthritis, more on the left side; bilateral knee pain    Current chronic conditions: has H/O breast surgery; Osteopenia; Carcinoid tumor of lung; Hypokalemia; LVH (left ventricular hypertrophy); Paroxysmal A-fib (HCC); Essential hypertension; MYESHA on CPAP; Hypovitaminosis D; Chronic pain of both knees; Obesity (BMI 30.0-34.9); and SVT (supraventricular tachycardia) ([facility]) on their problem list.  Past medical history:  has a past medical history of Arthritis, BMI 38.0-38.9,adult (9/18/2013), CATARACT, Chronic nausea (1/12/2015), Chronic pain of both knees (1/9/2019), Depression with anxiety (5/3/2011), Heart burn, Hemothorax on right (1/4/2017), History of cholecystectomy, Hyperlipidemia (4/4/2011), Hypertension, Indigestion, MYESHA (obstructive sleep apnea) (4/4/2011), Pain, Pleural effusion, right (1/4/2017), Range of motion deficit, S/P bilateral mastectomy (4/4/2011), Sleep apnea, and Snoring.   Negative for: CAD, SBE, CVA, TIA, DVT, PE, bleeding requiring transfusion, intubation.    Surgical and anesthetic history:  has a past surgical history that includes hip arthroplasty total (3/26/2009); lisa by laparoscopy (2/1/2011); other (1988); other (1989); uvulopharyngopalatoplasty (1990); cataract phaco with iol (10/20/2011); cataract phaco with iol (11/3/2011); orif, humerus (1950's); hip arthroplasty total (12/3/2012); breast biopsy (1980); pr enlarge breast with implant; and thoracoscopy (Right, 12/6/2016).   Prior surgery without complication, bleeding, reaction to anesthetic, prolonged recovery    Habits:   Social History     Tobacco Use   • Smoking status: Former Smoker     Packs/day: 1.00     Years: 15.00     Pack years: 15.00     Types: Cigarettes     Last attempt to quit: 1/1/1975     Years since  "quittin.7   • Smokeless tobacco: Never Used   Substance Use Topics   • Alcohol use: No     Alcohol/week: 0.0 oz     Comment: once in a great while   • Drug use: No     Allergies: Pcn [penicillins]; Clindamycin; Influenza virus vacc; Norco [hydrocodone-acetaminophen]; Pneumococcal vaccine; Tetracycline; and Xarelto [rivaroxaban] No known allergy to Anesthetic, or Latex.       Current medicines:   Current Outpatient Medications   Medication Sig Dispense Refill   • amLODIPine (NORVASC) 10 MG Tab TAKE 1 TABLET BY MOUTH EVERY DAY 90 Tab 1   • predniSONE (DELTASONE) 20 MG Tab Take 1 tab daily after breakfast for 7 days. 7 Tab 0   • diclofenac CR (VOLTAREN-XR) 100 MG TABLET SR 24 HR tablet TAKE 1 TABLET BY MOUTH EVERY DAY 30 Tab 1   • furosemide (LASIX) 40 MG Tab TAKE 1 TABLET BY MOUTH TWICE A DAY 30 Tab 0   • vitamin D, Ergocalciferol, (DRISDOL) 61095 units Cap capsule TAKE ONE CAPSULE BY MOUTH EVERY 7 DAYS 12 Cap 0   • lisinopril (PRINIVIL, ZESTRIL) 40 MG tablet Take 1 Tab by mouth every day. 90 Tab 1   • potassium chloride SA (KDUR) 20 MEQ Tab CR Take 2 Tabs by mouth every day. 180 Tab 1   • atorvastatin (LIPITOR) 20 MG Tab Take 1 Tab by mouth every bedtime. 90 Tab 1   • omeprazole (PRILOSEC) 40 MG delayed-release capsule Take 1 Cap by mouth every day. 30 Cap 2   • traZODone (DESYREL) 50 MG Tab Take  mg by mouth at bedtime as needed for Sleep.       No current facility-administered medications for this visit.      Anticoagulant: none    ROS negative for:    - CP, SOB, CHAIREZ, Orthopnea, wheezing, leg edema, polydipsia, polyuria, fevers, chills, sweats, cough, cold, congestion, abd pain, reflux, black or bloody stools,weight loss/gain.    Functional Status: ambulatory, no exertional SOB/palpitations    PHYSICAL EXAMINATION:  VITAL SIGNS: /82  Pulse 64  Temp 36.6 °C (97.8 °F) (Temporal)  Ht 1.6 m (5' 3\")  Wt 87.6 kg (193lb), SpO2 97% Body mass index is 34.21 kg/m².  HEENT: EOMI, PERRL. Oropharynx pink, " moist. Normal airway. Neck supple, no cervical lymphadenopathy.  LUNGS: CTAB good excursion.   HEART: RRR no murmur.  ABDOMEN: soft, nondistended, nontender normal BS. No HSM.  LOWER EXTREMITIES: warm and well perfused with no edema.    Labs:   Lab Results   Component Value Date/Time    CHOLSTRLTOT 144 03/29/2018 07:21 AM    LDL 63 03/29/2018 07:21 AM    HDL 68 03/29/2018 07:21 AM    TRIGLYCERIDE 66 03/29/2018 07:21 AM       Lab Results   Component Value Date/Time    SODIUM 138 02/27/2019 04:28 AM    POTASSIUM 3.3 (L) 02/27/2019 04:28 AM    CHLORIDE 103 02/27/2019 04:28 AM    CO2 25 02/27/2019 04:28 AM    GLUCOSE 90 02/27/2019 04:28 AM    BUN 22 02/27/2019 04:28 AM    CREATININE 0.77 02/27/2019 04:28 AM    CREATININE 0.79 04/04/2011 12:00 AM    BUNCREATRAT 13 04/04/2011 12:00 AM     Lab Results   Component Value Date/Time    ALKPHOSPHAT 68 02/26/2019 03:43 AM    ASTSGOT 21 02/26/2019 03:43 AM    ALTSGPT 19 02/26/2019 03:43 AM    TBILIRUBIN 0.6 02/26/2019 03:43 AM      Lab Results   Component Value Date/Time    HBA1C 5.6 02/25/2016 07:09 AM    HBA1C 5.4 06/25/2012 10:00 PM    HBA1C 5.4 07/27/2006 03:25 AM     No results found for: TSH  Lab Results   Component Value Date/Time    FREET4 0.82 02/14/2013 01:45 PM    FREET4 0.95 07/08/2011 03:37 PM     Lab Results   Component Value Date/Time    WBC 5.6 02/27/2019 04:28 AM    RBC 4.62 02/27/2019 04:28 AM    HEMOGLOBIN 13.5 02/27/2019 04:28 AM    HEMATOCRIT 41.8 02/27/2019 04:28 AM    MCV 90.5 02/27/2019 04:28 AM    MCH 29.2 02/27/2019 04:28 AM    MCHC 32.3 (L) 02/27/2019 04:28 AM    MPV 11.2 02/27/2019 04:28 AM    NEUTSPOLYS 59.70 02/27/2019 04:28 AM    LYMPHOCYTES 24.80 02/27/2019 04:28 AM    MONOCYTES 9.40 02/27/2019 04:28 AM    EOSINOPHILS 3.60 02/27/2019 04:28 AM    BASOPHILS 1.10 02/27/2019 04:28 AM    HYPOCHROMIA 2+ 07/04/2013 07:30 AM    ANISOCYTOSIS 1+ 12/01/2016 06:45 AM      EKG   By my interpretation today:  Rhythm: normal sinus   Rate: normal   Axis: normal    Ectopy: none   Conduction: abnormal RV progression, early transition  ST Segments no acute change   T Waves: no acute change   Q Waves: none     CXR   Reviewed and discussed,2/25/2019:  No acute cardiopulmonary process.   -She has ordered new chest x-ray prior surgery if anesthesiology requests.    IMPRESSION:  1. Diagnoses of Preop examination, Primary osteoarthritis of both knees, Chronic pain of both knees, H/O bilateral hip replacements, Essential hypertension, Hypokalemia, LVH (left ventricular hypertrophy), Paroxysmal A-fib (HCC), SVT (supraventricular tachycardia) (HCC), MYESHA on CPAP, Osteopenia, unspecified location, Obesity (BMI 30.0-34.9), Hypovitaminosis D, and Carcinoid tumor of lung, unspecified whether malignant were pertinent to this visit.  2. Planned surgery: Left knee replacement due to left knee osteoarthritis  3. High risk medical conditions: negative for ulmonary, Bleeding, Poor healing, Thrombosis, Debility  4. She has Afib/SVT, LVH, need cardiac clearance.    PLAN:  1. Chronic medical conditions: Stable and controlled. Continue current medicines.   2. Avoid drugs that potentiate bleeding as advised by surgeon  3. Discontinue all herbal supplements 2 weeks prior to surgery.  4. Need for SBE prophylaxis: no  5. This patient is considered Low risk  6. Need cardiac preop clearance.  7. She has ordered UA to do 10 days prior surgery.    F/u PRN

## 2019-10-04 ENCOUNTER — OFFICE VISIT (OUTPATIENT)
Dept: CARDIOLOGY | Facility: MEDICAL CENTER | Age: 79
End: 2019-10-04
Payer: MEDICARE

## 2019-10-04 ENCOUNTER — TELEPHONE (OUTPATIENT)
Dept: CARDIOLOGY | Facility: MEDICAL CENTER | Age: 79
End: 2019-10-04

## 2019-10-04 VITALS
SYSTOLIC BLOOD PRESSURE: 116 MMHG | BODY MASS INDEX: 37.36 KG/M2 | HEIGHT: 62 IN | OXYGEN SATURATION: 97 % | DIASTOLIC BLOOD PRESSURE: 74 MMHG | WEIGHT: 203 LBS | HEART RATE: 53 BPM

## 2019-10-04 DIAGNOSIS — I10 ESSENTIAL HYPERTENSION: ICD-10-CM

## 2019-10-04 DIAGNOSIS — M25.561 CHRONIC PAIN OF BOTH KNEES: ICD-10-CM

## 2019-10-04 DIAGNOSIS — I48.0 PAROXYSMAL A-FIB (HCC): ICD-10-CM

## 2019-10-04 DIAGNOSIS — Z01.818 PRE-OPERATIVE CLEARANCE: ICD-10-CM

## 2019-10-04 DIAGNOSIS — G89.29 CHRONIC PAIN OF BOTH KNEES: ICD-10-CM

## 2019-10-04 DIAGNOSIS — Z98.890 H/O BREAST SURGERY: ICD-10-CM

## 2019-10-04 DIAGNOSIS — M25.562 CHRONIC PAIN OF BOTH KNEES: ICD-10-CM

## 2019-10-04 DIAGNOSIS — I48.0 PAF (PAROXYSMAL ATRIAL FIBRILLATION) (HCC): ICD-10-CM

## 2019-10-04 PROCEDURE — 99214 OFFICE O/P EST MOD 30 MIN: CPT | Performed by: INTERNAL MEDICINE

## 2019-10-04 NOTE — TELEPHONE ENCOUNTER
Clearance letter for pt's upcoming left knee replacement drafted with DS recommendations from OV note today and faxed to Dr. Roque Edmond at [phone]. Receipt confirmed.

## 2019-10-04 NOTE — LETTER
PROCEDURE/SURGERY CLEARANCE FORM      Encounter Date: 10/4/2019    Patient: Sharon Callahan  YOB: 1940    CARDIOLOGIST:  Michael Crockett MD  REFERRING DOCTOR:  Roque Edmond M.D.      The above patient is low risk to have the following procedure/surgery: Left knee replacement                                           Additional comments: N/A                 MD Signature  Michael Crockett MD

## 2019-10-04 NOTE — PROGRESS NOTES
Chief Complaint   Patient presents with   • Atrial Fibrillation     F/V DX:PAF/Cardiac Clearance       Subjective:   Sharon Callahan is a 78 y.o. female who presents today here for preoperative cardiac clearance for knee replacement.  Scheduled for left knee replacement and will have subsequent right knee replacement. Previous atrial fibrillation in the setting of an intercurrent illness.  No recurrence clinically.  No history of chest pain.  No history of congestive heart failure.  No history of significant lung disease.  Patient without complaints.  Moderate obesity    Past Medical History:   Diagnosis Date   • Arthritis    • BMI 38.0-38.9,adult 9/18/2013   • CATARACT     surgical correcttion bilateral   • Chronic nausea 1/12/2015    Has had GI work up done Dr voss   • Chronic pain of both knees 1/9/2019   • Depression with anxiety 5/3/2011   • Heart burn    • Hemothorax on right 1/4/2017    Status post thoracotomy and decortication   • History of cholecystectomy    • Hyperlipidemia 4/4/2011   • Hypertension    • Indigestion    • MYESHA (obstructive sleep apnea) 4/4/2011    On cPAP    • Pain     left hip   • Pleural effusion, right 1/4/2017    Status post thoracotomy and decortication   • Range of motion deficit     left elbow, unable to completely extend   • S/P bilateral mastectomy 4/4/2011   • Sleep apnea     CPAP   • Snoring      Past Surgical History:   Procedure Laterality Date   • THORACOSCOPY Right 12/6/2016    Procedure: RIGHT THORACOSCOPY ,DECORTICATION, EVACUATION OF HEMOTHORAX;  Surgeon: Ehsan De Leon D.O.;  Location: SURGERY [facility];  Service:    • HIP ARTHROPLASTY TOTAL  12/3/2012    Performed by Jamie Kenney M.D. at SURGERY [facility] ORS   • CATARACT PHACO WITH IOL  11/3/2011    Performed by DEENA BRADEN at SURGERY [facility]   • CATARACT PHACO WITH IOL  10/20/2011    Performed by DEENA BRADEN at SURGERY SAME DAY [facility] ORS   • MAXIM BY LAPAROSCOPY  2/1/2011     Performed by DORON HINOJOSA at SURGERY SAME DAY [facility] ORS   • HIP ARTHROPLASTY TOTAL  3/26/2009    Right; Perfmed by THERESE LEMA at SURGERY [facility] ORS   • UVULOPHARYNGOPALATOPLASTY     • OTHER      tummy tuck   • OTHER      bilateral mastectomies with implants   • BREAST BIOPSY      bilateral benign mastectomy   • ORIF, HUMERUS  's    left   • PB ENLARGE BREAST WITH IMPLANT       Family History   Problem Relation Age of Onset   • Hypertension Mother    • Cancer Father         lung   • Diabetes Father    • Hypertension Father    • Heart Disease Father         MI   • Hypertension Brother    • Sleep Apnea Brother    • Hypertension Maternal Grandmother    • Sleep Apnea Brother    • Hypertension Brother    • Diabetes Brother    • Cancer Paternal Aunt         stomach   • Diabetes Paternal Aunt    • Heart Disease Brother         MI   • Hyperlipidemia Neg Hx    • Stroke Neg Hx      Social History     Socioeconomic History   • Marital status:      Spouse name: Not on file   • Number of children: Not on file   • Years of education: Not on file   • Highest education level: Not on file   Occupational History   • Occupation: retired      Comment: [employer]   Social Needs   • Financial resource strain: Not on file   • Food insecurity:     Worry: Not on file     Inability: Not on file   • Transportation needs:     Medical: Not on file     Non-medical: Not on file   Tobacco Use   • Smoking status: Former Smoker     Packs/day: 1.00     Years: 15.00     Pack years: 15.00     Types: Cigarettes     Last attempt to quit: 1975     Years since quittin.7   • Smokeless tobacco: Never Used   Substance and Sexual Activity   • Alcohol use: No     Alcohol/week: 0.0 oz     Comment: once in a great while   • Drug use: No   • Sexual activity: Never     Comment: lives with ex-   Lifestyle   • Physical activity:     Days per week: Not on file     Minutes per session: Not on  file   • Stress: Not on file   Relationships   • Social connections:     Talks on phone: Not on file     Gets together: Not on file     Attends [employer] service: Not on file     Active member of club or organization: Not on file     Attends meetings of clubs or organizations: Not on file     Relationship status: Not on file   • Intimate partner violence:     Fear of current or ex partner: Not on file     Emotionally abused: Not on file     Physically abused: Not on file     Forced sexual activity: Not on file   Other Topics Concern   • Not on file   Social History Narrative   • Not on file     Allergies   Allergen Reactions   • Pcn [Penicillins] Anaphylaxis     Skin turns black   • Clindamycin Rash     Rash     • Influenza Virus Vacc Rash     Red in face   • Norco [Hydrocodone-Acetaminophen]    • Pneumococcal Vaccine Rash   • Tetracycline Rash     Rash    • Xarelto [Rivaroxaban] Rash     Outpatient Encounter Medications as of 10/4/2019   Medication Sig Dispense Refill   • diclofenac CR (VOLTAREN-XR) 100 MG TABLET SR 24 HR tablet TAKE 1 TABLET BY MOUTH EVERY DAY 30 Tab 1   • furosemide (LASIX) 40 MG Tab TAKE 1 TABLET BY MOUTH TWICE A DAY 30 Tab 0   • vitamin D, Ergocalciferol, (DRISDOL) 62707 units Cap capsule TAKE ONE CAPSULE BY MOUTH EVERY 7 DAYS 12 Cap 0   • lisinopril (PRINIVIL, ZESTRIL) 40 MG tablet Take 1 Tab by mouth every day. 90 Tab 1   • potassium chloride SA (KDUR) 20 MEQ Tab CR Take 2 Tabs by mouth every day. 180 Tab 1   • atorvastatin (LIPITOR) 20 MG Tab Take 1 Tab by mouth every bedtime. 90 Tab 1   • omeprazole (PRILOSEC) 40 MG delayed-release capsule Take 1 Cap by mouth every day. 30 Cap 2   • traZODone (DESYREL) 50 MG Tab Take  mg by mouth at bedtime as needed for Sleep.     • [DISCONTINUED] amLODIPine (NORVASC) 10 MG Tab TAKE 1 TABLET BY MOUTH EVERY DAY (Patient not taking: Reported on 10/4/2019) 90 Tab 1   • [DISCONTINUED] predniSONE (DELTASONE) 20 MG Tab Take 1 tab daily after breakfast for  "7 days. (Patient not taking: Reported on 10/4/2019) 7 Tab 0     No facility-administered encounter medications on file as of 10/4/2019.      ROS     Objective:   /74 (BP Location: Left arm, Patient Position: Sitting, BP Cuff Size: Adult)   Pulse (!) 53   Ht 1.575 m (5' 2\")   Wt 92.1 kg (203 lb)   SpO2 97%   BMI 37.13 kg/m²     Physical Exam   Constitutional: She is oriented to person, place, and time. She appears well-developed and well-nourished.   Overweight   HENT:   Head: Normocephalic and atraumatic.   Eyes: Pupils are equal, round, and reactive to light. EOM are normal.   Neck: Normal range of motion. Neck supple.   Cardiovascular: Normal rate, regular rhythm, normal heart sounds and intact distal pulses. Exam reveals no gallop and no friction rub.   No murmur heard.  Pulmonary/Chest: Effort normal and breath sounds normal.   Abdominal: Soft. Bowel sounds are normal.   Musculoskeletal: Normal range of motion. She exhibits no edema.   Neurological: She is alert and oriented to person, place, and time. No cranial nerve deficit.   Skin: Skin is warm.   Psychiatric: She has a normal mood and affect. Her behavior is normal. Judgment and thought content normal.     Electrocardiogram in media sinus rhythm unchanged.  Assessment:     1. PAF (paroxysmal atrial fibrillation) (HCC)  EKG   2. Pre-operative clearance     3. Paroxysmal A-fib (HCC)     4. H/O breast surgery     5. Essential hypertension     6. Chronic pain of both knees         Medical Decision Making:  Today's Assessment / Status / Plan:   1.  Preoperative clearance.  Patient should be low risk for orthopedic surgery.  2.  Episode of atrial fibrillation with intercurrent illness no evidence of recurrence.  3.  Hypertension well treated.  4.  DJD as above.  "

## 2019-10-04 NOTE — LETTER
[facility]   [address]  [place] [address]  Phone: [phone]  Fax: [phone]              Sharon Callahan  1940    Encounter Date: 10/4/2019    Michael Crockett M.D.          PROGRESS NOTE:  Chief Complaint   Patient presents with   • Atrial Fibrillation     F/V DX:PAF/Cardiac Clearance       Subjective:   Sharon Callahan is a 78 y.o. female who presents today here for preoperative cardiac clearance for knee replacement.  Scheduled for left knee replacement and will have subsequent right knee replacement. Previous atrial fibrillation in the setting of an intercurrent illness.  No recurrence clinically.  No history of chest pain.  No history of congestive heart failure.  No history of significant lung disease.  Patient without complaints.  Moderate obesity    Past Medical History:   Diagnosis Date   • Arthritis    • BMI 38.0-38.9,adult 9/18/2013   • CATARACT     surgical correcttion bilateral   • Chronic nausea 1/12/2015    Has had GI work up done Dr voss   • Chronic pain of both knees 1/9/2019   • Depression with anxiety 5/3/2011   • Heart burn    • Hemothorax on right 1/4/2017    Status post thoracotomy and decortication   • History of cholecystectomy    • Hyperlipidemia 4/4/2011   • Hypertension    • Indigestion    • MYESHA (obstructive sleep apnea) 4/4/2011    On cPAP    • Pain     left hip   • Pleural effusion, right 1/4/2017    Status post thoracotomy and decortication   • Range of motion deficit     left elbow, unable to completely extend   • S/P bilateral mastectomy 4/4/2011   • Sleep apnea     CPAP   • Snoring      Past Surgical History:   Procedure Laterality Date   • THORACOSCOPY Right 12/6/2016    Procedure: RIGHT THORACOSCOPY ,DECORTICATION, EVACUATION OF HEMOTHORAX;  Surgeon: Ehsan De Leon D.O.;  Location: SURGERY [facility];  Service:    • HIP ARTHROPLASTY TOTAL  12/3/2012    Performed by Jamie Kenney M.D. at SURGERY  [facility] ORS   • CATARACT PHACO WITH IOL  11/3/2011    Performed by DEENA BRADEN at SURGERY [facility] ORS   • CATARACT PHACO WITH IOL  10/20/2011    Performed by DEENA BRADEN at SURGERY SAME DAY [facility] ORS   • MAXIM BY LAPAROSCOPY  2011    Performed by DORON HINOJOSA at SURGERY SAME DAY [facility] ORS   • HIP ARTHROPLASTY TOTAL  3/26/2009    Right; Perfmed by THERESE LEMA at SURGERY [facility] ORS   • UVULOPHARYNGOPALATOPLASTY     • OTHER      tummy tuck   • OTHER      bilateral mastectomies with implants   • BREAST BIOPSY      bilateral benign mastectomy   • ORIF, HUMERUS  's    left   • PB ENLARGE BREAST WITH IMPLANT       Family History   Problem Relation Age of Onset   • Hypertension Mother    • Cancer Father         lung   • Diabetes Father    • Hypertension Father    • Heart Disease Father         MI   • Hypertension Brother    • Sleep Apnea Brother    • Hypertension Maternal Grandmother    • Sleep Apnea Brother    • Hypertension Brother    • Diabetes Brother    • Cancer Paternal Aunt         stomach   • Diabetes Paternal Aunt    • Heart Disease Brother         MI   • Hyperlipidemia Neg Hx    • Stroke Neg Hx      Social History     Socioeconomic History   • Marital status:      Spouse name: Not on file   • Number of children: Not on file   • Years of education: Not on file   • Highest education level: Not on file   Occupational History   • Occupation: retired      Comment: [employer]   Social Needs   • Financial resource strain: Not on file   • Food insecurity:     Worry: Not on file     Inability: Not on file   • Transportation needs:     Medical: Not on file     Non-medical: Not on file   Tobacco Use   • Smoking status: Former Smoker     Packs/day: 1.00     Years: 15.00     Pack years: 15.00     Types: Cigarettes     Last attempt to quit: 1975     Years since quittin.7   • Smokeless tobacco: Never Used   Substance and Sexual  Activity   • Alcohol use: No     Alcohol/week: 0.0 oz     Comment: once in a great while   • Drug use: No   • Sexual activity: Never     Comment: lives with ex-   Lifestyle   • Physical activity:     Days per week: Not on file     Minutes per session: Not on file   • Stress: Not on file   Relationships   • Social connections:     Talks on phone: Not on file     Gets together: Not on file     Attends [employer] service: Not on file     Active member of club or organization: Not on file     Attends meetings of clubs or organizations: Not on file     Relationship status: Not on file   • Intimate partner violence:     Fear of current or ex partner: Not on file     Emotionally abused: Not on file     Physically abused: Not on file     Forced sexual activity: Not on file   Other Topics Concern   • Not on file   Social History Narrative   • Not on file     Allergies   Allergen Reactions   • Pcn [Penicillins] Anaphylaxis     Skin turns black   • Clindamycin Rash     Rash     • Influenza Virus Vacc Rash     Red in face   • Norco [Hydrocodone-Acetaminophen]    • Pneumococcal Vaccine Rash   • Tetracycline Rash     Rash    • Xarelto [Rivaroxaban] Rash     Outpatient Encounter Medications as of 10/4/2019   Medication Sig Dispense Refill   • diclofenac CR (VOLTAREN-XR) 100 MG TABLET SR 24 HR tablet TAKE 1 TABLET BY MOUTH EVERY DAY 30 Tab 1   • furosemide (LASIX) 40 MG Tab TAKE 1 TABLET BY MOUTH TWICE A DAY 30 Tab 0   • vitamin D, Ergocalciferol, (DRISDOL) 30355 units Cap capsule TAKE ONE CAPSULE BY MOUTH EVERY 7 DAYS 12 Cap 0   • lisinopril (PRINIVIL, ZESTRIL) 40 MG tablet Take 1 Tab by mouth every day. 90 Tab 1   • potassium chloride SA (KDUR) 20 MEQ Tab CR Take 2 Tabs by mouth every day. 180 Tab 1   • atorvastatin (LIPITOR) 20 MG Tab Take 1 Tab by mouth every bedtime. 90 Tab 1   • omeprazole (PRILOSEC) 40 MG delayed-release capsule Take 1 Cap by mouth every day. 30 Cap 2   • traZODone (DESYREL) 50 MG Tab Take  mg by  "mouth at bedtime as needed for Sleep.     • [DISCONTINUED] amLODIPine (NORVASC) 10 MG Tab TAKE 1 TABLET BY MOUTH EVERY DAY (Patient not taking: Reported on 10/4/2019) 90 Tab 1   • [DISCONTINUED] predniSONE (DELTASONE) 20 MG Tab Take 1 tab daily after breakfast for 7 days. (Patient not taking: Reported on 10/4/2019) 7 Tab 0     No facility-administered encounter medications on file as of 10/4/2019.      ROS     Objective:   /74 (BP Location: Left arm, Patient Position: Sitting, BP Cuff Size: Adult)   Pulse (!) 53   Ht 1.575 m (5' 2\")   Wt 92.1 kg (203 lb)   SpO2 97%   BMI 37.13 kg/m²      Physical Exam   Constitutional: She is oriented to person, place, and time. She appears well-developed and well-nourished.   Overweight   HENT:   Head: Normocephalic and atraumatic.   Eyes: Pupils are equal, round, and reactive to light. EOM are normal.   Neck: Normal range of motion. Neck supple.   Cardiovascular: Normal rate, regular rhythm, normal heart sounds and intact distal pulses. Exam reveals no gallop and no friction rub.   No murmur heard.  Pulmonary/Chest: Effort normal and breath sounds normal.   Abdominal: Soft. Bowel sounds are normal.   Musculoskeletal: Normal range of motion. She exhibits no edema.   Neurological: She is alert and oriented to person, place, and time. No cranial nerve deficit.   Skin: Skin is warm.   Psychiatric: She has a normal mood and affect. Her behavior is normal. Judgment and thought content normal.     Electrocardiogram in media sinus rhythm unchanged.  Assessment:     1. PAF (paroxysmal atrial fibrillation) ([facility])  EKG   2. Pre-operative clearance     3. Paroxysmal A-fib (HCC)     4. H/O breast surgery     5. Essential hypertension     6. Chronic pain of both knees         Medical Decision Making:  Today's Assessment / Status / Plan:   1.  Preoperative clearance.  Patient should be low risk for orthopedic surgery.  2.  Episode of atrial fibrillation with intercurrent illness no " evidence of recurrence.  3.  Hypertension well treated.  4.  DJD as above.      Roque Edmond M.D.  [address]  [facility] [address]  VIA Facsimile: [phone]

## 2019-10-04 NOTE — LETTER
PROCEDURE/SURGERY CLEARANCE FORM      Encounter Date: 10/4/2019    Patient: Sharon Callahan  YOB: 1940    CARDIOLOGIST:  Michael Crockett MD  REFERRING DOCTOR:  Xochitl Franklin M.D.      The above patient is low risk to have the following procedure/surgery: Left knee replacement                                           Additional comments: N/A                 MD Signature  Michael Crockett MD

## 2019-10-06 DIAGNOSIS — E55.9 HYPOVITAMINOSIS D: ICD-10-CM

## 2019-10-07 RX ORDER — ERGOCALCIFEROL 1.25 MG/1
CAPSULE ORAL
Qty: 12 CAP | Refills: 1 | Status: SHIPPED | OUTPATIENT
Start: 2019-10-07 | End: 2020-10-21

## 2019-10-24 DIAGNOSIS — Z01.812 PRE-OPERATIVE LABORATORY EXAMINATION: ICD-10-CM

## 2019-10-24 LAB
ANION GAP SERPL CALC-SCNC: 8 MMOL/L (ref 0–11.9)
BUN SERPL-MCNC: 17 MG/DL (ref 8–22)
CALCIUM SERPL-MCNC: 9.7 MG/DL (ref 8.5–10.5)
CHLORIDE SERPL-SCNC: 105 MMOL/L (ref 96–112)
CO2 SERPL-SCNC: 26 MMOL/L (ref 20–33)
CREAT SERPL-MCNC: 1 MG/DL (ref 0.5–1.4)
ERYTHROCYTE [DISTWIDTH] IN BLOOD BY AUTOMATED COUNT: 48.8 FL (ref 35.9–50)
GLUCOSE SERPL-MCNC: 75 MG/DL (ref 65–99)
HCT VFR BLD AUTO: 40.4 % (ref 37–47)
HGB BLD-MCNC: 12.8 G/DL (ref 12–16)
MCH RBC QN AUTO: 30.8 PG (ref 27–33)
MCHC RBC AUTO-ENTMCNC: 31.7 G/DL (ref 33.6–35)
MCV RBC AUTO: 97.1 FL (ref 81.4–97.8)
PLATELET # BLD AUTO: 166 K/UL (ref 164–446)
PMV BLD AUTO: 11.6 FL (ref 9–12.9)
POTASSIUM SERPL-SCNC: 4.6 MMOL/L (ref 3.6–5.5)
RBC # BLD AUTO: 4.16 M/UL (ref 4.2–5.4)
SCCMEC + MECA PNL NOSE NAA+PROBE: NEGATIVE
SCCMEC + MECA PNL NOSE NAA+PROBE: NEGATIVE
SODIUM SERPL-SCNC: 139 MMOL/L (ref 135–145)
WBC # BLD AUTO: 7 K/UL (ref 4.8–10.8)

## 2019-10-24 PROCEDURE — 87641 MR-STAPH DNA AMP PROBE: CPT

## 2019-10-24 PROCEDURE — 80048 BASIC METABOLIC PNL TOTAL CA: CPT

## 2019-10-24 PROCEDURE — 36415 COLL VENOUS BLD VENIPUNCTURE: CPT

## 2019-10-24 PROCEDURE — 87640 STAPH A DNA AMP PROBE: CPT | Mod: XU

## 2019-10-24 PROCEDURE — 85027 COMPLETE CBC AUTOMATED: CPT

## 2019-10-24 NOTE — OR NURSING
DISCHARGE PLANNING NOTE - TOTAL JOINT    Procedure: Procedure(s):  ARTHROPLASTY, KNEE, TOTAL  Procedure Date: 11/8/2019  Insurance:    Equipment currently available at home? Walker, shower chair, safety bars in shower,   Steps into the home? 4  Steps within the home? none  Toilet height? Standard, pt has insert  Type of shower? Step in  Who will be with you during your recovery? other: *neighbor**  Is Outpatient Physical Therapy set up after surgery? No   Did you take the Total Joint Class and where? No   Planning same day discharge?No     Plan: Reviewed home safety check list, with patient.  Patient is going to talk to Dr. French's office regarding possible rehab following surgery.  Pt's ex- is unable to physically take care of pt.  Pt has neighbor for part of recovery.

## 2019-10-24 NOTE — OR NURSING
"TOTAL JOINT REPLACEMENT SURGERY   PreAdmit Appointment  Pre-Operative Education Note       1) Did you take a Total Joint Replacement Pre-Operative Education class?  Where?  Answer: no    2) If you did not take a class, did you receive pre-op education in the form of a book or through an online class?    Answer: yes    3) Have you had the same joint replacement procedure within the last 3 years?   Answer:no    For patients who answered \"No\" to the above questions:     4) Was patient given information on [facility] Pre-Op Education class through the Pre-Op Education Class flyer or the Alternative Pre-op Education flyer?   Answer:   yes  5) Was a [facility] Total Joint Replacement Patient Guide binder handed out?   Answer:yes    6) Did the patient refuse preoperative education?  Answer:  No she will call the [facility] and take a total joint class  "

## 2019-10-24 NOTE — OR NURSING
Pre admit apt: Pt. Instructed to continue regularly prescribed medications through day before surgery.  Instructed to take the following medications, the day of surgery, with a sip of water per anesthesia protocol:prilosec    Pt denied any sx of UTI, denied pain/burning, or frequency with urination.

## 2019-11-03 DIAGNOSIS — E78.5 DYSLIPIDEMIA: ICD-10-CM

## 2019-11-04 RX ORDER — ATORVASTATIN CALCIUM 20 MG/1
TABLET, FILM COATED ORAL
Qty: 90 TAB | Refills: 1 | Status: SHIPPED | OUTPATIENT
Start: 2019-11-04 | End: 2020-04-17 | Stop reason: SDUPTHER

## 2019-11-05 ENCOUNTER — TELEPHONE (OUTPATIENT)
Dept: MEDICAL GROUP | Facility: MEDICAL CENTER | Age: 79
End: 2019-11-05

## 2019-11-05 NOTE — TELEPHONE ENCOUNTER
Future Appointments       Provider Department Center    11/7/2019 9:40 AM Xochitl Franklin M.D.; [facility]          ANNUAL WELLNESS VISIT PRE-VISIT PLANNING WITHOUT OUTREACH    1.  Reviewed note from last office visit with PCP: YES    2.  If any orders were placed at last visit, do we have Results/Consult Notes?        •  Labs - Labs ordered, NOT completed. Patient advised to complete prior to next appointment.  Note: If patient appointment is for lab review and patient did not complete labs, check with provider if OK to reschedule patient until labs completed.       •  Imaging - Imaging ordered, NOT completed. Patient advised to complete prior to next appointment.       •  Referrals - Referral ordered, patient has NOT been seen.    3.  Immunizations were updated in Epic using WebIZ?: Epic matches WebIZ       •  WebIZ Recommendations: FLU, TD, SHINGRIX (Shingles) and CPOX        •  Is patient due for Tdap? NO       •  Is patient due for Shingrix? YES. Patient was not notified of copay/out of pocket cost.     4.  Patient is due for the following Health Maintenance Topics:   Health Maintenance Due   Topic Date Due   • Annual Wellness Visit  1940   • IMM ZOSTER VACCINES (1 of 2) 10/30/1991   • MAMMOGRAM  07/09/2019     5.  Reviewed/Updated the following with patient:       •   Preferred Pharmacy? NO       •   Preferred Lab? NO       •   Preferred Communication? NO       •   Allergies? NO       •   Medications? NO       •   Social History? NO       •   Family History (document living status of immediate family members and if + hx of  cancer, diabetes, hypertension, hyperlipidemia, heart attack, stroke) NO    6.  Care Team Updated:       •   DME Company (gait device, O2, CPAP, etc.): NO       •   Other Specialists (eye doctor, derm, GYN, cardiology, endo, etc): NO    7. Orders for overdue Health Maintenance topics pended in Pre-Charting?  N\A    8.  Patient has the following Care Path diagnoses on Problem List:  NONE    9.  Patient was not advised: “This is a free wellness visit. The provider will screen for medical conditions to help you stay healthy. If you have other concerns to address you may be asked to discuss these at a separate visit or there may be an additional fee.”     10.  Patient was NOT informed to arrive 15 min prior to their scheduled appointment and bring in their medication bottles.

## 2019-11-07 ENCOUNTER — ANESTHESIA EVENT (OUTPATIENT)
Dept: SURGERY | Facility: MEDICAL CENTER | Age: 79
End: 2019-11-07
Payer: MEDICARE

## 2019-11-08 ENCOUNTER — ANESTHESIA (OUTPATIENT)
Dept: SURGERY | Facility: MEDICAL CENTER | Age: 79
End: 2019-11-08
Payer: MEDICARE

## 2019-11-08 ENCOUNTER — HOSPITAL ENCOUNTER (OUTPATIENT)
Facility: MEDICAL CENTER | Age: 79
End: 2019-11-09
Attending: ORTHOPAEDIC SURGERY | Admitting: ORTHOPAEDIC SURGERY
Payer: MEDICARE

## 2019-11-08 ENCOUNTER — APPOINTMENT (OUTPATIENT)
Dept: RADIOLOGY | Facility: MEDICAL CENTER | Age: 79
End: 2019-11-08
Attending: ORTHOPAEDIC SURGERY
Payer: MEDICARE

## 2019-11-08 PROCEDURE — 73560 X-RAY EXAM OF KNEE 1 OR 2: CPT | Mod: LT

## 2019-11-08 PROCEDURE — L8699 PROSTHETIC IMPLANT NOS: HCPCS | Performed by: ORTHOPAEDIC SURGERY

## 2019-11-08 PROCEDURE — 160022 HCHG BLOCK: Performed by: ORTHOPAEDIC SURGERY

## 2019-11-08 PROCEDURE — 700105 HCHG RX REV CODE 258: Performed by: ORTHOPAEDIC SURGERY

## 2019-11-08 PROCEDURE — A9270 NON-COVERED ITEM OR SERVICE: HCPCS | Performed by: ORTHOPAEDIC SURGERY

## 2019-11-08 PROCEDURE — 502000 HCHG MISC OR IMPLANTS RC 0278: Performed by: ORTHOPAEDIC SURGERY

## 2019-11-08 PROCEDURE — G0378 HOSPITAL OBSERVATION PER HR: HCPCS

## 2019-11-08 PROCEDURE — 700111 HCHG RX REV CODE 636 W/ 250 OVERRIDE (IP): Performed by: ORTHOPAEDIC SURGERY

## 2019-11-08 PROCEDURE — 700111 HCHG RX REV CODE 636 W/ 250 OVERRIDE (IP): Performed by: ANESTHESIOLOGY

## 2019-11-08 PROCEDURE — 501838 HCHG SUTURE GENERAL: Performed by: ORTHOPAEDIC SURGERY

## 2019-11-08 PROCEDURE — 96376 TX/PRO/DX INJ SAME DRUG ADON: CPT

## 2019-11-08 PROCEDURE — 700101 HCHG RX REV CODE 250: Performed by: ANESTHESIOLOGY

## 2019-11-08 PROCEDURE — 160048 HCHG OR STATISTICAL LEVEL 1-5: Performed by: ORTHOPAEDIC SURGERY

## 2019-11-08 PROCEDURE — 160041 HCHG SURGERY MINUTES - EA ADDL 1 MIN LEVEL 4: Performed by: ORTHOPAEDIC SURGERY

## 2019-11-08 PROCEDURE — 502240 HCHG MISC OR SUPPLY RC 0272: Performed by: ORTHOPAEDIC SURGERY

## 2019-11-08 PROCEDURE — 700101 HCHG RX REV CODE 250: Performed by: ORTHOPAEDIC SURGERY

## 2019-11-08 PROCEDURE — 160036 HCHG PACU - EA ADDL 30 MINS PHASE I: Performed by: ORTHOPAEDIC SURGERY

## 2019-11-08 PROCEDURE — A9270 NON-COVERED ITEM OR SERVICE: HCPCS | Performed by: ANESTHESIOLOGY

## 2019-11-08 PROCEDURE — 700105 HCHG RX REV CODE 258: Performed by: ANESTHESIOLOGY

## 2019-11-08 PROCEDURE — 160029 HCHG SURGERY MINUTES - 1ST 30 MINS LEVEL 4: Performed by: ORTHOPAEDIC SURGERY

## 2019-11-08 PROCEDURE — 160002 HCHG RECOVERY MINUTES (STAT): Performed by: ORTHOPAEDIC SURGERY

## 2019-11-08 PROCEDURE — 160009 HCHG ANES TIME/MIN: Performed by: ORTHOPAEDIC SURGERY

## 2019-11-08 PROCEDURE — 160035 HCHG PACU - 1ST 60 MINS PHASE I: Performed by: ORTHOPAEDIC SURGERY

## 2019-11-08 PROCEDURE — 700102 HCHG RX REV CODE 250 W/ 637 OVERRIDE(OP): Performed by: ORTHOPAEDIC SURGERY

## 2019-11-08 PROCEDURE — 700102 HCHG RX REV CODE 250 W/ 637 OVERRIDE(OP): Performed by: ANESTHESIOLOGY

## 2019-11-08 PROCEDURE — 700112 HCHG RX REV CODE 229: Performed by: ORTHOPAEDIC SURGERY

## 2019-11-08 PROCEDURE — 96375 TX/PRO/DX INJ NEW DRUG ADDON: CPT

## 2019-11-08 PROCEDURE — 502579 HCHG PACK, TOTAL KNEE: Performed by: ORTHOPAEDIC SURGERY

## 2019-11-08 DEVICE — FEMUR TKA OXINIUM CR JOURNEY II NP LEFT SIZE 4 (1EA): Type: IMPLANTABLE DEVICE | Site: KNEE | Status: FUNCTIONAL

## 2019-11-08 DEVICE — BONE CEMENT SIMPLEX ANTIBIO - (10/PK): Type: IMPLANTABLE DEVICE | Site: KNEE | Status: FUNCTIONAL

## 2019-11-08 DEVICE — IMPLANTABLE DEVICE: Type: IMPLANTABLE DEVICE | Site: KNEE | Status: FUNCTIONAL

## 2019-11-08 DEVICE — BASE TIBIAL NONPOROUS JOURNEY II LEFT SIZE 3 (1EA): Type: IMPLANTABLE DEVICE | Site: KNEE | Status: FUNCTIONAL

## 2019-11-08 RX ORDER — POLYETHYLENE GLYCOL 3350 17 G/17G
1 POWDER, FOR SOLUTION ORAL 2 TIMES DAILY PRN
Status: DISCONTINUED | OUTPATIENT
Start: 2019-11-08 | End: 2019-11-09 | Stop reason: HOSPADM

## 2019-11-08 RX ORDER — OXYCODONE HCL 5 MG/5 ML
10 SOLUTION, ORAL ORAL
Status: COMPLETED | OUTPATIENT
Start: 2019-11-08 | End: 2019-11-08

## 2019-11-08 RX ORDER — TRANEXAMIC ACID 100 MG/ML
INJECTION, SOLUTION INTRAVENOUS PRN
Status: DISCONTINUED | OUTPATIENT
Start: 2019-11-08 | End: 2019-11-08 | Stop reason: SURG

## 2019-11-08 RX ORDER — DIPHENHYDRAMINE HYDROCHLORIDE 50 MG/ML
25 INJECTION INTRAMUSCULAR; INTRAVENOUS EVERY 6 HOURS PRN
Status: DISCONTINUED | OUTPATIENT
Start: 2019-11-08 | End: 2019-11-09 | Stop reason: HOSPADM

## 2019-11-08 RX ORDER — CELECOXIB 200 MG/1
200 CAPSULE ORAL ONCE
Status: COMPLETED | OUTPATIENT
Start: 2019-11-08 | End: 2019-11-08

## 2019-11-08 RX ORDER — LIDOCAINE HYDROCHLORIDE 20 MG/ML
INJECTION, SOLUTION EPIDURAL; INFILTRATION; INTRACAUDAL; PERINEURAL PRN
Status: DISCONTINUED | OUTPATIENT
Start: 2019-11-08 | End: 2019-11-08 | Stop reason: SURG

## 2019-11-08 RX ORDER — MEPERIDINE HYDROCHLORIDE 25 MG/ML
12.5 INJECTION INTRAMUSCULAR; INTRAVENOUS; SUBCUTANEOUS
Status: DISCONTINUED | OUTPATIENT
Start: 2019-11-08 | End: 2019-11-08 | Stop reason: HOSPADM

## 2019-11-08 RX ORDER — MORPHINE SULFATE 4 MG/ML
2 INJECTION, SOLUTION INTRAMUSCULAR; INTRAVENOUS
Status: DISCONTINUED | OUTPATIENT
Start: 2019-11-08 | End: 2019-11-09 | Stop reason: HOSPADM

## 2019-11-08 RX ORDER — ACETAMINOPHEN 500 MG
1000 TABLET ORAL EVERY 6 HOURS
Status: DISCONTINUED | OUTPATIENT
Start: 2019-11-08 | End: 2019-11-09 | Stop reason: HOSPADM

## 2019-11-08 RX ORDER — HYDROMORPHONE HYDROCHLORIDE 2 MG/ML
INJECTION, SOLUTION INTRAMUSCULAR; INTRAVENOUS; SUBCUTANEOUS PRN
Status: DISCONTINUED | OUTPATIENT
Start: 2019-11-08 | End: 2019-11-08 | Stop reason: SURG

## 2019-11-08 RX ORDER — HYDROMORPHONE HYDROCHLORIDE 1 MG/ML
0.2 INJECTION, SOLUTION INTRAMUSCULAR; INTRAVENOUS; SUBCUTANEOUS
Status: DISCONTINUED | OUTPATIENT
Start: 2019-11-08 | End: 2019-11-08 | Stop reason: HOSPADM

## 2019-11-08 RX ORDER — ATORVASTATIN CALCIUM 20 MG/1
20 TABLET, FILM COATED ORAL DAILY
Status: DISCONTINUED | OUTPATIENT
Start: 2019-11-09 | End: 2019-11-09 | Stop reason: HOSPADM

## 2019-11-08 RX ORDER — ROPIVACAINE HYDROCHLORIDE 5 MG/ML
INJECTION, SOLUTION EPIDURAL; INFILTRATION; PERINEURAL PRN
Status: DISCONTINUED | OUTPATIENT
Start: 2019-11-08 | End: 2019-11-08 | Stop reason: SURG

## 2019-11-08 RX ORDER — ACETAMINOPHEN 500 MG
1000 TABLET ORAL ONCE
Status: COMPLETED | OUTPATIENT
Start: 2019-11-08 | End: 2019-11-08

## 2019-11-08 RX ORDER — OMEPRAZOLE 20 MG/1
40 CAPSULE, DELAYED RELEASE ORAL DAILY
Status: DISCONTINUED | OUTPATIENT
Start: 2019-11-09 | End: 2019-11-09 | Stop reason: HOSPADM

## 2019-11-08 RX ORDER — BISACODYL 10 MG
10 SUPPOSITORY, RECTAL RECTAL
Status: DISCONTINUED | OUTPATIENT
Start: 2019-11-08 | End: 2019-11-09 | Stop reason: HOSPADM

## 2019-11-08 RX ORDER — HALOPERIDOL 5 MG/ML
1 INJECTION INTRAMUSCULAR
Status: DISCONTINUED | OUTPATIENT
Start: 2019-11-08 | End: 2019-11-08 | Stop reason: HOSPADM

## 2019-11-08 RX ORDER — HALOPERIDOL 5 MG/ML
1 INJECTION INTRAMUSCULAR EVERY 6 HOURS PRN
Status: DISCONTINUED | OUTPATIENT
Start: 2019-11-08 | End: 2019-11-09 | Stop reason: HOSPADM

## 2019-11-08 RX ORDER — ENEMA 19; 7 G/133ML; G/133ML
1 ENEMA RECTAL
Status: DISCONTINUED | OUTPATIENT
Start: 2019-11-08 | End: 2019-11-09 | Stop reason: HOSPADM

## 2019-11-08 RX ORDER — SODIUM CHLORIDE, SODIUM LACTATE, POTASSIUM CHLORIDE, CALCIUM CHLORIDE 600; 310; 30; 20 MG/100ML; MG/100ML; MG/100ML; MG/100ML
INJECTION, SOLUTION INTRAVENOUS CONTINUOUS
Status: ACTIVE | OUTPATIENT
Start: 2019-11-08 | End: 2019-11-08

## 2019-11-08 RX ORDER — SCOLOPAMINE TRANSDERMAL SYSTEM 1 MG/1
1 PATCH, EXTENDED RELEASE TRANSDERMAL
Status: DISCONTINUED | OUTPATIENT
Start: 2019-11-08 | End: 2019-11-09 | Stop reason: HOSPADM

## 2019-11-08 RX ORDER — AMOXICILLIN 250 MG
1 CAPSULE ORAL
Status: DISCONTINUED | OUTPATIENT
Start: 2019-11-08 | End: 2019-11-09 | Stop reason: HOSPADM

## 2019-11-08 RX ORDER — OXYCODONE HYDROCHLORIDE 5 MG/1
5 TABLET ORAL EVERY 6 HOURS
Status: DISCONTINUED | OUTPATIENT
Start: 2019-11-08 | End: 2019-11-09 | Stop reason: HOSPADM

## 2019-11-08 RX ORDER — ONDANSETRON 2 MG/ML
4 INJECTION INTRAMUSCULAR; INTRAVENOUS
Status: DISCONTINUED | OUTPATIENT
Start: 2019-11-08 | End: 2019-11-08 | Stop reason: HOSPADM

## 2019-11-08 RX ORDER — KETOROLAC TROMETHAMINE 30 MG/ML
15 INJECTION, SOLUTION INTRAMUSCULAR; INTRAVENOUS EVERY 6 HOURS
Status: DISCONTINUED | OUTPATIENT
Start: 2019-11-08 | End: 2019-11-09 | Stop reason: HOSPADM

## 2019-11-08 RX ORDER — BUPIVACAINE HYDROCHLORIDE AND EPINEPHRINE 5; 5 MG/ML; UG/ML
INJECTION, SOLUTION EPIDURAL; INTRACAUDAL; PERINEURAL
Status: DISCONTINUED | OUTPATIENT
Start: 2019-11-08 | End: 2019-11-08 | Stop reason: HOSPADM

## 2019-11-08 RX ORDER — POTASSIUM CHLORIDE 20 MEQ/1
40 TABLET, EXTENDED RELEASE ORAL DAILY
Status: DISCONTINUED | OUTPATIENT
Start: 2019-11-09 | End: 2019-11-09 | Stop reason: HOSPADM

## 2019-11-08 RX ORDER — OXYCODONE HCL 5 MG/5 ML
5 SOLUTION, ORAL ORAL
Status: COMPLETED | OUTPATIENT
Start: 2019-11-08 | End: 2019-11-08

## 2019-11-08 RX ORDER — DEXAMETHASONE SODIUM PHOSPHATE 4 MG/ML
4 INJECTION, SOLUTION INTRA-ARTICULAR; INTRALESIONAL; INTRAMUSCULAR; INTRAVENOUS; SOFT TISSUE
Status: DISCONTINUED | OUTPATIENT
Start: 2019-11-08 | End: 2019-11-09 | Stop reason: HOSPADM

## 2019-11-08 RX ORDER — CEFAZOLIN SODIUM 1 G/3ML
INJECTION, POWDER, FOR SOLUTION INTRAMUSCULAR; INTRAVENOUS PRN
Status: DISCONTINUED | OUTPATIENT
Start: 2019-11-08 | End: 2019-11-08 | Stop reason: SURG

## 2019-11-08 RX ORDER — ONDANSETRON 2 MG/ML
4 INJECTION INTRAMUSCULAR; INTRAVENOUS EVERY 4 HOURS PRN
Status: DISCONTINUED | OUTPATIENT
Start: 2019-11-08 | End: 2019-11-09 | Stop reason: HOSPADM

## 2019-11-08 RX ORDER — MIDAZOLAM HYDROCHLORIDE 1 MG/ML
INJECTION INTRAMUSCULAR; INTRAVENOUS PRN
Status: DISCONTINUED | OUTPATIENT
Start: 2019-11-08 | End: 2019-11-08 | Stop reason: SURG

## 2019-11-08 RX ORDER — DOCUSATE SODIUM 100 MG/1
100 CAPSULE, LIQUID FILLED ORAL 2 TIMES DAILY
Status: DISCONTINUED | OUTPATIENT
Start: 2019-11-08 | End: 2019-11-09 | Stop reason: HOSPADM

## 2019-11-08 RX ORDER — HYDRALAZINE HYDROCHLORIDE 20 MG/ML
5 INJECTION INTRAMUSCULAR; INTRAVENOUS
Status: DISCONTINUED | OUTPATIENT
Start: 2019-11-08 | End: 2019-11-08 | Stop reason: HOSPADM

## 2019-11-08 RX ORDER — DIPHENHYDRAMINE HYDROCHLORIDE 50 MG/ML
6.25 INJECTION INTRAMUSCULAR; INTRAVENOUS
Status: DISCONTINUED | OUTPATIENT
Start: 2019-11-08 | End: 2019-11-08 | Stop reason: HOSPADM

## 2019-11-08 RX ORDER — HYDROMORPHONE HYDROCHLORIDE 1 MG/ML
0.4 INJECTION, SOLUTION INTRAMUSCULAR; INTRAVENOUS; SUBCUTANEOUS
Status: DISCONTINUED | OUTPATIENT
Start: 2019-11-08 | End: 2019-11-08 | Stop reason: HOSPADM

## 2019-11-08 RX ORDER — AMOXICILLIN 250 MG
1 CAPSULE ORAL NIGHTLY
Status: DISCONTINUED | OUTPATIENT
Start: 2019-11-08 | End: 2019-11-09 | Stop reason: HOSPADM

## 2019-11-08 RX ORDER — SODIUM CHLORIDE, SODIUM LACTATE, POTASSIUM CHLORIDE, CALCIUM CHLORIDE 600; 310; 30; 20 MG/100ML; MG/100ML; MG/100ML; MG/100ML
INJECTION, SOLUTION INTRAVENOUS CONTINUOUS
Status: DISCONTINUED | OUTPATIENT
Start: 2019-11-08 | End: 2019-11-08 | Stop reason: HOSPADM

## 2019-11-08 RX ORDER — DEXAMETHASONE SODIUM PHOSPHATE 4 MG/ML
INJECTION, SOLUTION INTRA-ARTICULAR; INTRALESIONAL; INTRAMUSCULAR; INTRAVENOUS; SOFT TISSUE PRN
Status: DISCONTINUED | OUTPATIENT
Start: 2019-11-08 | End: 2019-11-08 | Stop reason: SURG

## 2019-11-08 RX ORDER — TRAZODONE HYDROCHLORIDE 50 MG/1
50-100 TABLET ORAL NIGHTLY PRN
Status: DISCONTINUED | OUTPATIENT
Start: 2019-11-08 | End: 2019-11-09 | Stop reason: HOSPADM

## 2019-11-08 RX ORDER — GABAPENTIN 100 MG/1
100 CAPSULE ORAL
Status: COMPLETED | OUTPATIENT
Start: 2019-11-08 | End: 2019-11-08

## 2019-11-08 RX ORDER — FUROSEMIDE 40 MG/1
40 TABLET ORAL
Status: DISCONTINUED | OUTPATIENT
Start: 2019-11-09 | End: 2019-11-09 | Stop reason: HOSPADM

## 2019-11-08 RX ORDER — MAGNESIUM SULFATE HEPTAHYDRATE 40 MG/ML
INJECTION, SOLUTION INTRAVENOUS PRN
Status: DISCONTINUED | OUTPATIENT
Start: 2019-11-08 | End: 2019-11-08 | Stop reason: SURG

## 2019-11-08 RX ORDER — LISINOPRIL 20 MG/1
40 TABLET ORAL DAILY
Status: DISCONTINUED | OUTPATIENT
Start: 2019-11-09 | End: 2019-11-09 | Stop reason: HOSPADM

## 2019-11-08 RX ORDER — HYDROMORPHONE HYDROCHLORIDE 1 MG/ML
0.1 INJECTION, SOLUTION INTRAMUSCULAR; INTRAVENOUS; SUBCUTANEOUS
Status: DISCONTINUED | OUTPATIENT
Start: 2019-11-08 | End: 2019-11-08 | Stop reason: HOSPADM

## 2019-11-08 RX ADMIN — ROPIVACAINE HYDROCHLORIDE 17 ML: 5 INJECTION, SOLUTION EPIDURAL; INFILTRATION; PERINEURAL at 12:31

## 2019-11-08 RX ADMIN — ACETAMINOPHEN 1000 MG: 500 TABLET, FILM COATED ORAL at 17:11

## 2019-11-08 RX ADMIN — SODIUM CHLORIDE, POTASSIUM CHLORIDE, SODIUM LACTATE AND CALCIUM CHLORIDE: 600; 310; 30; 20 INJECTION, SOLUTION INTRAVENOUS at 14:35

## 2019-11-08 RX ADMIN — LIDOCAINE HYDROCHLORIDE 0.5 ML: 10 INJECTION, SOLUTION INFILTRATION; PERINEURAL at 11:53

## 2019-11-08 RX ADMIN — HYDROMORPHONE HYDROCHLORIDE 0.5 MG: 2 INJECTION, SOLUTION INTRAMUSCULAR; INTRAVENOUS; SUBCUTANEOUS at 13:15

## 2019-11-08 RX ADMIN — DEXAMETHASONE SODIUM PHOSPHATE 8 MG: 4 INJECTION, SOLUTION INTRAMUSCULAR; INTRAVENOUS at 12:58

## 2019-11-08 RX ADMIN — OXYCODONE HYDROCHLORIDE 10 MG: 5 SOLUTION ORAL at 14:34

## 2019-11-08 RX ADMIN — ACETAMINOPHEN 1000 MG: 500 TABLET, FILM COATED ORAL at 11:54

## 2019-11-08 RX ADMIN — MORPHINE SULFATE 2 MG: 4 INJECTION INTRAVENOUS at 20:33

## 2019-11-08 RX ADMIN — ACETAMINOPHEN 1000 MG: 500 TABLET, FILM COATED ORAL at 23:52

## 2019-11-08 RX ADMIN — FENTANYL CITRATE 50 MCG: 0.05 INJECTION, SOLUTION INTRAMUSCULAR; INTRAVENOUS at 14:34

## 2019-11-08 RX ADMIN — MIDAZOLAM HYDROCHLORIDE 1 MG: 1 INJECTION, SOLUTION INTRAMUSCULAR; INTRAVENOUS at 12:55

## 2019-11-08 RX ADMIN — PROPOFOL 150 MG: 10 INJECTION, EMULSION INTRAVENOUS at 12:55

## 2019-11-08 RX ADMIN — TRAZODONE HYDROCHLORIDE 50 MG: 50 TABLET ORAL at 23:53

## 2019-11-08 RX ADMIN — OXYCODONE HYDROCHLORIDE 5 MG: 5 TABLET ORAL at 17:11

## 2019-11-08 RX ADMIN — FENTANYL CITRATE 50 MCG: 50 INJECTION, SOLUTION INTRAMUSCULAR; INTRAVENOUS at 13:15

## 2019-11-08 RX ADMIN — CEFAZOLIN 2 G: 1 INJECTION, POWDER, FOR SOLUTION INTRAVENOUS at 12:55

## 2019-11-08 RX ADMIN — STANDARDIZED SENNA CONCENTRATE AND DOCUSATE SODIUM 1 TABLET: 8.6; 5 TABLET, FILM COATED ORAL at 20:35

## 2019-11-08 RX ADMIN — GABAPENTIN 100 MG: 100 CAPSULE ORAL at 11:54

## 2019-11-08 RX ADMIN — KETOROLAC TROMETHAMINE 15 MG: 30 INJECTION, SOLUTION INTRAMUSCULAR at 23:52

## 2019-11-08 RX ADMIN — EPHEDRINE SULFATE 10 MG: 50 INJECTION INTRAMUSCULAR; INTRAVENOUS; SUBCUTANEOUS at 13:05

## 2019-11-08 RX ADMIN — HYDROMORPHONE HYDROCHLORIDE 0.5 MG: 2 INJECTION, SOLUTION INTRAMUSCULAR; INTRAVENOUS; SUBCUTANEOUS at 12:55

## 2019-11-08 RX ADMIN — CELECOXIB 200 MG: 200 CAPSULE ORAL at 11:54

## 2019-11-08 RX ADMIN — DOCUSATE SODIUM 100 MG: 100 CAPSULE, LIQUID FILLED ORAL at 17:11

## 2019-11-08 RX ADMIN — KETOROLAC TROMETHAMINE 15 MG: 30 INJECTION, SOLUTION INTRAMUSCULAR at 17:11

## 2019-11-08 RX ADMIN — OXYCODONE HYDROCHLORIDE 5 MG: 5 TABLET ORAL at 23:52

## 2019-11-08 RX ADMIN — MAGNESIUM SULFATE IN WATER 4 G: 40 INJECTION, SOLUTION INTRAVENOUS at 13:07

## 2019-11-08 RX ADMIN — TRANEXAMIC ACID 1500 MG: 100 INJECTION, SOLUTION INTRAVENOUS at 12:58

## 2019-11-08 RX ADMIN — FENTANYL CITRATE 50 MCG: 0.05 INJECTION, SOLUTION INTRAMUSCULAR; INTRAVENOUS at 14:50

## 2019-11-08 RX ADMIN — FENTANYL CITRATE 50 MCG: 50 INJECTION, SOLUTION INTRAMUSCULAR; INTRAVENOUS at 12:55

## 2019-11-08 RX ADMIN — SODIUM CHLORIDE, POTASSIUM CHLORIDE, SODIUM LACTATE AND CALCIUM CHLORIDE: 600; 310; 30; 20 INJECTION, SOLUTION INTRAVENOUS at 11:55

## 2019-11-08 RX ADMIN — LIDOCAINE HYDROCHLORIDE 60 MG: 20 INJECTION, SOLUTION EPIDURAL; INFILTRATION; INTRACAUDAL; PERINEURAL at 12:55

## 2019-11-08 ASSESSMENT — LIFESTYLE VARIABLES: EVER_SMOKED: NEVER

## 2019-11-08 ASSESSMENT — COPD QUESTIONNAIRES
COPD SCREENING SCORE: 4
DO YOU EVER COUGH UP ANY MUCUS OR PHLEGM?: NO/ONLY WITH OCCASIONAL COLDS OR INFECTIONS
DURING THE PAST 4 WEEKS HOW MUCH DID YOU FEEL SHORT OF BREATH: NONE/LITTLE OF THE TIME
HAVE YOU SMOKED AT LEAST 100 CIGARETTES IN YOUR ENTIRE LIFE: YES

## 2019-11-08 ASSESSMENT — PAIN SCALES - GENERAL: PAIN_LEVEL: 5

## 2019-11-08 NOTE — ANESTHESIA POSTPROCEDURE EVALUATION
Patient: Sharon Callahan    Procedure Summary     Date:  11/08/19 Room / Location:   OR 06 / SURGERY [facility]    Anesthesia Start:  1251 Anesthesia Stop:  1413    Procedure:  ARTHROPLASTY, KNEE, TOTAL (Left Knee) Diagnosis:  (UNILATERAL PRIMARY OA LEFT KNEE)    Surgeon:  Roque Edmond M.D. Responsible Provider:  Chele Sinclair M.D.    Anesthesia Type:  general, peripheral nerve block ASA Status:  2          Final Anesthesia Type: general, peripheral nerve block  Last vitals  BP   Blood Pressure : 146/67    Temp   36.2 °C (97.2 °F)    Pulse   Heart Rate (Monitored): 61   Resp   16    SpO2   97 %      Anesthesia Post Evaluation    Patient location during evaluation: PACU  Patient participation: complete - patient participated  Level of consciousness: sleepy but conscious  Pain score: 5    Airway patency: patent  Anesthetic complications: no  Cardiovascular status: hemodynamically stable  Respiratory status: acceptable  Hydration status: euvolemic    PONV: none

## 2019-11-08 NOTE — ANESTHESIA PROCEDURE NOTES
Peripheral Block  Date/Time: 11/8/2019 12:30 PM  Performed by: Chele Sinclair M.D.  Authorized by: Chele Sinclair M.D.     Patient Location:  Pre-op  Start Time:  11/8/2019 12:30 PM  End Time:  11/8/2019 12:32 PM  Reason for Block: at surgeon's request and post-op pain management    patient identified, IV checked, site marked, risks and benefits discussed, surgical consent, monitors and equipment checked, pre-op evaluation and timeout performed    Patient Position:  Supine  Prep: ChloraPrep    Monitoring:  Heart rate, continuous pulse ox and cardiac monitor  Block Region:  Lower Extremity  Lower Extremity - Block Type:  Selective FEMORAL nerve block at the Adductor Canal    Laterality:  Left  Procedures: ultrasound guided  Image captured, interpreted and electronically stored.  Local Infiltration:  Lidocaine  Strength:  1 %  Dose:  3 ml  Block Type:  Single-shot  Needle Length:  100mm  Needle Gauge:  21 G  Needle Localization:  Ultrasound guidance  Injection Assessment:  Negative aspiration for heme, no paresthesia on injection, incremental injection and local visualized surrounding nerve on ultrasound  Evidence of intravascular injection: No     US Guided Selective Femoral Nerve Block at Adductor Canal:   US probe placed at mid-thigh level on externally rotated leg and femur identified.  Probe directed medially until Sartorius Muscle (SM), Femoral Artery (FA) and Saphenous Nerve (SN) identified in Adductor Canal (AC).  Needle inserted anterolateral to probe in an in plane approach into a subsartorial perivascular perineural position.  After negative aspiration LA injected with ease and visualized spreading within the AC.

## 2019-11-08 NOTE — ANESTHESIA TIME REPORT
Anesthesia Start and Stop Event Times     Date Time Event    11/8/2019 1233 Ready for Procedure     1251 Anesthesia Start     1413 Anesthesia Stop        Responsible Staff  11/08/19    Name Role Begin End    Chele Sinclair M.D. Anesth 1251 1413        Preop Diagnosis (Free Text):  Pre-op Diagnosis     UNILATERAL PRIMARY OA LEFT KNEE        Preop Diagnosis (Codes):    Post op Diagnosis  Osteoarthritis of left knee      Premium Reason  Non-Premium    Comments:

## 2019-11-08 NOTE — OP REPORT
DATE OF SERVICE:  11/08/2019    PREOPERATIVE DIAGNOSIS:  Left knee degenerative joint disease.    POSTOPERATIVE DIAGNOSIS:  Left knee degenerative joint disease.    PROCEDURE:  Left total knee arthroplasty.    SURGEON:  Roque Edmond MD    ASSISTANT:  Altagracia Hahn PA-C    ANESTHESIA:  General and an adductor canal nerve block.    ANESTHESIOLOGIST:  Chele Sinclair MD    IMPLANTS:  [employer]rockett [employer]hew Journey II size 4 cruciate retaining Oxinium   femoral component, a size 3 tibial component, an 11 mm cruciate retaining   articular insert and a 32x7.5 mm round patellar button.    TOTAL TOURNIQUET TIME:  41 minutes.    COMPLICATIONS:  None.    DISPOSITION:  Stable to postanesthesia care unit.    INDICATIONS:  The patient has had progressive left knee pain, which has been   unresponsive to conservative management.  The risks, benefits, alternatives,   limitations of surgical intervention were discussed in detail.  She expressed   understanding and desired to proceed.    DESCRIPTION OF PROCEDURE:  The patient and the correct operative extremity   were identified in the preoperative area.  The knee was marked.  She was   brought to the operating room where the correct operative extremity was again   confirmed.  She was placed supine on the OR table where she underwent an   adductor canal nerve block performed at my request for postoperative pain   control.  She underwent general anesthesia without complication.  Examination   under anesthesia showed varus deformity of the knee, no instability.  The knee   was prepped with alcohol.  This was allowed to dry.  The knee was then   prepped and draped in the usual sterile fashion using ChloraPrep.  An Esmarch   was used to exsanguinate the left lower extremity, tourniquet inflated to 250   mmHg.  A 15 cm longitudinal incision was centered over the patella.  Sharp   dissection carried down to level of the paratenon.  A medial parapatellar   arthrotomy was performed and a wide  medial release was performed.  She had   severe tricompartmental osteoarthrosis, most pronounced in the medial   compartment.  Anterior horns of the medial and lateral menisci were excised.    The fat pad was excised, patella everted.  A centering drill placed in the   distal femur and intramedullary kelechi was placed and intramedullary distal   femoral resection was performed and extramedullary tibial resection was   performed.  We then excised the posterior horns of the medial and lateral   menisci.  I performed finishing cuts on the distal femur, placed trials on the   femur and the tibia, cut the box on the femur, then trialed with a 9 mm   articular insert, it was a little loose in both flexion and extension, placed   an 11 and that had excellent balance in flexion and extension, central   patellar tracking.  We everted the patella, there was extensive arthrosis and   a freehand patellar resection was performed.  We then placed a 32 mm round   patellar button, again had central patellar tracking and excellent balance in   flexion and extension.  We then punched the femur and the tibia, removed the   trials, vacuum irrigated and dried the bony surfaces while the cement was   vacuum mixed and cemented the tibia followed by the femur, then the patella   using third generation technique.  We then allowed the cement to cure   completely while the knee was bathing in a Betadine solution with a 12 mm   articular polyethylene trial in place.  Once the cement had cured completely,   we removed all excess cement, copiously irrigated the wound, deflated the   tourniquet, obtained hemostasis.  I then placed the real 11 mm articular   insert, snapped it into position, again took the knee through a full range of   motion, again had excellent balance in flexion and extension, central patellar   tracking.  Again, irrigated the wound, closed the extensor mechanism with a   combination of interrupted #1 Vicryl suture and a running  #2 Quill stitch and   irrigated the wound and closed the deep subQ with Monocryl, closed the skin   with staples.  Sterile dressings were applied.  The knee was injected with   0.5% bupivacaine with epinephrine.  Sterile dressings were applied.  The knee   was loosely overwrapped with an Ace wrap.  A HAN stocking was placed.  The   patient was then allowed to awake from anesthesia, transferred to her hospital   cart and taken to the postanesthesia care unit in stable condition.  She   tolerated the procedure well.  There were no immediate complications.       ____________________________________     JEANNE MOSQUEDA MD RD / RYAN    DD:  11/08/2019 14:26:49  DT:  11/08/2019 14:37:32    D#:  [id number]  Job#:  [id number]

## 2019-11-08 NOTE — ANESTHESIA PREPROCEDURE EVALUATION
"Past Medical History:   Diagnosis Date   • Arrhythmia     a-fib   • Arthritis     osteo- shoulders knees   • BMI 38.0-38.9,adult 9/18/2013   • CATARACT     surgical correcttion bilateral   • Chronic nausea 1/12/2015    Has had GI work up done Dr voss   • Chronic pain of both knees 1/9/2019   • Depression with anxiety 5/3/2011   • Heart burn    • Heart valve disease     \"slight regurgitation\"   • Hemothorax on right 1/4/2017    Status post thoracotomy and decortication   • History of cholecystectomy    • Hyperlipidemia 4/4/2011   • Hypertension    • Indigestion    • MYESHA (obstructive sleep apnea) 4/4/2011    On cPAP    • Pain     left hip   • Pleural effusion, right 1/4/2017    Status post thoracotomy and decortication   • Range of motion deficit     left elbow, unable to completely extend   • S/P bilateral mastectomy 4/4/2011   • Sleep apnea     CPAP   • Snoring          Relevant Problems   ANESTHESIA   (+) MYESHA on CPAP      CARDIAC   (+) Essential hypertension   (+) Paroxysmal A-fib (HCC)   (+) SVT (supraventricular tachycardia) (HCC)       Physical Exam    Airway   Mallampati: II  TM distance: >3 FB  Neck ROM: full       Cardiovascular - normal exam  Rhythm: regular  Rate: normal  (-) murmur     Dental - normal exam         Pulmonary - normal exam  Breath sounds clear to auscultation     Abdominal   (+) obese     Neurological - normal exam                 Anesthesia Plan    ASA 2       Plan - general and peripheral nerve block     Peripheral nerve block will be post-op pain control  Airway plan will be LMA        Induction: intravenous    Postoperative Plan: Postoperative administration of opioids is intended.    Pertinent diagnostic labs and testing reviewed    Informed Consent:    Anesthetic plan and risks discussed with patient.        "

## 2019-11-08 NOTE — ANESTHESIA QCDR
2019 [facility] Clinical Data Registry (for Quality Improvement)     Postoperative nausea/vomiting risk protocol (Adult = 18 yrs and Pediatric 3-17 yrs)- (430 and 463)  General inhalation anesthetic (NOT TIVA) with PONV risk factors: Yes  Provision of anti-emetic therapy with at least 2 different classes of agents: Yes   Patient DID NOT receive anti-emetic therapy and reason is documented in Medical Record:  N/A    Multimodal Pain Management- (AQI59)  Patient undergoing Elective Surgery (i.e. Outpatient, or ASC, or Prescheduled Surgery prior to Hospital Admission): Yes  Use of Multimodal Pain Management, two or more drugs and/or interventions, NOT including systemic opioids: Yes   Exception: Documented allergy to multiple classes of analgesics:  N/A    PACU assessment of acute postoperative pain prior to Anesthesia Care End- Applies to Patients Age = 18- (ABG7)  Initial PACU pain score is which of the following: < 7/10  Patient unable to report pain score: N/A    Post-anesthetic transfer of care checklist/protocol to PACU/ICU- (426 and 427)  Upon conclusion of case, patient transferred to which of the following locations: PACU/Non-ICU  Use of transfer checklist/protocol: Yes  Exclusion: Service Performed in Patient Hospital Room (and thus did not require transfer): N/A    PACU Reintubation- (AQI31)  General anesthesia requiring endotracheal intubation (ETT) along with subsequent extubation in OR or PACU: No  Required reintubation in the PACU: N/A  Extubation was a planned trial documented in the medical record prior to removal of the original airway device: N/A    Unplanned admission to ICU related to anesthesia service up through end of PACU care- (MD51)  Unplanned admission to ICU (not initially anticipated at anesthesia start time): No

## 2019-11-08 NOTE — ANESTHESIA PROCEDURE NOTES
Airway  Date/Time: 11/8/2019 12:56 PM  Performed by: Chele Sinclair M.D.  Authorized by: Chele Sinclair M.D.     Location:  OR  Urgency:  Elective  Difficult Airway: No    Indications for Airway Management:  Anesthesia  Spontaneous Ventilation: absent    Sedation Level:  Deep  Preoxygenated: Yes    Mask Difficulty Assessment:  1 - vent by mask  Final Airway Type:  Supraglottic airway  Final Supraglottic Airway:  Standard LMA  SGA Size:  3  Number of Attempts at Approach:  1

## 2019-11-09 VITALS
OXYGEN SATURATION: 94 % | HEART RATE: 54 BPM | RESPIRATION RATE: 16 BRPM | HEIGHT: 62 IN | DIASTOLIC BLOOD PRESSURE: 52 MMHG | SYSTOLIC BLOOD PRESSURE: 107 MMHG | TEMPERATURE: 97.7 F | BODY MASS INDEX: 37.08 KG/M2 | WEIGHT: 201.5 LBS

## 2019-11-09 PROCEDURE — 96365 THER/PROPH/DIAG IV INF INIT: CPT

## 2019-11-09 PROCEDURE — 700105 HCHG RX REV CODE 258: Performed by: ORTHOPAEDIC SURGERY

## 2019-11-09 PROCEDURE — 700112 HCHG RX REV CODE 229: Performed by: ORTHOPAEDIC SURGERY

## 2019-11-09 PROCEDURE — A9270 NON-COVERED ITEM OR SERVICE: HCPCS | Performed by: ORTHOPAEDIC SURGERY

## 2019-11-09 PROCEDURE — 96366 THER/PROPH/DIAG IV INF ADDON: CPT

## 2019-11-09 PROCEDURE — 700102 HCHG RX REV CODE 250 W/ 637 OVERRIDE(OP): Performed by: ORTHOPAEDIC SURGERY

## 2019-11-09 PROCEDURE — 96376 TX/PRO/DX INJ SAME DRUG ADON: CPT

## 2019-11-09 PROCEDURE — G0378 HOSPITAL OBSERVATION PER HR: HCPCS

## 2019-11-09 PROCEDURE — 700111 HCHG RX REV CODE 636 W/ 250 OVERRIDE (IP): Performed by: ORTHOPAEDIC SURGERY

## 2019-11-09 PROCEDURE — 97165 OT EVAL LOW COMPLEX 30 MIN: CPT

## 2019-11-09 PROCEDURE — 97162 PT EVAL MOD COMPLEX 30 MIN: CPT

## 2019-11-09 RX ADMIN — ACETAMINOPHEN 1000 MG: 500 TABLET, FILM COATED ORAL at 06:28

## 2019-11-09 RX ADMIN — ASPIRIN 325 MG: 325 TABLET, DELAYED RELEASE ORAL at 06:21

## 2019-11-09 RX ADMIN — VANCOMYCIN HYDROCHLORIDE 1500 MG: 500 INJECTION, POWDER, LYOPHILIZED, FOR SOLUTION INTRAVENOUS at 01:12

## 2019-11-09 RX ADMIN — ATORVASTATIN CALCIUM 20 MG: 20 TABLET, FILM COATED ORAL at 06:28

## 2019-11-09 RX ADMIN — KETOROLAC TROMETHAMINE 15 MG: 30 INJECTION, SOLUTION INTRAMUSCULAR at 06:28

## 2019-11-09 RX ADMIN — OXYCODONE HYDROCHLORIDE 5 MG: 5 TABLET ORAL at 06:28

## 2019-11-09 RX ADMIN — POTASSIUM CHLORIDE 40 MEQ: 1500 TABLET, EXTENDED RELEASE ORAL at 06:28

## 2019-11-09 RX ADMIN — DOCUSATE SODIUM 100 MG: 100 CAPSULE, LIQUID FILLED ORAL at 06:28

## 2019-11-09 RX ADMIN — OMEPRAZOLE 40 MG: 20 CAPSULE, DELAYED RELEASE ORAL at 06:28

## 2019-11-09 ASSESSMENT — COGNITIVE AND FUNCTIONAL STATUS - GENERAL
MOBILITY SCORE: 18
WALKING IN HOSPITAL ROOM: A LITTLE
EATING MEALS: A LITTLE
SUGGESTED CMS G CODE MODIFIER DAILY ACTIVITY: CK
HELP NEEDED FOR BATHING: A LITTLE
MOBILITY SCORE: 23
DAILY ACTIVITIY SCORE: 18
STANDING UP FROM CHAIR USING ARMS: A LITTLE
DRESSING REGULAR LOWER BODY CLOTHING: A LITTLE
MOVING TO AND FROM BED TO CHAIR: A LITTLE
TURNING FROM BACK TO SIDE WHILE IN FLAT BAD: A LITTLE
SUGGESTED CMS G CODE MODIFIER MOBILITY: CI
TOILETING: A LITTLE
DRESSING REGULAR UPPER BODY CLOTHING: A LITTLE
CLIMB 3 TO 5 STEPS WITH RAILING: A LITTLE
DAILY ACTIVITIY SCORE: 21
MOVING FROM LYING ON BACK TO SITTING ON SIDE OF FLAT BED: A LITTLE
HELP NEEDED FOR BATHING: A LITTLE
PERSONAL GROOMING: A LITTLE
SUGGESTED CMS G CODE MODIFIER DAILY ACTIVITY: CJ
DRESSING REGULAR LOWER BODY CLOTHING: A LITTLE
SUGGESTED CMS G CODE MODIFIER MOBILITY: CK
TOILETING: A LITTLE
CLIMB 3 TO 5 STEPS WITH RAILING: A LITTLE

## 2019-11-09 ASSESSMENT — LIFESTYLE VARIABLES
EVER HAD A DRINK FIRST THING IN THE MORNING TO STEADY YOUR NERVES TO GET RID OF A HANGOVER: NO
ALCOHOL_USE: NO
ON A TYPICAL DAY WHEN YOU DRINK ALCOHOL HOW MANY DRINKS DO YOU HAVE: 0
HAVE YOU EVER FELT YOU SHOULD CUT DOWN ON YOUR DRINKING: NO
HOW MANY TIMES IN THE PAST YEAR HAVE YOU HAD 5 OR MORE DRINKS IN A DAY: 0
TOTAL SCORE: 0
HAVE PEOPLE ANNOYED YOU BY CRITICIZING YOUR DRINKING: NO
AVERAGE NUMBER OF DAYS PER WEEK YOU HAVE A DRINK CONTAINING ALCOHOL: 0
TOTAL SCORE: 0
CONSUMPTION TOTAL: NEGATIVE
TOTAL SCORE: 0
EVER_SMOKED: YES
EVER FELT BAD OR GUILTY ABOUT YOUR DRINKING: NO

## 2019-11-09 ASSESSMENT — PATIENT HEALTH QUESTIONNAIRE - PHQ9
2. FEELING DOWN, DEPRESSED, IRRITABLE, OR HOPELESS: NOT AT ALL
SUM OF ALL RESPONSES TO PHQ9 QUESTIONS 1 AND 2: 0
1. LITTLE INTEREST OR PLEASURE IN DOING THINGS: NOT AT ALL

## 2019-11-09 ASSESSMENT — ACTIVITIES OF DAILY LIVING (ADL): TOILETING: INDEPENDENT

## 2019-11-09 ASSESSMENT — GAIT ASSESSMENTS
GAIT LEVEL OF ASSIST: SUPERVISED
DEVIATION: STEP TO
ASSISTIVE DEVICE: PLATFORM WALKER
DISTANCE (FEET): 150

## 2019-11-09 NOTE — PROGRESS NOTES
2 RN skin assessment complete, skin not WDL. See wound flowsheet. Pt has sx incision on L knee, dressing CDI, no other signs of skin breakdown.

## 2019-11-09 NOTE — FLOWSHEET NOTE
11/08/19 7305   CPAP/BiPAP MYESHA Group   Nocturnal CPAP or BiPAP CPAP  (patient states doing well on O2 NC. Declines CPAP)

## 2019-11-09 NOTE — PROGRESS NOTES
Bedside report received from night RN. Assumed care of patient. Daily plan of care discussed. Pt awake and alert, eager to discharge this AM after PT/OT. Pt denies nausea or significant pain at this time. Hourly rounding in place.

## 2019-11-09 NOTE — CARE PLAN
Problem: Safety  Goal: Will remain free from injury  Outcome: PROGRESSING AS EXPECTED  Note:   Pt is injury-free at this moment   Fall precautions in place including: bed locked & at lowest position, call light & personal belongings within reach, x2 upper bed rails up   Pt required x1 assist with FWW for ambulation; steady gait   Encouraged pt to call for any assistance.      Problem: Infection  Goal: Will remain free from infection  Outcome: PROGRESSING AS EXPECTED  Note:   Pt is afebrile (97.5*F)   Pt received x1 dose of Vanco 1500mg for post-op infection prevention   Surgical dressing to L knee remains CDI, no purulent drainage noted - will continue to monitor      Problem: Discharge Barriers/Planning  Goal: Patient's continuum of care needs will be met  Outcome: PROGRESSING AS EXPECTED  Note:   Planning to D/C today after PT/OT in AM      Problem: Pain Management  Goal: Pain level will decrease to patient's comfort goal  Outcome: PROGRESSING AS EXPECTED  Note:   Pt on scheduled Tylenol 1000mg, Toradol 15mg and Oxycodone 5mg for pain management to surgical site/ L knee  Polar ice in place   Pt understanding to report to the nurse if pain remains uncontrolled

## 2019-11-09 NOTE — THERAPY
"Occupational Therapy Evaluation completed.   Functional Status:  Pt is a 78 y/o female, admit for a L TKA. Pt lives with her ex , who is supportive and able to assist with care. PLOF- I and active, volunteers for hospital. Pt presents with minimal c/o pain with ADLS and functional mobility, is at the Supervised level with the use of a platform walker. Pt was educated regarding techniques for ADLS and functional mobility, post surgery. Pt will be seen for initial evaluation and treatment only, as she is functional in this setting.   Plan of Care: Patient with no further skilled OT needs in the acute care setting at this time  Discharge Recommendations:  Equipment: No Equipment Needed. Post-acute therapy Discharge to home with outpatient or home health for additional skilled therapy services.    See \"Rehab Therapy-Acute\" Patient Summary Report for complete documentation.    "

## 2019-11-09 NOTE — PROGRESS NOTES
Pt discharged to home via personal vehicle with spouse. Discharge paperwork reviewed and signed. VSS. Pt escorted off unit via wheelchair with hospital staff.

## 2019-11-09 NOTE — PROGRESS NOTES
Paged Una Flores MA answered. Pt arrived to floor and PT/OT unavailable for therapy. Pt will need to stay overnight.

## 2019-11-09 NOTE — THERAPY
"Physical Therapy Evaluation completed.   Bed Mobility:     Transfers: Sit to Stand: Supervised  Gait: Level Of Assist: Supervised with platform walker x 150 feet and 3 stairs     Plan of Care: Patient with no further skilled PT needs in the acute care setting at this time  Discharge Recommendations: Equipment: No Equipment Needed. Post-acute therapy Discharge to home with outpatient or home health for additional skilled therapy services.  79 year old female S/P L TKR.Pt lives at home with  and is active Pt was safe with transfers,ambulation and stairs She understands HEP and has no equipment needs  See \"Rehab Therapy-Acute\" Patient Summary Report for complete documentation.     "

## 2019-11-09 NOTE — PROGRESS NOTES
Received report from PACU RN, assumed pt care. Pt transferred from PACU to GSU room 218. Pt A&Ox4, reports mild pain in knee, will medicate per MAR. Dressing to knee CDI, +CMS, cap refill less than 3 seconds, pt able to move leg w/o difficulty. Polar ice to knee. Pt d/t void post-op by 2115. Pt taught to inform nurse if experiencing pain above comfort goal, SOB, chest pain, ambulating out of bed, or for any further assistance. Fall precautions in place, call light within reach. Will continue with care.

## 2019-11-09 NOTE — PROGRESS NOTES
Received report from day shift nurse.   Assumed pt care  Pt is A&Ox4, resting comfortably in chair.   Pt on 2L of oxygen via nasal cannula.   No signs of SOB/respiratory distress.   Pt c/o pain to surgical site/ L knee; prn Morphine 2mg given per MAR  Assessment completed, Labs noted, VSS.   Surgical dressing to L knee in place CDI, +CMS, + pulse,   able to wiggle toes and dorsi/plantar flex.   +numbness to RLE   Polar ice & SCDs on.    Educated pt the importance to call for assistance.   Fall precautions in place.   Call light and personal belongings within reach.   Continue to monitor.

## 2019-11-09 NOTE — DISCHARGE INSTRUCTIONS
Discharge Instructions    Discharged to home by car with relative. Discharged via wheelchair, hospital escort: Yes.  Special equipment needed: Not Applicable    Be sure to schedule a follow-up appointment with your primary care doctor or any specialists as instructed.     Discharge Plan:   Influenza Vaccine Indication: Patient Refuses(per pt, she is allergic to influenza vaccine )    I understand that a diet low in cholesterol, fat, and sodium is recommended for good health. Unless I have been given specific instructions below for another diet, I accept this instruction as my diet prescription.   Other diet: n/a    Special Instructions: Discharge instructions for the Orthopedic Patient    Follow up with Primary Care Physician within 2 weeks of discharge to home, regarding:  Review of medications and diagnostic testing.  Surveillance for medical complications.  Workup and treatment of osteoporosis, if appropriate.     -Is this a Joint Replacement patient? Yes Total Joint Knee Replacement Discharge Instructions    Pain  - The goal is to slowly wean off the prescription pain medicine.  - Ice can be used for pain control.  20 minutes at a time is recommended, and never directly against your skin or incision.  - Most patients are off the pain pills by 3 weeks; others may require a low level of pain medications for many months.  If your pain continues to be severe, follow up with your physician.  Infection    Knee joint infections; occur in fewer than 2% of patients. The most common causes of infection following total knee replacement surgery are from bacteria that enter the bloodstream during dental procedures, urinary tract infections, or skin infections. These bacteria can lodge around your knee replacement and cause an infection.  - Keep the incision as clean and dry as possible.  - Always wash your hands before touching your incision.  - Skin infections tend to develop around 7-10 days after surgery; most can be  treated with oral antibiotics.  - Dental Care should be delayed for 3 months after surgery, your surgeon recommends taking a dose of antibiotics 1 hour prior to any dental procedure. After 2 years, most surgeons recommend antibiotics only before an extensive procedure.  Ask your surgeon what he recommends.  - Signs and symptoms of infection can include:  low grade fever, redness, pain, swelling and drainage from your incision.  Notify your surgeon immediately if you develop any of these symptoms.  Other instructions  - Bowel habits - constipation is extremely common and is caused by a combination of anesthesia, lack of mobility and pain medicine.  Use stool softeners or laxatives if necessary. It is important not to ignore this problem, as bowel obstructions can be a serious complication after joint replacement surgery.  - Mood/Energy Level - Many patients experience a lack of energy and endurance for up to 2-3 months after surgery.  Some may also feel down and can even become depressed.  This is likely due to the postoperative anemia, change in activity level, lack of sleep, pain medicine and just the emotional reaction to the surgery itself that is a big disruption in a person’s life.  This usually passes.  If symptoms persist, follow up with your primary physician.  - Returning to work - Your surgeon will give you more specific instructions. Depending on the type of activities you perform, it may be 6 to 8 weeks before you return to work.   Generally, if you work a sedentary job requiring little standing or walking, most patients may return within 2-6 weeks.  Manual labor jobs involving walking, lifting and standing may take longer. Your surgeon’s office can provide a release to part-time or light duty work early on in your recovery and progress you to full duty as able.    - Driving - If your left knee was replaced and you have an automatic transmission, you may be able to begin driving in a week or so, provided  you are no longer taking narcotic pain medication. If your right knee was replaced, avoid driving for 6 to 8 weeks. Remember that your reflexes may not be as sharp as before your surgery. Ask your surgeon for specific instructions.   - Avoiding falls - A fall during the first few weeks after surgery can damage your new knee and may result in a need for further surgery.   throw rugs and tack down loose carpeting.  Be aware of floor hazards such as pets, small objects or uneven surfaces.    - [employer] Metal Detectors - The sensitivity of metal detectors varies and it is likely that your prosthesis will cause an alarm.  Inform the  of your artificial joint.  Diet  - Resume your normal diet as tolerated.  - It is important to achieve a healthy nutritional status by eating a well balanced diet on a regular basis.  - Your physician may recommend that you take iron and vitamin supplements.   - Continue to drink plenty of fluids.  Shower/Bathing  - You may shower as soon as you get home from the hospital unless otherwise instructed.  - Keep your incision out of water.  To keep the incision dry when showering, cover it with a plastic bag or plastic wrap.  - Pat incision dry if it gets wet.  Don’t rub.  - Do not submerge in a bath until staples are out and the incision is completely healed. (Approximately 6-8 weeks)  Dressing Change:  Procedure (if recommended by your physician)  - Wash hands.  - Open all new dressing change materials.  - Remove old dressing and discard.  - Inspect incision for redness, increase in clear drainage, yellow/green drainage, odor and surrounding skin hot to touch.  -  ABD (large gauze) pad or “island dressing” by one corner and lay over the incision.  Be careful not to touch the inside of the dressing that will lay over the incision.  - Secure in place as instructed (Ace wrap or tape).    Swelling/Bruising    - Swelling can last from 3-6 months.  - Elevate your leg  higher than your heart while reclining.   The first week you are home you should elevate your leg an equal amount of time, as you are active.    - Anti-inflammatory pills can be taken once you have stopped the blood thinners.  - The swelling is usually worse after you go home since you are upright for longer periods of time.  - Bruising is common and can involve the entire leg including the thigh, calf and even foot. Bruising often does not appear until after you arrive home and it can be quite dramatic- purple, black, and green.  The bruising you can see is not usually concerning and will subside without any treatment.      Blood Clot Prevention  Blood clots in the legs and the less common, but frightening, clots that travel to the lungs are a real focus of our preventative. Most patients are at standard risk for them, but those patients who are at higher risk include people who have had previous clots, a family history of clotting, smoking, diabetes, obesity, advanced age, use of estrogen and a sedentary lifestyle.    - Signs of blood clots in legs - Swelling in thigh, calf or ankle that does not go down with elevation.  Pain, heat and tenderness in calf, back of calf or groin area.  NOTE: blood clots can occur in either leg.  - You have been receiving anticoagulant therapy (blood thinners) in the hospital and you may be instructed to continue at home depending on your risk factors.  - Your risk for developing a clot continues for up to 2-3 months after surgery.  You should avoid prolonged sitting and dehydration during that time (long air trips and car trips).  If you do take a trip during this time, please get up and move around every 1- 1.5 hours.  - If you are prescribed blood-thinning medication for home, follow instructions as directed. (Handouts provided if applicable).      Activity  Once home, you should continue to stay active. The key is to remember not to overdo it! While you can expect some good days  and some bad days, you should notice a gradual improvement and a gradual increase in your endurance over the next 6 to 12 months. Exercise is a critical component of home care, particularly during the first few weeks after surgery.     - Normal activities of daily living You should be able to resume most within 3 to 6 weeks following surgery. Some pain with activity and at night is common for several weeks after surgery  -  Physical Therapy Exercises - Continue to do the exercises prescribed for at least two months after surgery. Riding a stationary bicycle can help maintain muscle tone and keep your knee flexible. Try to achieve the maximum degree of bending and extension possible. (handout provided by Therapist).  - Sexual Activity -. Your surgeon can tell you when it safe to resume sexual activity.    - Sleeping Positions - You can safely sleep on your back, on either side, or on your stomach.   - Other Activities - Walk as much as you like, but remember that walking is no substitute for the exercises your doctor and physical therapist will prescribe. Lower impact activities are preferred.  If you have specific questions, consult your Surgeon.    When to Call the Doctor   Call the physician if:   - Fever over 100.5? F  - Increased pain, drainage, redness, odor or heat around the incision area  - Shaking chills  - Increased knee pain with activity and rest  - Increased pain in calf, tenderness or redness above or below the knee  - Increased swelling of calf, ankle, foot  - Sudden increased shortness of breath, sudden onset of chest pain, localized chest pain with coughing  - Incision opening  Or, if there are any questions or concerns about medications or care.       -Is this patient being discharged with medication to prevent blood clots?  Yes, Aspirin Aspirin, ASA oral tablets  What is this medicine?  ASPIRIN (AS pir in) is a pain reliever. It is used to treat mild pain and fever. This medicine is also used as  directed by a doctor to prevent and to treat heart attacks, to prevent strokes, and to treat arthritis or inflammation.  This medicine may be used for other purposes; ask your health care provider or pharmacist if you have questions.  COMMON BRAND NAME(S): Aspir-Low, Aspir-Rosario, Aspirtab, Debbie Advanced Aspirin, Debbie Aspirin, Debbie Aspirin Extra Strength, Debbie Aspirin Plus, Debbie Extra Strength, Debbie Extra Strength Plus, Debbie Genuine Aspirin, Debbie Womens Aspirin, Bufferin, Bufferin Extra Strength, Bufferin Low Dose  What should I tell my health care provider before I take this medicine?  They need to know if you have any of these conditions:  -anemia  -asthma  -bleeding problems  -child with chickenpox, the flu, or other viral infection  -diabetes  -gout  -if you frequently drink alcohol containing drinks  -kidney disease  -liver disease  -low level of vitamin K  -lupus  -smoke tobacco  -stomach ulcers or other problems  -an unusual or allergic reaction to aspirin, tartrazine dye, other medicines, dyes, or preservatives  -pregnant or trying to get pregnant  -breast-feeding  How should I use this medicine?  Take this medicine by mouth with a glass of water. Follow the directions on the package or prescription label. You can take this medicine with or without food. If it upsets your stomach, take it with food. Do not take your medicine more often than directed.  Talk to your pediatrician regarding the use of this medicine in children. While this drug may be prescribed for children as young as 12 years of age for selected conditions, precautions do apply. Children and teenagers should not use this medicine to treat chicken pox or flu symptoms unless directed by a doctor.  Patients over 65 years old may have a stronger reaction and need a smaller dose.  Overdosage: If you think you have taken too much of this medicine contact a poison control center or emergency room at once.  NOTE: This medicine is only for you.  Do not share this medicine with others.  What if I miss a dose?  If you are taking this medicine on a regular schedule and miss a dose, take it as soon as you can. If it is almost time for your next dose, take only that dose. Do not take double or extra doses.  What may interact with this medicine?  Do not take this medicine with any of the following medications:  -cidofovir  -ketorolac  -probenecid  This medicine may also interact with the following medications:  -alcohol  -alendronate  -bismuth subsalicylate  -flavocoxid  -herbal supplements like feverfew, garlic, justin, ginkgo biloba, horse chestnut  -medicines for diabetes or glaucoma like acetazolamide, methazolamide  -medicines for gout  -medicines that treat or prevent blood clots like enoxaparin, heparin, ticlopidine, warfarin  -other aspirin and aspirin-like medicines  -NSAIDs, medicines for pain and inflammation, like ibuprofen or naproxen  -pemetrexed  -sulfinpyrazone  -varicella live vaccine  This list may not describe all possible interactions. Give your health care provider a list of all the medicines, herbs, non-prescription drugs, or dietary supplements you use. Also tell them if you smoke, drink alcohol, or use illegal drugs. Some items may interact with your medicine.  What should I watch for while using this medicine?  If you are treating yourself for pain, tell your doctor or health care professional if the pain lasts more than 10 days, if it gets worse, or if there is a new or different kind of pain. Tell your doctor if you see redness or swelling. Also, check with your doctor if you have a fever that lasts for more than 3 days. Only take this medicine to prevent heart attacks or blood clotting if prescribed by your doctor or health care professional.  Do not take aspirin or aspirin-like medicines with this medicine. Too much aspirin can be dangerous. Always read the labels carefully.  This medicine can irritate your stomach or cause bleeding  problems. Do not smoke cigarettes or drink alcohol while taking this medicine. Do not lie down for 30 minutes after taking this medicine to prevent irritation to your throat.  If you are scheduled for any medical or dental procedure, tell your healthcare provider that you are taking this medicine. You may need to stop taking this medicine before the procedure.  This medicine may be used to treat migraines. If you take migraine medicines for 10 or more days a month, your migraines may get worse. Keep a diary of headache days and medicine use. Contact your healthcare professional if your migraine attacks occur more frequently.  What side effects may I notice from receiving this medicine?  Side effects that you should report to your doctor or health care professional as soon as possible:  -allergic reactions like skin rash, itching or hives, swelling of the face, lips, or tongue  -breathing problems  -changes in hearing, ringing in the ears  -confusion  -general ill feeling or flu-like symptoms  -pain on swallowing  -redness, blistering, peeling or loosening of the skin, including inside the mouth or nose  -signs and symptoms of bleeding such as bloody or black, tarry stools; red or dark-brown urine; spitting up blood or brown material that looks like coffee grounds; red spots on the skin; unusual bruising or bleeding from the eye, gums, or nose  -trouble passing urine or change in the amount of urine  -unusually weak or tired  -yellowing of the eyes or skin  Side effects that usually do not require medical attention (report to your doctor or health care professional if they continue or are bothersome):  -diarrhea or constipation  -headache  -nausea, vomiting  -stomach gas, heartburn  This list may not describe all possible side effects. Call your doctor for medical advice about side effects. You may report side effects to FDA at 4-130-FDA-6429.  Where should I keep my medicine?  Keep out of the reach of  children.  Store at room temperature between 15 and 30 degrees C (59 and 86 degrees F). Protect from heat and moisture. Do not use this medicine if it has a strong vinegar smell. Throw away any unused medicine after the expiration date.  NOTE: This sheet is a summary. It may not cover all possible information. If you have questions about this medicine, talk to your doctor, pharmacist, or health care provider.  © 2018 [employer] (2014-08-19 11:30:31)      · Is patient discharged on Warfarin / Coumadin?   No     Depression / Suicide Risk    As you are discharged from this Psychiatric hospital facility, it is important to learn how to keep safe from harming yourself.    Recognize the warning signs:  · Abrupt changes in personality, positive or negative- including increase in energy   · Giving away possessions  · Change in eating patterns- significant weight changes-  positive or negative  · Change in sleeping patterns- unable to sleep or sleeping all the time   · Unwillingness or inability to communicate  · Depression  · Unusual sadness, discouragement and loneliness  · Talk of wanting to die  · Neglect of personal appearance   · Rebelliousness- reckless behavior  · Withdrawal from people/activities they love  · Confusion- inability to concentrate     If you or a loved one observes any of these behaviors or has concerns about self-harm, here's what you can do:  · Talk about it- your feelings and reasons for harming yourself  · Remove any means that you might use to hurt yourself (examples: pills, rope, extension cords, firearm)  · Get professional help from the community (Mental Health, Substance Abuse, psychological counseling)  · Do not be alone:Call your Safe Contact- someone whom you trust who will be there for you.  · Call your local CRISIS HOTLINE 885-5140 or 169-538-0335  · Call your local Children's Mobile Crisis Response Team [place] (979) 403-9753 or www.SiteBrand  · Call the toll free  National Suicide Prevention Hotlines   · National Suicide Prevention Lifeline 247-869-QJHX (6419)  · National Hope Line Network 800-SUICIDE (116-3722)

## 2019-11-09 NOTE — PROGRESS NOTES
"Doing very well this morning.  Minimal pain, polar care in place. Dressings clean dry and intact.  Normal neurovascular exam of the foot.  Block seems to be wearing off and sensation returning to the knee.    BP (!) 95/61   Pulse (!) 53   Temp 36.4 °C (97.5 °F) (Oral)   Resp 15   Ht 1.575 m (5' 2\")   Wt 91.4 kg (201 lb 8 oz)   SpO2 100%         Plan:  Keep silver dressing in place till follow up  Weightbearing as tolerated  PT/OT eval this morning  Discharge home late morning  Follow-up with Dr. Edmond  "

## 2019-12-05 DIAGNOSIS — M79.89 LEG SWELLING: ICD-10-CM

## 2019-12-05 DIAGNOSIS — I10 ESSENTIAL HYPERTENSION: ICD-10-CM

## 2019-12-05 RX ORDER — FUROSEMIDE 40 MG/1
TABLET ORAL
Qty: 60 TAB | Refills: 2 | Status: SHIPPED | OUTPATIENT
Start: 2019-12-05 | End: 2020-02-28

## 2019-12-10 DIAGNOSIS — I10 ESSENTIAL HYPERTENSION: ICD-10-CM

## 2019-12-10 RX ORDER — LISINOPRIL 40 MG/1
TABLET ORAL
Qty: 90 TAB | Refills: 0 | Status: SHIPPED | OUTPATIENT
Start: 2019-12-10 | End: 2020-03-06

## 2020-02-05 DIAGNOSIS — E87.6 HYPOKALEMIA: ICD-10-CM

## 2020-02-05 DIAGNOSIS — I10 ESSENTIAL HYPERTENSION: ICD-10-CM

## 2020-02-05 RX ORDER — POTASSIUM CHLORIDE 20 MEQ/1
TABLET, EXTENDED RELEASE ORAL
Qty: 180 TAB | Refills: 0 | Status: SHIPPED | OUTPATIENT
Start: 2020-02-05 | End: 2020-04-17 | Stop reason: SDUPTHER

## 2020-02-10 ENCOUNTER — TELEPHONE (OUTPATIENT)
Dept: MEDICAL GROUP | Facility: MEDICAL CENTER | Age: 80
End: 2020-02-10

## 2020-02-10 NOTE — TELEPHONE ENCOUNTER
VOICEMAIL  1. Caller Name: Sharon Callahan                        Call Back Number: [phone] (home)      2. Message: Patient called and left a message to switch to Dr. Cee. I have called her back to inform her Dr. GIRON is not taking new patient. I have left a message for her to call us back so we are able to tell her who is taking new patients.     3. Patient approves office to leave a detailed voicemail/[employer] message: N\A

## 2020-02-28 DIAGNOSIS — M79.89 LEG SWELLING: ICD-10-CM

## 2020-02-28 DIAGNOSIS — I10 ESSENTIAL HYPERTENSION: ICD-10-CM

## 2020-02-28 RX ORDER — FUROSEMIDE 40 MG/1
TABLET ORAL
Qty: 60 TAB | Refills: 2 | Status: SHIPPED | OUTPATIENT
Start: 2020-02-28 | End: 2020-04-17 | Stop reason: SDUPTHER

## 2020-03-06 DIAGNOSIS — I10 ESSENTIAL HYPERTENSION: ICD-10-CM

## 2020-03-06 RX ORDER — LISINOPRIL 40 MG/1
TABLET ORAL
Qty: 30 TAB | Refills: 0 | Status: SHIPPED | OUTPATIENT
Start: 2020-03-06 | End: 2020-04-17 | Stop reason: SDUPTHER

## 2020-04-09 DIAGNOSIS — E78.5 DYSLIPIDEMIA: ICD-10-CM

## 2020-04-09 DIAGNOSIS — R53.83 FATIGUE, UNSPECIFIED TYPE: ICD-10-CM

## 2020-04-09 DIAGNOSIS — E55.9 HYPOVITAMINOSIS D: ICD-10-CM

## 2020-04-13 ENCOUNTER — HOSPITAL ENCOUNTER (OUTPATIENT)
Dept: LAB | Facility: MEDICAL CENTER | Age: 80
End: 2020-04-13
Attending: INTERNAL MEDICINE
Payer: MEDICARE

## 2020-04-13 DIAGNOSIS — R53.83 FATIGUE, UNSPECIFIED TYPE: ICD-10-CM

## 2020-04-13 DIAGNOSIS — E55.9 HYPOVITAMINOSIS D: ICD-10-CM

## 2020-04-13 DIAGNOSIS — E78.5 DYSLIPIDEMIA: ICD-10-CM

## 2020-04-13 LAB
25(OH)D3 SERPL-MCNC: 74 NG/ML (ref 30–100)
ALBUMIN SERPL BCP-MCNC: 4.3 G/DL (ref 3.2–4.9)
ALBUMIN/GLOB SERPL: 1.6 G/DL
ALP SERPL-CCNC: 67 U/L (ref 30–99)
ALT SERPL-CCNC: 13 U/L (ref 2–50)
ANION GAP SERPL CALC-SCNC: 12 MMOL/L (ref 7–16)
AST SERPL-CCNC: 16 U/L (ref 12–45)
BASOPHILS # BLD AUTO: 0.5 % (ref 0–1.8)
BASOPHILS # BLD: 0.04 K/UL (ref 0–0.12)
BILIRUB SERPL-MCNC: 0.4 MG/DL (ref 0.1–1.5)
BUN SERPL-MCNC: 23 MG/DL (ref 8–22)
CALCIUM SERPL-MCNC: 9.6 MG/DL (ref 8.5–10.5)
CHLORIDE SERPL-SCNC: 100 MMOL/L (ref 96–112)
CHOLEST SERPL-MCNC: 168 MG/DL (ref 100–199)
CO2 SERPL-SCNC: 24 MMOL/L (ref 20–33)
CREAT SERPL-MCNC: 1.26 MG/DL (ref 0.5–1.4)
EOSINOPHIL # BLD AUTO: 0.07 K/UL (ref 0–0.51)
EOSINOPHIL NFR BLD: 0.8 % (ref 0–6.9)
ERYTHROCYTE [DISTWIDTH] IN BLOOD BY AUTOMATED COUNT: 49 FL (ref 35.9–50)
FASTING STATUS PATIENT QL REPORTED: NORMAL
GLOBULIN SER CALC-MCNC: 2.7 G/DL (ref 1.9–3.5)
GLUCOSE SERPL-MCNC: 78 MG/DL (ref 65–99)
HCT VFR BLD AUTO: 43.1 % (ref 37–47)
HDLC SERPL-MCNC: 85 MG/DL
HGB BLD-MCNC: 13.5 G/DL (ref 12–16)
IMM GRANULOCYTES # BLD AUTO: 0.04 K/UL (ref 0–0.11)
IMM GRANULOCYTES NFR BLD AUTO: 0.5 % (ref 0–0.9)
LDLC SERPL CALC-MCNC: 65 MG/DL
LYMPHOCYTES # BLD AUTO: 1.3 K/UL (ref 1–4.8)
LYMPHOCYTES NFR BLD: 15.1 % (ref 22–41)
MCH RBC QN AUTO: 29.2 PG (ref 27–33)
MCHC RBC AUTO-ENTMCNC: 31.3 G/DL (ref 33.6–35)
MCV RBC AUTO: 93.1 FL (ref 81.4–97.8)
MONOCYTES # BLD AUTO: 0.6 K/UL (ref 0–0.85)
MONOCYTES NFR BLD AUTO: 7 % (ref 0–13.4)
NEUTROPHILS # BLD AUTO: 6.56 K/UL (ref 2–7.15)
NEUTROPHILS NFR BLD: 76.1 % (ref 44–72)
NRBC # BLD AUTO: 0 K/UL
NRBC BLD-RTO: 0 /100 WBC
PLATELET # BLD AUTO: 202 K/UL (ref 164–446)
PMV BLD AUTO: 11.2 FL (ref 9–12.9)
POTASSIUM SERPL-SCNC: 4.5 MMOL/L (ref 3.6–5.5)
PROT SERPL-MCNC: 7 G/DL (ref 6–8.2)
RBC # BLD AUTO: 4.63 M/UL (ref 4.2–5.4)
SODIUM SERPL-SCNC: 136 MMOL/L (ref 135–145)
TRIGL SERPL-MCNC: 89 MG/DL (ref 0–149)
TSH SERPL DL<=0.005 MIU/L-ACNC: 1.91 UIU/ML (ref 0.38–5.33)
WBC # BLD AUTO: 8.6 K/UL (ref 4.8–10.8)

## 2020-04-13 PROCEDURE — 84443 ASSAY THYROID STIM HORMONE: CPT

## 2020-04-13 PROCEDURE — 85025 COMPLETE CBC W/AUTO DIFF WBC: CPT

## 2020-04-13 PROCEDURE — 82306 VITAMIN D 25 HYDROXY: CPT

## 2020-04-13 PROCEDURE — 36415 COLL VENOUS BLD VENIPUNCTURE: CPT

## 2020-04-13 PROCEDURE — 80053 COMPREHEN METABOLIC PANEL: CPT

## 2020-04-13 PROCEDURE — 80061 LIPID PANEL: CPT

## 2020-04-16 NOTE — PROGRESS NOTES
Telemedicine Visit: Established Patient     This Remote Face to Face encounter was conducted via [employer]. Given the importance of social distancing and other strategies recommended to reduce the risk of COVID-19 transmission, I am providing medical care to this patient via audio/video visit in place of an in person visit at the request of the patient. Verbal consent to telehealth, risks, benefits, and consequences were discussed. Patient retains the right to withdraw at any time. All existing confidentiality protections apply. The patient has access to all transmitted medical information. No dissemination of any patient images or information to other entities without further written consent.    Subjective:   CC: HTN, med refill    Sharon Callahan is a 79 y.o. female presenting for evaluation and management of:    Hypertension, LVH, hypokalemia, decreased GFR, LE swelling  Meds: Lisinopril 40 mg daily, Lasix 40 mg twice daily, potassium 20 mEq twice daily:  -  taking as prescribed.   Measuring BP at home: yes, it has been < 150/90.  Denies: - headaches, vision problems, tinnitus.  - chest pain/pressure, palpitations, irregular heart beats, exertional, dyspnea, peripheral edema.  - medication side effect: unusual fatigue, slow heartbeat, foot/leg swelling.  Low salt diet: yes  Diet: healthy  Exercise: daily yard work  BMI: 34  FH of HTN: Multiple  Labs showed hypokalemia, borderline, on potassium supplement.  LE swelling improved.     The last echocardiography, 2/26/19:  Technically difficult and incomplete study due to breast implant.   Parasternal long axis view was subopotimal and no parasternal short   axis images.  Grossly normal chamber sizes.  Right heart appeared enlarged in some views but likely from transducer   position and/or locations.  Normal left ventricular systolic function.  Left ventricular ejection fraction is visually estimated to be 60%.  Grossly normal regional wall motion.  The aortic valve is  not well visualized.  Aortic sclerosis without stenosis.  No mitral stenosis or regurgitation seen.  Mild tricuspid regurgitation.  Normal inferior vena cava size and inspiratory collapse.  Right ventricular systolic pressure is estimated to be 25 mmHg.  Normal pericardium without effusion.     Paroxysmal Afib, SVT  H/o paroxysmal afib in the setting of intercurrent illness, h/o HTN, Carcinoid tumor of the lung, MYESHA.      She denies chest pain, dizziness, pre syncope or syncope, dyspnea, PND, orthopnea, or lower extremity edema.     She has had occasional palpitations that are infrequent, and usually only last a few seconds. She states that they are not overly bothersome at this time. No associated symptoms with palps.   Reviewed the last echocardiography.  Ziopatch revealed short runs of SVT, no afib.     Dyslipidemia  On simvastatin, 40 mg, taking daily, as prescribed. No myalgias, muscle cramps or pain.   Diet /Exercise/BMI: As above  FH: neg    GERD  On omeprazole, 20 mg QD; takes medication as prescribed, that controls sx.   Denies:   - heartburn, epigastric pain.   -  nausea, vomiting, or diarrhea  - dysphagia  - abnormal cough  - blood in the stool or dark tarry stools.  - early satiety  - tobacco use.    OBESITY, Body mass index is 34.75 kg/(m^2).  Onset: since menopause  Diet: healthy  Exercise:   No temperature intolerance. No change in hair/skin quality, BMs.   No HTN, buffalo hump, purple striae, flushing.  FH of obesity: M-GM      ROS       - Constitutional:  Negative for fever, chills, and fatigue.    - HEENT:  Negative for headaches, vision / hearing changes, ear pain, ear discharge, rhinorrhea, sinus congestion, sore throat.      - Respiratory: Negative for cough, sputum production, chest congestion, dyspnea, wheezing.      - Cardiovascular: Negative for chest pain, palpitations, orthopnea, and lower extremity edema.     - Gastrointestinal: Negative for heartburn, nausea, vomiting, abdominal pain,  diarrhea, constipation.     - Genitourinary: Negative for dysuria, frequency.    - Musculoskeletal: Follow-up but she is negative for myalgias, back or joint pain.     - Skin: Negative for rash, cyanotic skin color change.     - Neurological: Negative for dizziness, numbness, weakness, tingling, headaches.     - Endo/Heme/Allergies: Does not bruise/bleed easily.     - Psychiatric/Behavioral: None Negati of 5 minutes total appointment time scheduled for 10 minutes after his appointment time to settle uppercase… Over n for depression.      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   • Osteopenia 03/25/2016   • H/O breast surgery 04/04/2011      Allergies:Pcn [penicillins]; Clindamycin; Influenza virus vacc; Norco [hydrocodone-acetaminophen]; Pneumococcal vaccine; Tetracycline; and Xarelto [rivaroxaban]    Current Outpatient Medications   Medication Sig Dispense Refill   • lisinopril (PRINIVIL) 40 MG tablet TAKE 1 TABLET BY MOUTH EVERY DAY 30 Tab 0   • furosemide (LASIX) 40 MG Tab TAKE 1 TABLET BY MOUTH TWICE A DAY 60 Tab 2   • potassium chloride SA (KDUR) 20 MEQ Tab CR TAKE 2 TABLETS BY MOUTH EVERY  Tab 0   • diclofenac CR (VOLTAREN-XR) 100 MG TABLET SR 24 HR tablet TAKE 1 TABLET BY MOUTH EVERY DAY 30 Tab 2   • atorvastatin (LIPITOR) 20 MG Tab TAKE 1 TABLET BY MOUTH EVERYDAY AT BEDTIME 90 Tab 1   • ergocalciferol (DRISDOL) 61570 UNIT capsule TAKE ONE CAPSULE BY MOUTH EVERY 7 DAYS 12 Cap 1   • furosemide (LASIX) 40 MG Tab TAKE 1 TABLET BY MOUTH TWICE A DAY 30 Tab 0   • omeprazole (PRILOSEC) 40 MG  "delayed-release capsule Take 1 Cap by mouth every day. 30 Cap 2   • traZODone (DESYREL) 50 MG Tab Take  mg by mouth at bedtime as needed for Sleep.       No current facility-administered medications for this visit.      Social History     Tobacco Use   • Smoking status: Former Smoker     Packs/day: 1.00     Years: 15.00     Pack years: 15.00     Types: Cigarettes     Last attempt to quit: 1975     Years since quittin.3   • Smokeless tobacco: Never Used   Substance Use Topics   • Alcohol use: No     Alcohol/week: 0.0 oz     Comment: 1 per day   • Drug use: No     Social History     Social History Narrative   • Not on file       Family History   Problem Relation Age of Onset   • Hypertension Mother    • Cancer Father         lung   • Diabetes Father    • Hypertension Father    • Heart Disease Father         MI   • Hypertension Brother    • Sleep Apnea Brother    • Hypertension Maternal Grandmother    • Sleep Apnea Brother    • Hypertension Brother    • Diabetes Brother    • Cancer Paternal Aunt         stomach   • Diabetes Paternal Aunt    • Heart Disease Brother         MI   • Hyperlipidemia Neg Hx    • Stroke Neg Hx       Objective:   Vitals obtained by patient:  Blood Pressure 142/64   Pulse 68   Height 1.575 m (5' 2\")   Weight 86.2 kg (190 lb) Comment: pt stated  Body Mass Index 34.75 kg/m²     Physical Exam:  Constitutional: Alert, no distress, well-groomed.  Skin: No rashes in visible areas.  Eye: Round. Conjunctiva clear, lids normal. No icterus.   ENMT: Lips pink without lesions, good dentition, moist mucous membranes. Phonation normal.  Neck: No masses, no thyromegaly. Moves freely without pain.  CV: Pulse as reported by patient  Respiratory: Unlabored respiratory effort, no cough or audible wheeze  Psych: Alert and oriented x3, normal affect and mood.     Labs:     Reviewed and discussed:    Lab Results   Component Value Date/Time    CHOLSTRLTOT 168 2020 02:28 PM    LDL 65 2020 " 02:28 PM    HDL 85 04/13/2020 02:28 PM    TRIGLYCERIDE 89 04/13/2020 02:28 PM       Lab Results   Component Value Date/Time    SODIUM 136 04/13/2020 02:28 PM    POTASSIUM 4.5 04/13/2020 02:28 PM    CHLORIDE 100 04/13/2020 02:28 PM    CO2 24 04/13/2020 02:28 PM    GLUCOSE 78 04/13/2020 02:28 PM    BUN 23 (H) 04/13/2020 02:28 PM    CREATININE 1.26 04/13/2020 02:28 PM    CREATININE 0.79 04/04/2011 12:00 AM    BUNCREATRAT 13 04/04/2011 12:00 AM     Lab Results   Component Value Date/Time    ALKPHOSPHAT 67 04/13/2020 02:28 PM    ASTSGOT 16 04/13/2020 02:28 PM    ALTSGPT 13 04/13/2020 02:28 PM    TBILIRUBIN 0.4 04/13/2020 02:28 PM      Lab Results   Component Value Date/Time    HBA1C 5.6 02/25/2016 07:09 AM    HBA1C 5.4 06/25/2012 10:00 PM    HBA1C 5.4 07/27/2006 03:25 AM     No results found for: TSH  Lab Results   Component Value Date/Time    FREET4 0.82 02/14/2013 01:45 PM    FREET4 0.95 07/08/2011 03:37 PM     Lab Results   Component Value Date/Time    WBC 8.6 04/13/2020 02:28 PM    RBC 4.63 04/13/2020 02:28 PM    HEMOGLOBIN 13.5 04/13/2020 02:28 PM    HEMATOCRIT 43.1 04/13/2020 02:28 PM    MCV 93.1 04/13/2020 02:28 PM    MCH 29.2 04/13/2020 02:28 PM    MCHC 31.3 (L) 04/13/2020 02:28 PM    MPV 11.2 04/13/2020 02:28 PM    NEUTSPOLYS 76.10 (H) 04/13/2020 02:28 PM    LYMPHOCYTES 15.10 (L) 04/13/2020 02:28 PM    MONOCYTES 7.00 04/13/2020 02:28 PM    EOSINOPHILS 0.80 04/13/2020 02:28 PM    BASOPHILS 0.50 04/13/2020 02:28 PM    HYPOCHROMIA 2+ 07/04/2013 07:30 AM    ANISOCYTOSIS 1+ 12/01/2016 06:45 AM      Imaging:     Reviewed and discussed per HPI    Assessment and Plan:   The following treatment plan was discussed:     1. Essential hypertension  Controlled, continue current treatment  - Comp Metabolic Panel; Future  - lisinopril (PRINIVIL) 40 MG tablet; Take 1 Tab by mouth every day.  Dispense: 90 Tab; Refill: 1  - furosemide (LASIX) 40 MG Tab; TAKE 1 TABLET BY MOUTH TWICE A DAY  Dispense: 180 Tab; Refill: 0  - potassium  chloride SA (KDUR) 20 MEQ Tab CR; TAKE 2 TABLETS BY MOUTH EVERY DAY  Dispense: 180 Tab; Refill: 0  2. LVH (left ventricular hypertrophy)  - Comp Metabolic Panel; Future  3. Hypokalemia  - Comp Metabolic Panel; Future  - potassium chloride SA (KDUR) 20 MEQ Tab CR; TAKE 2 TABLETS BY MOUTH EVERY DAY  Dispense: 180 Tab; Refill: 0    4. Decreased GFR  Advised to increase fluid intake, avoid NSAIDs    5. Leg swelling  Controlled, continue current treatment  - furosemide (LASIX) 40 MG Tab; TAKE 1 TABLET BY MOUTH TWICE A DAY  Dispense: 180 Tab; Refill: 0    6. Paroxysmal atrial fibrillation (HCC)  7. SVT (supraventricular tachycardia) (HCC)  Controlled, continue current treatment and cardiology follow-up    8. Dyslipidemia  Controlled, continue current treatment  - atorvastatin (LIPITOR) 20 MG Tab; TAKE 1 TABLET BY MOUTH EVERYDAY AT BEDTIME  Dispense: 90 Tab; Refill: 1    9. Gastroesophageal reflux disease, esophagitis presence not specified  Controlled, continue current treatment  - omeprazole (PRILOSEC) 40 MG delayed-release capsule; Take 1 Cap by mouth every day. Indications: Gastroesophageal Reflux Disease, Heartburn  Dispense: 90 Cap; Refill: 3    10. Obesity (BMI 30.0-34.9)  - OBESITY COUNSELING (No Charge): Patient identified as having weight management issue.  Appropriate orders and counseling given.    11. Menopause  - DS-BONE DENSITY STUDY (DEXA); Future    12. Encounter for screening mammogram for malignant neoplasm of breast  - MA-SCREENING MAMMO BILAT W/TOMOSYNTHESIS W/CAD; Future    Follow-up: in 3 months with labs

## 2020-04-17 ENCOUNTER — TELEMEDICINE (OUTPATIENT)
Dept: MEDICAL GROUP | Age: 80
End: 2020-04-17
Payer: MEDICARE

## 2020-04-17 VITALS
BODY MASS INDEX: 34.96 KG/M2 | HEART RATE: 68 BPM | DIASTOLIC BLOOD PRESSURE: 64 MMHG | WEIGHT: 190 LBS | HEIGHT: 62 IN | SYSTOLIC BLOOD PRESSURE: 142 MMHG

## 2020-04-17 DIAGNOSIS — E78.5 DYSLIPIDEMIA: ICD-10-CM

## 2020-04-17 DIAGNOSIS — Z12.31 ENCOUNTER FOR SCREENING MAMMOGRAM FOR MALIGNANT NEOPLASM OF BREAST: ICD-10-CM

## 2020-04-17 DIAGNOSIS — I10 ESSENTIAL HYPERTENSION: ICD-10-CM

## 2020-04-17 DIAGNOSIS — K21.9 GASTROESOPHAGEAL REFLUX DISEASE, ESOPHAGITIS PRESENCE NOT SPECIFIED: ICD-10-CM

## 2020-04-17 DIAGNOSIS — E87.6 HYPOKALEMIA: ICD-10-CM

## 2020-04-17 DIAGNOSIS — I48.0 PAROXYSMAL ATRIAL FIBRILLATION (HCC): ICD-10-CM

## 2020-04-17 DIAGNOSIS — Z78.0 MENOPAUSE: ICD-10-CM

## 2020-04-17 DIAGNOSIS — E66.9 OBESITY (BMI 30.0-34.9): ICD-10-CM

## 2020-04-17 DIAGNOSIS — I51.7 LVH (LEFT VENTRICULAR HYPERTROPHY): ICD-10-CM

## 2020-04-17 DIAGNOSIS — R94.4 DECREASED GFR: ICD-10-CM

## 2020-04-17 DIAGNOSIS — I47.10 SVT (SUPRAVENTRICULAR TACHYCARDIA) (HCC): ICD-10-CM

## 2020-04-17 DIAGNOSIS — M79.89 LEG SWELLING: ICD-10-CM

## 2020-04-17 PROCEDURE — 99214 OFFICE O/P EST MOD 30 MIN: CPT | Mod: CR,95 | Performed by: INTERNAL MEDICINE

## 2020-04-17 RX ORDER — OMEPRAZOLE 40 MG/1
40 CAPSULE, DELAYED RELEASE ORAL DAILY
Qty: 90 CAP | Refills: 3 | Status: SHIPPED | OUTPATIENT
Start: 2020-04-17 | End: 2022-11-27

## 2020-04-17 RX ORDER — ATORVASTATIN CALCIUM 20 MG/1
TABLET, FILM COATED ORAL
Qty: 90 TAB | Refills: 1 | Status: SHIPPED | OUTPATIENT
Start: 2020-04-17 | End: 2020-10-21 | Stop reason: SDUPTHER

## 2020-04-17 RX ORDER — POTASSIUM CHLORIDE 20 MEQ/1
TABLET, EXTENDED RELEASE ORAL
Qty: 180 TAB | Refills: 0 | Status: SHIPPED | OUTPATIENT
Start: 2020-04-17 | End: 2020-08-17

## 2020-04-17 RX ORDER — LISINOPRIL 40 MG/1
40 TABLET ORAL
Qty: 90 TAB | Refills: 1 | Status: SHIPPED | OUTPATIENT
Start: 2020-04-17 | End: 2020-10-21 | Stop reason: SDUPTHER

## 2020-04-17 RX ORDER — FUROSEMIDE 40 MG/1
TABLET ORAL
Qty: 180 TAB | Refills: 0 | Status: SHIPPED | OUTPATIENT
Start: 2020-04-17 | End: 2020-10-21 | Stop reason: SDUPTHER

## 2020-04-17 SDOH — HEALTH STABILITY: MENTAL HEALTH: HOW OFTEN DO YOU HAVE A DRINK CONTAINING ALCOHOL?: 4 OR MORE TIMES A WEEK

## 2020-04-17 ASSESSMENT — FIBROSIS 4 INDEX: FIB4 SCORE: 1.71

## 2020-06-09 ENCOUNTER — APPOINTMENT (OUTPATIENT)
Dept: RADIOLOGY | Facility: MEDICAL CENTER | Age: 80
End: 2020-06-09
Attending: INTERNAL MEDICINE
Payer: MEDICARE

## 2020-07-13 PROBLEM — Z01.818 PRE-OPERATIVE CLEARANCE: Status: RESOLVED | Noted: 2019-10-04 | Resolved: 2020-07-13

## 2020-08-15 DIAGNOSIS — E87.6 HYPOKALEMIA: ICD-10-CM

## 2020-08-15 DIAGNOSIS — I10 ESSENTIAL HYPERTENSION: ICD-10-CM

## 2020-08-17 RX ORDER — POTASSIUM CHLORIDE 20 MEQ/1
TABLET, EXTENDED RELEASE ORAL
Qty: 60 TAB | Refills: 0 | Status: SHIPPED | OUTPATIENT
Start: 2020-08-17 | End: 2020-09-09

## 2020-09-03 ENCOUNTER — HOSPITAL ENCOUNTER (OUTPATIENT)
Dept: HOSPITAL 8 - OUT | Age: 80
Setting detail: OBSERVATION
LOS: 2 days | Discharge: HOME | End: 2020-09-05
Attending: ORTHOPAEDIC SURGERY | Admitting: ORTHOPAEDIC SURGERY
Payer: MEDICARE

## 2020-09-03 VITALS — DIASTOLIC BLOOD PRESSURE: 77 MMHG | SYSTOLIC BLOOD PRESSURE: 130 MMHG

## 2020-09-03 VITALS — WEIGHT: 202.83 LBS | BODY MASS INDEX: 37.32 KG/M2 | HEIGHT: 62 IN

## 2020-09-03 VITALS — SYSTOLIC BLOOD PRESSURE: 114 MMHG | DIASTOLIC BLOOD PRESSURE: 49 MMHG

## 2020-09-03 VITALS — SYSTOLIC BLOOD PRESSURE: 115 MMHG | DIASTOLIC BLOOD PRESSURE: 82 MMHG

## 2020-09-03 DIAGNOSIS — M17.11: Primary | ICD-10-CM

## 2020-09-03 DIAGNOSIS — E03.9: ICD-10-CM

## 2020-09-03 DIAGNOSIS — E78.5: ICD-10-CM

## 2020-09-03 DIAGNOSIS — G47.33: ICD-10-CM

## 2020-09-03 DIAGNOSIS — I10: ICD-10-CM

## 2020-09-03 DIAGNOSIS — M21.169: ICD-10-CM

## 2020-09-03 DIAGNOSIS — Z88.0: ICD-10-CM

## 2020-09-03 DIAGNOSIS — Z20.828: ICD-10-CM

## 2020-09-03 DIAGNOSIS — K21.9: ICD-10-CM

## 2020-09-03 DIAGNOSIS — Z79.899: ICD-10-CM

## 2020-09-03 DIAGNOSIS — E66.9: ICD-10-CM

## 2020-09-03 DIAGNOSIS — Z87.891: ICD-10-CM

## 2020-09-03 LAB
ALBUMIN SERPL-MCNC: 3.7 G/DL (ref 3.4–5)
ALP SERPL-CCNC: 72 U/L (ref 45–117)
ALT SERPL-CCNC: 15 U/L (ref 12–78)
ANION GAP SERPL CALC-SCNC: 7 MMOL/L (ref 5–15)
BILIRUB SERPL-MCNC: 0.5 MG/DL (ref 0.2–1)
CALCIUM SERPL-MCNC: 9.6 MG/DL (ref 8.5–10.1)
CHLORIDE SERPL-SCNC: 111 MMOL/L (ref 98–107)
CREAT SERPL-MCNC: 1.17 MG/DL (ref 0.55–1.02)
PROT SERPL-MCNC: 7.3 G/DL (ref 6.4–8.2)

## 2020-09-03 PROCEDURE — 73560 X-RAY EXAM OF KNEE 1 OR 2: CPT

## 2020-09-03 PROCEDURE — G0378 HOSPITAL OBSERVATION PER HR: HCPCS

## 2020-09-03 PROCEDURE — 87081 CULTURE SCREEN ONLY: CPT

## 2020-09-03 PROCEDURE — 96376 TX/PRO/DX INJ SAME DRUG ADON: CPT

## 2020-09-03 PROCEDURE — 93005 ELECTROCARDIOGRAM TRACING: CPT

## 2020-09-03 PROCEDURE — C1713 ANCHOR/SCREW BN/BN,TIS/BN: HCPCS

## 2020-09-03 PROCEDURE — 96375 TX/PRO/DX INJ NEW DRUG ADDON: CPT

## 2020-09-03 PROCEDURE — 97162 PT EVAL MOD COMPLEX 30 MIN: CPT

## 2020-09-03 PROCEDURE — C1776 JOINT DEVICE (IMPLANTABLE): HCPCS

## 2020-09-03 PROCEDURE — 97530 THERAPEUTIC ACTIVITIES: CPT

## 2020-09-03 PROCEDURE — 27447 TOTAL KNEE ARTHROPLASTY: CPT

## 2020-09-03 PROCEDURE — 36415 COLL VENOUS BLD VENIPUNCTURE: CPT

## 2020-09-03 PROCEDURE — 96374 THER/PROPH/DIAG INJ IV PUSH: CPT

## 2020-09-03 PROCEDURE — 80053 COMPREHEN METABOLIC PANEL: CPT

## 2020-09-03 PROCEDURE — 87635 SARS-COV-2 COVID-19 AMP PRB: CPT

## 2020-09-03 PROCEDURE — 97110 THERAPEUTIC EXERCISES: CPT

## 2020-09-03 RX ADMIN — OXYCODONE HYDROCHLORIDE PRN MG: 5 TABLET ORAL at 17:53

## 2020-09-03 RX ADMIN — LISINOPRIL SCH MG: 40 TABLET ORAL at 09:00

## 2020-09-03 RX ADMIN — DOCUSATE SODIUM SCH MG: 100 CAPSULE, LIQUID FILLED ORAL at 20:44

## 2020-09-03 RX ADMIN — ATORVASTATIN CALCIUM SCH MG: 20 TABLET, FILM COATED ORAL at 20:44

## 2020-09-03 RX ADMIN — ACETAMINOPHEN SCH MG: 500 TABLET, COATED ORAL at 17:56

## 2020-09-03 RX ADMIN — ACETAMINOPHEN SCH MG: 500 TABLET, COATED ORAL at 23:56

## 2020-09-03 RX ADMIN — POTASSIUM CHLORIDE SCH MEQ: 20 TABLET, EXTENDED RELEASE ORAL at 09:00

## 2020-09-03 RX ADMIN — FUROSEMIDE SCH MG: 40 TABLET ORAL at 21:00

## 2020-09-03 RX ADMIN — DEXTROSE AND SODIUM CHLORIDE SCH MLS/HR: 5; .45 INJECTION, SOLUTION INTRAVENOUS at 18:24

## 2020-09-03 RX ADMIN — DOCUSATE SODIUM SCH MG: 100 CAPSULE, LIQUID FILLED ORAL at 09:00

## 2020-09-03 RX ADMIN — DEXTROSE AND SODIUM CHLORIDE SCH MLS/HR: 5; .45 INJECTION, SOLUTION INTRAVENOUS at 08:24

## 2020-09-03 RX ADMIN — FENTANYL CITRATE PRN MCG: 50 INJECTION INTRAMUSCULAR; INTRAVENOUS at 10:56

## 2020-09-03 RX ADMIN — OXYCODONE HYDROCHLORIDE PRN MG: 5 TABLET ORAL at 20:47

## 2020-09-03 RX ADMIN — OXYCODONE HYDROCHLORIDE PRN MG: 5 SOLUTION ORAL at 13:32

## 2020-09-03 RX ADMIN — FENTANYL CITRATE PRN MCG: 50 INJECTION INTRAMUSCULAR; INTRAVENOUS at 10:30

## 2020-09-03 RX ADMIN — FUROSEMIDE SCH MG: 20 TABLET ORAL at 09:00

## 2020-09-03 RX ADMIN — FENTANYL CITRATE PRN MCG: 50 INJECTION INTRAMUSCULAR; INTRAVENOUS at 10:48

## 2020-09-03 RX ADMIN — FUROSEMIDE SCH MG: 20 TABLET ORAL at 20:44

## 2020-09-03 RX ADMIN — FENTANYL CITRATE PRN MCG: 50 INJECTION INTRAMUSCULAR; INTRAVENOUS at 11:28

## 2020-09-03 RX ADMIN — ACETAMINOPHEN SCH MG: 500 TABLET, COATED ORAL at 08:30

## 2020-09-03 RX ADMIN — OXYCODONE HYDROCHLORIDE PRN MG: 5 SOLUTION ORAL at 10:50

## 2020-09-03 RX ADMIN — HYDROMORPHONE HYDROCHLORIDE PRN MG: 1 INJECTION, SOLUTION INTRAMUSCULAR; INTRAVENOUS; SUBCUTANEOUS at 11:28

## 2020-09-03 RX ADMIN — FENTANYL CITRATE PRN MCG: 50 INJECTION INTRAMUSCULAR; INTRAVENOUS at 11:06

## 2020-09-03 RX ADMIN — HYDROMORPHONE HYDROCHLORIDE PRN MG: 1 INJECTION, SOLUTION INTRAMUSCULAR; INTRAVENOUS; SUBCUTANEOUS at 10:55

## 2020-09-03 RX ADMIN — MORPHINE SULFATE PRN MG: 4 INJECTION INTRAVENOUS at 16:17

## 2020-09-04 VITALS — DIASTOLIC BLOOD PRESSURE: 66 MMHG | SYSTOLIC BLOOD PRESSURE: 102 MMHG

## 2020-09-04 VITALS — SYSTOLIC BLOOD PRESSURE: 117 MMHG | DIASTOLIC BLOOD PRESSURE: 72 MMHG

## 2020-09-04 VITALS — DIASTOLIC BLOOD PRESSURE: 64 MMHG | SYSTOLIC BLOOD PRESSURE: 97 MMHG

## 2020-09-04 RX ADMIN — KETOROLAC TROMETHAMINE SCH MG: 30 INJECTION, SOLUTION INTRAMUSCULAR at 13:37

## 2020-09-04 RX ADMIN — DEXTROSE AND SODIUM CHLORIDE SCH MLS/HR: 5; .45 INJECTION, SOLUTION INTRAVENOUS at 23:56

## 2020-09-04 RX ADMIN — MORPHINE SULFATE PRN MG: 4 INJECTION INTRAVENOUS at 08:59

## 2020-09-04 RX ADMIN — OXYCODONE HYDROCHLORIDE PRN MG: 5 TABLET ORAL at 17:50

## 2020-09-04 RX ADMIN — FUROSEMIDE SCH MG: 20 TABLET ORAL at 08:59

## 2020-09-04 RX ADMIN — DOCUSATE SODIUM SCH MG: 100 CAPSULE, LIQUID FILLED ORAL at 20:27

## 2020-09-04 RX ADMIN — OXYCODONE HYDROCHLORIDE PRN MG: 5 TABLET ORAL at 21:49

## 2020-09-04 RX ADMIN — OXYCODONE HYDROCHLORIDE PRN MG: 5 TABLET ORAL at 00:41

## 2020-09-04 RX ADMIN — FUROSEMIDE SCH MG: 40 TABLET ORAL at 20:27

## 2020-09-04 RX ADMIN — ACETAMINOPHEN SCH MG: 500 TABLET, COATED ORAL at 11:20

## 2020-09-04 RX ADMIN — OXYCODONE HYDROCHLORIDE PRN MG: 5 TABLET ORAL at 09:49

## 2020-09-04 RX ADMIN — ACETAMINOPHEN SCH MG: 500 TABLET, COATED ORAL at 05:39

## 2020-09-04 RX ADMIN — ATORVASTATIN CALCIUM SCH MG: 20 TABLET, FILM COATED ORAL at 20:27

## 2020-09-04 RX ADMIN — DEXTROSE AND SODIUM CHLORIDE SCH MLS/HR: 5; .45 INJECTION, SOLUTION INTRAVENOUS at 14:24

## 2020-09-04 RX ADMIN — POTASSIUM CHLORIDE SCH MEQ: 20 TABLET, EXTENDED RELEASE ORAL at 08:59

## 2020-09-04 RX ADMIN — DOCUSATE SODIUM SCH MG: 100 CAPSULE, LIQUID FILLED ORAL at 08:59

## 2020-09-04 RX ADMIN — OXYCODONE HYDROCHLORIDE PRN MG: 5 TABLET ORAL at 05:40

## 2020-09-04 RX ADMIN — FUROSEMIDE SCH MG: 40 TABLET ORAL at 09:00

## 2020-09-04 RX ADMIN — MORPHINE SULFATE PRN MG: 4 INJECTION INTRAVENOUS at 03:16

## 2020-09-04 RX ADMIN — FUROSEMIDE SCH MG: 20 TABLET ORAL at 20:28

## 2020-09-04 RX ADMIN — OXYCODONE HYDROCHLORIDE PRN MG: 5 TABLET ORAL at 13:36

## 2020-09-04 RX ADMIN — KETOROLAC TROMETHAMINE SCH MG: 30 INJECTION, SOLUTION INTRAMUSCULAR at 21:48

## 2020-09-04 RX ADMIN — ACETAMINOPHEN SCH MG: 500 TABLET, COATED ORAL at 17:49

## 2020-09-04 RX ADMIN — DEXTROSE AND SODIUM CHLORIDE SCH MLS/HR: 5; .45 INJECTION, SOLUTION INTRAVENOUS at 04:33

## 2020-09-04 RX ADMIN — LISINOPRIL SCH MG: 40 TABLET ORAL at 08:59

## 2020-09-05 VITALS — SYSTOLIC BLOOD PRESSURE: 90 MMHG | DIASTOLIC BLOOD PRESSURE: 57 MMHG

## 2020-09-05 VITALS — DIASTOLIC BLOOD PRESSURE: 78 MMHG | SYSTOLIC BLOOD PRESSURE: 107 MMHG

## 2020-09-05 VITALS — DIASTOLIC BLOOD PRESSURE: 65 MMHG | SYSTOLIC BLOOD PRESSURE: 93 MMHG

## 2020-09-05 RX ADMIN — LISINOPRIL SCH MG: 40 TABLET ORAL at 09:00

## 2020-09-05 RX ADMIN — DEXTROSE AND SODIUM CHLORIDE SCH MLS/HR: 5; .45 INJECTION, SOLUTION INTRAVENOUS at 10:16

## 2020-09-05 RX ADMIN — ACETAMINOPHEN SCH MG: 500 TABLET, COATED ORAL at 00:02

## 2020-09-05 RX ADMIN — OXYCODONE HYDROCHLORIDE PRN MG: 5 TABLET ORAL at 01:59

## 2020-09-05 RX ADMIN — ACETAMINOPHEN SCH MG: 500 TABLET, COATED ORAL at 06:09

## 2020-09-05 RX ADMIN — OXYCODONE HYDROCHLORIDE PRN MG: 5 TABLET ORAL at 10:12

## 2020-09-05 RX ADMIN — DOCUSATE SODIUM SCH MG: 100 CAPSULE, LIQUID FILLED ORAL at 10:12

## 2020-09-05 RX ADMIN — FUROSEMIDE SCH MG: 20 TABLET ORAL at 09:00

## 2020-09-05 RX ADMIN — KETOROLAC TROMETHAMINE SCH MG: 30 INJECTION, SOLUTION INTRAMUSCULAR at 06:10

## 2020-09-05 RX ADMIN — OXYCODONE HYDROCHLORIDE PRN MG: 5 TABLET ORAL at 06:10

## 2020-09-05 RX ADMIN — FUROSEMIDE SCH MG: 40 TABLET ORAL at 09:00

## 2020-09-05 RX ADMIN — ACETAMINOPHEN SCH MG: 500 TABLET, COATED ORAL at 12:00

## 2020-09-05 RX ADMIN — POTASSIUM CHLORIDE SCH MEQ: 20 TABLET, EXTENDED RELEASE ORAL at 10:13

## 2020-09-09 DIAGNOSIS — E87.6 HYPOKALEMIA: ICD-10-CM

## 2020-09-09 DIAGNOSIS — I10 ESSENTIAL HYPERTENSION: ICD-10-CM

## 2020-09-09 RX ORDER — POTASSIUM CHLORIDE 20 MEQ/1
TABLET, EXTENDED RELEASE ORAL
Qty: 30 TAB | Refills: 0 | Status: SHIPPED | OUTPATIENT
Start: 2020-09-09 | End: 2020-09-21

## 2020-09-21 DIAGNOSIS — E87.6 HYPOKALEMIA: ICD-10-CM

## 2020-09-21 DIAGNOSIS — I10 ESSENTIAL HYPERTENSION: ICD-10-CM

## 2020-09-21 RX ORDER — POTASSIUM CHLORIDE 20 MEQ/1
TABLET, EXTENDED RELEASE ORAL
Qty: 30 TAB | Refills: 0 | Status: SHIPPED | OUTPATIENT
Start: 2020-09-21 | End: 2020-10-02

## 2020-10-02 DIAGNOSIS — E87.6 HYPOKALEMIA: ICD-10-CM

## 2020-10-02 DIAGNOSIS — I10 ESSENTIAL HYPERTENSION: ICD-10-CM

## 2020-10-02 RX ORDER — POTASSIUM CHLORIDE 20 MEQ/1
TABLET, EXTENDED RELEASE ORAL
Qty: 30 TAB | Refills: 0 | Status: SHIPPED | OUTPATIENT
Start: 2020-10-02 | End: 2020-10-21 | Stop reason: SDUPTHER

## 2020-10-19 ENCOUNTER — HOSPITAL ENCOUNTER (OUTPATIENT)
Dept: LAB | Facility: MEDICAL CENTER | Age: 80
End: 2020-10-19
Attending: INTERNAL MEDICINE
Payer: MEDICARE

## 2020-10-19 DIAGNOSIS — I51.7 LVH (LEFT VENTRICULAR HYPERTROPHY): ICD-10-CM

## 2020-10-19 DIAGNOSIS — I10 ESSENTIAL HYPERTENSION: ICD-10-CM

## 2020-10-19 DIAGNOSIS — E87.6 HYPOKALEMIA: ICD-10-CM

## 2020-10-19 LAB
ALBUMIN SERPL BCP-MCNC: 4 G/DL (ref 3.2–4.9)
ALBUMIN/GLOB SERPL: 1.5 G/DL
ALP SERPL-CCNC: 74 U/L (ref 30–99)
ALT SERPL-CCNC: 7 U/L (ref 2–50)
ANION GAP SERPL CALC-SCNC: 14 MMOL/L (ref 7–16)
AST SERPL-CCNC: 11 U/L (ref 12–45)
BILIRUB SERPL-MCNC: 0.3 MG/DL (ref 0.1–1.5)
BUN SERPL-MCNC: 20 MG/DL (ref 8–22)
CALCIUM SERPL-MCNC: 10 MG/DL (ref 8.5–10.5)
CHLORIDE SERPL-SCNC: 102 MMOL/L (ref 96–112)
CO2 SERPL-SCNC: 23 MMOL/L (ref 20–33)
CREAT SERPL-MCNC: 0.96 MG/DL (ref 0.5–1.4)
FASTING STATUS PATIENT QL REPORTED: NORMAL
GLOBULIN SER CALC-MCNC: 2.6 G/DL (ref 1.9–3.5)
GLUCOSE SERPL-MCNC: 93 MG/DL (ref 65–99)
POTASSIUM SERPL-SCNC: 4.5 MMOL/L (ref 3.6–5.5)
PROT SERPL-MCNC: 6.6 G/DL (ref 6–8.2)
SODIUM SERPL-SCNC: 139 MMOL/L (ref 135–145)

## 2020-10-19 PROCEDURE — 80053 COMPREHEN METABOLIC PANEL: CPT

## 2020-10-19 PROCEDURE — 36415 COLL VENOUS BLD VENIPUNCTURE: CPT

## 2020-10-21 ENCOUNTER — OFFICE VISIT (OUTPATIENT)
Dept: MEDICAL GROUP | Age: 80
End: 2020-10-21
Payer: MEDICARE

## 2020-10-21 ENCOUNTER — APPOINTMENT (OUTPATIENT)
Dept: MEDICAL GROUP | Age: 80
End: 2020-10-21
Payer: MEDICARE

## 2020-10-21 VITALS
TEMPERATURE: 98.2 F | BODY MASS INDEX: 36.25 KG/M2 | DIASTOLIC BLOOD PRESSURE: 60 MMHG | HEIGHT: 62 IN | SYSTOLIC BLOOD PRESSURE: 104 MMHG | WEIGHT: 197 LBS | OXYGEN SATURATION: 99 % | HEART RATE: 52 BPM

## 2020-10-21 DIAGNOSIS — I10 ESSENTIAL HYPERTENSION: ICD-10-CM

## 2020-10-21 DIAGNOSIS — Z12.31 ENCOUNTER FOR SCREENING MAMMOGRAM FOR MALIGNANT NEOPLASM OF BREAST: ICD-10-CM

## 2020-10-21 DIAGNOSIS — I47.10 SVT (SUPRAVENTRICULAR TACHYCARDIA) (HCC): ICD-10-CM

## 2020-10-21 DIAGNOSIS — I51.7 LVH (LEFT VENTRICULAR HYPERTROPHY): ICD-10-CM

## 2020-10-21 DIAGNOSIS — E78.5 DYSLIPIDEMIA: ICD-10-CM

## 2020-10-21 DIAGNOSIS — N18.30 STAGE 3 CHRONIC KIDNEY DISEASE, UNSPECIFIED WHETHER STAGE 3A OR 3B CKD: ICD-10-CM

## 2020-10-21 DIAGNOSIS — E66.01 MORBID (SEVERE) OBESITY DUE TO EXCESS CALORIES (HCC): ICD-10-CM

## 2020-10-21 DIAGNOSIS — E87.6 HYPOKALEMIA: ICD-10-CM

## 2020-10-21 DIAGNOSIS — K21.9 GASTROESOPHAGEAL REFLUX DISEASE WITHOUT ESOPHAGITIS: ICD-10-CM

## 2020-10-21 DIAGNOSIS — M79.89 LEG SWELLING: ICD-10-CM

## 2020-10-21 DIAGNOSIS — Z78.0 MENOPAUSE: ICD-10-CM

## 2020-10-21 DIAGNOSIS — I48.0 PAROXYSMAL ATRIAL FIBRILLATION (HCC): ICD-10-CM

## 2020-10-21 PROCEDURE — 99214 OFFICE O/P EST MOD 30 MIN: CPT | Performed by: INTERNAL MEDICINE

## 2020-10-21 RX ORDER — ATORVASTATIN CALCIUM 20 MG/1
TABLET, FILM COATED ORAL
Qty: 90 TAB | Refills: 3 | Status: SHIPPED | OUTPATIENT
Start: 2020-10-21 | End: 2021-10-22

## 2020-10-21 RX ORDER — POTASSIUM CHLORIDE 20 MEQ/1
40 TABLET, EXTENDED RELEASE ORAL DAILY
Qty: 180 TAB | Refills: 3 | Status: SHIPPED | OUTPATIENT
Start: 2020-10-21 | End: 2022-01-03

## 2020-10-21 RX ORDER — FUROSEMIDE 40 MG/1
TABLET ORAL
Qty: 180 TAB | Refills: 1 | Status: SHIPPED | OUTPATIENT
Start: 2020-10-21 | End: 2021-04-23

## 2020-10-21 RX ORDER — LISINOPRIL 40 MG/1
40 TABLET ORAL
Qty: 90 TAB | Refills: 3 | Status: SHIPPED | OUTPATIENT
Start: 2020-10-21 | End: 2021-12-10

## 2020-10-21 ASSESSMENT — PATIENT HEALTH QUESTIONNAIRE - PHQ9: CLINICAL INTERPRETATION OF PHQ2 SCORE: 0

## 2020-10-21 ASSESSMENT — FIBROSIS 4 INDEX: FIB4 SCORE: 1.61

## 2020-10-21 NOTE — PROGRESS NOTES
CHIEF COMPLAINT  HTN, labs    HPI  Sharon Callahan is a 79 y.o. female who presents today for the following     Hypertension, LVH, hypokalemia, LE swelling  CKD stage III  Meds: Lisinopril 40 mg daily, Lasix 40 mg twice daily, potassium 20 mEq twice daily:  -  taking as prescribed.   Measuring BP at home: yes, it has been < 150/90.  Denies: - headaches, vision problems, tinnitus.  - chest pain/pressure, palpitations, irregular heart beats, exertional, dyspnea, peripheral edema.  - medication side effect: unusual fatigue, slow heartbeat, foot/leg swelling.  Low salt diet: yes  Diet: healthy  Exercise: daily yard work  BMI: 34  FH of HTN: Multiple  Labs showed hypokalemia, borderline, on potassium supplement.  LE swelling improved.     The last echocardiography, 2/26/19:  Technically difficult and incomplete study due to breast implant.   Parasternal long axis view was subopotimal and no parasternal short   axis images.  Grossly normal chamber sizes.  Right heart appeared enlarged in some views but likely from transducer   position and/or locations.  Normal left ventricular systolic function.  Left ventricular ejection fraction is visually estimated to be 60%.  Grossly normal regional wall motion.  The aortic valve is not well visualized.  Aortic sclerosis without stenosis.  No mitral stenosis or regurgitation seen.  Mild tricuspid regurgitation.  Normal inferior vena cava size and inspiratory collapse.  Right ventricular systolic pressure is estimated to be 25 mmHg.  Normal pericardium without effusion.     Paroxysmal Afib, SVT  H/o paroxysmal afib in the setting of intercurrent illness, h/o HTN, Carcinoid tumor of the lung, MYESHA.      She denies chest pain, dizziness, pre syncope or syncope, dyspnea, PND, orthopnea, or lower extremity edema.     She has had occasional palpitations that are infrequent, and usually only last a few seconds. She states that they are not overly bothersome at this time. No associated  symptoms with palps.   Reviewed the last echocardiography.  Ziopatch revealed short runs of SVT, no afib.     Dyslipidemia  On simvastatin, 40 mg, taking daily, as prescribed. No myalgias, muscle cramps or pain.   Diet /Exercise/BMI: As above  FH: neg     GERD  On omeprazole, 20 mg QD; takes medication as prescribed, that controls sx.   Denies:   - heartburn, epigastric pain.   -  nausea, vomiting, or diarrhea  - dysphagia  - abnormal cough  - blood in the stool or dark tarry stools.  - early satiety  - tobacco use.     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 90 Tab 1   • furosemide (LASIX) 40 MG Tab TAKE 1 TABLET BY MOUTH TWICE A  Tab 0   • atorvastatin (LIPITOR) 20 MG Tab TAKE 1 TABLET BY MOUTH EVERYDAY AT BEDTIME 90 Tab 1   • omeprazole (PRILOSEC) 40 MG delayed-release capsule Take 1 Cap by mouth every day. Indications: Gastroesophageal Reflux Disease, Heartburn 90 Cap 3   • diclofenac CR (VOLTAREN-XR) 100 MG TABLET SR 24 HR tablet TAKE 1 TABLET BY MOUTH EVERY DAY 30 Tab 2   • ergocalciferol (DRISDOL) 76381 UNIT capsule TAKE ONE CAPSULE BY MOUTH EVERY 7 DAYS 12 Cap 1   • traZODone  "(DESYREL) 50 MG Tab Take  mg by mouth at bedtime as needed for Sleep.       No current facility-administered medications for this visit.      ALLERGIES  Allergies: Pcn [penicillins], Clindamycin, Influenza virus vacc, Norco [hydrocodone-acetaminophen], Pneumococcal vaccine, Tetracycline, and Xarelto [rivaroxaban]  PAST MEDICAL HISTORY  Past Medical History:   Diagnosis Date   • Chronic pain of both knees 2019   • Pleural effusion, right 2017    Status post thoracotomy and decortication   • Hemothorax on right 2017    Status post thoracotomy and decortication   • Chronic nausea 2015    Has had GI work up done Dr voss   • BMI 38.0-38.9,adult 2013   • Depression with anxiety 5/3/2011   • S/P bilateral mastectomy 2011   • Hyperlipidemia 2011   • MYESHA (obstructive sleep apnea) 2011    On cPAP    • Arrhythmia     a-fib   • Arthritis     osteo- shoulders knees   • CATARACT     surgical correcttion bilateral   • Heart burn    • Heart valve disease     \"slight regurgitation\"   • History of cholecystectomy    • Hypertension    • Indigestion    • Pain     left hip   • Range of motion deficit     left elbow, unable to completely extend   • Sleep apnea     CPAP   • Snoring      SURGICAL HISTORY  She  has a past surgical history that includes lisa by laparoscopy (2011); other (); other (); uvulopharyngopalatoplasty (); cataract phaco with iol (10/20/2011); cataract phaco with iol (11/3/2011); orif, humerus ('s); pr enlarge breast with implant; thoracoscopy (Right, 2016); breast biopsy (); hip arthroplasty total (3/26/2009); hip arthroplasty total (12/3/2012); and pr total knee arthroplasty (Left, 2019).  SOCIAL HISTORY  Social History     Tobacco Use   • Smoking status: Former Smoker     Packs/day: 1.00     Years: 15.00     Pack years: 15.00     Types: Cigarettes     Quit date: 1975     Years since quittin.8   • Smokeless tobacco: Never Used " "  Substance Use Topics   • Alcohol use: Yes     Alcohol/week: 0.0 oz     Frequency: 4 or more times a week     Comment: 1 per day   • Drug use: No     Social History     Social History Narrative   • Not on file     FAMILY HISTORY  Family History   Problem Relation Age of Onset   • Hypertension Mother    • Cancer Father         lung   • Diabetes Father    • Hypertension Father    • Heart Disease Father         MI   • Hypertension Brother    • Sleep Apnea Brother    • Hypertension Maternal Grandmother    • Sleep Apnea Brother    • Hypertension Brother    • Diabetes Brother    • Cancer Paternal Aunt         stomach   • Diabetes Paternal Aunt    • Heart Disease Brother         MI   • Hyperlipidemia Neg Hx    • Stroke Neg Hx      Family Status   Relation Name Status   • Mo     • Fa     • Sis 0 (Not Specified)   • Bro  Alive   • MGMo   at age 65   • Bro  Alive   • Bro older    • PAunt  (Not Specified)   • Bro  (Not Specified)   • Neg Hx  (Not Specified)       ROS   Constitutional: Negative for fever, chills, fatigue.  HENT: Negative for congestion, sore throat.  Eyes: Negative for vision problems.   Respiratory: Negative for cough, shortness of breath.  Cardiovascular: Negative for chest pain, palpitations.   Gastrointestinal: Negative for heartburn, nausea, abdominal pain.   Genitourinary: Negative for dysuria.  Musculoskeletal: Negative for significant myalgia, back and joint pain.   Skin: Negative for rash.   Neuro: Negative for dizziness, weakness and headaches.   Endo/Heme/Allergies: Does not bruise/bleed easily.   Psychiatric/Behavioral: Negative for depression.    PHYSICAL EXAM   Blood Pressure 104/60 (BP Location: Right arm, Patient Position: Sitting, BP Cuff Size: Adult)   Pulse (Abnormal) 52   Temperature 36.8 °C (98.2 °F) (Temporal)   Height 1.575 m (5' 2\")   Weight 89.4 kg (197 lb)   Oxygen Saturation 99%   Body Mass Index 36.03 kg/m²   General:  NAD, well " appearing  HEENT:   NC/AT, PERRLA, EOMI.  Cardiovascular: unlabored breathing, no peripheral cyanosis or swelling.     Lungs:   no respiratory distress.  Abdomen: non- distended.  Extremities:  No LE swelling.  Skin:  Warm, dry.  No erythema. No rash.   Neurologic: Alert & oriented x 3. CN II-XII grossly intact. No focal deficits.  Psychiatric:  Affect normal, mood normal, judgment normal.    Labs     Labs are reviewed and discussed with a patient  Lab Results   Component Value Date/Time    CHOLSTRLTOT 168 04/13/2020 02:28 PM    LDL 65 04/13/2020 02:28 PM    HDL 85 04/13/2020 02:28 PM    TRIGLYCERIDE 89 04/13/2020 02:28 PM       Lab Results   Component Value Date/Time    SODIUM 139 10/19/2020 07:42 AM    POTASSIUM 4.5 10/19/2020 07:42 AM    CHLORIDE 102 10/19/2020 07:42 AM    CO2 23 10/19/2020 07:42 AM    GLUCOSE 93 10/19/2020 07:42 AM    BUN 20 10/19/2020 07:42 AM    CREATININE 0.96 10/19/2020 07:42 AM    CREATININE 0.79 04/04/2011 12:00 AM    BUNCREATRAT 13 04/04/2011 12:00 AM     Lab Results   Component Value Date/Time    ALKPHOSPHAT 74 10/19/2020 07:42 AM    ASTSGOT 11 (L) 10/19/2020 07:42 AM    ALTSGPT 7 10/19/2020 07:42 AM    TBILIRUBIN 0.3 10/19/2020 07:42 AM      Lab Results   Component Value Date/Time    HBA1C 5.6 02/25/2016 07:09 AM    HBA1C 5.4 06/25/2012 10:00 PM    HBA1C 5.4 07/27/2006 03:25 AM     No results found for: TSH  Lab Results   Component Value Date/Time    FREET4 0.82 02/14/2013 01:45 PM    FREET4 0.95 07/08/2011 03:37 PM       Lab Results   Component Value Date/Time    WBC 8.6 04/13/2020 02:28 PM    RBC 4.63 04/13/2020 02:28 PM    HEMOGLOBIN 13.5 04/13/2020 02:28 PM    HEMATOCRIT 43.1 04/13/2020 02:28 PM    MCV 93.1 04/13/2020 02:28 PM    MCH 29.2 04/13/2020 02:28 PM    MCHC 31.3 (L) 04/13/2020 02:28 PM    MPV 11.2 04/13/2020 02:28 PM    NEUTSPOLYS 76.10 (H) 04/13/2020 02:28 PM    LYMPHOCYTES 15.10 (L) 04/13/2020 02:28 PM    MONOCYTES 7.00 04/13/2020 02:28 PM    EOSINOPHILS 0.80 04/13/2020  02:28 PM    BASOPHILS 0.50 04/13/2020 02:28 PM    HYPOCHROMIA 2+ 07/04/2013 07:30 AM    ANISOCYTOSIS 1+ 12/01/2016 06:45 AM      Imaging     Reviewed and discussed per HPI    Assessment and Plan     Sharon Callahan is a 79 y.o. female    1. Essential hypertension  Controlled, continue with current treatment.  - Comp Metabolic Panel; Future  - furosemide (LASIX) 40 MG Tab; TAKE 1 TABLET BY MOUTH TWICE A DAY  Dispense: 180 Tab; Refill: 1  - potassium chloride SA (KDUR) 20 MEQ Tab CR; Take 2 Tabs by mouth every day.  Dispense: 180 Tab; Refill: 3  - lisinopril (PRINIVIL) 40 MG tablet; Take 1 Tab by mouth every day.  Dispense: 90 Tab; Refill: 3    2. LVH (left ventricular hypertrophy)  Reviewed echo    3. Hypokalemia  Controlled, continue with current treatment.  - Comp Metabolic Panel; Future  - potassium chloride SA (KDUR) 20 MEQ Tab CR; Take 2 Tabs by mouth every day.  Dispense: 180 Tab; Refill: 3    4. Leg swelling  May have a trial of 40 mg of lasix per day, check WT  - furosemide (LASIX) 40 MG Tab; TAKE 1 TABLET BY MOUTH TWICE A DAY  Dispense: 180 Tab; Refill: 1    5. Stage 3 chronic kidney disease, unspecified whether stage 3a or 3b CKD  Advised good fluid intake, avoid NSAIDs    6. Paroxysmal atrial fibrillation (HCC)  7. SVT (supraventricular tachycardia) (HCC)  No significant sx, advised cardiology f/u    8. Dyslipidemia  Controlled, continue with current treatment.  - Comp Metabolic Panel; Future  - Lipid Profile; Future  - atorvastatin (LIPITOR) 20 MG Tab; TAKE 1 TABLET BY MOUTH EVERYDAY AT BEDTIME  Dispense: 90 Tab; Refill: 3    9. Gastroesophageal reflux disease without esophagitis  Controlled, continue with current treatment.    10. Menopause  - DS-BONE DENSITY STUDY (DEXA); Future    11. Encounter for screening mammogram for malignant neoplasm of breast  - MA-SCREENING MAMMO BILAT W/TOMOSYNTHESIS W/CAD; Future    12. Morbid (severe) obesity due to excess calories (HCC)  Counseling  given      Counseling:   - Smoking:  Nonsmoker    Followup: in 4 months    All questions are answered.    Please note that this dictation was created using voice recognition software, and that there might be errors of darling and possibly content.

## 2020-11-10 ENCOUNTER — OFFICE VISIT (OUTPATIENT)
Dept: CARDIOLOGY | Facility: MEDICAL CENTER | Age: 80
End: 2020-11-10
Payer: MEDICARE

## 2020-11-10 ENCOUNTER — TELEPHONE (OUTPATIENT)
Dept: CARDIOLOGY | Facility: MEDICAL CENTER | Age: 80
End: 2020-11-10

## 2020-11-10 VITALS
SYSTOLIC BLOOD PRESSURE: 124 MMHG | OXYGEN SATURATION: 95 % | HEIGHT: 62 IN | WEIGHT: 199 LBS | HEART RATE: 60 BPM | BODY MASS INDEX: 36.62 KG/M2 | DIASTOLIC BLOOD PRESSURE: 76 MMHG

## 2020-11-10 DIAGNOSIS — I10 ESSENTIAL HYPERTENSION: ICD-10-CM

## 2020-11-10 DIAGNOSIS — I48.0 PAROXYSMAL ATRIAL FIBRILLATION (HCC): ICD-10-CM

## 2020-11-10 DIAGNOSIS — G47.33 OSA ON CPAP: Chronic | ICD-10-CM

## 2020-11-10 DIAGNOSIS — I48.0 PAF (PAROXYSMAL ATRIAL FIBRILLATION) (HCC): ICD-10-CM

## 2020-11-10 DIAGNOSIS — E66.9 OBESITY (BMI 30.0-34.9): ICD-10-CM

## 2020-11-10 LAB — EKG IMPRESSION: NORMAL

## 2020-11-10 PROCEDURE — 99214 OFFICE O/P EST MOD 30 MIN: CPT | Performed by: INTERNAL MEDICINE

## 2020-11-10 PROCEDURE — 93000 ELECTROCARDIOGRAM COMPLETE: CPT | Performed by: INTERNAL MEDICINE

## 2020-11-10 RX ORDER — TRAMADOL HYDROCHLORIDE 50 MG/1
TABLET ORAL
COMMUNITY
Start: 2020-10-20 | End: 2022-07-27

## 2020-11-10 RX ORDER — OXYCODONE HYDROCHLORIDE 5 MG/1
TABLET ORAL
COMMUNITY
Start: 2020-09-16 | End: 2020-11-10

## 2020-11-10 ASSESSMENT — FIBROSIS 4 INDEX: FIB4 SCORE: 1.63

## 2020-11-10 NOTE — PROGRESS NOTES
"Chief Complaint   Patient presents with   • Atrial Fibrillation     F/V DX:PAF       Subjective:   Sharon Callahan is a 80 y.o. female who presents today recent knee surgery at [facility] in early September with an episode of bradycardia asymptomatic.  Did not see cardiology.  Has had both knees replaced in the last year.  Overall feels well.  No chest pain or shortness of breath.  No syncope or presyncope.    Past Medical History:   Diagnosis Date   • Arrhythmia     a-fib   • Arthritis     osteo- shoulders knees   • BMI 38.0-38.9,adult 9/18/2013   • CATARACT     surgical correcttion bilateral   • Chronic nausea 1/12/2015    Has had GI work up done Dr voss   • Chronic pain of both knees 1/9/2019   • Depression with anxiety 5/3/2011   • Heart burn    • Heart valve disease     \"slight regurgitation\"   • Hemothorax on right 1/4/2017    Status post thoracotomy and decortication   • History of cholecystectomy    • Hyperlipidemia 4/4/2011   • Hypertension    • Indigestion    • MYESHA (obstructive sleep apnea) 4/4/2011    On cPAP    • Pain     left hip   • Pleural effusion, right 1/4/2017    Status post thoracotomy and decortication   • Range of motion deficit     left elbow, unable to completely extend   • S/P bilateral mastectomy 4/4/2011   • Sleep apnea     CPAP   • Snoring      Past Surgical History:   Procedure Laterality Date   • PB TOTAL KNEE ARTHROPLASTY Left 11/8/2019    Procedure: ARTHROPLASTY, KNEE, TOTAL;  Surgeon: Roque Edmond M.D.;  Location: [facility];  Service: Orthopedics   • THORACOSCOPY Right 12/6/2016    Procedure: RIGHT THORACOSCOPY ,DECORTICATION, EVACUATION OF HEMOTHORAX;  Surgeon: Ehsan De Leon D.O.;  Location: [facility];  Service:    • HIP ARTHROPLASTY TOTAL  12/3/2012    Performed by Jamie Kenney M.D. at [facility]   • CATARACT PHACO WITH IOL  11/3/2011    Performed by DEENA BRADEN at [facility]   • CATARACT PHACO WITH IOL  " 10/20/2011    Performed by DEENA BRADEN at SURGERY SAME DAY [facility]S   • MAXIM BY LAPAROSCOPY  2011    Performed by DORON HINOJOSA at SUR[facility]ERY [facility] DAY [facility]   • HIP ARTHROPLASTY TOTAL  3/26/2009    Right; Perfmed by THERESE LEMA at SURGERY [facility]S   • UVULOPHARYNGOPALATOPLASTY     • OTHER      tummy tuck   • OTHER      bilateral mastectomies with implants   • BREAST BIOPSY      bilateral benign mastectomy   • ORIF, HUMERUS      left   • PB ENLARGE BREAST WITH IMPLANT       Family History   Problem Relation Age of Onset   • Hypertension Mother    • Cancer Father         lung   • Diabetes Father    • Hypertension Father    • Heart Disease Father         MI   • Hypertension Brother    • Sleep Apnea Brother    • Hypertension Maternal Grandmother    • Sleep Apnea Brother    • Hypertension Brother    • Diabetes Brother    • Cancer Paternal Aunt         stomach   • Diabetes Paternal Aunt    • Heart Disease Brother         MI   • Hyperlipidemia Neg Hx    • Stroke Neg Hx      Social History     Socioeconomic History   • Marital status:      Spouse name: Not on file   • Number of children: Not on file   • Years of education: Not on file   • Highest education level: Not on file   Occupational History   • Occupation: retired      Comment: [employer]   Social Needs   • Financial resource strain: Not on file   • Food insecurity     Worry: Not on file     Inability: Not on file   • Transportation needs     Medical: Not on file     Non-medical: Not on file   Tobacco Use   • Smoking status: Former Smoker     Packs/day: 1.00     Years: 15.00     Pack years: 15.00     Types: Cigarettes     Quit date: 1975     Years since quittin.8   • Smokeless tobacco: Never Used   Substance and Sexual Activity   • Alcohol use: Yes     Alcohol/week: 0.0 oz     Frequency: 4 or more times a week     Comment: 1 per day   • Drug use: No   • Sexual activity: Never      Comment: lives with ex-   Lifestyle   • Physical activity     Days per week: Not on file     Minutes per session: Not on file   • Stress: Not on file   Relationships   • Social connections     Talks on phone: Not on file     Gets together: Not on file     Attends Scientologist service: Not on file     Active member of club or organization: Not on file     Attends meetings of clubs or organizations: Not on file     Relationship status: Not on file   • Intimate partner violence     Fear of current or ex partner: Not on file     Emotionally abused: Not on file     Physically abused: Not on file     Forced sexual activity: Not on file   Other Topics Concern   • Not on file   Social History Narrative   • Not on file     Allergies   Allergen Reactions   • Pcn [Penicillins] Anaphylaxis     Skin turns black   • Clindamycin Rash     Rash     • Influenza Virus Vacc Rash     Red in face   • Norco [Hydrocodone-Acetaminophen]    • Pneumococcal Vaccine Rash   • Tetracycline Rash     Rash    • Xarelto [Rivaroxaban] Rash     Outpatient Encounter Medications as of 11/10/2020   Medication Sig Dispense Refill   • traMADol (ULTRAM) 50 MG Tab TAKE 1 TABLET BY MOUTH EVERY 6 HOURS AS NEEDED FOR PAIN. G89.18, 7DAY     • furosemide (LASIX) 40 MG Tab TAKE 1 TABLET BY MOUTH TWICE A DAY (Patient taking differently: Take 20 mg by mouth 2 Times a Day. TAKE 1 TABLET BY MOUTH TWICE A DAY) 180 Tab 1   • potassium chloride SA (KDUR) 20 MEQ Tab CR Take 2 Tabs by mouth every day. (Patient taking differently: Take 20 mEq by mouth every day.) 180 Tab 3   • lisinopril (PRINIVIL) 40 MG tablet Take 1 Tab by mouth every day. 90 Tab 3   • atorvastatin (LIPITOR) 20 MG Tab TAKE 1 TABLET BY MOUTH EVERYDAY AT BEDTIME 90 Tab 3   • omeprazole (PRILOSEC) 40 MG delayed-release capsule Take 1 Cap by mouth every day. Indications: Gastroesophageal Reflux Disease, Heartburn 90 Cap 3   • diclofenac CR (VOLTAREN-XR) 100 MG TABLET SR 24 HR tablet TAKE 1 TABLET BY MOUTH  "EVERY DAY 30 Tab 2   • traZODone (DESYREL) 50 MG Tab Take  mg by mouth at bedtime as needed for Sleep.     • [DISCONTINUED] oxyCODONE immediate-release (ROXICODONE) 5 MG Tab TAKE 1 TABLET ORALLY EVERY 6 HOURS AS NEEDED FOR PAIN 7DS G89.18       No facility-administered encounter medications on file as of 11/10/2020.      ROS     Objective:   /76 (BP Location: Left arm, Patient Position: Sitting, BP Cuff Size: Large adult)   Pulse 60   Ht 1.575 m (5' 2\")   Wt 90.3 kg (199 lb)   SpO2 95%   BMI 36.40 kg/m²     Physical Exam   Constitutional: She is oriented to person, place, and time. She appears well-developed and well-nourished.   Overweight   HENT:   Head: Normocephalic and atraumatic.   Eyes: Pupils are equal, round, and reactive to light. EOM are normal.   Neck: Normal range of motion. Neck supple.   Cardiovascular: Normal rate, regular rhythm, normal heart sounds and intact distal pulses. Exam reveals no gallop and no friction rub.   No murmur heard.  Pulmonary/Chest: Effort normal and breath sounds normal.   Abdominal: Soft. Bowel sounds are normal.   Musculoskeletal: Normal range of motion.         General: No edema.   Neurological: She is alert and oriented to person, place, and time. No cranial nerve deficit.   Skin: Skin is warm.   Psychiatric: She has a normal mood and affect. Her behavior is normal. Judgment and thought content normal.       Assessment:     1. PAF (paroxysmal atrial fibrillation) ([facility])  EKG   2. Paroxysmal atrial fibrillation (HCC)     3. Essential hypertension     4. MYESHA on CPAP     5. Obesity (BMI 30.0-34.9)         Medical Decision Making:  Today's Assessment / Status / Plan:   1.  Atrial fibrillation no evidence of recurrence.  2.  Bradycardia in September while in the hospital.  Get old records.  3.  Sleep apnea on CPAP.  4.  Obesity work on weight loss.  5.  Follow-up in 1 year.  "

## 2020-11-10 NOTE — PROGRESS NOTES
Request for records sent to [facility]   Admission date 9/3-9/5/2020   [phone]    Requesting all cardiac records. Imaging,EKG and labs.

## 2020-11-23 ENCOUNTER — OFFICE VISIT (OUTPATIENT)
Dept: MEDICAL GROUP | Age: 80
End: 2020-11-23
Payer: MEDICARE

## 2020-11-23 ENCOUNTER — TELEPHONE (OUTPATIENT)
Dept: MEDICAL GROUP | Age: 80
End: 2020-11-23

## 2020-11-23 VITALS
WEIGHT: 199 LBS | TEMPERATURE: 99.2 F | DIASTOLIC BLOOD PRESSURE: 62 MMHG | SYSTOLIC BLOOD PRESSURE: 112 MMHG | BODY MASS INDEX: 36.62 KG/M2 | HEIGHT: 62 IN | OXYGEN SATURATION: 98 % | HEART RATE: 57 BPM

## 2020-11-23 DIAGNOSIS — I51.7 LVH (LEFT VENTRICULAR HYPERTROPHY): ICD-10-CM

## 2020-11-23 DIAGNOSIS — E87.6 HYPOKALEMIA: ICD-10-CM

## 2020-11-23 DIAGNOSIS — R60.0 BILATERAL LEG EDEMA: ICD-10-CM

## 2020-11-23 DIAGNOSIS — K21.9 GASTROESOPHAGEAL REFLUX DISEASE WITHOUT ESOPHAGITIS: ICD-10-CM

## 2020-11-23 DIAGNOSIS — I10 ESSENTIAL HYPERTENSION: ICD-10-CM

## 2020-11-23 DIAGNOSIS — N18.30 STAGE 3 CHRONIC KIDNEY DISEASE, UNSPECIFIED WHETHER STAGE 3A OR 3B CKD: ICD-10-CM

## 2020-11-23 DIAGNOSIS — E78.5 DYSLIPIDEMIA: ICD-10-CM

## 2020-11-23 PROCEDURE — 99214 OFFICE O/P EST MOD 30 MIN: CPT | Performed by: INTERNAL MEDICINE

## 2020-11-23 ASSESSMENT — FIBROSIS 4 INDEX: FIB4 SCORE: 1.63

## 2020-11-23 NOTE — TELEPHONE ENCOUNTER
Phone Number Called: [phone] (home)       Call outcome: Spoke to patient regarding message below.    Message: Called patient to let her know that her letter for an emotional support pet is ready to be picked up. She will be by today.

## 2020-11-24 NOTE — PROGRESS NOTES
CHIEF COMPLAINT  Chief Complaint   Patient presents with   • Paperwork   Hypertension    HPI  Sharon Callahan is a 80 y.o. female who presents today for the following     Hypertension, LVH, hypokalemia, LE swelling  CKD stage III  Meds: Lisinopril 40 mg QD, Lasix 20 mg twice daily, potassium 20 mEq daily:  -  taking as prescribed.   Measuring BP at home: yes, it has been < 150/90.  Denies: - headaches, vision problems, tinnitus.  - chest pain/pressure, palpitations, irregular heart beats, exertional, dyspnea, peripheral edema.  - medication side effect: unusual fatigue, slow heartbeat, foot/leg swelling.  Low salt diet: yes  Diet: healthy  Exercise: daily yard work  BMI: 34  FH of HTN: Multiple  Labs showed hypokalemia, borderline, on potassium supplement.  LE swelling improved.     The last echocardiography, 2/26/19:  Technically difficult and incomplete study due to breast implant.   Parasternal long axis view was subopotimal and no parasternal short   axis images.  Grossly normal chamber sizes.  Right heart appeared enlarged in some views but likely from transducer   position and/or locations.  Normal left ventricular systolic function.  Left ventricular ejection fraction is visually estimated to be 60%.  Grossly normal regional wall motion.  The aortic valve is not well visualized.  Aortic sclerosis without stenosis.  No mitral stenosis or regurgitation seen.  Mild tricuspid regurgitation.  Normal inferior vena cava size and inspiratory collapse.  Right ventricular systolic pressure is estimated to be 25 mmHg.  Normal pericardium without effusion.      Dyslipidemia  On simvastatin, 40 mg, taking daily, as prescribed. No myalgias, muscle cramps or pain.   Diet /Exercise/BMI: As above  FH: neg     GERD  On omeprazole, 20 mg QD; takes medication as prescribed, that controls sx.   Denies:   - heartburn, epigastric pain.   -  nausea, vomiting, or diarrhea  - dysphagia  - abnormal cough  - blood in the stool or dark  tarry stools.  - early satiety  - tobacco use.    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 G89.18, 7DAY     • furosemide (LASIX) 40 MG Tab TAKE 1 TABLET BY MOUTH TWICE A DAY (Patient taking differently: Take 20 mg by mouth 2 Times a Day. TAKE 1 TABLET BY MOUTH TWICE A DAY) 180 Tab 1   • potassium chloride SA (KDUR) 20 MEQ Tab CR Take 2 Tabs by mouth every day. (Patient taking differently: Take 20 mEq by mouth every day.) 180 Tab 3   • lisinopril (PRINIVIL) 40 MG tablet Take 1 Tab by mouth every day. 90 Tab 3   • atorvastatin (LIPITOR) 20 MG Tab TAKE 1 TABLET BY MOUTH EVERYDAY AT BEDTIME 90 Tab 3   • omeprazole (PRILOSEC) 40 MG delayed-release capsule Take 1 Cap by mouth every day. Indications: Gastroesophageal Reflux Disease, Heartburn 90 Cap 3   • diclofenac CR (VOLTAREN-XR) 100 MG TABLET SR 24 HR tablet TAKE 1 TABLET BY MOUTH EVERY DAY 30 Tab 2   • traZODone (DESYREL) 50 MG Tab Take  mg by mouth at bedtime as needed for Sleep.       No current facility-administered medications for this visit.      ALLERGIES  Allergies: Pcn [penicillins], Clindamycin, Influenza virus vacc, Norco [hydrocodone-acetaminophen], Pneumococcal vaccine, Tetracycline, and Xarelto  "[rivaroxaban]  PAST MEDICAL HISTORY  Past Medical History:   Diagnosis Date   • Chronic pain of both knees 2019   • Pleural effusion, right 2017    Status post thoracotomy and decortication   • Hemothorax on right 2017    Status post thoracotomy and decortication   • Chronic nausea 2015    Has had GI work up done Dr voss   • BMI 38.0-38.9,adult 2013   • Depression with anxiety 5/3/2011   • S/P bilateral mastectomy 2011   • Hyperlipidemia 2011   • MYESHA (obstructive sleep apnea) 2011    On cPAP    • Arrhythmia     a-fib   • Arthritis     osteo- shoulders knees   • CATARACT     surgical correcttion bilateral   • Heart burn    • Heart valve disease     \"slight regurgitation\"   • History of cholecystectomy    • Hypertension    • Indigestion    • Pain     left hip   • Range of motion deficit     left elbow, unable to completely extend   • Sleep apnea     CPAP   • Snoring      SURGICAL HISTORY  She  has a past surgical history that includes lisa by laparoscopy (2011); other (); other (); uvulopharyngopalatoplasty (); cataract phaco with iol (10/20/2011); cataract phaco with iol (11/3/2011); orif, humerus ('s); pr enlarge breast with implant; thoracoscopy (Right, 2016); breast biopsy (); hip arthroplasty total (3/26/2009); hip arthroplasty total (12/3/2012); and pr total knee arthroplasty (Left, 2019).  SOCIAL HISTORY  Social History     Tobacco Use   • Smoking status: Former Smoker     Packs/day: 1.00     Years: 15.00     Pack years: 15.00     Types: Cigarettes     Quit date: 1975     Years since quittin.9   • Smokeless tobacco: Never Used   Substance Use Topics   • Alcohol use: Yes     Alcohol/week: 0.0 oz     Frequency: 4 or more times a week     Comment: 1 per day   • Drug use: No     Social History     Social History Narrative   • Not on file     FAMILY HISTORY  Family History   Problem Relation Age of Onset   • Hypertension Mother    • " "Cancer Father         lung   • Diabetes Father    • Hypertension Father    • Heart Disease Father         MI   • Hypertension Brother    • Sleep Apnea Brother    • Hypertension Maternal Grandmother    • Sleep Apnea Brother    • Hypertension Brother    • Diabetes Brother    • Cancer Paternal Aunt         stomach   • Diabetes Paternal Aunt    • Heart Disease Brother         MI   • Hyperlipidemia Neg Hx    • Stroke Neg Hx      Family Status   Relation Name Status   • Mo     • Fa     • Sis 0 (Not Specified)   • Bro  Alive   • MGMo   at age 65   • Bro  Alive   • Bro older    • PAunt  (Not Specified)   • Bro  (Not Specified)   • Neg Hx  (Not Specified)     ROS   Constitutional: Negative for fever, chills, fatigue.  HENT: Negative for congestion, sore throat.  Eyes: Negative for vision problems.   Respiratory: Negative for cough, shortness of breath.  Cardiovascular: Negative for chest pain, palpitations.   Gastrointestinal: Negative for nausea, abdominal pain.   Genitourinary: Negative for dysuria.  Musculoskeletal: has knees pain.   Skin: Negative for rash.   Neuro: Negative for dizziness, weakness and headaches.   Endo/Heme/Allergies: Does not bruise/bleed easily.   Psychiatric/Behavioral: Negative for depression.    PHYSICAL EXAM   Blood Pressure 112/62 (BP Location: Right arm, Patient Position: Sitting, BP Cuff Size: Adult long)   Pulse (Abnormal) 57   Temperature 37.3 °C (99.2 °F) (Temporal)   Height 1.575 m (5' 2\")   Weight 90.3 kg (199 lb)   Oxygen Saturation 98%  Body mass index is 36.4 kg/m².  General:  NAD, well appearing  HEENT:   NC/AT, PERRLA, EOMI.  Cardiovascular: unlabored breathing, no peripheral cyanosis or swelling.     Lungs:   no respiratory distress.  Abdomen: non- distended.  Extremities:  No LE swelling.  Skin:  Warm, dry.  No erythema. No rash.   Neurologic: Alert & oriented x 3. CN II-XII grossly intact. No focal deficits.  Psychiatric:  Affect normal, mood " normal, judgment normal.    Labs     Labs are reviewed and discussed with a patient  Lab Results   Component Value Date/Time    CHOLSTRLTOT 168 04/13/2020 02:28 PM    LDL 65 04/13/2020 02:28 PM    HDL 85 04/13/2020 02:28 PM    TRIGLYCERIDE 89 04/13/2020 02:28 PM       Lab Results   Component Value Date/Time    SODIUM 139 10/19/2020 07:42 AM    POTASSIUM 4.5 10/19/2020 07:42 AM    CHLORIDE 102 10/19/2020 07:42 AM    CO2 23 10/19/2020 07:42 AM    GLUCOSE 93 10/19/2020 07:42 AM    BUN 20 10/19/2020 07:42 AM    CREATININE 0.96 10/19/2020 07:42 AM    CREATININE 0.79 04/04/2011 12:00 AM    BUNCREATRAT 13 04/04/2011 12:00 AM     Lab Results   Component Value Date/Time    ALKPHOSPHAT 74 10/19/2020 07:42 AM    ASTSGOT 11 (L) 10/19/2020 07:42 AM    ALTSGPT 7 10/19/2020 07:42 AM    TBILIRUBIN 0.3 10/19/2020 07:42 AM      Lab Results   Component Value Date/Time    HBA1C 5.6 02/25/2016 07:09 AM    HBA1C 5.4 06/25/2012 10:00 PM    HBA1C 5.4 07/27/2006 03:25 AM     No results found for: TSH  Lab Results   Component Value Date/Time    FREET4 0.82 02/14/2013 01:45 PM    FREET4 0.95 07/08/2011 03:37 PM       Lab Results   Component Value Date/Time    WBC 8.6 04/13/2020 02:28 PM    RBC 4.63 04/13/2020 02:28 PM    HEMOGLOBIN 13.5 04/13/2020 02:28 PM    HEMATOCRIT 43.1 04/13/2020 02:28 PM    MCV 93.1 04/13/2020 02:28 PM    MCH 29.2 04/13/2020 02:28 PM    MCHC 31.3 (L) 04/13/2020 02:28 PM    MPV 11.2 04/13/2020 02:28 PM    NEUTSPOLYS 76.10 (H) 04/13/2020 02:28 PM    LYMPHOCYTES 15.10 (L) 04/13/2020 02:28 PM    MONOCYTES 7.00 04/13/2020 02:28 PM    EOSINOPHILS 0.80 04/13/2020 02:28 PM    BASOPHILS 0.50 04/13/2020 02:28 PM    HYPOCHROMIA 2+ 07/04/2013 07:30 AM    ANISOCYTOSIS 1+ 12/01/2016 06:45 AM      Imaging     Reviewed and discussed per HPI    Assessment and Plan     Sharon Callahan is a 80 y.o. female    1. Essential hypertension  Controlled, continue with current treatment.  2. LVH (left ventricular hypertrophy)  3. Hypokalemia  4.  Bilateral leg edema  As above    5. Stage 3 chronic kidney disease, unspecified whether stage 3a or 3b CKD  Advised to avoid NSAIDs    6. Dyslipidemia  Controlled, continue with current treatment.    7. Gastroesophageal reflux disease without esophagitis  Controlled, continue with current treatment.    Counseling:   - Smoking:  Nonsmoker    Paperwork was filled out    Followup: Return if symptoms worsen or fail to improve.    All questions are answered.    Please note that this dictation was created using voice recognition software, and that there might be errors of darling and possibly content.

## 2020-12-28 DIAGNOSIS — Z23 NEED FOR VACCINATION: ICD-10-CM

## 2021-02-04 ENCOUNTER — TELEPHONE (OUTPATIENT)
Dept: MEDICAL GROUP | Age: 81
End: 2021-02-04

## 2021-02-04 NOTE — TELEPHONE ENCOUNTER
Phone Number Called: [phone] (home)       Call outcome: Spoke to patient regarding message below.    Message: patient wanted to know if she should get covid vaccine because she is allergic to the flu vaccine. I talked to dominga watson and she recommended that is severe allergy to flu vaccine to avoid the covid vaccine. Patient states anaphylactic with flu vaccine.

## 2021-03-07 ENCOUNTER — HOSPITAL ENCOUNTER (OUTPATIENT)
Dept: RADIOLOGY | Facility: MEDICAL CENTER | Age: 81
End: 2021-03-07
Attending: PHYSICIAN ASSISTANT
Payer: MEDICARE

## 2021-03-07 DIAGNOSIS — M25.511 RIGHT SHOULDER PAIN, UNSPECIFIED CHRONICITY: ICD-10-CM

## 2021-03-07 DIAGNOSIS — M19.011 ARTHRITIS OF RIGHT SHOULDER REGION: ICD-10-CM

## 2021-03-07 PROCEDURE — 73221 MRI JOINT UPR EXTREM W/O DYE: CPT | Mod: RT,MH

## 2021-03-07 PROCEDURE — 73200 CT UPPER EXTREMITY W/O DYE: CPT | Mod: RT,MH

## 2021-03-12 ENCOUNTER — APPOINTMENT (OUTPATIENT)
Dept: RADIOLOGY | Facility: MEDICAL CENTER | Age: 81
End: 2021-03-12
Attending: INTERNAL MEDICINE
Payer: MEDICARE

## 2021-03-12 ENCOUNTER — TELEPHONE (OUTPATIENT)
Dept: CARDIOLOGY | Facility: MEDICAL CENTER | Age: 81
End: 2021-03-12

## 2021-03-12 NOTE — LETTER
PROCEDURE/SURGERY CLEARANCE FORM      Encounter Date: 3/12/2021    Patient: Sharon Callahan  YOB: 1940    CARDIOLOGIST:  Michael Crockett M.D.    REFERRING DOCTOR:  Roque Edmond M.D.    The above patient is Low risk to have the following procedure/surgery: right shoulder rose arthroplasty v total shoulder.                                             Please call with any questions on concerns.       Sincerely,           Michael Crockett M.D.   Electronically Signed

## 2021-03-13 NOTE — TELEPHONE ENCOUNTER
[facility] clearance request received for right shoulder rose arthroplasty v total shoulder with Dr danial Edmond.     Given to DS. Ok to proceed low risk.     Letter created and faxed to [facility]

## 2021-03-15 ENCOUNTER — TELEPHONE (OUTPATIENT)
Dept: MEDICAL GROUP | Age: 81
End: 2021-03-15

## 2021-03-15 NOTE — TELEPHONE ENCOUNTER
ESTABLISHED PATIENT PRE-VISIT PLANNING     03/15/21@2:55PM Called patient. No answer. Left voice message. Advised Virtual Visit scheduled Tuesday, 03/16/21@2:00PM Dr. Blake. Advised to download [email or web address] tigist. [employer] is active.    Patient was contacted to complete PVP.     Note: Patient will not be contacted if there is no indication to call.     1.  Reviewed notes from the last few office visits within the medical group: Yes    2.  If any orders were placed at last visit or intended to be done for this visit (i.e. 6 mos follow-up), do we have Results/Consult Notes?         •  Labs - Labs ordered, NOT completed. Patient advised to complete prior to next appointment.  Note: If patient appointment is for lab review and patient did not complete labs, check with provider if OK to reschedule patient until labs completed.       •  Imaging - Imaging ordered, NOT completed. Patient advised to complete prior to next appointment.       •  Referrals - No referrals were ordered at last office visit.    3. Is this appointment scheduled as a Hospital Follow-Up? No    4.  Immunizations were updated in Epic using Re[employer] Information activity? Yes    5.  Patient is due for the following Health Maintenance Topics:   Health Maintenance Due   Topic Date Due   • COVID-19 Vaccine (1 of 2) Never done   • IMM ZOSTER VACCINES (1 of 2) Never done   • MAMMOGRAM  07/09/2019   • BONE DENSITY  04/16/2020     6.  AHA (Pulse[employer]) form printed for Provider? N/A

## 2021-03-15 NOTE — TELEPHONE ENCOUNTER
Phone Number Called: [phone]    Call outcome: Spoke to patient regarding message below.    Message: Informed patient that in order to get a surgical clearance she would need to have at least a virtual visit with Dr. Blake. However patient is not wanting to have another appointment, she was just seen 11/2020. Please advise

## 2021-03-15 NOTE — TELEPHONE ENCOUNTER
Phone Number Called: [phone] (home)     Call outcome: Spoke to patient regarding message below.    Message: Patient is scheduled for 3/16 at 2pm.

## 2021-03-16 ENCOUNTER — TELEMEDICINE (OUTPATIENT)
Dept: MEDICAL GROUP | Age: 81
End: 2021-03-16
Payer: MEDICARE

## 2021-03-16 VITALS
BODY MASS INDEX: 35.7 KG/M2 | WEIGHT: 194 LBS | HEIGHT: 62 IN | HEART RATE: 53 BPM | SYSTOLIC BLOOD PRESSURE: 125 MMHG | DIASTOLIC BLOOD PRESSURE: 64 MMHG | TEMPERATURE: 98.7 F

## 2021-03-16 DIAGNOSIS — M25.511 CHRONIC RIGHT SHOULDER PAIN: ICD-10-CM

## 2021-03-16 DIAGNOSIS — M25.561 CHRONIC PAIN OF BOTH KNEES: ICD-10-CM

## 2021-03-16 DIAGNOSIS — E55.9 HYPOVITAMINOSIS D: ICD-10-CM

## 2021-03-16 DIAGNOSIS — G89.29 CHRONIC PAIN OF BOTH KNEES: ICD-10-CM

## 2021-03-16 DIAGNOSIS — R60.0 BILATERAL LEG EDEMA: ICD-10-CM

## 2021-03-16 DIAGNOSIS — M25.562 CHRONIC PAIN OF BOTH KNEES: ICD-10-CM

## 2021-03-16 DIAGNOSIS — D3A.090 CARCINOID TUMOR OF LUNG, UNSPECIFIED WHETHER MALIGNANT: ICD-10-CM

## 2021-03-16 DIAGNOSIS — G89.29 CHRONIC RIGHT SHOULDER PAIN: ICD-10-CM

## 2021-03-16 DIAGNOSIS — K21.9 GASTROESOPHAGEAL REFLUX DISEASE WITHOUT ESOPHAGITIS: ICD-10-CM

## 2021-03-16 DIAGNOSIS — Z01.818 PREOP EXAMINATION: ICD-10-CM

## 2021-03-16 DIAGNOSIS — M19.011 PRIMARY OSTEOARTHRITIS OF RIGHT SHOULDER: ICD-10-CM

## 2021-03-16 DIAGNOSIS — I48.0 PAROXYSMAL ATRIAL FIBRILLATION (HCC): ICD-10-CM

## 2021-03-16 DIAGNOSIS — E87.6 HYPOKALEMIA: ICD-10-CM

## 2021-03-16 DIAGNOSIS — E78.5 DYSLIPIDEMIA: ICD-10-CM

## 2021-03-16 DIAGNOSIS — N18.30 STAGE 3 CHRONIC KIDNEY DISEASE, UNSPECIFIED WHETHER STAGE 3A OR 3B CKD: ICD-10-CM

## 2021-03-16 DIAGNOSIS — E66.01 MORBID (SEVERE) OBESITY DUE TO EXCESS CALORIES (HCC): ICD-10-CM

## 2021-03-16 DIAGNOSIS — I10 ESSENTIAL HYPERTENSION: ICD-10-CM

## 2021-03-16 DIAGNOSIS — G47.33 OSA ON CPAP: Chronic | ICD-10-CM

## 2021-03-16 PROBLEM — E66.9 OBESITY (BMI 30.0-34.9): Status: RESOLVED | Noted: 2019-02-26 | Resolved: 2021-03-16

## 2021-03-16 PROBLEM — I47.10 SVT (SUPRAVENTRICULAR TACHYCARDIA) (HCC): Status: RESOLVED | Noted: 2019-04-18 | Resolved: 2021-03-16

## 2021-03-16 PROBLEM — E66.811 OBESITY (BMI 30.0-34.9): Status: RESOLVED | Noted: 2019-02-26 | Resolved: 2021-03-16

## 2021-03-16 PROCEDURE — 99214 OFFICE O/P EST MOD 30 MIN: CPT | Mod: 95 | Performed by: INTERNAL MEDICINE

## 2021-03-16 ASSESSMENT — PATIENT HEALTH QUESTIONNAIRE - PHQ9: CLINICAL INTERPRETATION OF PHQ2 SCORE: 0

## 2021-03-16 ASSESSMENT — FIBROSIS 4 INDEX: FIB4 SCORE: 1.63

## 2021-03-16 NOTE — PROGRESS NOTES
REASON FOR VISIT: Pre-Op Consultation  Consultation Requested by: orthopedics  Procedure date and type: right shoulder arthroplasty    History of condition for which surgery is planned:arthritis of the RT shoulder    Current chronic conditions: has Osteopenia; Carcinoid tumor of lung; Hypokalemia; Paroxysmal atrial fibrillation (HCC); Essential hypertension; MYESHA on CPAP; Dyslipidemia; Hypovitaminosis D; Chronic pain of both knees; Bilateral leg edema; Stage 3 chronic kidney disease; and Gastroesophageal reflux disease without esophagitis on their problem list.  Past medical history:  has a past medical history of Arrhythmia, Arthritis, BMI 38.0-38.9,adult (9/18/2013), CATARACT, Chronic nausea (1/12/2015), Chronic pain of both knees (1/9/2019), Depression with anxiety (5/3/2011), Heart burn, Heart valve disease, Hemothorax on right (1/4/2017), History of cholecystectomy, Hyperlipidemia (4/4/2011), Hypertension, Indigestion, MYESHA (obstructive sleep apnea) (4/4/2011), Pain, Pleural effusion, right (1/4/2017), Range of motion deficit, S/P bilateral mastectomy (4/4/2011), Sleep apnea, and Snoring.. Negative for: CAD, SBE, CVA, TIA, DVT, PE, bleeding requiring transfusion, intubation.  Surgical and anesthetic history:  has a past surgical history that includes lisa by laparoscopy (2/1/2011); other (1988); other (1989); uvulopharyngopalatoplasty (1990); cataract phaco with iol (10/20/2011); cataract phaco with iol (11/3/2011); orif, humerus (1950's); pr breast augmentation with implant; thoracoscopy (Right, 12/6/2016); breast biopsy (1980); hip arthroplasty total (3/26/2009); hip arthroplasty total (12/3/2012); and pr total knee arthroplasty (Left, 11/8/2019). Prior surgery without complication, bleeding, reaction to anesthetic, prolonged recovery  Habits:   Social History     Tobacco Use   • Smoking status: Former Smoker     Packs/day: 1.00     Years: 15.00     Pack years: 15.00     Types: Cigarettes     Quit date:  1975     Years since quittin.2   • Smokeless tobacco: Never Used   Substance Use Topics   • Alcohol use: Yes     Alcohol/week: 0.0 oz     Comment: 1 per day   • Drug use: No     Allergies: Pcn [penicillins], Clindamycin, Influenza virus vacc, Norco [hydrocodone-acetaminophen], Pneumococcal vaccine, Tetracycline, and Xarelto [rivaroxaban] No known allergy to Anesthetic, or Latex.     Current medicines:   Current Outpatient Medications   Medication Sig Dispense Refill   • traMADol (ULTRAM) 50 MG Tab TAKE 1 TABLET BY MOUTH EVERY 6 HOURS AS NEEDED FOR PAIN. G89.18, 7DAY     • furosemide (LASIX) 40 MG Tab TAKE 1 TABLET BY MOUTH TWICE A DAY (Patient taking differently: Take 20 mg by mouth 2 Times a Day. TAKE 1 TABLET BY MOUTH TWICE A DAY) 180 Tab 1   • potassium chloride SA (KDUR) 20 MEQ Tab CR Take 2 Tabs by mouth every day. (Patient taking differently: Take 20 mEq by mouth every day.) 180 Tab 3   • lisinopril (PRINIVIL) 40 MG tablet Take 1 Tab by mouth every day. 90 Tab 3   • atorvastatin (LIPITOR) 20 MG Tab TAKE 1 TABLET BY MOUTH EVERYDAY AT BEDTIME 90 Tab 3   • omeprazole (PRILOSEC) 40 MG delayed-release capsule Take 1 Cap by mouth every day. Indications: Gastroesophageal Reflux Disease, Heartburn 90 Cap 3   • diclofenac CR (VOLTAREN-XR) 100 MG TABLET SR 24 HR tablet TAKE 1 TABLET BY MOUTH EVERY DAY 30 Tab 2   • traZODone (DESYREL) 50 MG Tab Take  mg by mouth at bedtime as needed for Sleep.       No current facility-administered medications for this visit.     Anticoagulant: ASA, NSAIDs - to stop at least a week prior surgery              ROS negative for:    - CP, SOB, CHAIREZ, Orthopnea, wheezing, leg edema, polydipsia, polyuria, fevers, chills, sweats, cough, cold, congestion, abd pain, reflux, black or bloody stools,weight loss/gain.    Functional Status: ambulatory    PHYSICAL EXAMINATION:  VITAL SIGNS: Blood Pressure 125/64 (BP Location: Left arm, Patient Position: Sitting)   Pulse (Abnormal) 53    "Temperature 37.1 °C (98.7 °F) (Oral)   Height 1.575 m (5' 2\")   Weight 88 kg (194 lb)  Body mass index is 35.48 kg/m².  HEENT: EOMI, PERRL. Oropharynx pink, moist. Normal airway. Neck supple, no cervical lymphadenopathy.  LUNGS: CTAB good excursion.   HEART: RRR no murmur.  ABDOMEN: soft, nondistended, nontender normal BS. No HSM.  LOWER EXTREMITIES: warm and well perfused with no edema.    Labs: See attached.    IMPRESSION:  1. Diagnoses of Preop examination, Chronic right shoulder pain, Primary osteoarthritis of right shoulder, Essential hypertension, Paroxysmal atrial fibrillation (HCC), Stage 3 chronic kidney disease, unspecified whether stage 3a or 3b CKD, Hypokalemia, Bilateral leg edema, Dyslipidemia, Carcinoid tumor of lung, unspecified whether malignant, Hypovitaminosis D, Chronic pain of both knees, Gastroesophageal reflux disease without esophagitis, Morbid (severe) obesity due to excess calories (HCC), and MYESHA on CPAP were pertinent to this visit.  2. Planned surgery: right shoulder arthriplasty  3. High risk medical conditions: negative for Cardiac, Pulmonary, Bleeding, Poor healing, Thrombosis, Debility    PLAN:  1. Chronic medical conditions: Stable and controlled. Continue current medicines.   2. Avoid drugs that potentiate bleeding as advised by surgeon  3. Discontinue all herbal supplements 2 weeks prior to surgery.  4. Need for SBE prophylaxis: no  5. This patient is considered moderate risk  6. Pending pulmonology clearance  "

## 2021-04-05 ENCOUNTER — APPOINTMENT (OUTPATIENT)
Dept: SLEEP MEDICINE | Facility: MEDICAL CENTER | Age: 81
End: 2021-04-05
Payer: MEDICARE

## 2021-04-22 ENCOUNTER — PRE-ADMISSION TESTING (OUTPATIENT)
Dept: ADMISSIONS | Facility: MEDICAL CENTER | Age: 81
End: 2021-04-22
Attending: ORTHOPAEDIC SURGERY
Payer: MEDICARE

## 2021-04-22 DIAGNOSIS — Z01.812 PRE-OPERATIVE LABORATORY EXAMINATION: ICD-10-CM

## 2021-04-22 LAB
ANION GAP SERPL CALC-SCNC: 10 MMOL/L (ref 7–16)
BUN SERPL-MCNC: 20 MG/DL (ref 8–22)
CALCIUM SERPL-MCNC: 9.6 MG/DL (ref 8.4–10.2)
CHLORIDE SERPL-SCNC: 104 MMOL/L (ref 96–112)
CO2 SERPL-SCNC: 25 MMOL/L (ref 20–33)
CREAT SERPL-MCNC: 0.96 MG/DL (ref 0.5–1.4)
ERYTHROCYTE [DISTWIDTH] IN BLOOD BY AUTOMATED COUNT: 49.2 FL (ref 35.9–50)
GLUCOSE SERPL-MCNC: 81 MG/DL (ref 65–99)
HCT VFR BLD AUTO: 44.3 % (ref 37–47)
HGB BLD-MCNC: 13.4 G/DL (ref 12–16)
MCH RBC QN AUTO: 28.3 PG (ref 27–33)
MCHC RBC AUTO-ENTMCNC: 30.2 G/DL (ref 33.6–35)
MCV RBC AUTO: 93.7 FL (ref 81.4–97.8)
PLATELET # BLD AUTO: 200 K/UL (ref 164–446)
PMV BLD AUTO: 11.3 FL (ref 9–12.9)
POTASSIUM SERPL-SCNC: 4.8 MMOL/L (ref 3.6–5.5)
RBC # BLD AUTO: 4.73 M/UL (ref 4.2–5.4)
SCCMEC + MECA PNL NOSE NAA+PROBE: NEGATIVE
SCCMEC + MECA PNL NOSE NAA+PROBE: NEGATIVE
SODIUM SERPL-SCNC: 139 MMOL/L (ref 135–145)
WBC # BLD AUTO: 7.2 K/UL (ref 4.8–10.8)

## 2021-04-22 PROCEDURE — 80048 BASIC METABOLIC PNL TOTAL CA: CPT

## 2021-04-22 PROCEDURE — 36415 COLL VENOUS BLD VENIPUNCTURE: CPT

## 2021-04-22 PROCEDURE — 87641 MR-STAPH DNA AMP PROBE: CPT

## 2021-04-22 PROCEDURE — 85027 COMPLETE CBC AUTOMATED: CPT

## 2021-04-22 PROCEDURE — 87640 STAPH A DNA AMP PROBE: CPT | Mod: XU

## 2021-04-22 RX ORDER — CEFAZOLIN SODIUM IN 0.9 % NACL 2 G/100 ML
2 PLASTIC BAG, INJECTION (ML) INTRAVENOUS ONCE
Status: CANCELLED | OUTPATIENT
Start: 2021-05-11 | End: 2021-05-11

## 2021-04-22 RX ORDER — POTASSIUM CHLORIDE 1500 MG/1
40 TABLET, EXTENDED RELEASE ORAL
COMMUNITY
Start: 2021-03-30 | End: 2021-04-22

## 2021-04-22 ASSESSMENT — FIBROSIS 4 INDEX: FIB4 SCORE: 1.63

## 2021-04-22 NOTE — DISCHARGE PLANNING
DISCHARGE PLANNING NOTE - TOTAL JOINT    Procedure: Procedure(s):  ARTHROPLASTY, SHOULDER, TOTAL - HEMIARTHROPLASTY VERSUS  ARTHROPLASTY, SHOULDER, TOTAL, REVERSE  TENODESIS, BICEPS - AND DUBOIS  Procedure Date: 5/11/2021  Insurance: Payor: MEDICARE / Plan: MEDICARE PART A & B / Product Type: *No Product type* /    Equipment currently available at home?  shower chair and ice and oversized apparal for post op.  Steps into the home? 0  Steps within the home? 0  Toilet height? Standard  Type of shower? walk-in shower  Who will be with you during your recovery? Brother will be transportation, but will not be staying with her.  Is Outpatient Physical Therapy set up after surgery? No   Did you take the Total Joint Class and where? Yes  Planning same day discharge?No     This writer met with ptduring her preadmission appointment. Pt states she lives alone and that the doctor is going to send her to rehab post op. Pt has a daughter who is a nurse, but won't be here for surgery. Pt states Dr. Edmond told her she would be here for 3-4 days, then rehab for 6 weeks. Referral process for post transitional care explained to pt and she states understanding. Home safety checklist reviewed and copy given to pt. Pt educated to dc criteria. All questions answered and verbalizes understanding of all instructions. No dc needs identified at this time. Anticipate dc to home with home health or to a transitional care facility without barriers.

## 2021-04-22 NOTE — PREPROCEDURE INSTRUCTIONS
"Pre-admit appointment completed. \"Preparing for your Procedure\" sheet given to Pt with verbal and written instructions. Pt states all instructions given are understood and to call pre-admit or Dr's office for additional questions or any symptoms of illness/covid develop prior to DOS. Directions to Covid testing site and Self isolation instructions for after the Covid test given to patient. Medications the patient will take the morning of surgery per anesthesia protocol: Prilosec, Potassium Chloride, and if needed Tylenol. Instructed to take other prescription medications through the day before surgery.        Surgery preparation checklist for Joint Replacement Program given to Pt with verbal instructions.     Denies anesthesia complications  "

## 2021-04-23 DIAGNOSIS — I10 ESSENTIAL HYPERTENSION: ICD-10-CM

## 2021-04-23 DIAGNOSIS — M79.89 LEG SWELLING: ICD-10-CM

## 2021-04-23 RX ORDER — FUROSEMIDE 40 MG/1
TABLET ORAL
Qty: 180 TABLET | Refills: 1 | Status: SHIPPED | OUTPATIENT
Start: 2021-04-23 | End: 2021-10-22

## 2021-05-07 ENCOUNTER — PRE-ADMISSION TESTING (OUTPATIENT)
Dept: ADMISSIONS | Facility: MEDICAL CENTER | Age: 81
End: 2021-05-07
Attending: ORTHOPAEDIC SURGERY
Payer: MEDICARE

## 2021-05-07 DIAGNOSIS — Z01.812 PRE-OPERATIVE LABORATORY EXAMINATION: ICD-10-CM

## 2021-05-07 LAB — COVID ORDER STATUS COVID19: NORMAL

## 2021-05-07 PROCEDURE — U0005 INFEC AGEN DETEC AMPLI PROBE: HCPCS

## 2021-05-07 PROCEDURE — U0003 INFECTIOUS AGENT DETECTION BY NUCLEIC ACID (DNA OR RNA); SEVERE ACUTE RESPIRATORY SYNDROME CORONAVIRUS 2 (SARS-COV-2) (CORONAVIRUS DISEASE [COVID-19]), AMPLIFIED PROBE TECHNIQUE, MAKING USE OF HIGH THROUGHPUT TECHNOLOGIES AS DESCRIBED BY CMS-2020-01-R: HCPCS

## 2021-05-07 PROCEDURE — C9803 HOPD COVID-19 SPEC COLLECT: HCPCS

## 2021-05-08 LAB
SARS-COV-2 RNA RESP QL NAA+PROBE: NOTDETECTED
SPECIMEN SOURCE: NORMAL

## 2021-05-10 RX ORDER — CEFAZOLIN SODIUM IN 0.9 % NACL 2 G/100 ML
2 PLASTIC BAG, INJECTION (ML) INTRAVENOUS ONCE
Status: DISCONTINUED | OUTPATIENT
Start: 2021-05-11 | End: 2021-05-10

## 2021-05-11 ENCOUNTER — ANESTHESIA EVENT (OUTPATIENT)
Dept: SURGERY | Facility: MEDICAL CENTER | Age: 81
End: 2021-05-11
Payer: MEDICARE

## 2021-05-11 ENCOUNTER — HOSPITAL ENCOUNTER (OUTPATIENT)
Facility: MEDICAL CENTER | Age: 81
End: 2021-05-14
Attending: ORTHOPAEDIC SURGERY | Admitting: ORTHOPAEDIC SURGERY
Payer: MEDICARE

## 2021-05-11 ENCOUNTER — ANESTHESIA (OUTPATIENT)
Dept: SURGERY | Facility: MEDICAL CENTER | Age: 81
End: 2021-05-11
Payer: MEDICARE

## 2021-05-11 PROCEDURE — 700102 HCHG RX REV CODE 250 W/ 637 OVERRIDE(OP): Performed by: ANESTHESIOLOGY

## 2021-05-11 PROCEDURE — C1713 ANCHOR/SCREW BN/BN,TIS/BN: HCPCS | Performed by: ORTHOPAEDIC SURGERY

## 2021-05-11 PROCEDURE — 160048 HCHG OR STATISTICAL LEVEL 1-5: Performed by: ORTHOPAEDIC SURGERY

## 2021-05-11 PROCEDURE — 64415 NJX AA&/STRD BRCH PLXS IMG: CPT | Performed by: ORTHOPAEDIC SURGERY

## 2021-05-11 PROCEDURE — 700111 HCHG RX REV CODE 636 W/ 250 OVERRIDE (IP): Performed by: ORTHOPAEDIC SURGERY

## 2021-05-11 PROCEDURE — C1776 JOINT DEVICE (IMPLANTABLE): HCPCS | Performed by: ORTHOPAEDIC SURGERY

## 2021-05-11 PROCEDURE — 160009 HCHG ANES TIME/MIN: Performed by: ORTHOPAEDIC SURGERY

## 2021-05-11 PROCEDURE — 96366 THER/PROPH/DIAG IV INF ADDON: CPT | Mod: XU

## 2021-05-11 PROCEDURE — 160002 HCHG RECOVERY MINUTES (STAT): Performed by: ORTHOPAEDIC SURGERY

## 2021-05-11 PROCEDURE — 501838 HCHG SUTURE GENERAL: Performed by: ORTHOPAEDIC SURGERY

## 2021-05-11 PROCEDURE — 502581 HCHG PACK, SHOULDER ARTHROSCOPY: Performed by: ORTHOPAEDIC SURGERY

## 2021-05-11 PROCEDURE — 700105 HCHG RX REV CODE 258: Performed by: ORTHOPAEDIC SURGERY

## 2021-05-11 PROCEDURE — A9270 NON-COVERED ITEM OR SERVICE: HCPCS | Performed by: ORTHOPAEDIC SURGERY

## 2021-05-11 PROCEDURE — 700111 HCHG RX REV CODE 636 W/ 250 OVERRIDE (IP): Performed by: ANESTHESIOLOGY

## 2021-05-11 PROCEDURE — 700101 HCHG RX REV CODE 250: Performed by: ORTHOPAEDIC SURGERY

## 2021-05-11 PROCEDURE — 700101 HCHG RX REV CODE 250: Performed by: ANESTHESIOLOGY

## 2021-05-11 PROCEDURE — 302135 SEQUENTIAL COMPRESSION MACHINE: Performed by: ORTHOPAEDIC SURGERY

## 2021-05-11 PROCEDURE — 160041 HCHG SURGERY MINUTES - EA ADDL 1 MIN LEVEL 4: Performed by: ORTHOPAEDIC SURGERY

## 2021-05-11 PROCEDURE — A9270 NON-COVERED ITEM OR SERVICE: HCPCS | Performed by: ANESTHESIOLOGY

## 2021-05-11 PROCEDURE — 700102 HCHG RX REV CODE 250 W/ 637 OVERRIDE(OP): Performed by: ORTHOPAEDIC SURGERY

## 2021-05-11 PROCEDURE — 502000 HCHG MISC OR IMPLANTS RC 0278: Performed by: ORTHOPAEDIC SURGERY

## 2021-05-11 PROCEDURE — 160036 HCHG PACU - EA ADDL 30 MINS PHASE I: Performed by: ORTHOPAEDIC SURGERY

## 2021-05-11 PROCEDURE — 502578 HCHG PACK, TOTAL HIP: Performed by: ORTHOPAEDIC SURGERY

## 2021-05-11 PROCEDURE — 96365 THER/PROPH/DIAG IV INF INIT: CPT | Mod: XU

## 2021-05-11 PROCEDURE — G0378 HOSPITAL OBSERVATION PER HR: HCPCS

## 2021-05-11 PROCEDURE — 160035 HCHG PACU - 1ST 60 MINS PHASE I: Performed by: ORTHOPAEDIC SURGERY

## 2021-05-11 PROCEDURE — 160029 HCHG SURGERY MINUTES - 1ST 30 MINS LEVEL 4: Performed by: ORTHOPAEDIC SURGERY

## 2021-05-11 DEVICE — IMPLANTABLE DEVICE: Type: IMPLANTABLE DEVICE | Site: SHOULDER | Status: FUNCTIONAL

## 2021-05-11 RX ORDER — HALOPERIDOL 5 MG/ML
1 INJECTION INTRAMUSCULAR
Status: DISCONTINUED | OUTPATIENT
Start: 2021-05-11 | End: 2021-05-11 | Stop reason: HOSPADM

## 2021-05-11 RX ORDER — BUPIVACAINE HYDROCHLORIDE 5 MG/ML
INJECTION, SOLUTION EPIDURAL; INTRACAUDAL PRN
Status: DISCONTINUED | OUTPATIENT
Start: 2021-05-11 | End: 2021-05-11 | Stop reason: SURG

## 2021-05-11 RX ORDER — MEPERIDINE HYDROCHLORIDE 25 MG/ML
12.5 INJECTION INTRAMUSCULAR; INTRAVENOUS; SUBCUTANEOUS
Status: DISCONTINUED | OUTPATIENT
Start: 2021-05-11 | End: 2021-05-11 | Stop reason: HOSPADM

## 2021-05-11 RX ORDER — OXYCODONE HCL 5 MG/5 ML
5 SOLUTION, ORAL ORAL
Status: COMPLETED | OUTPATIENT
Start: 2021-05-11 | End: 2021-05-11

## 2021-05-11 RX ORDER — DEXTROSE, SODIUM CHLORIDE, SODIUM LACTATE, POTASSIUM CHLORIDE, AND CALCIUM CHLORIDE 5; .6; .31; .03; .02 G/100ML; G/100ML; G/100ML; G/100ML; G/100ML
INJECTION, SOLUTION INTRAVENOUS CONTINUOUS
Status: DISCONTINUED | OUTPATIENT
Start: 2021-05-11 | End: 2021-05-14 | Stop reason: HOSPADM

## 2021-05-11 RX ORDER — AMOXICILLIN 250 MG
1 CAPSULE ORAL
Status: DISCONTINUED | OUTPATIENT
Start: 2021-05-11 | End: 2021-05-14 | Stop reason: HOSPADM

## 2021-05-11 RX ORDER — OXYCODONE HYDROCHLORIDE 5 MG/1
2.5 TABLET ORAL
Status: DISCONTINUED | OUTPATIENT
Start: 2021-05-11 | End: 2021-05-14 | Stop reason: HOSPADM

## 2021-05-11 RX ORDER — HYDRALAZINE HYDROCHLORIDE 20 MG/ML
5 INJECTION INTRAMUSCULAR; INTRAVENOUS
Status: DISCONTINUED | OUTPATIENT
Start: 2021-05-11 | End: 2021-05-11 | Stop reason: HOSPADM

## 2021-05-11 RX ORDER — BISACODYL 10 MG
10 SUPPOSITORY, RECTAL RECTAL
Status: DISCONTINUED | OUTPATIENT
Start: 2021-05-11 | End: 2021-05-14 | Stop reason: HOSPADM

## 2021-05-11 RX ORDER — CHLORPROMAZINE HYDROCHLORIDE 25 MG/1
25 TABLET, FILM COATED ORAL EVERY 6 HOURS PRN
Status: DISCONTINUED | OUTPATIENT
Start: 2021-05-11 | End: 2021-05-14 | Stop reason: HOSPADM

## 2021-05-11 RX ORDER — HYDROMORPHONE HYDROCHLORIDE 1 MG/ML
0.4 INJECTION, SOLUTION INTRAMUSCULAR; INTRAVENOUS; SUBCUTANEOUS
Status: DISCONTINUED | OUTPATIENT
Start: 2021-05-11 | End: 2021-05-11 | Stop reason: HOSPADM

## 2021-05-11 RX ORDER — ACETAMINOPHEN 500 MG
1000 TABLET ORAL EVERY 6 HOURS PRN
Status: DISCONTINUED | OUTPATIENT
Start: 2021-05-16 | End: 2021-05-14 | Stop reason: HOSPADM

## 2021-05-11 RX ORDER — HYDROMORPHONE HYDROCHLORIDE 1 MG/ML
0.25 INJECTION, SOLUTION INTRAMUSCULAR; INTRAVENOUS; SUBCUTANEOUS
Status: DISCONTINUED | OUTPATIENT
Start: 2021-05-11 | End: 2021-05-14 | Stop reason: HOSPADM

## 2021-05-11 RX ORDER — DEXAMETHASONE SODIUM PHOSPHATE 4 MG/ML
4 INJECTION, SOLUTION INTRA-ARTICULAR; INTRALESIONAL; INTRAMUSCULAR; INTRAVENOUS; SOFT TISSUE
Status: DISCONTINUED | OUTPATIENT
Start: 2021-05-11 | End: 2021-05-14 | Stop reason: HOSPADM

## 2021-05-11 RX ORDER — DOCUSATE SODIUM 100 MG/1
100 CAPSULE, LIQUID FILLED ORAL 2 TIMES DAILY
Status: DISCONTINUED | OUTPATIENT
Start: 2021-05-11 | End: 2021-05-14 | Stop reason: HOSPADM

## 2021-05-11 RX ORDER — POLYETHYLENE GLYCOL 3350 17 G/17G
1 POWDER, FOR SOLUTION ORAL 2 TIMES DAILY PRN
Status: DISCONTINUED | OUTPATIENT
Start: 2021-05-11 | End: 2021-05-14 | Stop reason: HOSPADM

## 2021-05-11 RX ORDER — DIPHENHYDRAMINE HCL 25 MG
25 TABLET ORAL EVERY 6 HOURS PRN
Status: DISCONTINUED | OUTPATIENT
Start: 2021-05-11 | End: 2021-05-14 | Stop reason: HOSPADM

## 2021-05-11 RX ORDER — TRAZODONE HYDROCHLORIDE 50 MG/1
50-100 TABLET ORAL NIGHTLY PRN
Status: DISCONTINUED | OUTPATIENT
Start: 2021-05-11 | End: 2021-05-14 | Stop reason: HOSPADM

## 2021-05-11 RX ORDER — HYDROMORPHONE HYDROCHLORIDE 1 MG/ML
0.1 INJECTION, SOLUTION INTRAMUSCULAR; INTRAVENOUS; SUBCUTANEOUS
Status: DISCONTINUED | OUTPATIENT
Start: 2021-05-11 | End: 2021-05-11 | Stop reason: HOSPADM

## 2021-05-11 RX ORDER — DIPHENHYDRAMINE HYDROCHLORIDE 50 MG/ML
25 INJECTION INTRAMUSCULAR; INTRAVENOUS EVERY 6 HOURS PRN
Status: DISCONTINUED | OUTPATIENT
Start: 2021-05-11 | End: 2021-05-14 | Stop reason: HOSPADM

## 2021-05-11 RX ORDER — FUROSEMIDE 40 MG/1
40 TABLET ORAL
Status: DISCONTINUED | OUTPATIENT
Start: 2021-05-11 | End: 2021-05-14 | Stop reason: HOSPADM

## 2021-05-11 RX ORDER — ACETAMINOPHEN 500 MG
1000 TABLET ORAL EVERY 6 HOURS
Status: DISCONTINUED | OUTPATIENT
Start: 2021-05-11 | End: 2021-05-14 | Stop reason: HOSPADM

## 2021-05-11 RX ORDER — ONDANSETRON 2 MG/ML
4 INJECTION INTRAMUSCULAR; INTRAVENOUS EVERY 4 HOURS PRN
Status: DISCONTINUED | OUTPATIENT
Start: 2021-05-11 | End: 2021-05-14 | Stop reason: HOSPADM

## 2021-05-11 RX ORDER — ATORVASTATIN CALCIUM 20 MG/1
20 TABLET, FILM COATED ORAL DAILY
Status: DISCONTINUED | OUTPATIENT
Start: 2021-05-11 | End: 2021-05-14 | Stop reason: HOSPADM

## 2021-05-11 RX ORDER — TRANEXAMIC ACID 100 MG/ML
INJECTION, SOLUTION INTRAVENOUS PRN
Status: DISCONTINUED | OUTPATIENT
Start: 2021-05-11 | End: 2021-05-11 | Stop reason: SURG

## 2021-05-11 RX ORDER — ENEMA 19; 7 G/133ML; G/133ML
1 ENEMA RECTAL
Status: DISCONTINUED | OUTPATIENT
Start: 2021-05-11 | End: 2021-05-14 | Stop reason: HOSPADM

## 2021-05-11 RX ORDER — LISINOPRIL 20 MG/1
40 TABLET ORAL
Status: DISCONTINUED | OUTPATIENT
Start: 2021-05-11 | End: 2021-05-14 | Stop reason: HOSPADM

## 2021-05-11 RX ORDER — CHLORPROMAZINE HYDROCHLORIDE 25 MG/ML
25 INJECTION INTRAMUSCULAR EVERY 6 HOURS PRN
Status: DISCONTINUED | OUTPATIENT
Start: 2021-05-11 | End: 2021-05-14 | Stop reason: HOSPADM

## 2021-05-11 RX ORDER — ONDANSETRON 2 MG/ML
4 INJECTION INTRAMUSCULAR; INTRAVENOUS
Status: DISCONTINUED | OUTPATIENT
Start: 2021-05-11 | End: 2021-05-11 | Stop reason: HOSPADM

## 2021-05-11 RX ORDER — OXYCODONE HCL 5 MG/5 ML
10 SOLUTION, ORAL ORAL
Status: COMPLETED | OUTPATIENT
Start: 2021-05-11 | End: 2021-05-11

## 2021-05-11 RX ORDER — SODIUM CHLORIDE, SODIUM LACTATE, POTASSIUM CHLORIDE, CALCIUM CHLORIDE 600; 310; 30; 20 MG/100ML; MG/100ML; MG/100ML; MG/100ML
INJECTION, SOLUTION INTRAVENOUS CONTINUOUS
Status: DISCONTINUED | OUTPATIENT
Start: 2021-05-11 | End: 2021-05-11 | Stop reason: HOSPADM

## 2021-05-11 RX ORDER — POTASSIUM CHLORIDE 20 MEQ/1
20 TABLET, EXTENDED RELEASE ORAL DAILY
Status: DISCONTINUED | OUTPATIENT
Start: 2021-05-11 | End: 2021-05-14 | Stop reason: HOSPADM

## 2021-05-11 RX ORDER — DEXAMETHASONE SODIUM PHOSPHATE 4 MG/ML
INJECTION, SOLUTION INTRA-ARTICULAR; INTRALESIONAL; INTRAMUSCULAR; INTRAVENOUS; SOFT TISSUE PRN
Status: DISCONTINUED | OUTPATIENT
Start: 2021-05-11 | End: 2021-05-11 | Stop reason: SURG

## 2021-05-11 RX ORDER — OXYCODONE HYDROCHLORIDE 5 MG/1
5 TABLET ORAL
Status: DISCONTINUED | OUTPATIENT
Start: 2021-05-11 | End: 2021-05-14 | Stop reason: HOSPADM

## 2021-05-11 RX ORDER — AMOXICILLIN 250 MG
1 CAPSULE ORAL NIGHTLY
Status: DISCONTINUED | OUTPATIENT
Start: 2021-05-11 | End: 2021-05-14 | Stop reason: HOSPADM

## 2021-05-11 RX ORDER — SODIUM CHLORIDE, SODIUM LACTATE, POTASSIUM CHLORIDE, CALCIUM CHLORIDE 600; 310; 30; 20 MG/100ML; MG/100ML; MG/100ML; MG/100ML
INJECTION, SOLUTION INTRAVENOUS CONTINUOUS
Status: ACTIVE | OUTPATIENT
Start: 2021-05-11 | End: 2021-05-11

## 2021-05-11 RX ORDER — DICLOFENAC SODIUM 75 MG/1
75 TABLET, DELAYED RELEASE ORAL 2 TIMES DAILY
Status: DISCONTINUED | OUTPATIENT
Start: 2021-05-11 | End: 2021-05-14 | Stop reason: HOSPADM

## 2021-05-11 RX ORDER — SCOLOPAMINE TRANSDERMAL SYSTEM 1 MG/1
1 PATCH, EXTENDED RELEASE TRANSDERMAL
Status: DISCONTINUED | OUTPATIENT
Start: 2021-05-11 | End: 2021-05-14 | Stop reason: HOSPADM

## 2021-05-11 RX ORDER — CEFAZOLIN SODIUM 1 G/3ML
INJECTION, POWDER, FOR SOLUTION INTRAMUSCULAR; INTRAVENOUS PRN
Status: DISCONTINUED | OUTPATIENT
Start: 2021-05-11 | End: 2021-05-11 | Stop reason: SURG

## 2021-05-11 RX ORDER — OMEPRAZOLE 20 MG/1
40 CAPSULE, DELAYED RELEASE ORAL DAILY
Status: DISCONTINUED | OUTPATIENT
Start: 2021-05-11 | End: 2021-05-14 | Stop reason: HOSPADM

## 2021-05-11 RX ORDER — TRAMADOL HYDROCHLORIDE 50 MG/1
50 TABLET ORAL EVERY 4 HOURS PRN
Status: DISCONTINUED | OUTPATIENT
Start: 2021-05-11 | End: 2021-05-14 | Stop reason: HOSPADM

## 2021-05-11 RX ORDER — LABETALOL HYDROCHLORIDE 5 MG/ML
5 INJECTION, SOLUTION INTRAVENOUS
Status: DISCONTINUED | OUTPATIENT
Start: 2021-05-11 | End: 2021-05-11 | Stop reason: HOSPADM

## 2021-05-11 RX ORDER — LIDOCAINE HYDROCHLORIDE 20 MG/ML
INJECTION, SOLUTION EPIDURAL; INFILTRATION; INTRACAUDAL; PERINEURAL PRN
Status: DISCONTINUED | OUTPATIENT
Start: 2021-05-11 | End: 2021-05-11 | Stop reason: SURG

## 2021-05-11 RX ORDER — HALOPERIDOL 5 MG/ML
1 INJECTION INTRAMUSCULAR EVERY 6 HOURS PRN
Status: DISCONTINUED | OUTPATIENT
Start: 2021-05-11 | End: 2021-05-14 | Stop reason: HOSPADM

## 2021-05-11 RX ORDER — ONDANSETRON 2 MG/ML
INJECTION INTRAMUSCULAR; INTRAVENOUS PRN
Status: DISCONTINUED | OUTPATIENT
Start: 2021-05-11 | End: 2021-05-11 | Stop reason: SURG

## 2021-05-11 RX ORDER — HYDROMORPHONE HYDROCHLORIDE 1 MG/ML
0.2 INJECTION, SOLUTION INTRAMUSCULAR; INTRAVENOUS; SUBCUTANEOUS
Status: DISCONTINUED | OUTPATIENT
Start: 2021-05-11 | End: 2021-05-11 | Stop reason: HOSPADM

## 2021-05-11 RX ORDER — ROCURONIUM BROMIDE 10 MG/ML
INJECTION, SOLUTION INTRAVENOUS PRN
Status: DISCONTINUED | OUTPATIENT
Start: 2021-05-11 | End: 2021-05-11 | Stop reason: SURG

## 2021-05-11 RX ADMIN — ROCURONIUM BROMIDE 50 MG: 10 INJECTION, SOLUTION INTRAVENOUS at 07:10

## 2021-05-11 RX ADMIN — LIDOCAINE HYDROCHLORIDE 50 MG: 20 INJECTION, SOLUTION EPIDURAL; INFILTRATION; INTRACAUDAL; PERINEURAL at 06:40

## 2021-05-11 RX ADMIN — PROPOFOL 200 MG: 10 INJECTION, EMULSION INTRAVENOUS at 07:04

## 2021-05-11 RX ADMIN — DICLOFENAC SODIUM 75 MG: 75 TABLET, DELAYED RELEASE ORAL at 11:03

## 2021-05-11 RX ADMIN — SENNOSIDES AND DOCUSATE SODIUM 1 TABLET: 8.6; 5 TABLET ORAL at 20:19

## 2021-05-11 RX ADMIN — DEXAMETHASONE SODIUM PHOSPHATE 4 MG: 4 INJECTION, SOLUTION INTRAMUSCULAR; INTRAVENOUS at 07:04

## 2021-05-11 RX ADMIN — DOCUSATE SODIUM 100 MG: 100 CAPSULE, LIQUID FILLED ORAL at 11:03

## 2021-05-11 RX ADMIN — TRANEXAMIC ACID 1000 MG: 100 INJECTION, SOLUTION INTRAVENOUS at 07:04

## 2021-05-11 RX ADMIN — POVIDONE-IODINE 15 ML: 10 SOLUTION TOPICAL at 06:29

## 2021-05-11 RX ADMIN — OXYCODONE HYDROCHLORIDE 10 MG: 5 SOLUTION ORAL at 09:26

## 2021-05-11 RX ADMIN — FENTANYL CITRATE 50 MCG: 50 INJECTION, SOLUTION INTRAMUSCULAR; INTRAVENOUS at 07:43

## 2021-05-11 RX ADMIN — ACETAMINOPHEN 1000 MG: 500 TABLET ORAL at 11:02

## 2021-05-11 RX ADMIN — ATORVASTATIN CALCIUM 20 MG: 20 TABLET, FILM COATED ORAL at 11:03

## 2021-05-11 RX ADMIN — POTASSIUM CHLORIDE 20 MEQ: 1500 TABLET, EXTENDED RELEASE ORAL at 11:02

## 2021-05-11 RX ADMIN — BUPIVACAINE HYDROCHLORIDE 10 ML: 5 INJECTION, SOLUTION EPIDURAL; INTRACAUDAL; PERINEURAL at 06:40

## 2021-05-11 RX ADMIN — FENTANYL CITRATE 50 MCG: 50 INJECTION, SOLUTION INTRAMUSCULAR; INTRAVENOUS at 07:38

## 2021-05-11 RX ADMIN — LIDOCAINE HYDROCHLORIDE 0.5 ML: 10 INJECTION, SOLUTION INFILTRATION; PERINEURAL at 06:29

## 2021-05-11 RX ADMIN — POLYETHYLENE GLYCOL 3350 1 PACKET: 17 POWDER, FOR SOLUTION ORAL at 21:50

## 2021-05-11 RX ADMIN — ROCURONIUM BROMIDE 25 MG: 10 INJECTION, SOLUTION INTRAVENOUS at 08:15

## 2021-05-11 RX ADMIN — SODIUM CHLORIDE, POTASSIUM CHLORIDE, SODIUM LACTATE AND CALCIUM CHLORIDE: 600; 310; 30; 20 INJECTION, SOLUTION INTRAVENOUS at 06:30

## 2021-05-11 RX ADMIN — DOCUSATE SODIUM 100 MG: 100 CAPSULE, LIQUID FILLED ORAL at 17:14

## 2021-05-11 RX ADMIN — ACETAMINOPHEN 1000 MG: 500 TABLET ORAL at 17:13

## 2021-05-11 RX ADMIN — FENTANYL CITRATE 50 MCG: 50 INJECTION, SOLUTION INTRAMUSCULAR; INTRAVENOUS at 09:37

## 2021-05-11 RX ADMIN — CEFAZOLIN 2 G: 1 INJECTION, POWDER, FOR SOLUTION INTRAVENOUS at 07:10

## 2021-05-11 RX ADMIN — OMEPRAZOLE 40 MG: 20 CAPSULE, DELAYED RELEASE ORAL at 11:02

## 2021-05-11 RX ADMIN — VANCOMYCIN HYDROCHLORIDE 1500 MG: 500 INJECTION, POWDER, LYOPHILIZED, FOR SOLUTION INTRAVENOUS at 06:30

## 2021-05-11 RX ADMIN — DICLOFENAC SODIUM 75 MG: 75 TABLET, DELAYED RELEASE ORAL at 17:14

## 2021-05-11 RX ADMIN — FENTANYL CITRATE 50 MCG: 50 INJECTION, SOLUTION INTRAMUSCULAR; INTRAVENOUS at 09:27

## 2021-05-11 RX ADMIN — ACETAMINOPHEN 1000 MG: 500 TABLET ORAL at 23:35

## 2021-05-11 RX ADMIN — ONDANSETRON 4 MG: 2 INJECTION INTRAMUSCULAR; INTRAVENOUS at 07:04

## 2021-05-11 RX ADMIN — VANCOMYCIN HYDROCHLORIDE 1250 MG: 500 INJECTION, POWDER, LYOPHILIZED, FOR SOLUTION INTRAVENOUS at 17:13

## 2021-05-11 RX ADMIN — SUGAMMADEX 200 MG: 100 INJECTION, SOLUTION INTRAVENOUS at 08:51

## 2021-05-11 ASSESSMENT — COGNITIVE AND FUNCTIONAL STATUS - GENERAL
HELP NEEDED FOR BATHING: A LITTLE
MOVING FROM LYING ON BACK TO SITTING ON SIDE OF FLAT BED: A LITTLE
CLIMB 3 TO 5 STEPS WITH RAILING: A LITTLE
MOBILITY SCORE: 18
DRESSING REGULAR LOWER BODY CLOTHING: A LITTLE
DRESSING REGULAR UPPER BODY CLOTHING: A LITTLE
STANDING UP FROM CHAIR USING ARMS: A LITTLE
TURNING FROM BACK TO SIDE WHILE IN FLAT BAD: A LITTLE
MOVING TO AND FROM BED TO CHAIR: A LITTLE
SUGGESTED CMS G CODE MODIFIER DAILY ACTIVITY: CK
EATING MEALS: A LITTLE
PERSONAL GROOMING: A LITTLE
DAILY ACTIVITIY SCORE: 18
WALKING IN HOSPITAL ROOM: A LITTLE
SUGGESTED CMS G CODE MODIFIER MOBILITY: CK
TOILETING: A LITTLE

## 2021-05-11 ASSESSMENT — PAIN DESCRIPTION - PAIN TYPE
TYPE: ACUTE PAIN;SURGICAL PAIN
TYPE: SURGICAL PAIN
TYPE: ACUTE PAIN;SURGICAL PAIN
TYPE: CHRONIC PAIN
TYPE: ACUTE PAIN;SURGICAL PAIN
TYPE: ACUTE PAIN;SURGICAL PAIN

## 2021-05-11 ASSESSMENT — FIBROSIS 4 INDEX: FIB4 SCORE: 1.64

## 2021-05-11 NOTE — ANESTHESIA PREPROCEDURE EVALUATION
Relevant Problems   ANESTHESIA   (+) MYESHA on CPAP      CARDIAC   (+) Essential hypertension   (+) Paroxysmal atrial fibrillation (HCC)      GI   (+) Gastroesophageal reflux disease without esophagitis         (+) Stage 3 chronic kidney disease       Physical Exam    Airway   Mallampati: II  TM distance: >3 FB  Neck ROM: full       Cardiovascular - normal exam  Rhythm: regular  Rate: normal  (-) murmur     Dental - normal exam           Pulmonary - normal exam  Breath sounds clear to auscultation     Abdominal    Neurological - normal exam                 Anesthesia Plan    ASA 2       Plan - general and peripheral nerve block     Peripheral nerve block will be post-op pain control  Airway plan will be LMA          Induction: intravenous    Postoperative Plan: Postoperative administration of opioids is intended.    Pertinent diagnostic labs and testing reviewed    Informed Consent:    Anesthetic plan and risks discussed with patient.    Use of blood products discussed with: patient whom consented to blood products.

## 2021-05-11 NOTE — ANESTHESIA POSTPROCEDURE EVALUATION
Patient: Sharon Callahan    Procedure Summary     Date: 05/11/21 Room / Location:  OR 06 / SURGERY [facility]    Anesthesia Start: 0700 Anesthesia Stop: 0859    Procedures:       ARTHROPLASTY, SHOULDER, TOTAL, REVERSE (Right Shoulder)      TENODESIS, BICEPS - AND DUBOIS. (Right Shoulder) Diagnosis: (PRIMARY OSTEOARTHRITIS RIGHT SHOULDER, PAIN IN RIGHT SHOULDER)    Surgeons: Roque Edmond M.D. Responsible Provider: Tobey Gansert, M.D.    Anesthesia Type: general, peripheral nerve block ASA Status: 2          Final Anesthesia Type: general, peripheral nerve block  Last vitals  BP   Blood Pressure : (!) 117/32    Temp   36 °C (96.8 °F)    Pulse   (!) 48   Resp   16    SpO2   98 %      Anesthesia Post Evaluation    Patient location during evaluation: PACU  Patient participation: complete - patient participated  Level of consciousness: awake and alert    Airway patency: patent  Anesthetic complications: no  Cardiovascular status: hemodynamically stable  Respiratory status: acceptable  Hydration status: euvolemic    PONV: none          No complications documented.     Nurse Pain Score: 4 (NPRS)

## 2021-05-11 NOTE — PROGRESS NOTES
Received report from PACU. Pt arrived on floor on bed; assumed pt care. Discussed POC. Pt taught to inform nurse if experiencing pain above comfort goal, SOB, chest pain or for any further assistance. Pt demonstrates call light use. A&0 x4. Pt rates pain 4/10. CMS intact to R upper extremity. Polar ice in place to R surgical site. Call light in place, will continue to monitor.

## 2021-05-11 NOTE — ANESTHESIA PROCEDURE NOTES
Airway    Date/Time: 5/11/2021 7:04 AM  Performed by: Tobey Gansert, M.D.  Authorized by: Tobey Gansert, M.D.     Location:  OR  Urgency:  Elective  Indications for Airway Management:  Anesthesia      Spontaneous Ventilation: absent    Sedation Level:  Deep  Preoxygenated: Yes    Final Airway Type:  Supraglottic airway  Final Supraglottic Airway:  Standard LMA    SGA Size:  3  Number of Attempts at Approach:  1

## 2021-05-11 NOTE — OR NURSING
0857: To PACU from OR via rney, Patient has a dressing on her Right Shoulder, she is on supplemental Oxygen at 6 lpm via mask. Plan to keep pt in PACU for full hour per STOPBANG protocol. Patient is awake and alert and has no pain or nausea at this time. Per Anesthesia, Patient received a peripheral nerve block.    0910: Patient is resting and denies having pain or nausea at this time. Patient is transitioning to Ambient room air.    0925: Patient is resting, pain at 6/10 and nausea free at this time. Plan to treat per MAR. Patient provided with nasal cannula and supplemental oxygen at 3 lpm, as Room SpO2 dropped to 88%. Brother Mono updated by telephone.    0940: Patient rates pain tolerable at 4/10, No nausea. Report to MASSIMO Villalta and MASSIMO Motta for room 220.    0955: STOPBANG complete.

## 2021-05-11 NOTE — DISCHARGE PLANNING
Anticipated Discharge Disposition:   Home when medically cleared    Action:   Chart review complete.     This patient was admitted for a right TSA. Per case management note from 4/22, this patient has a shower chair, ice and an oversized shirt at home. No anticipated equipment needs.     OT evaluation pending at this time. RN CM will continue to follow and assist as needed.     Barriers to Discharge:   OT evaluation   Medical Clearance    Plan:   Hospital care management will continue to assist with discharge planning needs.

## 2021-05-11 NOTE — ANESTHESIA TIME REPORT
Anesthesia Start and Stop Event Times     Date Time Event    5/11/2021 0651 Ready for Procedure     0700 Anesthesia Start     0859 Anesthesia Stop        Responsible Staff  05/11/21    Name Role Begin End    Tobey Gansert, M.D. Anesth 0700 0859        Preop Diagnosis (Free Text):  Pre-op Diagnosis     PRIMARY OSTEOARTHRITIS RIGHT SHOULDER, PAIN IN RIGHT SHOULDER        Preop Diagnosis (Codes):    Post op Diagnosis  Osteoarthritis of right shoulder, unspecified osteoarthritis type      Premium Reason  Non-Premium    Comments:

## 2021-05-11 NOTE — CARE PLAN
Problem: Pain Management  Goal: Pain level will decrease to patient's comfort goal  Outcome: PROGRESSING AS EXPECTED  Note: Pt reports pain 4/10, denies medication at this time.      Problem: Communication  Goal: The ability to communicate needs accurately and effectively will improve  Outcome: PROGRESSING AS EXPECTED     Problem: Safety  Goal: Will remain free from injury  Outcome: PROGRESSING AS EXPECTED     Problem: Knowledge Deficit  Goal: Knowledge of disease process/condition, treatment plan, diagnostic tests, and medications will improve  Outcome: PROGRESSING AS EXPECTED

## 2021-05-11 NOTE — ANESTHESIA PROCEDURE NOTES
Peripheral Block    Date/Time: 5/11/2021 6:40 AM  Performed by: Tobey Gansert, M.D.  Authorized by: Tobey Gansert, M.D.     Start Time:  5/11/2021 6:40 AM  End Time:  5/11/2021 6:51 AM  Reason for Block: at surgeon's request and post-op pain management ONLY    patient identified, IV checked, site marked, risks and benefits discussed, surgical consent, monitors and equipment checked, pre-op evaluation and timeout performed    Patient Position:  Supine  Prep: ChloraPrep    Monitoring:  Heart rate, continuous pulse ox and cardiac monitor  Block Region:  Upper Extremity  Upper Extremity - Block Type:  BRACHIAL PLEXUS block, Supraclavicular approach    Laterality:  Right  Procedures: ultrasound guided  Image captured, interpreted and electronically stored.  Local Infiltration:  Lidocaine  Strength:  1 %  Dose:  3 ml  Block Type:  Single-shot  Needle Length:  50mm  Needle Gauge:  22 G  Needle Localization:  Ultrasound guidance  Injection Assessment:  Negative aspiration for heme, no paresthesia on injection, incremental injection and local visualized surrounding nerve on ultrasound  Evidence of intravascular injection: No     US Guided Supraclavicular Brachial Plexus Block    US transducer placed cephalad and parallel to clavicle in angle to view the subclavian artery with the brachial plexus lateral and superficial to the artery. Needle inserted lateral to the transducer in-plane and advanced with the needle tip visualized continually into the perineural position. After negative aspiration, LA injected without resistance.

## 2021-05-12 LAB
HCT VFR BLD AUTO: 34.6 % (ref 37–47)
HGB BLD-MCNC: 10.9 G/DL (ref 12–16)

## 2021-05-12 PROCEDURE — 36415 COLL VENOUS BLD VENIPUNCTURE: CPT

## 2021-05-12 PROCEDURE — 700102 HCHG RX REV CODE 250 W/ 637 OVERRIDE(OP): Performed by: ORTHOPAEDIC SURGERY

## 2021-05-12 PROCEDURE — G0378 HOSPITAL OBSERVATION PER HR: HCPCS

## 2021-05-12 PROCEDURE — 85018 HEMOGLOBIN: CPT

## 2021-05-12 PROCEDURE — A9270 NON-COVERED ITEM OR SERVICE: HCPCS | Performed by: PHYSICIAN ASSISTANT

## 2021-05-12 PROCEDURE — 85014 HEMATOCRIT: CPT

## 2021-05-12 PROCEDURE — 700105 HCHG RX REV CODE 258: Performed by: PHYSICIAN ASSISTANT

## 2021-05-12 PROCEDURE — 97166 OT EVAL MOD COMPLEX 45 MIN: CPT

## 2021-05-12 PROCEDURE — 700102 HCHG RX REV CODE 250 W/ 637 OVERRIDE(OP): Performed by: PHYSICIAN ASSISTANT

## 2021-05-12 PROCEDURE — 97161 PT EVAL LOW COMPLEX 20 MIN: CPT

## 2021-05-12 PROCEDURE — A9270 NON-COVERED ITEM OR SERVICE: HCPCS | Performed by: ORTHOPAEDIC SURGERY

## 2021-05-12 RX ORDER — GUAIFENESIN 600 MG/1
600 TABLET, EXTENDED RELEASE ORAL 2 TIMES DAILY
Status: DISCONTINUED | OUTPATIENT
Start: 2021-05-12 | End: 2021-05-14 | Stop reason: HOSPADM

## 2021-05-12 RX ORDER — SODIUM CHLORIDE 9 MG/ML
1000 INJECTION, SOLUTION INTRAVENOUS ONCE
Status: COMPLETED | OUTPATIENT
Start: 2021-05-12 | End: 2021-05-12

## 2021-05-12 RX ADMIN — DICLOFENAC SODIUM 75 MG: 75 TABLET, DELAYED RELEASE ORAL at 05:26

## 2021-05-12 RX ADMIN — POTASSIUM CHLORIDE 20 MEQ: 1500 TABLET, EXTENDED RELEASE ORAL at 05:25

## 2021-05-12 RX ADMIN — SODIUM CHLORIDE 1000 ML: 9 INJECTION, SOLUTION INTRAVENOUS at 06:16

## 2021-05-12 RX ADMIN — ATORVASTATIN CALCIUM 20 MG: 20 TABLET, FILM COATED ORAL at 05:26

## 2021-05-12 RX ADMIN — DOCUSATE SODIUM 100 MG: 100 CAPSULE, LIQUID FILLED ORAL at 18:02

## 2021-05-12 RX ADMIN — ACETAMINOPHEN 1000 MG: 500 TABLET ORAL at 18:01

## 2021-05-12 RX ADMIN — OMEPRAZOLE 40 MG: 20 CAPSULE, DELAYED RELEASE ORAL at 05:25

## 2021-05-12 RX ADMIN — SENNOSIDES AND DOCUSATE SODIUM 1 TABLET: 8.6; 5 TABLET ORAL at 21:05

## 2021-05-12 RX ADMIN — GUAIFENESIN 600 MG: 600 TABLET, EXTENDED RELEASE ORAL at 23:00

## 2021-05-12 RX ADMIN — DOCUSATE SODIUM 100 MG: 100 CAPSULE, LIQUID FILLED ORAL at 05:26

## 2021-05-12 RX ADMIN — ACETAMINOPHEN 1000 MG: 500 TABLET ORAL at 12:09

## 2021-05-12 RX ADMIN — DICLOFENAC SODIUM 75 MG: 75 TABLET, DELAYED RELEASE ORAL at 18:01

## 2021-05-12 RX ADMIN — ACETAMINOPHEN 1000 MG: 500 TABLET ORAL at 05:25

## 2021-05-12 RX ADMIN — ACETAMINOPHEN 1000 MG: 500 TABLET ORAL at 23:02

## 2021-05-12 ASSESSMENT — COGNITIVE AND FUNCTIONAL STATUS - GENERAL
STANDING UP FROM CHAIR USING ARMS: A LITTLE
TURNING FROM BACK TO SIDE WHILE IN FLAT BAD: A LITTLE
MOVING TO AND FROM BED TO CHAIR: A LITTLE
TOILETING: A LITTLE
CLIMB 3 TO 5 STEPS WITH RAILING: A LITTLE
MOVING FROM LYING ON BACK TO SITTING ON SIDE OF FLAT BED: A LITTLE
SUGGESTED CMS G CODE MODIFIER MOBILITY: CK
MOBILITY SCORE: 18
DRESSING REGULAR LOWER BODY CLOTHING: A LITTLE
DRESSING REGULAR UPPER BODY CLOTHING: A LOT
HELP NEEDED FOR BATHING: A LOT
SUGGESTED CMS G CODE MODIFIER DAILY ACTIVITY: CK
DAILY ACTIVITIY SCORE: 16
EATING MEALS: A LITTLE
WALKING IN HOSPITAL ROOM: A LITTLE
PERSONAL GROOMING: A LITTLE

## 2021-05-12 ASSESSMENT — GAIT ASSESSMENTS
DISTANCE (FEET): 15
DEVIATION: OTHER (COMMENT);INCREASED BASE OF SUPPORT;DECREASED HEEL STRIKE;DECREASED TOE OFF
GAIT LEVEL OF ASSIST: MINIMAL ASSIST

## 2021-05-12 ASSESSMENT — PAIN DESCRIPTION - PAIN TYPE
TYPE: SURGICAL PAIN
TYPE: SURGICAL PAIN

## 2021-05-12 ASSESSMENT — ACTIVITIES OF DAILY LIVING (ADL): TOILETING: INDEPENDENT

## 2021-05-12 NOTE — PROGRESS NOTES
Pt resting in bed, A&Ox4, VSS, pain 2/10, declines pain medications at this time. R shoulder dressing clean, dry, and intact, polar ice applied, numbness to R upper arm d/t nerve block, 2+ radial pulse, cap refill < 2 seconds, skin pink and warm. POC discussed, denies needs at this time. Safety measures and rounding in place.

## 2021-05-12 NOTE — DISCHARGE PLANNING
"Anticipated Discharge Disposition:   TBD, possibly SNF or Home with HH     Action:   Chart review complete.     Per P.A. note from 5/12, \"SNF placement due to limitations with mobilization and self care because of left arm dysfunction and right arm immobilization. Patient lives alone.\"     0830: OT evaluation pending. No PT order in place. RN CM sent a voalte message to bedside RN requesting PT order. Per chart review of ADLs, patient is a standby assist and is steady on her feet. Patient may not qualify for SNF placement.     1130: No PT order in place. MASSIMO LINO sent [facility] a voalte message requesting a PT order.     1215: PT order in place.     Per PT note, \"Recommend post-acute placement for additional physical therapy services prior to discharge home.\"     Per OT note, \"Recommend post-acute placement for additional occupational therapy services prior to discharge home.\"     1525: RN CM met with the patient and her brother, Mono, at bedside to discuss the discharge plan. Choices collected for SNF: 1- [facility], 2- [facility], 3- [facility].     PASRR pending and choice form to be sent when SNF order in place. Bedside RN aware that patient needs a SNF order. Bedside RN to page.      Barriers to Discharge:   PT/OT evaluation   Medical Clearance    Plan:   Hospital care management will continue to assist with discharge planning needs.     "

## 2021-05-12 NOTE — PROGRESS NOTES
Spoke with Michael Campbell with the [facility] regarding low blood pressure. 1L bolus of NS and a stat H&H ordered.

## 2021-05-12 NOTE — THERAPY
Physical Therapy   Initial Evaluation     Patient Name: Sharon Callahan  Age:  80 y.o., Sex:  female  Medical Record #: [id number]  Today's Date: 5/12/2021     Precautions: (P) Fall Risk, Non Weight Bearing Right Upper Extremity, immobilizer RUE    Assessment  Patient is 80 y.o. female with new right reverse total shoulder with biceps tenodesis 5/12/21.  Other PMH includes arthritis and sleep apnea. Pt also with old injury left elbow with severe restriction of movement, approx only 90 degrees. Pt reports she lives in a ground level apartment with her ex-, who is very debilitated, and who she is a partial caregiver for. He is not able to assist her. She does not have any other consistent assistance available at home. Prior to surgery, pt reports she was independent with self care and mobility sans AD. Today, she demonstrates deficits with gait, balance, transfers, and self care. With ambulation, pt with wide base of support and increased lateral sway, and states she does not feel as steady as normal with gait. PT with concern for stability on outdoor and uneven surfaces based on balance for level surface. Pt requiring assistance for clothing management and hygiene with toileting, as her left UE ROM is limited, and she cannot safely manage her clothing or reach her buttocks for hygiene. Pt will continue to benefit from skilled PT while she remains in acute setting to address deficits. Recommend further PT in the post-acute setting, as pt is requiring assistance with self care, and she will require a high degree of independence when she returns home due to lack of support at home.    Plan    Recommend Physical Therapy 3 times per week until therapy goals are met for the following treatments:  Bed Mobility, Gait Training, Neuro Re-Education / Balance, Therapeutic Activities and Therapeutic Exercises    DC Equipment Recommendations: (P) Unable to determine at this time  Discharge Recommendations: (P) Recommend  post-acute placement for additional physical therapy services prior to discharge home       Subjective    Pt stating she does not know how she would be able to care for herself at home with limitations of LUE and restrictions on RUE.     Objective       05/12/21 1314   Prior Living Situation   Prior Services None;Home-Independent   Housing / Facility 1 Story Apartment / Condo   Steps Into Home 0   Steps In Home 0   Bathroom Set up Walk In Shower;Shower Chair   Equipment Owned Tub / Shower Seat   Lives with - Patient's Self Care Capacity   (ex-)   Comments Pt reports she is indepdnent at home with self care and mobility. Her ex- lives in the home, but is very debilitated and unable to assist. She is a partial caregiver for him. No outside assistance available on regualr basis from family.   Prior Level of Functional Mobility   Bed Mobility Independent   Transfer Status Independent   Ambulation Independent   Distance Ambulation (Feet)   (community)   Assistive Devices Used None   Stairs Independent   Cognition    Cognition / Consciousness WDL   Level of Consciousness Alert   Strength Lower Body   Comments WFL for mobility   Balance Assessment   Sitting Balance (Static) Fair   Sitting Balance (Dynamic) Fair   Standing Balance (Static) Fair   Standing Balance (Dynamic) Fair -   Weight Shift Sitting Fair   Weight Shift Standing Fair   Comments with FWW   Gait Analysis   Gait Level Of Assist Minimal Assist  (SBA)   Assistive Device None   Distance (Feet) 15   # of Times Distance was Traveled 2   Deviation Other (Comment);Increased Base Of Support;Decreased Heel Strike;Decreased Toe Off  (increased lateral sway)   # of Stairs Climbed 0   Weight Bearing Status no restriction LEs; NWB RUE   Comments pt reporting she is feeling less steady than normal; PT with concern for uneven ground based on performance on level surface   Bed Mobility    Comments up in chair   Functional Mobility   Sit to Stand Supervised    Transfer Method Stand Step   Mobility chair->stand->amb->toilet->amb->chair   Comments required multiple attempts for stand from toilet   How much difficulty does the patient currently have...   Turning over in bed (including adjusting bedclothes, sheets and blankets)? 3   Sitting down on and standing up from a chair with arms (e.g., wheelchair, bedside commode, etc.) 3   Moving from lying on back to sitting on the side of the bed? 3   How much help from another person does the patient currently need...   Moving to and from a bed to a chair (including a wheelchair)? 3   Need to walk in a hospital room? 3   Climbing 3-5 steps with a railing? 3   6 clicks Mobility Score 18   Activity Tolerance   Sitting in Chair left up in chair   Sitting Edge of Bed NT   Standing 2x3-4 minutes approx   Patient / Family Goals    Patient / Family Goal #1 go to rehab until I can take care of myself at home   Short Term Goals    Short Term Goal # 1 Pt will perform bed mobility independently within 6 visits, as required to return home   Short Term Goal # 2 Pt will perform transfers independently within 6 visits as required to return home   Short Term Goal # 3 Pt will ambulate 300' with no AD and independently within 6 visits, as required to return seda   Education Group   Education Provided Role of Physical Therapist   Role of Physical Therapist Patient Response Patient;Acceptance;Demonstration;Verbal Demonstration   Problem List    Problems Pain;Impaired Bed Mobility;Impaired Transfers;Impaired Ambulation;Impaired Balance;Decreased Activity Tolerance;Other (Comments)  (limited reach LUE)   Anticipated Discharge Equipment and Recommendations   DC Equipment Recommendations Unable to determine at this time   Discharge Recommendations Recommend post-acute placement for additional physical therapy services prior to discharge home

## 2021-05-12 NOTE — PROGRESS NOTES
Received report, assumed pt care. Discussed POC. Pt taught to inform nurse if experiencing pain above comfort goal, SOB, chest pain or for any further assistance. Assessment done. VSS. Dressing to R shoulder CDI. Will cont to monitor.

## 2021-05-12 NOTE — CARE PLAN
The patient is Stable - Low risk of patient condition declining or worsening    Problem: Fluid Volume:  Goal: Will maintain balanced intake and output  Outcome: Progressing     Problem: Communication  Goal: The ability to communicate needs accurately and effectively will improve  Outcome: Progressing     Problem: Knowledge Deficit  Goal: Knowledge of disease process/condition, treatment plan, diagnostic tests, and medications will improve  Outcome: Progressing  Note: Pt updated on plan of care for the day. Educated to call for assistance.

## 2021-05-12 NOTE — THERAPY
Occupational Therapy   Initial Evaluation     Patient Name: Sharon Callahan  Age:  80 y.o., Sex:  female  Medical Record #: [id number]  Today's Date: 5/12/2021     Precautions: Fall Risk, Non Weight Bearing Right Upper Extremity    Assessment  Pt is a pleasant 80 y.o. female who was seen today for an OT evaluation POD #1 s/p R TSA placement. Pt at baseline is completely independent in all ADLs, home management tasks, cooking, cleaning, driving, and cares for her ex- (who is physically disabled). Pt today presented with decreased participation in all ADLs d/t recent surgical ROM restrictions of R arm as well as L elbow limited ROM (stuck at 90 degrees flexion at baseline). Due to high level of independence in ADLs and home management tasks required for safe DC home with no available assist, I anticipate pt would greatly benefit from post-acute placement for continued therapy. Pt reports she has secured support at home for care for her ex- and is agreeable to a rehab stay to progress independence. Will follow while in house for OT.     Plan    Recommend Occupational Therapy 4 times per week until therapy goals are met for the following treatments:  Adaptive Equipment, Self Care/Activities of Daily Living, Therapeutic Activities and Therapeutic Exercises.    DC Equipment Recommendations: Unable to determine at this time  Discharge Recommendations: Recommend post-acute placement for additional occupational therapy services prior to discharge home        05/12/21 1022   Initial Contact Note    Initial Contact Note Order Received and Verified, Occupational Therapy Evaluation in Progress with Full Report to Follow.   Prior Living Situation   Prior Services None;Home-Independent   Housing / Facility 1 Story Apartment / Condo   Steps Into Home 0   Steps In Home 0   Bathroom Set up Walk In Shower   Equipment Owned None   Lives with - Patient's Self Care Capacity Spouse   Comments Pt reports she is complete  independent with all ADLs, shopping, cooking, cleaning at baseline w/o AD. Pt lives with her ex/ who is physically disabled and she cares for at baseline (he is unable to assist upon DC)   Prior Level of ADL Function   Self Feeding Independent   Grooming / Hygiene Independent   Bathing Independent   Dressing Independent   Toileting Independent   Prior Level of IADL Function   Medication Management Independent   Laundry Independent   Kitchen Mobility Independent   Finances Independent   Home Management Independent   Shopping Independent   Prior Level Of Mobility Independent Without Device in Community;Independent Without Device in Home   Driving / Transportation Driving Independent   History of Falls   History of Falls No   Precautions   Precautions Fall Risk;Non Weight Bearing Right Upper Extremity   Pain   Pain Scales 0 to 10 Scale    Intervention Repositioned;Ambulation / Increased Activity;Cold Pack   Pain 0 - 10 Group   Location Breast   Location Orientation Right;Mid   Pain Rating Scale (NPRS) 6   Comfort Goal Comfort with Movement   Therapist Pain Assessment Prior to Activity;During Activity;Nurse Notified   Cognition    Cognition / Consciousness WDL   Comments pleasant and cooperative   Passive ROM Upper Body   Passive ROM Upper Body X   Lt Elbow Extension Degrees   (stuck at 90 degrees flexion)   Comments Pt is R handing (surgical arm) and is limited with ROM d/t recent surger. Pt has limited moblity in L elbow which is stuck at 90 degrees   Active ROM Upper Body   Active ROM Upper Body  X   Dominant Hand Right   Comments Same as above   Strength Upper Body   Upper Body Strength  X   Comments Same as above   Sensation Upper Body   Upper Extremity Sensation  X   Comments Limited d/t nerve block   Balance Assessment   Sitting Balance (Static) Good   Sitting Balance (Dynamic) Good   Standing Balance (Static) Fair +   Standing Balance (Dynamic) Fair +   Weight Shift Sitting Good   Weight Shift Standing  Fair   Comments limited d/t low BP   ADL Assessment   Grooming Moderate Assist   Upper Body Dressing Maximal Assist   Lower Body Dressing Moderate Assist   Comments higher levels of assist required d/t recent surgery and difficulty using L UE. Pt does not have assist at home for any ADLs including donning/doffing the brace   How much help from another person does the patient currently need...   Putting on and taking off regular lower body clothing? 3   Bathing (including washing, rinsing, and drying)? 2   Toileting, which includes using a toilet, bedpan, or urinal? 3   Putting on and taking off regular upper body clothing? 2   Taking care of personal grooming such as brushing teeth? 3   Eating meals? 3   6 Clicks Daily Activity Score 16   Functional Mobility   Sit to Stand Supervised  (CGA)   Transfer Method Stand Step   Mobility limited to EOB; reported previously ambulated to the bathroom with nursing with a drop in BP   Activity Tolerance   Sitting in Chair approached pre/post session in recliner; 20 min   Standing 10 min    Patient / Family Goals   Patient / Family Goal #1 Go to rehab   Short Term Goals   Short Term Goal # 1 Pt will demo don/doff UB clothing with mod A   Short Term Goal # 2 Pt will demo don/doff sling with mod A   Short Term Goal # 3 Pt will demo toilet txf with mod A   Short Term Goal # 4 Pt will complete G/H sink side with mod A   Education Group   Education Provided Transfers;Weight Bearing Precautions;Activities of Daily Living   Role of Occupational Therapist Patient Response Patient;Acceptance;Explanation   Transfers Patient Response Patient;Acceptance;Explanation;Action Demonstration   ADL Patient Response Patient;Acceptance;Explanation;Action Demonstration;Reinforcement Needed   Weight Bearing Precautions Patient Response Patient;Acceptance;Explanation;Action Demonstration;Reinforcement Needed   Problem List   Problem List Decreased Active Daily Living Skills;Decreased Homemaking  Skills;Decreased Upper Extremity Strength Right;Decreased Upper Extremity Strength Left;Decreased Upper Extremity AROM Right;Decreased Upper Extremity AROM Left;Decreased Upper Extremity PROM Left;Decreased Activity Tolerance;Decreased Functional Mobility   Anticipated Discharge Equipment and Recommendations   DC Equipment Recommendations Unable to determine at this time   Discharge Recommendations Recommend post-acute placement for additional occupational therapy services prior to discharge home   Interdisciplinary Plan of Care Collaboration   IDT Collaboration with  Nursing;   Patient Position at End of Therapy Seated;Phone within Reach;Tray Table within Reach;Call Light within Reach   Collaboration Comments Reported pain in R breast and DC recs/findings   Session Information   Date / Session Number  5/12, 1 (1/4, 5/17),    Priority 3

## 2021-05-13 PROCEDURE — 700102 HCHG RX REV CODE 250 W/ 637 OVERRIDE(OP): Performed by: PHYSICIAN ASSISTANT

## 2021-05-13 PROCEDURE — 700102 HCHG RX REV CODE 250 W/ 637 OVERRIDE(OP): Performed by: ORTHOPAEDIC SURGERY

## 2021-05-13 PROCEDURE — A9270 NON-COVERED ITEM OR SERVICE: HCPCS | Performed by: ORTHOPAEDIC SURGERY

## 2021-05-13 PROCEDURE — A9270 NON-COVERED ITEM OR SERVICE: HCPCS | Performed by: PHYSICIAN ASSISTANT

## 2021-05-13 PROCEDURE — G0378 HOSPITAL OBSERVATION PER HR: HCPCS

## 2021-05-13 RX ADMIN — DOCUSATE SODIUM 100 MG: 100 CAPSULE, LIQUID FILLED ORAL at 05:08

## 2021-05-13 RX ADMIN — ACETAMINOPHEN 1000 MG: 500 TABLET ORAL at 19:25

## 2021-05-13 RX ADMIN — POTASSIUM CHLORIDE 20 MEQ: 1500 TABLET, EXTENDED RELEASE ORAL at 05:06

## 2021-05-13 RX ADMIN — ACETAMINOPHEN 1000 MG: 500 TABLET ORAL at 12:12

## 2021-05-13 RX ADMIN — OMEPRAZOLE 40 MG: 20 CAPSULE, DELAYED RELEASE ORAL at 05:05

## 2021-05-13 RX ADMIN — GUAIFENESIN 600 MG: 600 TABLET, EXTENDED RELEASE ORAL at 19:26

## 2021-05-13 RX ADMIN — FUROSEMIDE 40 MG: 40 TABLET ORAL at 05:06

## 2021-05-13 RX ADMIN — LISINOPRIL 40 MG: 20 TABLET ORAL at 12:11

## 2021-05-13 RX ADMIN — ATORVASTATIN CALCIUM 20 MG: 20 TABLET, FILM COATED ORAL at 05:06

## 2021-05-13 RX ADMIN — POLYETHYLENE GLYCOL 3350 1 PACKET: 17 POWDER, FOR SOLUTION ORAL at 20:59

## 2021-05-13 RX ADMIN — DOCUSATE SODIUM 100 MG: 100 CAPSULE, LIQUID FILLED ORAL at 19:25

## 2021-05-13 RX ADMIN — GUAIFENESIN 600 MG: 600 TABLET, EXTENDED RELEASE ORAL at 05:06

## 2021-05-13 RX ADMIN — DICLOFENAC SODIUM 75 MG: 75 TABLET, DELAYED RELEASE ORAL at 19:25

## 2021-05-13 RX ADMIN — SENNOSIDES AND DOCUSATE SODIUM 1 TABLET: 8.6; 5 TABLET ORAL at 20:52

## 2021-05-13 RX ADMIN — DICLOFENAC SODIUM 75 MG: 75 TABLET, DELAYED RELEASE ORAL at 05:06

## 2021-05-13 RX ADMIN — ACETAMINOPHEN 1000 MG: 500 TABLET ORAL at 05:05

## 2021-05-13 ASSESSMENT — PAIN DESCRIPTION - PAIN TYPE: TYPE: ACUTE PAIN;SURGICAL PAIN

## 2021-05-13 NOTE — DISCHARGE PLANNING
Received Choice form at [phone]  Agency/Facility Name: [facility]  Referral sent per Choice form @ [phone]

## 2021-05-13 NOTE — CARE PLAN
Problem: Pain Management  Goal: Pain level will decrease to patient's comfort goal  Outcome: Progressing  Intervention: Follow pain managment plan developed in collaboration with patient and Interdisciplinary Team  Note: Pt educated to call when pain is above comfort level.   The patient is Stable - Low risk of patient condition declining or worsening         Summary of progress made towards problems/goals: Pain well controlled with oral analgesics and polar ice.

## 2021-05-13 NOTE — PROGRESS NOTES
Called Dr Edmond office Left message with Karen with updates Pt accepted to SNF and need DC summary, DC orders and verbal order for cobra.

## 2021-05-13 NOTE — DISCHARGE PLANNING
Anticipated Discharge Disposition: SNF    Action: Pt accepted at [facility]. Both don't have any available beds today.   [facility] pending    Barriers to Discharge: Bed availability    Plan: F/U with SNF    Called [facility] and spoke with Re. Re states pt needs 3 inpatient midnights to go to their facility.     [facility] have accepted pending a bed

## 2021-05-14 VITALS
DIASTOLIC BLOOD PRESSURE: 55 MMHG | BODY MASS INDEX: 37.73 KG/M2 | SYSTOLIC BLOOD PRESSURE: 105 MMHG | HEIGHT: 62 IN | HEART RATE: 51 BPM | TEMPERATURE: 97.2 F | WEIGHT: 205.03 LBS | OXYGEN SATURATION: 97 % | RESPIRATION RATE: 17 BRPM

## 2021-05-14 PROCEDURE — 97535 SELF CARE MNGMENT TRAINING: CPT

## 2021-05-14 PROCEDURE — 700102 HCHG RX REV CODE 250 W/ 637 OVERRIDE(OP): Performed by: ORTHOPAEDIC SURGERY

## 2021-05-14 PROCEDURE — G0378 HOSPITAL OBSERVATION PER HR: HCPCS

## 2021-05-14 PROCEDURE — A9270 NON-COVERED ITEM OR SERVICE: HCPCS | Performed by: ORTHOPAEDIC SURGERY

## 2021-05-14 RX ADMIN — DICLOFENAC SODIUM 75 MG: 75 TABLET, DELAYED RELEASE ORAL at 06:25

## 2021-05-14 RX ADMIN — OXYCODONE HYDROCHLORIDE 5 MG: 5 TABLET ORAL at 13:44

## 2021-05-14 RX ADMIN — ATORVASTATIN CALCIUM 20 MG: 20 TABLET, FILM COATED ORAL at 06:25

## 2021-05-14 RX ADMIN — DOCUSATE SODIUM 100 MG: 100 CAPSULE, LIQUID FILLED ORAL at 06:25

## 2021-05-14 RX ADMIN — POTASSIUM CHLORIDE 20 MEQ: 1500 TABLET, EXTENDED RELEASE ORAL at 06:25

## 2021-05-14 RX ADMIN — OMEPRAZOLE 40 MG: 20 CAPSULE, DELAYED RELEASE ORAL at 06:25

## 2021-05-14 RX ADMIN — OXYCODONE HYDROCHLORIDE 2.5 MG: 5 TABLET ORAL at 02:20

## 2021-05-14 RX ADMIN — ACETAMINOPHEN 1000 MG: 500 TABLET ORAL at 00:13

## 2021-05-14 RX ADMIN — FUROSEMIDE 40 MG: 40 TABLET ORAL at 06:25

## 2021-05-14 RX ADMIN — ACETAMINOPHEN 1000 MG: 500 TABLET ORAL at 12:24

## 2021-05-14 ASSESSMENT — COGNITIVE AND FUNCTIONAL STATUS - GENERAL
PERSONAL GROOMING: A LITTLE
HELP NEEDED FOR BATHING: A LOT
DRESSING REGULAR LOWER BODY CLOTHING: A LITTLE
DAILY ACTIVITIY SCORE: 16
EATING MEALS: A LITTLE
DRESSING REGULAR UPPER BODY CLOTHING: A LOT
SUGGESTED CMS G CODE MODIFIER DAILY ACTIVITY: CK
TOILETING: A LITTLE

## 2021-05-14 ASSESSMENT — PAIN DESCRIPTION - PAIN TYPE
TYPE: SURGICAL PAIN
TYPE: ACUTE PAIN;SURGICAL PAIN
TYPE: ACUTE PAIN;SURGICAL PAIN
TYPE: SURGICAL PAIN

## 2021-05-14 NOTE — PROGRESS NOTES
Report received from night shift RN. Assume care. Pt. AAOx4 pt is bed,  Assessment completed. VSS. Denies pain, able to wiggle right fingers, good CMS and pulses to vilma LE, denies numbness and tingling. Dressing and immobilizer in place DCI, Pt was update for the care for the day. White board updated, All question answered. Pt has call light within reach,  bed is in the lowest position. Pt has no other needs at this time.

## 2021-05-14 NOTE — THERAPY
Occupational Therapy  Daily Treatment     Patient Name: Sharon Callahan  Age:  80 y.o., Sex:  female  Medical Record #: [id number]  Today's Date: 5/14/2021     Precautions  Precautions: (P) Fall Risk, Non Weight Bearing Right Upper Extremity  Comments: R reverse TSA    Assessment    Pt progressed well with therapy today. Pt required repetition of donning/doffing sling and UB clothing for new learning to occur (see other treatments below) and continues to require mod/max A. Pt would continue to greatly benefit from post acute placement prior to DC with biggest barriers being limited use of L UE requiring compensatory/adaptive strategies for safe ADL task completion and no support at home.    Plan    Continue current treatment plan.    DC Equipment Recommendations: (P) Unable to determine at this time  Discharge Recommendations: (P) Recommend post-acute placement for additional occupational therapy services prior to discharge home       05/14/21 1417   Precautions   Precautions Fall Risk;Non Weight Bearing Right Upper Extremity   Cognition    Cognition / Consciousness X   Comments Noted some difficulty retaining information requiring frequent cueing   Other Treatments   Other Treatments Provided treatment focused on UB dressing techniques and donning/doffing brace independently. Pt requring repetition of techniques to reinforce learning (repeated dressing techniques 3x)   Balance   Sitting Balance (Static) Fair +   Sitting Balance (Dynamic) Fair +   Standing Balance (Static) Fair   Standing Balance (Dynamic) Fair   Weight Shift Sitting Fair   Weight Shift Standing Fair   Comments w/o AD   Bed Mobility    Comments in recliner pre/post session   Activities of Daily Living   Upper Body Dressing Moderate Assist   Lower Body Dressing Moderate Assist   Skilled Intervention Verbal Cuing;Tactile Cuing;Compensatory Strategies;Sequencing   Comments training on donnin/doffing sling 3x   How much help from another person does the  patient currently need...   Putting on and taking off regular lower body clothing? 3   Bathing (including washing, rinsing, and drying)? 2   Toileting, which includes using a toilet, bedpan, or urinal? 3   Putting on and taking off regular upper body clothing? 2   Taking care of personal grooming such as brushing teeth? 3   Eating meals? 3   6 Clicks Daily Activity Score 16   Functional Mobility   Sit to Stand Supervised   Mobility room distnaces    Activity Tolerance   Sitting in Chair 15 min   Standing 5 min   Comments good tolerance   Patient / Family Goals   Patient / Family Goal #1 Go to rehab   Goal #1 Outcome Progressing as expected   Short Term Goals   Short Term Goal # 1 Pt will demo don/doff UB clothing with mod A   Goal Outcome # 1 Progressing as expected   Short Term Goal # 2 Pt will demo don/doff sling with mod A   Goal Outcome # 2 Progressing as expected   Short Term Goal # 3 Pt will demo toilet txf with mod A   Goal Outcome # 3 Progressing as expected   Short Term Goal # 4 Pt will complete G/H sink side with mod A   Goal Outcome # 4 Progressing as expected   Education Group   Role of Occupational Therapist Patient Response Patient;Acceptance;Explanation   Transfers Patient Response Patient;Acceptance;Explanation;Action Demonstration   ADL Patient Response Patient;Acceptance;Explanation;Action Demonstration;Reinforcement Needed   Weight Bearing Precautions Patient Response Patient;Acceptance;Explanation;Action Demonstration;Reinforcement Needed   Anticipated Discharge Equipment and Recommendations   DC Equipment Recommendations Unable to determine at this time   Discharge Recommendations Recommend post-acute placement for additional occupational therapy services prior to discharge home   Interdisciplinary Plan of Care Collaboration   IDT Collaboration with  Nursing   Patient Position at End of Therapy Seated;Phone within Reach;Call Light within Reach;Tray Table within Reach   Collaboration Comments OT  recs and tx   Session Information   Date / Session Number  5/14 2 (2/4, 5/17), SM   Priority 3

## 2021-05-14 NOTE — CARE PLAN
Problem: Knowledge Deficit - Standard  Goal: Patient and family/care givers will demonstrate understanding of plan of care, disease process/condition, diagnostic tests and medications  5/14/2021 1328 by Torri Espino R.N.  Outcome: Progressing  5/14/2021 1122 by Torri Espino R.N.  Outcome: Progressing   The patient is Stable - Low risk of patient condition declining or worsening    Shift Goals  Clinical Goals: ambulation without assistance  Patient Goals: Pt will be able to move without assistance  Family Goals: none    Progress made toward(s) clinical / shift goals:  Pt was able to ambulated to bathroom on her one    Patient is not progressing towards the following goals:

## 2021-05-14 NOTE — CARE PLAN
The patient is Stable - Low risk of patient condition declining or worsening  Shift Goals  Clinical Goals: ambulation without assistance  Patient Goals: Pt will be able to move without assistance  Family Goals: none    Progress made toward(s) clinical / shift goals:  able to ambulate on her own with steady gait.    Patient is not progressing towards the following goals:

## 2021-05-14 NOTE — DISCHARGE INSTRUCTIONS
Discharge Instructions    Discharged to other by medical transportation with escort. Discharged via wheelchair, hospital escort: Yes.  Special equipment needed: Immobilizer    Be sure to schedule a follow-up appointment with your primary care doctor or any specialists as instructed.     Discharge Plan:   Diet Plan: Discussed  Activity Level: Discussed  Confirmed Follow up Appointment: Patient to Call and Schedule Appointment  Confirmed Symptoms Management: Discussed  Medication Reconciliation Updated: Yes    I understand that a diet low in cholesterol, fat, and sodium is recommended for good health. Unless I have been given specific instructions below for another diet, I accept this instruction as my diet prescription.   Other diet: Regular, thin liquids    Special Instructions: Discharge instructions for the Orthopedic Patient    Follow up with Primary Care Physician within 2 weeks of discharge to home, regarding:  Review of medications and diagnostic testing.  Surveillance for medical complications.  Workup and treatment of osteoporosis, if appropriate.     -Is this a Hip/Knee/Shoulder Joint Replacement patient? Yes   SHOULDER REPLACEMENT, AFTER-CARE GUIDELINES     These instructions provide you with information on caring for yourself and your shoulder after surgery. Your health care provider may also give you instructions that are more specific. Your treatment has been planned and performed according to current medical practices but problems sometimes occur. Call your health care provider if you have any problems or questions.     WHAT TO EXPECT AFTER THE PROCEDURE   After your procedure, your shoulder and arm will typically be stiff, sore, and bruised. This will improve over time.     HOME CARE INSTRUCTIONS   · Follow your home pain management plan as discussed with your nurse and as directed by your provider.   · It is important to follow any scheduled pain medications as directed for maximal pain relief.    · If prescribed opioid medication, the goal is to use opioids ONLY IF NEEDED and to wean off prescription pain medicine as soon as possible.   · Apply ice to the injured area for several days after surgery:   · Put ice in a plastic bag.   · Place a towel between your skin and the bag.   · Leave the ice on for 20 minutes, 2-3 times a day at a minimum.   · You may resume your normal diet and activities as directed by your provider.   · Your arm will be in a sling. You will need to wear this for 4-6 weeks after surgery.   · It may be more comfortable to sleep in a recliner for several days or weeks after surgery.   · Do not use your arm to push yourself up in bed or from a chair.   · If prescribed a home exercise plan, follow the program of home exercises as suggested by your provider and/or occupational therapist. Do the exercises at least 3x daily as directed.   · Do not lift anything heavier than a cup of coffee for the first 6 weeks after surgery.   · Ask for help. If you do not have adequate assistance at home, please seek advice from your provider about home care or other services.   · Change dressings only if necessary and as directed. Keep wound dry and covered until otherwise directed by your provider.   · Make sure that you have a follow-up appointment with your provider after surgery.     SEEK URGENT MEDICAL CARE IF:   · You have redness, swelling, or increased pain at your operative site.   · You see pus coming from the wound.   · You have a fever greater than 100.5° F.   · You notice a bad smell coming from the wound or dressing.   · The edges of the wound break open after suture or staple removal.   · You have increasing pain with movement of the shoulder.   · You develop a rash.   · You have chest pain or shortness of breath.     This information is not intended to replace advice given to you by your health care provider. Please discuss any specific questions you have with your health care provider.        -Is this patient being discharged with medication to prevent blood clots?  Yes, Aspirin Aspirin, ASA oral tablets  What is this medicine?  ASPIRIN (AS pir in) is a pain reliever. It is used to treat mild pain and fever. This medicine is also used as directed by a doctor to prevent and to treat heart attacks, to prevent strokes and blood clots, and to treat arthritis or inflammation.  This medicine may be used for other purposes; ask your health care provider or pharmacist if you have questions.  COMMON BRAND NAME(S): Aspir-Low, Aspir-Rosario, Aspirtab, Debbie Advanced Aspirin, Debbie Aspirin, Debbie Aspirin Extra Strength, Debbie Aspirin Plus, Debbie Extra Strength, Debbie Extra Strength Plus, Debbie Genuine Aspirin, Debbie Womens Aspirin, Bufferin, Bufferin Extra Strength, Bufferin Low Dose  What should I tell my health care provider before I take this medicine?  They need to know if you have any of these conditions:  · anemia  · asthma  · bleeding problems  · child with chickenpox, the flu, or other viral infection  · diabetes  · gout  · if you frequently drink alcohol containing drinks  · kidney disease  · liver disease  · low level of vitamin K  · lupus  · smoke tobacco  · stomach ulcers or other problems  · an unusual or allergic reaction to aspirin, tartrazine dye, other medicines, dyes, or preservatives  · pregnant or trying to get pregnant  · breast-feeding  How should I use this medicine?  Take this medicine by mouth with a glass of water. Follow the directions on the package or prescription label. You can take this medicine with or without food. If it upsets your stomach, take it with food. Do not take your medicine more often than directed.  Talk to your pediatrician regarding the use of this medicine in children. While this drug may be prescribed for children as young as 12 years of age for selected conditions, precautions do apply. Children and teenagers should not use this medicine to treat chicken pox or  flu symptoms unless directed by a doctor.  Patients over 65 years old may have a stronger reaction and need a smaller dose.  Overdosage: If you think you have taken too much of this medicine contact a poison control center or emergency room at once.  NOTE: This medicine is only for you. Do not share this medicine with others.  What if I miss a dose?  If you are taking this medicine on a regular schedule and miss a dose, take it as soon as you can. If it is almost time for your next dose, take only that dose. Do not take double or extra doses.  What may interact with this medicine?  Do not take this medicine with any of the following medications:  · cidofovir  · ketorolac  · probenecid  This medicine may also interact with the following medications:  · alcohol  · alendronate  · bismuth subsalicylate  · flavocoxid  · herbal supplements like feverfew, garlic, justin, ginkgo biloba, horse chestnut  · medicines for diabetes or glaucoma like acetazolamide, methazolamide  · medicines for gout  · medicines that treat or prevent blood clots like enoxaparin, heparin, ticlopidine, warfarin  · other aspirin and aspirin-like medicines  · NSAIDs, medicines for pain and inflammation, like ibuprofen or naproxen  · pemetrexed  · sulfinpyrazone  · varicella live vaccine  This list may not describe all possible interactions. Give your health care provider a list of all the medicines, herbs, non-prescription drugs, or dietary supplements you use. Also tell them if you smoke, drink alcohol, or use illegal drugs. Some items may interact with your medicine.  What should I watch for while using this medicine?  If you are treating yourself for pain, tell your doctor or health care professional if the pain lasts more than 10 days, if it gets worse, or if there is a new or different kind of pain. Tell your doctor if you see redness or swelling. Also, check with your doctor if you have a fever that lasts for more than 3 days. Only take this  medicine to prevent heart attacks or blood clotting if prescribed by your doctor or health care professional.  Do not take aspirin or aspirin-like medicines with this medicine. Too much aspirin can be dangerous. Always read the labels carefully.  This medicine can irritate your stomach or cause bleeding problems. Do not smoke cigarettes or drink alcohol while taking this medicine. Do not lie down for 30 minutes after taking this medicine to prevent irritation to your throat.  If you are scheduled for any medical or dental procedure, tell your healthcare provider that you are taking this medicine. You may need to stop taking this medicine before the procedure.  This medicine may be used to treat migraines. If you take migraine medicines for 10 or more days a month, your migraines may get worse. Keep a diary of headache days and medicine use. Contact your healthcare professional if your migraine attacks occur more frequently.  What side effects may I notice from receiving this medicine?  Side effects that you should report to your doctor or health care professional as soon as possible:  · allergic reactions like skin rash, itching or hives, swelling of the face, lips, or tongue  · breathing problems  · changes in hearing, ringing in the ears  · confusion  · general ill feeling or flu-like symptoms  · pain on swallowing  · redness, blistering, peeling or loosening of the skin, including inside the mouth or nose  · signs and symptoms of bleeding such as bloody or black, tarry stools; red or dark-brown urine; spitting up blood or brown material that looks like coffee grounds; red spots on the skin; unusual bruising or bleeding from the eye, gums, or nose  · trouble passing urine or change in the amount of urine  · unusually weak or tired  · yellowing of the eyes or skin  Side effects that usually do not require medical attention (report to your doctor or health care professional if they continue or are  bothersome):  · diarrhea or constipation  · headache  · nausea, vomiting  · stomach gas, heartburn  This list may not describe all possible side effects. Call your doctor for medical advice about side effects. You may report side effects to FDA at 1-489-PCV-7255.  Where should I keep my medicine?  Keep out of the reach of children.  Store at room temperature between 15 and 30 degrees C (59 and 86 degrees F). Protect from heat and moisture. Do not use this medicine if it has a strong vinegar smell. Throw away any unused medicine after the expiration date.  NOTE: This sheet is a summary. It may not cover all possible information. If you have questions about this medicine, talk to your doctor, pharmacist, or health care provider.  © 2020 [employer] (2018-01-30 10:42:13)      · Is patient discharged on Warfarin / Coumadin?   No     Depression / Suicide Risk    As you are discharged from this [facility] facility, it is important to learn how to keep safe from harming yourself.    Recognize the warning signs:  · Abrupt changes in personality, positive or negative- including increase in energy   · Giving away possessions  · Change in eating patterns- significant weight changes-  positive or negative  · Change in sleeping patterns- unable to sleep or sleeping all the time   · Unwillingness or inability to communicate  · Depression  · Unusual sadness, discouragement and loneliness  · Talk of wanting to die  · Neglect of personal appearance   · Rebelliousness- reckless behavior  · Withdrawal from people/activities they love  · Confusion- inability to concentrate     If you or a loved one observes any of these behaviors or has concerns about self-harm, here's what you can do:  · Talk about it- your feelings and reasons for harming yourself  · Remove any means that you might use to hurt yourself (examples: pills, rope, extension cords, firearm)  · Get professional help from the community (Mental Health, Substance  Abuse, psychological counseling)  · Do not be alone:Call your Safe Contact- someone whom you trust who will be there for you.  · Call your local CRISIS HOTLINE 386-9963 or 612-329-6342  · Call your local Children's Mobile Crisis Response Team [place] (893) 581-1946 or www.[employer]  · Call the toll free National Suicide Prevention Hotlines   · National Suicide Prevention Lifeline 684-496-BFGG (1269)  · [employer] Hope Line Network 800-SUICIDE (853-0957)      ACTIVITY: Rest and take it easy for the first 24 hours.  A responsible adult is recommended to remain with you during that time.  It is normal to feel sleepy.  We encourage you to not do anything that requires balance, judgment or coordination.    MILD FLU-LIKE SYMPTOMS ARE NORMAL. YOU MAY EXPERIENCE GENERALIZED MUSCLE ACHES, THROAT IRRITATION, HEADACHE AND/OR SOME NAUSEA.    FOR 24 HOURS DO NOT:  Drive, operate machinery or run household appliances.  Drink beer or alcoholic beverages.   Make important decisions or sign legal documents.    SPECIAL INSTRUCTIONS:   SHOULDER REPLACEMENT, AFTER-CARE GUIDELINES     These instructions provide you with information on caring for yourself and your shoulder after surgery. Your health care provider may also give you instructions that are more specific. Your treatment has been planned and performed according to current medical practices but problems sometimes occur. Call your health care provider if you have any problems or questions.     WHAT TO EXPECT AFTER THE PROCEDURE   After your procedure, your shoulder and arm will typically be stiff, sore, and bruised. This will improve over time.     HOME CARE INSTRUCTIONS   · Follow your home pain management plan as discussed with your nurse and as directed by your provider.   · It is important to follow any scheduled pain medications as directed for maximal pain relief.   · If prescribed opioid medication, the goal is to use opioids ONLY IF NEEDED and to wean off  prescription pain medicine as soon as possible.   · Apply ice to the injured area for several days after surgery:   · Put ice in a plastic bag.   · Place a towel between your skin and the bag.   · Leave the ice on for 20 minutes, 2-3 times a day at a minimum.   · You may resume your normal diet and activities as directed by your provider.   · Your arm will be in a sling. You will need to wear this for 4-6 weeks after surgery.   · It may be more comfortable to sleep in a recliner for several days or weeks after surgery.   · Do not use your arm to push yourself up in bed or from a chair.   · If prescribed a home exercise plan, follow the program of home exercises as suggested by your provider and/or occupational therapist. Do the exercises at least 3x daily as directed.   · Do not lift anything heavier than a cup of coffee for the first 6 weeks after surgery.   · Ask for help. If you do not have adequate assistance at home, please seek advice from your provider about home care or other services.   · Change dressings only if necessary and as directed. Keep wound dry and covered until otherwise directed by your provider.   · Make sure that you have a follow-up appointment with your provider after surgery.     SEEK URGENT MEDICAL CARE IF:   · You have redness, swelling, or increased pain at your operative site.   · You see pus coming from the wound.   · You have a fever greater than 100.5° F.   · You notice a bad smell coming from the wound or dressing.   · The edges of the wound break open after suture or staple removal.   · You have increasing pain with movement of the shoulder.   · You develop a rash.   · You have chest pain or shortness of breath.     This information is not intended to replace advice given to you by your health care provider. Please discuss any specific questions you have with your health care provider.       DIET: To avoid nausea, slowly advance diet as tolerated, avoiding spicy or greasy foods for  the first day.  Add more substantial food to your diet according to your physician's instructions.    INCREASE FLUIDS AND FIBER TO AVOID CONSTIPATION.    SURGICAL DRESSING/BATHING: Ok to take showers, no baths    FOLLOW-UP APPOINTMENT:  A follow-up appointment should be arranged with your doctor; call to schedule.    You should CALL YOUR PHYSICIAN if you develop:  Fever greater than 101 degrees F.  Pain not relieved by medication, or persistent nausea or vomiting.  Excessive bleeding (blood soaking through dressing) or unexpected drainage from the wound.  Extreme redness or swelling around the incision site, drainage of pus or foul smelling drainage.  Inability to urinate or empty your bladder within 8 hours.  Problems with breathing or chest pain.    You should call 911 if you develop problems with breathing or chest pain.  If you are unable to contact your doctor or surgical center, you should go to the nearest emergency room or urgent care center.  Physician's telephone #: [phone]    If any questions arise, call your doctor.  If your doctor is not available, please feel free to call the Surgical Center at [phone]. The Contact Center is open Monday through Friday 7AM to 5PM and may speak to a nurse at [phone], or toll free at [phone].     A registered nurse may call you a few days after your surgery to see how you are doing after your procedure.    MEDICATIONS: Resume taking daily medication.  Take prescribed pain medication with food.  If no medication is prescribed, you may take non-aspirin pain medication if needed.  PAIN MEDICATION CAN BE VERY CONSTIPATING.  Take a stool softener or laxative such as senokot, pericolace, or milk of magnesia if needed.    Prescription given for n/a.  Last pain medication given at 1345.    If your physician has prescribed pain medication that includes Acetaminophen (Tylenol), do not take additional Acetaminophen (Tylenol) while taking the prescribed  medication.    Depression / Suicide Risk    As you are discharged from this [facility] facility, it is important to learn how to keep safe from harming yourself.    Recognize the warning signs:  · Abrupt changes in personality, positive or negative- including increase in energy   · Giving away possessions  · Change in eating patterns- significant weight changes-  positive or negative  · Change in sleeping patterns- unable to sleep or sleeping all the time   · Unwillingness or inability to communicate  · Depression  · Unusual sadness, discouragement and loneliness  · Talk of wanting to die  · Neglect of personal appearance   · Rebelliousness- reckless behavior  · Withdrawal from people/activities they love  · Confusion- inability to concentrate     If you or a loved one observes any of these behaviors or has concerns about self-harm, here's what you can do:  · Talk about it- your feelings and reasons for harming yourself  · Remove any means that you might use to hurt yourself (examples: pills, rope, extension cords, firearm)  · Get professional help from the community (Mental Health, Substance Abuse, psychological counseling)  · Do not be alone:Call your Safe Contact- someone whom you trust who will be there for you.  · Call your local CRISIS HOTLINE 056-8526 or 735-983-4209  · Call your local Children's Mobile Crisis Response Team [place] (054) 416-1743 or www.[employer]  · Call the toll free National Suicide Prevention Hotlines   · National Suicide Prevention Lifeline 969-487-UMWM (7963)  National Hope Line Network 800-SUICIDE (920-9929)    Peripheral Nerve Block Discharge Instructions from Same Day Surgery and Inpatient :  What to Expect - Upper Extremity  · You may experience numbness and weakness in shoulder, arm and hand  on the same side as your surgery  · This is normal. For some people, this may be an unpleasant sensation. Be very careful with your numb limb  · Ask for help when you need  "it  Shoulder Surgery Side Effects  · In addition to numbness and weakness you may experience other symptoms  · Other nerves that are close to those nerves injected can also be affected by local anesthesia  · You may experience a hoarseness in your voice  · Your breathing may feel different  · You may also notice drooping of your eyelid, pupil constriction, and decreased sweating, on the side of your surgery  · All of these side effects are normal and will resolve when the local anesthetic wears off   Prevent Injury  · Protect the limb like a baby  · Beware of exposing your limb to extreme heat or cold or trauma  · The limb may be injured without you noticing because it is numb  · Keep the limb elevated whenever possible  · Do not sleep on the limb  · Change the position of the limb regularly  · Avoid putting pressure on your surgical limb  Pain Control  · The initial block on the day of surgery will make your extremity feel \"numb\"  · Any consecutive injection including prior to discharge from the hospital will make your extremity feel \"numb\"  · You may feel an aching or burning when the local anesthesia starts to wear off  · Take pain pills as prescribed by your surgeon  · Call your surgeon or anesthesiologist if you do not have adequate pain control  "

## 2021-05-14 NOTE — CARE PLAN
The patient is Stable - Low risk of patient condition declining or worsening    Shift Goals  Clinical Goals: Pt pain will be reduced  Patient Goals: Pt will be able to move without assistance  Family Goals: none    Progress made toward(s) clinical / shift goals:  Pt pain management was tolerable.    Patient is not progressing towards the following goals:    Problem: Pain Management  Goal: Pain level will decrease to patient's comfort goal  Outcome: Progressing     Problem: Safety  Goal: Will remain free from injury  Outcome: Progressing  Goal: Will remain free from falls  Outcome: Progressing     Problem: Infection  Goal: Will remain free from infection  Outcome: Progressing

## 2021-05-14 NOTE — DISCHARGE PLANNING
Anticipated Discharge Disposition: SNF    Action: Spoke with Patricia at [facility], no bed available today.     Spoke with Soren at [facility] who states they have a bed available. He will call back with transfer time.     Barriers to Discharge: None    Plan: D/C to SNF    Update: Received return call from Soren with [facility]. They will pick pt up at 1630 today. RN notified.     COBRA packet completed and given to BSN. Pt states her brother is aware of transfer.

## 2021-05-14 NOTE — PROGRESS NOTES
1435 Report given to [facility].     1630 [facility] discharge to [facility] in good and stable conditions. Left with Facility transportation staff and accompanied with brother.

## 2021-05-14 NOTE — DISCHARGE SUMMARY
DATE OF ADMISSION:  05/11/2021   DATE OF DISCHARGE:  05/14/2021     PROCEDURE:  Right reverse total shoulder arthroplasty.     HOSPITAL COURSE:  The patient was brought to the operating room where she   underwent an interscalene nerve block followed by general anesthesia and then   a right reverse total shoulder arthroplasty without complication.  She was   taken to the postanesthesia care unit, followed by the orthopedic floor where   she underwent an unremarkable postoperative course.  On discharge, she was   tolerating a general diet and ambulating without assistance.  Her pain well   controlled with oral medications.     PHYSICAL FINDINGS:  The incision is clean, dry and intact without drainage.    She is neurovascularly intact.     FOLLOWUP:  She will follow up with Dr. Mosqueda in the outpatient clinic in 1   week.  Ongoing care, she needs assistance with activities of daily living as   she has limited function of the left upper extremity and surgery on the right   upper extremity.  PT, OT orders she can do active range of motion of the   elbow, wrist and hand with passive range of motion only to include only   pendulums of the right shoulder for 6 weeks.  She can have active range of   motion of the left upper extremity.  She is weightbearing as tolerated.     ONGOING MEDICATIONS:  Tylenol 1000 mg p.o. q.6 hours p.r.n., oxycodone 5 mg 1   tablet p.o. q.4 hours p.r.n., tramadol 50 mg p.o. q.4 hours p.r.n., Lipitor 20   mg daily, diclofenac 75 mg b.i.d. p.r.n., Colace 100 mg b.i.d. p.r.n.,   furosemide 40 mg daily, Mucinex tablets 600 mg twice a day p.r.n., lisinopril   40 mg daily, Prilosec 40 mg daily p.r.n.        ______________________________  JEANNE MOSQUEDA MD RD/SY[facility]    DD:  05/13/2021 18:17  DT:  05/13/2021 18:47    Job#:  [id number]
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
## 2021-10-22 DIAGNOSIS — M79.89 LEG SWELLING: ICD-10-CM

## 2021-10-22 DIAGNOSIS — I10 ESSENTIAL HYPERTENSION: ICD-10-CM

## 2021-10-22 DIAGNOSIS — E78.5 DYSLIPIDEMIA: ICD-10-CM

## 2021-10-22 RX ORDER — ATORVASTATIN CALCIUM 20 MG/1
TABLET, FILM COATED ORAL
Qty: 90 TABLET | Refills: 3 | Status: SHIPPED | OUTPATIENT
Start: 2021-10-22 | End: 2022-01-19 | Stop reason: SDUPTHER

## 2021-10-22 RX ORDER — FUROSEMIDE 40 MG/1
TABLET ORAL
Qty: 60 TABLET | Refills: 0 | Status: SHIPPED | OUTPATIENT
Start: 2021-10-22 | End: 2021-11-23

## 2021-11-23 DIAGNOSIS — I10 ESSENTIAL HYPERTENSION: ICD-10-CM

## 2021-11-23 DIAGNOSIS — M79.89 LEG SWELLING: ICD-10-CM

## 2021-11-23 RX ORDER — FUROSEMIDE 40 MG/1
TABLET ORAL
Qty: 60 TABLET | Refills: 0 | Status: SHIPPED | OUTPATIENT
Start: 2021-11-23 | End: 2021-12-28

## 2021-12-10 DIAGNOSIS — I10 ESSENTIAL HYPERTENSION: ICD-10-CM

## 2021-12-10 RX ORDER — LISINOPRIL 40 MG/1
TABLET ORAL
Qty: 90 TABLET | Refills: 0 | Status: SHIPPED | OUTPATIENT
Start: 2021-12-10 | End: 2022-01-19 | Stop reason: SDUPTHER

## 2021-12-28 DIAGNOSIS — I10 ESSENTIAL HYPERTENSION: ICD-10-CM

## 2021-12-28 DIAGNOSIS — M79.89 LEG SWELLING: ICD-10-CM

## 2021-12-28 RX ORDER — FUROSEMIDE 40 MG/1
TABLET ORAL
Qty: 60 TABLET | Refills: 0 | Status: SHIPPED | OUTPATIENT
Start: 2021-12-28 | End: 2022-01-19 | Stop reason: SDUPTHER

## 2022-01-02 DIAGNOSIS — I10 ESSENTIAL HYPERTENSION: ICD-10-CM

## 2022-01-02 DIAGNOSIS — E87.6 HYPOKALEMIA: ICD-10-CM

## 2022-01-03 RX ORDER — POTASSIUM CHLORIDE 1500 MG/1
20 TABLET, EXTENDED RELEASE ORAL DAILY
Qty: 30 TABLET | Refills: 0 | Status: SHIPPED | OUTPATIENT
Start: 2022-01-03 | End: 2022-01-19 | Stop reason: SDUPTHER

## 2022-01-04 ENCOUNTER — TELEPHONE (OUTPATIENT)
Dept: MEDICAL GROUP | Age: 82
End: 2022-01-04

## 2022-01-04 DIAGNOSIS — Z12.83 SKIN CANCER SCREENING: ICD-10-CM

## 2022-01-04 NOTE — TELEPHONE ENCOUNTER
Caller Name: Sharon    Call Back Number: [phone]    How would the patient prefer to be contacted with a response: Phone call do NOT leave a detailed message    2. SPECIFIC Action To Be Taken: Referral pending, please sign.    3. Diagnosis/Clinical Reason for Request: face skin cancer concerns    4. Specialty & Provider Name/Lab/Imaging Location: dermatology    5. Is appointment scheduled for requested order/referral: no    Patient was not informed they will receive a return phone call from the office ONLY if there are any questions before processing their request. Advised to call back if they haven't received a call from the referral department in 5 days.

## 2022-01-07 NOTE — TELEPHONE ENCOUNTER
Phone Number Called: [phone] (home)     Message: Called pt and scheduled an appt for 4/20    Left Message for patient to call back: no       SBAR report out to RHONDA Espinoza RN. Care of pt relinquished.

## 2022-01-18 ENCOUNTER — HOSPITAL ENCOUNTER (OUTPATIENT)
Dept: LAB | Facility: MEDICAL CENTER | Age: 82
End: 2022-01-18
Attending: INTERNAL MEDICINE
Payer: MEDICARE

## 2022-01-18 DIAGNOSIS — M25.511 CHRONIC RIGHT SHOULDER PAIN: ICD-10-CM

## 2022-01-18 DIAGNOSIS — E78.5 DYSLIPIDEMIA: ICD-10-CM

## 2022-01-18 DIAGNOSIS — N18.30 STAGE 3 CHRONIC KIDNEY DISEASE, UNSPECIFIED WHETHER STAGE 3A OR 3B CKD: ICD-10-CM

## 2022-01-18 DIAGNOSIS — G89.29 CHRONIC RIGHT SHOULDER PAIN: ICD-10-CM

## 2022-01-18 LAB
ALBUMIN SERPL BCP-MCNC: 4.1 G/DL (ref 3.2–4.9)
ALBUMIN/GLOB SERPL: 1.7 G/DL
ALP SERPL-CCNC: 77 U/L (ref 30–99)
ALT SERPL-CCNC: 10 U/L (ref 2–50)
ANION GAP SERPL CALC-SCNC: 9 MMOL/L (ref 7–16)
AST SERPL-CCNC: 14 U/L (ref 12–45)
BASOPHILS # BLD AUTO: 1.2 % (ref 0–1.8)
BASOPHILS # BLD: 0.07 K/UL (ref 0–0.12)
BILIRUB SERPL-MCNC: 0.3 MG/DL (ref 0.1–1.5)
BUN SERPL-MCNC: 25 MG/DL (ref 8–22)
CALCIUM SERPL-MCNC: 8.9 MG/DL (ref 8.4–10.2)
CHLORIDE SERPL-SCNC: 109 MMOL/L (ref 96–112)
CHOLEST SERPL-MCNC: 163 MG/DL (ref 100–199)
CO2 SERPL-SCNC: 24 MMOL/L (ref 20–33)
CREAT SERPL-MCNC: 0.96 MG/DL (ref 0.5–1.4)
EOSINOPHIL # BLD AUTO: 0.16 K/UL (ref 0–0.51)
EOSINOPHIL NFR BLD: 2.7 % (ref 0–6.9)
ERYTHROCYTE [DISTWIDTH] IN BLOOD BY AUTOMATED COUNT: 48.6 FL (ref 35.9–50)
FASTING STATUS PATIENT QL REPORTED: NORMAL
GLOBULIN SER CALC-MCNC: 2.4 G/DL (ref 1.9–3.5)
GLUCOSE SERPL-MCNC: 98 MG/DL (ref 65–99)
HCT VFR BLD AUTO: 40.4 % (ref 37–47)
HDLC SERPL-MCNC: 66 MG/DL
HGB BLD-MCNC: 12.7 G/DL (ref 12–16)
IMM GRANULOCYTES # BLD AUTO: 0.02 K/UL (ref 0–0.11)
IMM GRANULOCYTES NFR BLD AUTO: 0.3 % (ref 0–0.9)
LDLC SERPL CALC-MCNC: 78 MG/DL
LYMPHOCYTES # BLD AUTO: 0.83 K/UL (ref 1–4.8)
LYMPHOCYTES NFR BLD: 13.9 % (ref 22–41)
MCH RBC QN AUTO: 29.5 PG (ref 27–33)
MCHC RBC AUTO-ENTMCNC: 31.4 G/DL (ref 33.6–35)
MCV RBC AUTO: 94 FL (ref 81.4–97.8)
MONOCYTES # BLD AUTO: 0.55 K/UL (ref 0–0.85)
MONOCYTES NFR BLD AUTO: 9.2 % (ref 0–13.4)
NEUTROPHILS # BLD AUTO: 4.33 K/UL (ref 2–7.15)
NEUTROPHILS NFR BLD: 72.7 % (ref 44–72)
NRBC # BLD AUTO: 0 K/UL
NRBC BLD-RTO: 0 /100 WBC
PLATELET # BLD AUTO: 165 K/UL (ref 164–446)
PMV BLD AUTO: 10.9 FL (ref 9–12.9)
POTASSIUM SERPL-SCNC: 4.4 MMOL/L (ref 3.6–5.5)
PROT SERPL-MCNC: 6.5 G/DL (ref 6–8.2)
RBC # BLD AUTO: 4.3 M/UL (ref 4.2–5.4)
SODIUM SERPL-SCNC: 142 MMOL/L (ref 135–145)
TRIGL SERPL-MCNC: 96 MG/DL (ref 0–149)
WBC # BLD AUTO: 6 K/UL (ref 4.8–10.8)

## 2022-01-18 PROCEDURE — 36415 COLL VENOUS BLD VENIPUNCTURE: CPT

## 2022-01-18 PROCEDURE — 80053 COMPREHEN METABOLIC PANEL: CPT

## 2022-01-18 PROCEDURE — 80061 LIPID PANEL: CPT

## 2022-01-18 PROCEDURE — 85025 COMPLETE CBC W/AUTO DIFF WBC: CPT

## 2022-01-19 ENCOUNTER — TELEMEDICINE (OUTPATIENT)
Dept: MEDICAL GROUP | Age: 82
End: 2022-01-19
Payer: MEDICARE

## 2022-01-19 VITALS — HEIGHT: 60 IN | TEMPERATURE: 97.2 F | BODY MASS INDEX: 39.27 KG/M2 | WEIGHT: 200 LBS

## 2022-01-19 DIAGNOSIS — E78.5 DYSLIPIDEMIA: ICD-10-CM

## 2022-01-19 DIAGNOSIS — N18.30 STAGE 3 CHRONIC KIDNEY DISEASE, UNSPECIFIED WHETHER STAGE 3A OR 3B CKD: ICD-10-CM

## 2022-01-19 DIAGNOSIS — M79.89 LEG SWELLING: ICD-10-CM

## 2022-01-19 DIAGNOSIS — Z12.31 ENCOUNTER FOR SCREENING MAMMOGRAM FOR MALIGNANT NEOPLASM OF BREAST: ICD-10-CM

## 2022-01-19 DIAGNOSIS — I48.0 PAROXYSMAL ATRIAL FIBRILLATION (HCC): ICD-10-CM

## 2022-01-19 DIAGNOSIS — E87.6 HYPOKALEMIA: ICD-10-CM

## 2022-01-19 DIAGNOSIS — I10 ESSENTIAL HYPERTENSION: ICD-10-CM

## 2022-01-19 DIAGNOSIS — E66.9 OBESITY (BMI 30-39.9): ICD-10-CM

## 2022-01-19 PROCEDURE — 99214 OFFICE O/P EST MOD 30 MIN: CPT | Mod: 95 | Performed by: INTERNAL MEDICINE

## 2022-01-19 RX ORDER — LISINOPRIL 40 MG/1
40 TABLET ORAL
Qty: 90 TABLET | Refills: 1 | Status: SHIPPED | OUTPATIENT
Start: 2022-01-19 | End: 2022-07-02

## 2022-01-19 RX ORDER — POTASSIUM CHLORIDE 1500 MG/1
20 TABLET, EXTENDED RELEASE ORAL DAILY
Qty: 90 TABLET | Refills: 1 | Status: SHIPPED | OUTPATIENT
Start: 2022-01-19 | End: 2022-03-19

## 2022-01-19 RX ORDER — FUROSEMIDE 40 MG/1
40 TABLET ORAL 2 TIMES DAILY
Qty: 180 TABLET | Refills: 1 | Status: SHIPPED | OUTPATIENT
Start: 2022-01-19 | End: 2023-03-10 | Stop reason: SDUPTHER

## 2022-01-19 RX ORDER — ATORVASTATIN CALCIUM 20 MG/1
20 TABLET, FILM COATED ORAL
Qty: 90 TABLET | Refills: 3 | Status: SHIPPED | OUTPATIENT
Start: 2022-01-19 | End: 2022-11-15

## 2022-01-19 ASSESSMENT — PATIENT HEALTH QUESTIONNAIRE - PHQ9: CLINICAL INTERPRETATION OF PHQ2 SCORE: 0

## 2022-01-19 ASSESSMENT — FIBROSIS 4 INDEX: FIB4 SCORE: 2.15

## 2022-01-19 NOTE — PROGRESS NOTES
Telemedicine Visit: Established Patient     This Remote Face to Face encounter was conducted via Zoom. Given the importance of social distancing and other strategies recommended to reduce the risk of COVID-19 transmission, I am providing medical care to this patient via audio/video visit in place of an in person visit at the request of the patient. Verbal consent to telehealth, risks, benefits, and consequences were discussed. Patient retains the right to withdraw at any time. All existing confidentiality protections apply. The patient has access to all transmitted medical information. No dissemination of any patient images or information to other entities without further written consent.    CHIEF COMPLAINT     Chief Complaint   Patient presents with   • Medication Refill   Hypertension, labs    HPI  Sharon Callahan is a 81 y.o. female who presents today for the following     Hypertension, hypokalemia, LE swelling, PAF  CKD stage III  Meds: Lisinopril 40 mg QD, Lasix 20 mg twice daily, potassium 20 mEq daily:  -  taking as prescribed.   Measuring BP at home: yes, it has been < 150/90.  Denies: - headaches, vision problems, tinnitus.  - chest pain/pressure, palpitations, irregular heart beats, exertional, dyspnea, peripheral edema.  - medication side effect: unusual fatigue, slow heartbeat, foot/leg swelling.  Low salt diet: yes  Diet: healthy  Exercise: daily yard work  BMI: 39  FH of HTN: Multiple  Labs showed hypokalemia, borderline, on potassium supplement.  LE swelling improved.     The last echocardiography, 2/26/19:  Technically difficult and incomplete study due to breast implant.   Parasternal long axis view was subopotimal and no parasternal short   axis images.  Grossly normal chamber sizes.  Right heart appeared enlarged in some views but likely from transducer   position and/or locations.  Normal left ventricular systolic function.  Left ventricular ejection fraction is visually estimated to be  60%.  Grossly normal regional wall motion.  The aortic valve is not well visualized.  Aortic sclerosis without stenosis.  No mitral stenosis or regurgitation seen.  Mild tricuspid regurgitation.  Normal inferior vena cava size and inspiratory collapse.  Right ventricular systolic pressure is estimated to be 25 mmHg.  Normal pericardium without effusion.      Dyslipidemia  On simvastatin, 40 mg, taking daily, as prescribed. No myalgias, muscle cramps or pain.   Diet /Exercise/BMI: As above  FH: neg     GERD  On omeprazole, 20 mg QD; takes medication as prescribed, that controls sx.   Denies:   - heartburn, epigastric pain.   -  nausea, vomiting, or diarrhea  - dysphagia  - abnormal cough  - blood in the stool or dark tarry stools.  - early satiety  - tobacco use.    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 No refills w/o labs and OV 30 Tablet 0   • furosemide (LASIX) 40 MG Tab TAKE 1 TABLET BY MOUTH TWICE A DAY 60 Tablet 0   • lisinopril (PRINIVIL) 40 MG tablet TAKE 1 TABLET BY MOUTH EVERY DAY 90 Tablet 0   • atorvastatin (LIPITOR) 20 MG Tab TAKE 1 TABLET BY MOUTH EVERYDAY AT BEDTIME 90 Tablet 3   • VITAMIN D PO Take  by mouth.     • Cyanocobalamin (VITAMIN B-12 PO) Take  by mouth.     •  "Acetaminophen (TYLENOL EXTRA STRENGTH PO) Take  by mouth.     • traMADol (ULTRAM) 50 MG Tab TAKE 1 TABLET BY MOUTH EVERY 6 HOURS AS NEEDED FOR PAIN. G89.18, 7DAY     • omeprazole (PRILOSEC) 40 MG delayed-release capsule Take 1 Cap by mouth every day. Indications: Gastroesophageal Reflux Disease, Heartburn 90 Cap 3   • diclofenac CR (VOLTAREN-XR) 100 MG TABLET SR 24 HR tablet TAKE 1 TABLET BY MOUTH EVERY DAY 30 Tab 2   • traZODone (DESYREL) 50 MG Tab Take  mg by mouth at bedtime as needed for Sleep.       No current facility-administered medications for this visit.     ALLERGIES  Allergies: Pcn [penicillins], Clindamycin, Influenza virus vacc, Norco [hydrocodone-acetaminophen], Pneumococcal vaccine, Tetracycline, and Xarelto [rivaroxaban]  PAST MEDICAL HISTORY  Past Medical History:   Diagnosis Date   • Chronic pain of both knees 1/9/2019   • Pleural effusion, right 1/4/2017    Status post thoracotomy and decortication   • Hemothorax on right 1/4/2017    Status post thoracotomy and decortication   • Chronic nausea 1/12/2015    Has had GI work up done Dr voss   • BMI 38.0-38.9,adult 9/18/2013   • Depression with anxiety 5/3/2011   • S/P bilateral mastectomy 4/4/2011   • Hyperlipidemia 4/4/2011   • MYESHA (obstructive sleep apnea) 4/4/2011    On cPAP    • Arrhythmia     a-fib   • Arthritis     osteo- shoulders knees   • CATARACT     surgical correcttion bilateral   • Heart burn    • Heart valve disease     \"slight regurgitation\"   • History of cholecystectomy    • Hypertension    • Indigestion    • Pain     left hip   • Range of motion deficit     left elbow, unable to completely extend   • Sleep apnea     CPAP   • Snoring      SURGICAL HISTORY  She  has a past surgical history that includes lisa by laparoscopy (2/1/2011); other (1988); other (1989); uvulopharyngopalatoplasty (1990); cataract phaco with iol (10/20/2011); cataract phaco with iol (11/3/2011); orif, humerus (1950's); pr breast augmentation with " implant; thoracoscopy (Right, 2016); breast biopsy (); hip arthroplasty total (3/26/2009); hip arthroplasty total (12/3/2012); pr total knee arthroplasty (Left, 2019); pr reconstr total shoulder implant (Right, 2021); and biceps tenodesis (Right, 2021).  SOCIAL HISTORY  Social History     Tobacco Use   • Smoking status: Former Smoker     Packs/day: 1.00     Years: 15.00     Pack years: 15.00     Types: Cigarettes     Quit date: 1975     Years since quittin.0   • Smokeless tobacco: Never Used   Vaping Use   • Vaping Use: Never used   Substance Use Topics   • Alcohol use: Yes     Alcohol/week: 0.0 oz     Comment: 1 per day   • Drug use: No     Social History     Social History Narrative   • Not on file     FAMILY HISTORY  Family History   Problem Relation Age of Onset   • Hypertension Mother    • Cancer Father         lung   • Diabetes Father    • Hypertension Father    • Heart Disease Father         MI   • Hypertension Brother    • Sleep Apnea Brother    • Hypertension Maternal Grandmother    • Sleep Apnea Brother    • Hypertension Brother    • Diabetes Brother    • Cancer Paternal Aunt         stomach   • Diabetes Paternal Aunt    • Heart Disease Brother         MI   • Hyperlipidemia Neg Hx    • Stroke Neg Hx      Family Status   Relation Name Status   • Mo     • Fa     • Sis 0 (Not Specified)   • Bro  Alive   • MGMo   at age 65   • Bro  Alive   • Bro older    • PAunt  (Not Specified)   • Bro  (Not Specified)   • Neg Hx  (Not Specified)     ROS   Constitutional: Negative for fever, chills.  HENT: Negative for congestion, sore throat.  Eyes: Negative for vision problems.   Respiratory: Negative for cough, shortness of breath.  Cardiovascular: Negative for chest pain, palpitations.   Gastrointestinal: Negative for heartburn, nausea, abdominal pain.   Genitourinary: Negative for dysuria.  Skin: Negative for rash.   Neuro: Negative for dizziness, weakness  and headaches.   Endo/Heme/Allergies: Does not bruise/bleed easily.   Psychiatric/Behavioral: Negative for depression.    Objective   Vitals obtained by patient:  Temperature 36.2 °C (97.2 °F)   Height 1.524 m (5')   Weight 90.7 kg (200 lb)   Body Mass Index 39.06 kg/m²   Physical Exam:  Constitutional: Alert, no distress, well-groomed.  Skin: No rash in visible areas.  Eye: Round. Conjunctiva clear, lids normal.  ENMT: Lips pink without lesions, good dentition. Phonation normal.  Neck: No visible masses or thyromegaly. Moves freely without pain.  CV: no peripheral cyanosis, tachycardia.  Respiratory: Unlabored respiratory effort, no cough or audible wheezing.  Psych: Alert and oriented x3, normal affect and mood.     Labs     Labs are reviewed and discussed with a patient  Lab Results   Component Value Date/Time    CHOLSTRLTOT 163 01/18/2022 07:57 AM    LDL 78 01/18/2022 07:57 AM    HDL 66 01/18/2022 07:57 AM    TRIGLYCERIDE 96 01/18/2022 07:57 AM       Lab Results   Component Value Date/Time    SODIUM 142 01/18/2022 07:57 AM    POTASSIUM 4.4 01/18/2022 07:57 AM    CHLORIDE 109 01/18/2022 07:57 AM    CO2 24 01/18/2022 07:57 AM    GLUCOSE 98 01/18/2022 07:57 AM    BUN 25 (H) 01/18/2022 07:57 AM    CREATININE 0.96 01/18/2022 07:57 AM    CREATININE 0.79 04/04/2011 12:00 AM    BUNCREATRAT 13 04/04/2011 12:00 AM     Lab Results   Component Value Date/Time    ALKPHOSPHAT 77 01/18/2022 07:57 AM    ASTSGOT 14 01/18/2022 07:57 AM    ALTSGPT 10 01/18/2022 07:57 AM    TBILIRUBIN 0.3 01/18/2022 07:57 AM      Lab Results   Component Value Date/Time    HBA1C 5.6 02/25/2016 07:09 AM    HBA1C 5.4 06/25/2012 10:00 PM    HBA1C 5.4 07/27/2006 03:25 AM     No results found for: TSH  Lab Results   Component Value Date/Time    FREET4 0.82 02/14/2013 01:45 PM    FREET4 0.95 07/08/2011 03:37 PM     Lab Results   Component Value Date/Time    WBC 6.0 01/18/2022 07:57 AM    RBC 4.30 01/18/2022 07:57 AM    HEMOGLOBIN 12.7 01/18/2022 07:57  AM    HEMATOCRIT 40.4 01/18/2022 07:57 AM    MCV 94.0 01/18/2022 07:57 AM    MCH 29.5 01/18/2022 07:57 AM    MCHC 31.4 (L) 01/18/2022 07:57 AM    MPV 10.9 01/18/2022 07:57 AM    NEUTSPOLYS 72.70 (H) 01/18/2022 07:57 AM    LYMPHOCYTES 13.90 (L) 01/18/2022 07:57 AM    MONOCYTES 9.20 01/18/2022 07:57 AM    EOSINOPHILS 2.70 01/18/2022 07:57 AM    BASOPHILS 1.20 01/18/2022 07:57 AM    HYPOCHROMIA 2+ 07/04/2013 07:30 AM    ANISOCYTOSIS 1+ 12/01/2016 06:45 AM      Imaging      Reviewed and discussed per HPI    Assessment and Plan     Sharon Callahan is a 81 y.o. female    1. Essential hypertension  - Controlled, continue with current management.  - potassium Chloride ER (K-TAB) 20 MEQ Tab CR tablet; Take 1 Tablet by mouth every day. No refills w/o labs and OV  Dispense: 90 Tablet; Refill: 1  - furosemide (LASIX) 40 MG Tab; Take 1 Tablet by mouth 2 times a day.  Dispense: 180 Tablet; Refill: 1  - lisinopril (PRINIVIL) 40 MG tablet; Take 1 Tablet by mouth every day.  Dispense: 90 Tablet; Refill: 1  2. Hypokalemia  - potassium Chloride ER (K-TAB) 20 MEQ Tab CR tablet; Take 1 Tablet by mouth every day. No refills w/o labs and OV  Dispense: 90 Tablet; Refill: 1  3. Leg swelling  - furosemide (LASIX) 40 MG Tab; Take 1 Tablet by mouth 2 times a day.  Dispense: 180 Tablet; Refill: 1  4. Paroxysmal atrial fibrillation (HCC)  As above.    5. Stage 3 chronic kidney disease, unspecified whether stage 3a or 3b CKD (HCC)  Stable, continue good hydration, avoid NSAIDs    6. Dyslipidemia  - Controlled, continue with current management.  - atorvastatin (LIPITOR) 20 MG Tab; Take 1 Tablet by mouth at bedtime.  Dispense: 90 Tablet; Refill: 3    7. Obesity (BMI 30-39.9)  - Patient identified as having weight management issue.  Appropriate orders and counseling given.    8. Encounter for screening mammogram for malignant neoplasm of breast  - MA-SCREENING MAMMO BILAT W/TOMOSYNTHESIS W/CAD; Future    Follow-up: in 6 month, labs

## 2022-02-01 ENCOUNTER — TELEPHONE (OUTPATIENT)
Dept: MEDICAL GROUP | Age: 82
End: 2022-02-01

## 2022-02-01 DIAGNOSIS — C44.301 SKIN CANCER OF NOSE: ICD-10-CM

## 2022-02-01 NOTE — TELEPHONE ENCOUNTER
1. Caller Name: Sharon Callahan                        Call Back Number: [phone] (home) [phone] (work)      How would the patient prefer to be contacted with a response: [employer] message    2. SPECIFIC Action To Be Taken: Referral pending, please sign.    3. Diagnosis/Clinical Reason for Request: skin cancer on nose     4. Specialty & Provider Name/Lab/Imaging Location: [facility] dermatology     5. Is appointment scheduled for requested order/referral: no    Patient was not informed they will receive a return phone call from the office ONLY if there are any questions before processing their request. Advised to call back if they haven't received a call from the referral department in 5 days.    Patient is requesting an urgent referral for the skin cancer on her nose. Her previous referral was marked as routine. She would also like the referral sent to [facility] for Dermatology

## 2022-03-18 DIAGNOSIS — I10 ESSENTIAL HYPERTENSION: ICD-10-CM

## 2022-03-18 DIAGNOSIS — E87.6 HYPOKALEMIA: ICD-10-CM

## 2022-03-19 RX ORDER — POTASSIUM CHLORIDE 1500 MG/1
20 TABLET, EXTENDED RELEASE ORAL DAILY
Qty: 90 TABLET | Refills: 1 | Status: SHIPPED | OUTPATIENT
Start: 2022-03-19 | End: 2022-09-26 | Stop reason: SDUPTHER

## 2022-07-02 DIAGNOSIS — I10 ESSENTIAL HYPERTENSION: ICD-10-CM

## 2022-07-02 RX ORDER — LISINOPRIL 40 MG/1
40 TABLET ORAL
Qty: 90 TABLET | Refills: 0 | Status: SHIPPED | OUTPATIENT
Start: 2022-07-02 | End: 2022-10-27 | Stop reason: SDUPTHER

## 2022-07-27 ENCOUNTER — TELEPHONE (OUTPATIENT)
Dept: MEDICAL GROUP | Facility: LAB | Age: 82
End: 2022-07-27
Payer: MEDICARE

## 2022-07-27 NOTE — TELEPHONE ENCOUNTER
NEW PATIENT VISIT PRE-VISIT PLANNING    1.  [employer] Patient is checked in Patient Demographics?Yes    2.  Immunizations were updated in Epic using Reconcile Outside Information activity? Yes         3.  Is this appointment scheduled as a Hospital Follow-Up? No    4.  Patient is due for the following Health Maintenance Topics:   Health Maintenance Due   Topic Date Due   • COVID-19 Vaccine (1) Never done   • IMM ZOSTER VACCINES (1 of 2) Never done   • BONE DENSITY  04/16/2020   • Annual Wellness Visit  07/14/2021     5.  Reviewed/Updated the following with patient:       •   Preferred Pharmacy? Yes       •   Preferred Lab? Yes       •   Preferred Communication? Yes       •   Allergies? Yes       •   Medications? YES. Was Abstract Encounter opened and chart updated? YES  6.  Updated Care Team?       •   DME Company (gait device, O2, CPAP, etc.) YES       •   Other Specialists (eye doctor, derm, GYN, cardiology, endo, etc): YES    7.  AHA (Puls[employer]) form printed for Provider? N/A

## 2022-08-03 ENCOUNTER — OFFICE VISIT (OUTPATIENT)
Dept: MEDICAL GROUP | Facility: LAB | Age: 82
End: 2022-08-03
Payer: MEDICARE

## 2022-08-03 VITALS
BODY MASS INDEX: 42.21 KG/M2 | WEIGHT: 215 LBS | HEIGHT: 60 IN | HEART RATE: 58 BPM | TEMPERATURE: 98.8 F | DIASTOLIC BLOOD PRESSURE: 72 MMHG | SYSTOLIC BLOOD PRESSURE: 124 MMHG | OXYGEN SATURATION: 94 % | RESPIRATION RATE: 15 BRPM

## 2022-08-03 DIAGNOSIS — I48.0 PAROXYSMAL ATRIAL FIBRILLATION (HCC): ICD-10-CM

## 2022-08-03 DIAGNOSIS — N18.30 STAGE 3 CHRONIC KIDNEY DISEASE, UNSPECIFIED WHETHER STAGE 3A OR 3B CKD: ICD-10-CM

## 2022-08-03 DIAGNOSIS — Z76.89 ENCOUNTER TO ESTABLISH CARE: ICD-10-CM

## 2022-08-03 DIAGNOSIS — I10 ESSENTIAL HYPERTENSION: ICD-10-CM

## 2022-08-03 DIAGNOSIS — E78.5 DYSLIPIDEMIA: ICD-10-CM

## 2022-08-03 PROCEDURE — 99214 OFFICE O/P EST MOD 30 MIN: CPT | Performed by: FAMILY MEDICINE

## 2022-08-03 RX ORDER — LOSARTAN POTASSIUM 100 MG/1
100 TABLET ORAL DAILY
Qty: 90 EACH | Refills: 3 | Status: SHIPPED | OUTPATIENT
Start: 2022-08-03 | End: 2023-07-29

## 2022-08-03 ASSESSMENT — FIBROSIS 4 INDEX: FIB4 SCORE: 2.15

## 2022-08-03 NOTE — PROGRESS NOTES
Subjective:   Sharon Callahan is a 81 y.o. female here today for   Chief Complaint   Patient presents with   • Establish Care       #Atrial Fib:  -Chronic longstanding condition currently on aspirin; however, not on any rate control, currently not on any anticoagulation.  When asked if patient has been on anticoagulation she is unsure at this time.  Upon reviewing previous medical records, no discussion regarding anticoagulation appears to be recorded.  Medication records do show a history of metoprolol in 2017, history of Coumadin use in 2013.  Patient states she is feeling well, denies any palpitations, chest pain, shortness of breath.  Denies any previous acute episodes of bleeding or falls.    #Dyslipidemia:  -Currently on Lipitor 20 mg daily.  Compliant with medication, no side effects.  No diet or exercise regimen.    #CKD:  -Low-sodium diet, avoiding NSAIDs.  No concerns.    #Hypertension:  -Chronic condition, currently treated with Lasix, lisinopril.  Is compliant with medication.  No aerobic regimen at this time.      Allergies   Allergen Reactions   • Pcn [Penicillins] Anaphylaxis     Skin turns black   • Clindamycin Rash     Rash     • Influenza Virus Vacc Rash     Red in face   • Norco [Hydrocodone-Acetaminophen]    • Pneumococcal Vaccine Rash   • Tetracycline Rash     Rash    • Xarelto [Rivaroxaban] Rash         Current medicines (including changes today)  Current Outpatient Medications   Medication Sig Dispense Refill   • aspirin EC (ECOTRIN) 81 MG Tablet Delayed Response Take 81 mg by mouth every day.     • lisinopril (PRINIVIL) 40 MG tablet TAKE 1 TABLET BY MOUTH EVERY DAY 90 Tablet 0   • potassium Chloride ER (K-TAB) 20 MEQ Tab CR tablet TAKE 1 TABLET BY MOUTH EVERY DAY. NO REFILLS W/O LABS AND OV 90 Tablet 1   • furosemide (LASIX) 40 MG Tab Take 1 Tablet by mouth 2 times a day. 180 Tablet 1   • atorvastatin (LIPITOR) 20 MG Tab Take 1 Tablet by mouth at bedtime. 90 Tablet 3   • VITAMIN D PO Take  by  mouth.     • Cyanocobalamin (VITAMIN B-12 PO) Take  by mouth.     • Acetaminophen (TYLENOL EXTRA STRENGTH PO) Take  by mouth.     • omeprazole (PRILOSEC) 40 MG delayed-release capsule Take 1 Cap by mouth every day. Indications: Gastroesophageal Reflux Disease, Heartburn 90 Cap 3   • diclofenac CR (VOLTAREN-XR) 100 MG TABLET SR 24 HR tablet TAKE 1 TABLET BY MOUTH EVERY DAY 30 Tab 2   • traZODone (DESYREL) 50 MG Tab Take  mg by mouth at bedtime as needed for Sleep.       No current facility-administered medications for this visit.     She  has a past medical history of Arrhythmia, Arthritis, BMI 38.0-38.9,adult (9/18/2013), CATARACT, Chronic nausea (1/12/2015), Chronic pain of both knees (1/9/2019), Depression with anxiety (5/3/2011), Heart burn, Heart valve disease, Hemothorax on right (1/4/2017), History of cholecystectomy, Hyperlipidemia (4/4/2011), Hypertension, Indigestion, MYESHA (obstructive sleep apnea) (4/4/2011), Pain, Pleural effusion, right (1/4/2017), Range of motion deficit, S/P bilateral mastectomy (4/4/2011), Sleep apnea, and Snoring.    ROS   Review of Systems   Constitutional: Negative for chills and fever.   Respiratory: Negative for shortness of breath and wheezing.    Cardiovascular: Negative for chest pain and palpitations.   Gastrointestinal: Negative for abdominal pain, constipation, diarrhea, nausea and vomiting.   Psychiatric/Behavioral: Negative for depression. The patient is not nervous/anxious.       Objective:     Physical Exam:  /72   Pulse (!) 58   Temp 37.1 °C (98.8 °F) (Temporal)   Resp 15   Ht 1.524 m (5')   Wt 97.5 kg (215 lb)   SpO2 94%  Body mass index is 41.99 kg/m².   Constitutional: Alert, no distress.  Skin: Warm, dry, good turgor, no rashes in visible areas.  Eye: Equal, round and reactive, conjunctiva clear, lids normal.  ENMT: TM's clear bilaterally, lips without lesions, good dentition, oropharynx clear.  Neck: Trachea midline, no masses, no thyromegaly. No  cervical or supraclavicular lymphadenopathy.  Respiratory: Unlabored respiratory effort, lungs clear to auscultation, no wheezes, no rhonchi.  Cardiovascular: Normal S1, S2, no murmur, no edema.  Abdomen: Soft, non-tender, no masses, no hepatosplenomegaly.  Psych: Alert and oriented x3, normal affect and mood.    Assessment and Plan:     1. Encounter to establish care  -All questions concerns were answered at this time.  -Vaccinations/screenings needed at this time: Patient to follow-up to discuss vaccines, bone density scan, annual wellness visit.  -Labs reviewed, no concerns at this time.  -Discussed the importance of a healthy, Mediterranean style diet, routine exercise regimen.  -Discussed general safety measures including seatbelts, helmets, avoidance of smoking, sunscreen, hydration.  -Follow-up for general physical exam on a yearly basis, sooner if needed.    2. Paroxysmal atrial fibrillation (HCC)  -Unsure as to exactly why patient is not on any anticoagulation.  Patient unsure as to this as well.  Will refer to cardiology for further evaluation with recommendations to start anticoagulation.  - REFERRAL TO CARDIOLOGY    3. Dyslipidemia  -Stable.  Continue with Lipitor daily.    4. Stage 3 chronic kidney disease, unspecified whether stage 3a or 3b CKD (HCC)  -Stable.  Continue with low-sodium diet, appropriate hydration, avoidance of NSAIDs.  Reviewed labs, no concerns.    5. Essential hypertension  -Blood pressure is unremarkable.  We will continue with losartan, Lasix.  Will refill losartan at this time.  - losartan (COZAAR) 100 MG Tab; Take 1 Tablet by mouth every day for 360 days.  Dispense: 90 Each; Refill: 3  - REFERRAL TO CARDIOLOGY      Followup: No follow-ups on file.         PLEASE NOTE: This dictation was created using voice recognition software. I have made every reasonable attempt to correct obvious errors, but I expect that there are errors of grammar and possibly content that I did not discover  before finalizing the note.

## 2022-08-06 ASSESSMENT — ENCOUNTER SYMPTOMS
VOMITING: 0
NAUSEA: 0
PALPITATIONS: 0
CHILLS: 0
DIARRHEA: 0
FEVER: 0
DEPRESSION: 0
SHORTNESS OF BREATH: 0
NERVOUS/ANXIOUS: 0
ABDOMINAL PAIN: 0
WHEEZING: 0
CONSTIPATION: 0

## 2022-09-20 ENCOUNTER — APPOINTMENT (OUTPATIENT)
Dept: MEDICAL GROUP | Facility: LAB | Age: 82
End: 2022-09-20
Payer: MEDICARE

## 2022-09-20 ENCOUNTER — HOSPITAL ENCOUNTER (OUTPATIENT)
Dept: LAB | Facility: MEDICAL CENTER | Age: 82
End: 2022-09-20
Attending: NURSE PRACTITIONER
Payer: MEDICARE

## 2022-09-20 DIAGNOSIS — Z11.1 SCREENING FOR TUBERCULOSIS: ICD-10-CM

## 2022-09-20 PROCEDURE — 36415 COLL VENOUS BLD VENIPUNCTURE: CPT

## 2022-09-20 PROCEDURE — 86480 TB TEST CELL IMMUN MEASURE: CPT | Mod: GY

## 2022-09-21 ENCOUNTER — TELEPHONE (OUTPATIENT)
Dept: MEDICAL GROUP | Facility: LAB | Age: 82
End: 2022-09-21
Payer: MEDICARE

## 2022-09-21 DIAGNOSIS — R06.02 SHORTNESS OF BREATH: ICD-10-CM

## 2022-09-21 DIAGNOSIS — R05.9 COUGH: ICD-10-CM

## 2022-09-21 DIAGNOSIS — R76.12 POSITIVE QUANTIFERON-TB GOLD TEST: ICD-10-CM

## 2022-09-21 LAB
GAMMA INTERFERON BACKGROUND BLD IA-ACNC: 0.04 IU/ML
M TB IFN-G BLD-IMP: POSITIVE
M TB IFN-G CD4+ BCKGRND COR BLD-ACNC: 0 IU/ML
MITOGEN IGNF BCKGRD COR BLD-ACNC: >10 IU/ML
QFT TB2 - NIL TBQ2: 0.36 IU/ML

## 2022-09-21 NOTE — TELEPHONE ENCOUNTER
Called patient to discuss TB test results. She does have a cough and shortness of breath. Chest x-ray order placed. Referral placed to infectious disease. Advised to wear N-95 mask.

## 2022-09-26 ENCOUNTER — HOSPITAL ENCOUNTER (OUTPATIENT)
Dept: RADIOLOGY | Facility: MEDICAL CENTER | Age: 82
End: 2022-09-26
Attending: NURSE PRACTITIONER
Payer: MEDICARE

## 2022-09-26 DIAGNOSIS — E87.6 HYPOKALEMIA: ICD-10-CM

## 2022-09-26 DIAGNOSIS — R06.02 SHORTNESS OF BREATH: ICD-10-CM

## 2022-09-26 DIAGNOSIS — I10 ESSENTIAL HYPERTENSION: ICD-10-CM

## 2022-09-26 DIAGNOSIS — R76.12 POSITIVE QUANTIFERON-TB GOLD TEST: ICD-10-CM

## 2022-09-26 DIAGNOSIS — R05.9 COUGH: ICD-10-CM

## 2022-09-26 PROCEDURE — 71046 X-RAY EXAM CHEST 2 VIEWS: CPT

## 2022-09-26 RX ORDER — POTASSIUM CHLORIDE 1500 MG/1
20 TABLET, EXTENDED RELEASE ORAL DAILY
Qty: 90 TABLET | Refills: 1 | Status: SHIPPED | OUTPATIENT
Start: 2022-09-26 | End: 2022-11-27

## 2022-09-26 NOTE — TELEPHONE ENCOUNTER
Received request via: Pharmacy    Was the patient seen in the last year in this department? Yes  8/3/22  Does the patient have an active prescription (recently filled or refills available) for medication(s) requested? No

## 2022-10-05 ENCOUNTER — PATIENT MESSAGE (OUTPATIENT)
Dept: MEDICAL GROUP | Facility: LAB | Age: 82
End: 2022-10-05
Payer: MEDICARE

## 2022-10-05 NOTE — LETTER
October 5, 2022       Patient: Sharon Callahan   YOB: 1940   Date of Visit: 10/5/2022         To Whom It May Concern:    Patient has a history of positive Quantiferon Gold completed on 9/20/2022.  Follow-up chest x-rays completed on 9/26/2022 were unremarkable with no signs of any active disease.  Patient can return to work immediately.    If you have any questions or concerns, please don't hesitate to call [phone]          Sincerely,          Stephen Alcantara M.D.  Electronically Signed

## 2022-10-27 DIAGNOSIS — I10 ESSENTIAL HYPERTENSION: ICD-10-CM

## 2022-10-27 RX ORDER — LISINOPRIL 40 MG/1
40 TABLET ORAL
Qty: 90 TABLET | Refills: 3 | Status: SHIPPED | OUTPATIENT
Start: 2022-10-27 | End: 2024-03-18

## 2022-11-09 ENCOUNTER — PATIENT MESSAGE (OUTPATIENT)
Dept: HEALTH INFORMATION MANAGEMENT | Facility: OTHER | Age: 82
End: 2022-11-09

## 2022-11-15 ENCOUNTER — OFFICE VISIT (OUTPATIENT)
Dept: CARDIOLOGY | Facility: MEDICAL CENTER | Age: 82
End: 2022-11-15
Payer: MEDICARE

## 2022-11-15 ENCOUNTER — TELEPHONE (OUTPATIENT)
Dept: CARDIOLOGY | Facility: MEDICAL CENTER | Age: 82
End: 2022-11-15

## 2022-11-15 VITALS
DIASTOLIC BLOOD PRESSURE: 78 MMHG | RESPIRATION RATE: 22 BRPM | OXYGEN SATURATION: 97 % | HEART RATE: 61 BPM | HEIGHT: 60 IN | SYSTOLIC BLOOD PRESSURE: 138 MMHG | WEIGHT: 220 LBS | BODY MASS INDEX: 43.19 KG/M2

## 2022-11-15 DIAGNOSIS — I10 ESSENTIAL HYPERTENSION: ICD-10-CM

## 2022-11-15 DIAGNOSIS — E78.2 MIXED HYPERLIPIDEMIA: ICD-10-CM

## 2022-11-15 DIAGNOSIS — I48.0 PAROXYSMAL ATRIAL FIBRILLATION (HCC): ICD-10-CM

## 2022-11-15 LAB — EKG IMPRESSION: NORMAL

## 2022-11-15 PROCEDURE — 99214 OFFICE O/P EST MOD 30 MIN: CPT | Performed by: INTERNAL MEDICINE

## 2022-11-15 PROCEDURE — 93000 ELECTROCARDIOGRAM COMPLETE: CPT | Performed by: INTERNAL MEDICINE

## 2022-11-15 ASSESSMENT — FIBROSIS 4 INDEX: FIB4 SCORE: 2.17

## 2022-11-15 NOTE — PROGRESS NOTES
"Chief Complaint   Patient presents with    Atrial Fibrillation     F/V Dx: PAF (paroxysmal atrial fibrillation) ([facility])       Subjective     Sharon Callahan is a 82 y.o. female who presents today with a history of atrial fibrillation during an intercurrent  illness many years ago.  None since.  Therefore not on anticoagulation.  Echocardiogram with some supposed regurgitation [facility] 2 years ago.  Asymptomatic.  Off statins previous hyperlipidemia.  Blood pressure management per primary.  Patient states she feels well.    Past Medical History:   Diagnosis Date    Arrhythmia     a-fib    Arthritis     osteo- shoulders knees    BMI 38.0-38.9,adult 9/18/2013    CATARACT     surgical correcttion bilateral    Chronic nausea 1/12/2015    Has had GI work up done Dr voss    Chronic pain of both knees 1/9/2019    Depression with anxiety 5/3/2011    Heart burn     Heart valve disease     \"slight regurgitation\"    Hemothorax on right 1/4/2017    Status post thoracotomy and decortication    History of cholecystectomy     Hyperlipidemia 4/4/2011    Hypertension     Indigestion     MYESHA (obstructive sleep apnea) 4/4/2011    On cPAP     Pain     left hip    Pleural effusion, right 1/4/2017    Status post thoracotomy and decortication    Range of motion deficit     left elbow, unable to completely extend    S/P bilateral mastectomy 4/4/2011    Sleep apnea     CPAP    Snoring      Past Surgical History:   Procedure Laterality Date    PB RECONSTR TOTAL SHOULDER IMPLANT Right 5/11/2021    Procedure: ARTHROPLASTY, SHOULDER, TOTAL, REVERSE;  Surgeon: Roque Edmond M.D.;  Location: [facility];  Service: Orthopedics    BICEPS TENODESIS Right 5/11/2021    Procedure: TENODESIS, BICEPS - AND DUBOIS.;  Surgeon: Roque Edmond M.D.;  Location: [facility];  Service: Orthopedics    PB TOTAL KNEE ARTHROPLASTY Left 11/8/2019    Procedure: ARTHROPLASTY, KNEE, TOTAL;  Surgeon: Roque Edmond M.D.;  Location: [facility] " ORS;  Service: Orthopedics    THORACOSCOPY Right 2016    Procedure: RIGHT THORACOSCOPY ,DECORTICATION, EVACUATION OF HEMOTHORAX;  Surgeon: Ehsan De Leon D.O.;  Location: SURGERY [facility];  Service:     HIP ARTHROPLASTY TOTAL  12/3/2012    Performed by Jamie Kenney M.D. at SURGERY [facility]    CATARACT PHACO WITH IOL  11/3/2011    Performed by DEENA BRADEN at SURGERY [facility]    CATARACT PHACO WITH IOL  10/20/2011    Performed by DEENA BRADEN at SURGERY SAME DAY [facility] ORS    MAXIM BY LAPAROSCOPY  2011    Performed by DORON HINOJOSA at SURGERY SAME DAY [facility] ORS    HIP ARTHROPLASTY TOTAL  3/26/2009    Right; Perfmed by THERESE LEMA at SURGERY [facility] ORS    UVULOPHARYNGOPALATOPLASTY      OTHER      tummy tuck    OTHER      bilateral mastectomies with implants    BREAST BIOPSY      bilateral benign mastectomy    ORIF, HUMERUS  's    left    CA BREAST AUGMENTATION WITH IMPLANT       Family History   Problem Relation Age of Onset    Hypertension Mother     Cancer Father         lung    Diabetes Father     Hypertension Father     Heart Disease Father         MI    Hypertension Brother     Sleep Apnea Brother     Hypertension Maternal Grandmother     Sleep Apnea Brother     Hypertension Brother     Diabetes Brother     Cancer Paternal Aunt         stomach    Diabetes Paternal Aunt     Heart Disease Brother         MI    Hyperlipidemia Neg Hx     Stroke Neg Hx      Social History     Socioeconomic History    Marital status:      Spouse name: Not on file    Number of children: Not on file    Years of education: Not on file    Highest education level: Not on file   Occupational History    Occupation: retired      Comment: [employer]   Tobacco Use    Smoking status: Former     Packs/day: 1.00     Years: 15.00     Pack years: 15.00     Types: Cigarettes     Quit date: 1975     Years since quittin.9    Smokeless  tobacco: Never   Vaping Use    Vaping Use: Never used   Substance and Sexual Activity    Alcohol use: Yes     Alcohol/week: 6.0 oz     Types: 10 Standard drinks or equivalent per week     Comment: 1-2 daily    Drug use: No    Sexual activity: Never     Comment: lives with ex-   Other Topics Concern    Not on file   Social History Narrative    Not on file     Social Determinants of Health     Financial Resource Strain: Not on file   Food Insecurity: Not on file   Transportation Needs: Not on file   Physical Activity: Not on file   Stress: Not on file   Social Connections: Not on file   Intimate Partner Violence: Not on file   Housing Stability: Not on file     Allergies   Allergen Reactions    Pcn [Penicillins] Anaphylaxis     Skin turns black    Clindamycin Rash     Rash      Influenza Virus Vacc Rash     Red in face    Norco [Hydrocodone-Acetaminophen]     Pneumococcal Vaccine Rash    Tetracycline Rash     Rash     Xarelto [Rivaroxaban] Rash     Outpatient Encounter Medications as of 11/15/2022   Medication Sig Dispense Refill    lisinopril (PRINIVIL) 40 MG tablet Take 1 Tablet by mouth every day. 90 Tablet 3    potassium Chloride ER (K-TAB) 20 MEQ Tab CR tablet Take 1 Tablet by mouth every day. No refills w/o labs and OV 90 Tablet 1    losartan (COZAAR) 100 MG Tab Take 1 Tablet by mouth every day for 360 days. 90 Each 3    aspirin EC (ECOTRIN) 81 MG Tablet Delayed Response Take 1 Tablet by mouth every day.      furosemide (LASIX) 40 MG Tab Take 1 Tablet by mouth 2 times a day. 180 Tablet 1    VITAMIN D PO Take  by mouth.      Cyanocobalamin (VITAMIN B-12 PO) Take  by mouth.      Acetaminophen (TYLENOL EXTRA STRENGTH PO) Take  by mouth.      omeprazole (PRILOSEC) 40 MG delayed-release capsule Take 1 Cap by mouth every day. Indications: Gastroesophageal Reflux Disease, Heartburn 90 Cap 3    diclofenac CR (VOLTAREN-XR) 100 MG TABLET SR 24 HR tablet TAKE 1 TABLET BY MOUTH EVERY DAY 30 Tab 2    traZODone  (DESYREL) 50 MG Tab Take 1-2 Tablets by mouth at bedtime as needed for Sleep.      [DISCONTINUED] atorvastatin (LIPITOR) 20 MG Tab Take 1 Tablet by mouth at bedtime. (Patient not taking: Reported on 11/15/2022) 90 Tablet 3     No facility-administered encounter medications on file as of 11/15/2022.     ROS           Objective     /78 (BP Location: Left arm, Patient Position: Sitting, BP Cuff Size: Adult)   Pulse 61   Resp (!) 22   Ht 1.524 m (5')   Wt 99.8 kg (220 lb)   SpO2 97%   BMI 42.97 kg/m²     Physical Exam  Constitutional:       Appearance: She is well-developed. She is obese.   HENT:      Head: Normocephalic and atraumatic.   Eyes:      Pupils: Pupils are equal, round, and reactive to light.   Cardiovascular:      Rate and Rhythm: Normal rate and regular rhythm.      Heart sounds: Normal heart sounds. No murmur heard.    No friction rub. No gallop.   Pulmonary:      Effort: Pulmonary effort is normal.      Breath sounds: Normal breath sounds.   Abdominal:      General: Bowel sounds are normal.      Palpations: Abdomen is soft.   Musculoskeletal:         General: Normal range of motion.      Cervical back: Normal range of motion and neck supple.   Skin:     General: Skin is warm.   Neurological:      Mental Status: She is alert and oriented to person, place, and time.      Cranial Nerves: No cranial nerve deficit.   Psychiatric:         Behavior: Behavior normal.         Thought Content: Thought content normal.         Judgment: Judgment normal.              Assessment & Plan     1. Paroxysmal atrial fibrillation (HCC)  EKG      2. Essential hypertension        3. Mixed hyperlipidemia  Lipid Profile          Medical Decision Making: Today's Assessment/Status/Plan:   1.  Question regurgitation get report of echo from [place] performed approxi-2 years ago.  2.  1 episode of atrial fibrillation many years ago.  No clear indication for anticoagulation.  3.  Hypertension continue current  regimen.  4.  Hyperlipidemia recheck lipids.  Off statins at this time.  5.  Follow-up in 1 year.

## 2022-11-17 NOTE — TELEPHONE ENCOUNTER
Faxed second records request to [facility].  F#:[phone]  Fax confirmation received and scanned into media.

## 2022-11-27 ENCOUNTER — APPOINTMENT (OUTPATIENT)
Dept: RADIOLOGY | Facility: MEDICAL CENTER | Age: 82
End: 2022-11-27
Attending: EMERGENCY MEDICINE
Payer: MEDICARE

## 2022-11-27 ENCOUNTER — HOSPITAL ENCOUNTER (EMERGENCY)
Facility: MEDICAL CENTER | Age: 82
End: 2022-11-27
Attending: EMERGENCY MEDICINE
Payer: MEDICARE

## 2022-11-27 VITALS
DIASTOLIC BLOOD PRESSURE: 71 MMHG | WEIGHT: 217.59 LBS | TEMPERATURE: 98 F | BODY MASS INDEX: 42.72 KG/M2 | HEIGHT: 60 IN | HEART RATE: 69 BPM | SYSTOLIC BLOOD PRESSURE: 110 MMHG | RESPIRATION RATE: 16 BRPM | OXYGEN SATURATION: 94 %

## 2022-11-27 DIAGNOSIS — J10.1 INFLUENZA A: ICD-10-CM

## 2022-11-27 LAB
ALBUMIN SERPL BCP-MCNC: 4.2 G/DL (ref 3.2–4.9)
ALBUMIN/GLOB SERPL: 1.5 G/DL
ALP SERPL-CCNC: 76 U/L (ref 30–99)
ALT SERPL-CCNC: 15 U/L (ref 2–50)
ANION GAP SERPL CALC-SCNC: 12 MMOL/L (ref 7–16)
AST SERPL-CCNC: 21 U/L (ref 12–45)
BASOPHILS # BLD AUTO: 0.5 % (ref 0–1.8)
BASOPHILS # BLD: 0.03 K/UL (ref 0–0.12)
BILIRUB SERPL-MCNC: 0.4 MG/DL (ref 0.1–1.5)
BUN SERPL-MCNC: 22 MG/DL (ref 8–22)
CALCIUM SERPL-MCNC: 8.7 MG/DL (ref 8.4–10.2)
CHLORIDE SERPL-SCNC: 104 MMOL/L (ref 96–112)
CO2 SERPL-SCNC: 23 MMOL/L (ref 20–33)
CREAT SERPL-MCNC: 1.05 MG/DL (ref 0.5–1.4)
EOSINOPHIL # BLD AUTO: 0.03 K/UL (ref 0–0.51)
EOSINOPHIL NFR BLD: 0.5 % (ref 0–6.9)
ERYTHROCYTE [DISTWIDTH] IN BLOOD BY AUTOMATED COUNT: 48.1 FL (ref 35.9–50)
FLUAV RNA SPEC QL NAA+PROBE: POSITIVE
FLUBV RNA SPEC QL NAA+PROBE: NEGATIVE
GFR SERPLBLD CREATININE-BSD FMLA CKD-EPI: 53 ML/MIN/1.73 M 2
GLOBULIN SER CALC-MCNC: 2.8 G/DL (ref 1.9–3.5)
GLUCOSE SERPL-MCNC: 97 MG/DL (ref 65–99)
HCT VFR BLD AUTO: 43.4 % (ref 37–47)
HGB BLD-MCNC: 13.7 G/DL (ref 12–16)
IMM GRANULOCYTES # BLD AUTO: 0.02 K/UL (ref 0–0.11)
IMM GRANULOCYTES NFR BLD AUTO: 0.3 % (ref 0–0.9)
LACTATE SERPL-SCNC: 1.5 MMOL/L (ref 0.5–2)
LYMPHOCYTES # BLD AUTO: 0.19 K/UL (ref 1–4.8)
LYMPHOCYTES NFR BLD: 3.1 % (ref 22–41)
MCH RBC QN AUTO: 28.7 PG (ref 27–33)
MCHC RBC AUTO-ENTMCNC: 31.6 G/DL (ref 33.6–35)
MCV RBC AUTO: 91 FL (ref 81.4–97.8)
MONOCYTES # BLD AUTO: 0.46 K/UL (ref 0–0.85)
MONOCYTES NFR BLD AUTO: 7.6 % (ref 0–13.4)
NEUTROPHILS # BLD AUTO: 5.36 K/UL (ref 2–7.15)
NEUTROPHILS NFR BLD: 88 % (ref 44–72)
NRBC # BLD AUTO: 0 K/UL
NRBC BLD-RTO: 0 /100 WBC
PLATELET # BLD AUTO: 142 K/UL (ref 164–446)
PMV BLD AUTO: 11.4 FL (ref 9–12.9)
POTASSIUM SERPL-SCNC: 4.2 MMOL/L (ref 3.6–5.5)
PROT SERPL-MCNC: 7 G/DL (ref 6–8.2)
RBC # BLD AUTO: 4.77 M/UL (ref 4.2–5.4)
RSV RNA SPEC QL NAA+PROBE: NEGATIVE
SARS-COV-2 RNA RESP QL NAA+PROBE: NOTDETECTED
SODIUM SERPL-SCNC: 139 MMOL/L (ref 135–145)
SPECIMEN SOURCE: ABNORMAL
WBC # BLD AUTO: 6.1 K/UL (ref 4.8–10.8)

## 2022-11-27 PROCEDURE — 71045 X-RAY EXAM CHEST 1 VIEW: CPT

## 2022-11-27 PROCEDURE — 87040 BLOOD CULTURE FOR BACTERIA: CPT | Mod: 91

## 2022-11-27 PROCEDURE — 36415 COLL VENOUS BLD VENIPUNCTURE: CPT

## 2022-11-27 PROCEDURE — 80053 COMPREHEN METABOLIC PANEL: CPT

## 2022-11-27 PROCEDURE — 700111 HCHG RX REV CODE 636 W/ 250 OVERRIDE (IP): Performed by: EMERGENCY MEDICINE

## 2022-11-27 PROCEDURE — 36415 COLL VENOUS BLD VENIPUNCTURE: CPT | Mod: XU

## 2022-11-27 PROCEDURE — 700102 HCHG RX REV CODE 250 W/ 637 OVERRIDE(OP): Performed by: EMERGENCY MEDICINE

## 2022-11-27 PROCEDURE — 0241U HCHG SARS-COV-2 COVID-19 NFCT DS RESP RNA 4 TRGT MIC: CPT

## 2022-11-27 PROCEDURE — 96365 THER/PROPH/DIAG IV INF INIT: CPT

## 2022-11-27 PROCEDURE — A9270 NON-COVERED ITEM OR SERVICE: HCPCS | Performed by: EMERGENCY MEDICINE

## 2022-11-27 PROCEDURE — C9803 HOPD COVID-19 SPEC COLLECT: HCPCS | Performed by: EMERGENCY MEDICINE

## 2022-11-27 PROCEDURE — 83605 ASSAY OF LACTIC ACID: CPT

## 2022-11-27 PROCEDURE — 99285 EMERGENCY DEPT VISIT HI MDM: CPT

## 2022-11-27 PROCEDURE — 85025 COMPLETE CBC W/AUTO DIFF WBC: CPT

## 2022-11-27 RX ORDER — OSELTAMIVIR PHOSPHATE 30 MG/1
30 CAPSULE ORAL ONCE
Status: COMPLETED | OUTPATIENT
Start: 2022-11-27 | End: 2022-11-27

## 2022-11-27 RX ORDER — OSELTAMIVIR PHOSPHATE 30 MG/1
90 CAPSULE ORAL EVERY 12 HOURS
Qty: 30 CAPSULE | Refills: 0 | Status: SHIPPED | OUTPATIENT
Start: 2022-11-27 | End: 2022-11-27 | Stop reason: SDUPTHER

## 2022-11-27 RX ORDER — ALBUTEROL SULFATE 90 UG/1
2 AEROSOL, METERED RESPIRATORY (INHALATION) EVERY 6 HOURS PRN
Qty: 8.5 G | Refills: 0 | Status: SHIPPED | OUTPATIENT
Start: 2022-11-27 | End: 2023-04-02

## 2022-11-27 RX ORDER — LEVOFLOXACIN 5 MG/ML
750 INJECTION, SOLUTION INTRAVENOUS ONCE
Status: COMPLETED | OUTPATIENT
Start: 2022-11-27 | End: 2022-11-27

## 2022-11-27 RX ORDER — POTASSIUM CHLORIDE 1500 MG/1
20 TABLET, EXTENDED RELEASE ORAL 2 TIMES DAILY
Status: SHIPPED | COMMUNITY
End: 2023-04-03

## 2022-11-27 RX ORDER — ACETAMINOPHEN 500 MG
1000 TABLET ORAL EVERY 6 HOURS PRN
COMMUNITY

## 2022-11-27 RX ORDER — OSELTAMIVIR PHOSPHATE 30 MG/1
30 CAPSULE ORAL EVERY 12 HOURS
Qty: 10 CAPSULE | Refills: 0 | Status: SHIPPED | OUTPATIENT
Start: 2022-11-27 | End: 2022-11-27 | Stop reason: SDUPTHER

## 2022-11-27 RX ORDER — OMEPRAZOLE 40 MG/1
40 CAPSULE, DELAYED RELEASE ORAL PRN
Status: SHIPPED | COMMUNITY
End: 2024-03-18

## 2022-11-27 RX ORDER — ACETAMINOPHEN 325 MG/1
650 TABLET ORAL ONCE
Status: COMPLETED | OUTPATIENT
Start: 2022-11-27 | End: 2022-11-27

## 2022-11-27 RX ORDER — ALBUTEROL SULFATE 90 UG/1
2 AEROSOL, METERED RESPIRATORY (INHALATION) ONCE
Status: COMPLETED | OUTPATIENT
Start: 2022-11-27 | End: 2022-11-27

## 2022-11-27 RX ORDER — OSELTAMIVIR PHOSPHATE 30 MG/1
30 CAPSULE ORAL EVERY 12 HOURS
Qty: 10 CAPSULE | Refills: 0 | Status: SHIPPED | OUTPATIENT
Start: 2022-11-27 | End: 2022-12-02

## 2022-11-27 RX ADMIN — LEVOFLOXACIN 750 MG: 750 INJECTION, SOLUTION INTRAVENOUS at 09:44

## 2022-11-27 RX ADMIN — ALBUTEROL SULFATE 2 PUFF: 90 AEROSOL, METERED RESPIRATORY (INHALATION) at 09:44

## 2022-11-27 RX ADMIN — OSELTAMIVIR PHOSPHATE 30 MG: 30 CAPSULE ORAL at 11:12

## 2022-11-27 RX ADMIN — ACETAMINOPHEN 650 MG: 325 TABLET, FILM COATED ORAL at 09:44

## 2022-11-27 ASSESSMENT — FIBROSIS 4 INDEX: FIB4 SCORE: 2.17

## 2022-11-27 NOTE — ED NOTES
Med rec updated and complete, per pt   Allergies reviewed, per pt  Pt reports that she takes her POTASSIUM 20MEQ 1 tablet twice a day.

## 2022-11-27 NOTE — ED NOTES
Pt discharged, reviewed all discharge instructions including medications and follow up, pt verbalizes understanding, and denies questions. Electronic prescription discussed with pt.  Escorted to lobby.

## 2022-11-27 NOTE — ED PROVIDER NOTES
"ED Provider Note    CHIEF COMPLAINT  Chief Complaint   Patient presents with    Cough    Shortness of Breath       HPI  Sharon Callahan is a 82 y.o. female who presents complaining of cough and shortness of breath that has been getting progressively worse since Friday, 2 days ago.  The patient has no history of asthma or pulmonary problems.  She is a non-smoker.  She states she does have a few lesions on her lungs that have not changed in years.  She does not wear oxygen at home.  She started having fevers today and is coughing up yellow sputum.  She has a little bit of diarrhea as well.  She has shortness of breath especially with exertion.  The patient states she is allergic to the COVID and flu vaccines and has not gotten any vaccinations because of this.  She does take blood pressure medication.  She denies any history of heart attack stroke or diabetes.    REVIEW OF SYSTEMS    HEENT:  No ear pain, congestion or sore throat   EYES: no discharge redness or vision changes  CARDIAC: no chest pain, palpitations    PULMONARY: See history of present illness  GI: no vomiting diarrhea or abdominal pain   : no dysuria, back pain or hematuria   Neuro: no weakness, numbness aphasia or headache  Musculoskeletal: no swelling deformity or pain no joint swelling  Endocrine: Positive fevers, sweating, weight loss   SKIN: no rash, erythema or contusions     See history of present illness all other systems are negative    PAST MEDICAL HISTORY  Past Medical History:   Diagnosis Date    Arrhythmia     a-fib    Arthritis     osteo- shoulders knees    BMI 38.0-38.9,adult 9/18/2013    CATARACT     surgical correcttion bilateral    Chronic nausea 1/12/2015    Has had GI work up done Dr voss    Chronic pain of both knees 1/9/2019    Depression with anxiety 5/3/2011    Heart burn     Heart valve disease     \"slight regurgitation\"    Hemothorax on right 1/4/2017    Status post thoracotomy and decortication    History of " cholecystectomy     Hyperlipidemia 2011    Hypertension     Indigestion     MYESHA (obstructive sleep apnea) 2011    On cPAP     Pain     left hip    Pleural effusion, right 2017    Status post thoracotomy and decortication    Range of motion deficit     left elbow, unable to completely extend    S/P bilateral mastectomy 2011    Sleep apnea     CPAP    Snoring        FAMILY HISTORY  Family History   Problem Relation Age of Onset    Hypertension Mother     Cancer Father         lung    Diabetes Father     Hypertension Father     Heart Disease Father         MI    Hypertension Brother     Sleep Apnea Brother     Hypertension Maternal Grandmother     Sleep Apnea Brother     Hypertension Brother     Diabetes Brother     Cancer Paternal Aunt         stomach    Diabetes Paternal Aunt     Heart Disease Brother         MI    Hyperlipidemia Neg Hx     Stroke Neg Hx        SOCIAL HISTORY  Social History     Socioeconomic History    Marital status:    Occupational History    Occupation: retired      Comment: [employer]   Tobacco Use    Smoking status: Former     Packs/day: 1.00     Years: 15.00     Pack years: 15.00     Types: Cigarettes     Quit date: 1975     Years since quittin.9    Smokeless tobacco: Never   Vaping Use    Vaping Use: Never used   Substance and Sexual Activity    Alcohol use: Yes     Alcohol/week: 6.0 oz     Types: 10 Standard drinks or equivalent per week     Comment: 1-2 daily    Drug use: No    Sexual activity: Never     Comment: lives with ex-       SURGICAL HISTORY  Past Surgical History:   Procedure Laterality Date    PB RECONSTR TOTAL SHOULDER IMPLANT Right 2021    Procedure: ARTHROPLASTY, SHOULDER, TOTAL, REVERSE;  Surgeon: Roque Edmond M.D.;  Location: SURGERY [facility];  Service: Orthopedics    BICEPS TENODESIS Right 2021    Procedure: TENODESIS, BICEPS - AND DUBOIS.;  Surgeon: Roque Edmond M.D.;  Location: [facility];   Service: Orthopedics    PB TOTAL KNEE ARTHROPLASTY Left 11/8/2019    Procedure: ARTHROPLASTY, KNEE, TOTAL;  Surgeon: Roque Edmond M.D.;  Location: SURGERY [facility];  Service: Orthopedics    THORACOSCOPY Right 12/6/2016    Procedure: RIGHT THORACOSCOPY ,DECORTICATION, EVACUATION OF HEMOTHORAX;  Surgeon: Ehsan De Leon D.O.;  Location: SURGERY [facility];  Service:     HIP ARTHROPLASTY TOTAL  12/3/2012    Performed by Jamie Kenney M.D. at SURGERY [facility]    CATARACT PHACO WITH IOL  11/3/2011    Performed by DEENA BRADEN at SURGERY [facility]    CATARACT PHACO WITH IOL  10/20/2011    Performed by DEENA BRADEN at SURGERY [facility] DAY [facility]    MAXIM BY LAPAROSCOPY  2/1/2011    Performed by DORON HINOJOSA at SURGERY SAME DAY [facility]    HIP ARTHROPLASTY TOTAL  3/26/2009    Right; Perfmed by THERESE LEMA at SURGERY [facility]    UVULOPHARYNGOPALATOPLASTY  1990    OTHER  1989    tummy reyna    OTHER  1988    bilateral mastectomies with implants    BREAST BIOPSY  1980    bilateral benign mastectomy    ORIF, HUMERUS  1950's    left    DC BREAST AUGMENTATION WITH IMPLANT         CURRENT MEDICATIONS  Home Medications       Reviewed by Macarena Perales (Pharmacy Tech) on 11/27/22 at 1010  Med List Status: Complete     Medication Last Dose Status   acetaminophen (TYLENOL) 500 MG Tab 11/26/2022 Active   aspirin EC (ECOTRIN) 81 MG Tablet Delayed Response 11/26/2022 Active   Cholecalciferol (D3 PO) 11/26/2022 Active   Cyanocobalamin (VITAMIN B-12 PO) 11/26/2022 Active   furosemide (LASIX) 40 MG Tab 11/26/2022 Active   lisinopril (PRINIVIL) 40 MG tablet 11/26/2022 Active   losartan (COZAAR) 100 MG Tab 11/26/2022 Active   omeprazole (PRILOSEC) 40 MG delayed-release capsule >1 week Active   potassium Chloride ER (K-TAB) 20 MEQ Tab CR tablet 11/26/2022 Active   traZODone (DESYREL) 50 MG Tab > 3 weeks Active                    ALLERGIES  Allergies   Allergen Reactions    Pcn  [Penicillins] Anaphylaxis     Skin turns black    Clindamycin Rash     Rash      Influenza Virus Vacc Rash     Red in face    Norco [Hydrocodone-Acetaminophen] Vomiting and Nausea    Pneumococcal Vaccine Rash    Tetracycline Rash     Rash     Xarelto [Rivaroxaban] Rash       PHYSICAL EXAM  VITAL SIGNS: /60   Pulse 79   Temp 37.9 °C (100.3 °F) (Temporal)   Resp 17   Ht 1.524 m (5')   Wt 98.7 kg (217 lb 9.5 oz)   SpO2 94%   BMI 42.50 kg/m²   Constitutional: Well developed, Well nourished, No acute distress, Non-toxic appearance.   HEENT: Normocephalic, Atraumatic,  external ears normal, pharynx pink,  Mucous  Membranes moist, No rhinorrhea or mucosal edema  Eyes: PERRL, EOMI, Conjunctiva normal, No discharge.   Neck: Normal range of motion, No tenderness, Supple, No stridor.   Lymphatic: No lymphadenopathy    Cardiovascular: Regular Rate and Rhythm, No murmurs,  rubs, or gallops.   Thorax & Lungs: Scattered wheezes and decreased breath sounds in all lung fields mild tachypnea  Abdomen: Bowel sounds normal, Soft, non tender, non distended,  No pulsatile masses., no rebound guarding or peritoneal signs.  Evated BMI  Skin: Warm, Dry, No erythema, No rash,   Back:  No CVA tenderness,  No spinal tenderness, bony crepitance step offs or instability.   Extremities: Equal, intact distal pulses, No cyanosis, clubbing or edema,  No tenderness.   Musculoskeletal: Good range of motion in all major joints. No tenderness to palpation or major deformities noted.   Neurologic: Alert & oriented x 3,  No focal deficits noted.   Psychiatric: Affect normal, Judgment normal, Mood normal.       RADIOLOGY/PROCEDURES  DX-CHEST-PORTABLE (1 VIEW)   Final Result         1. No acute cardiopulmonary abnormalities are identified.            COURSE & MEDICAL DECISION MAKING  Pertinent Labs & Imaging studies reviewed. (See chart for details)  Differential diagnosis: Pneumonia, influenza, COVID-19, bronchitis        Results for orders  placed or performed during the hospital encounter of 11/27/22   CoV-2, FLU A/B, and RSV by PCR (2-4 Hours CEPHEID) : Collect NP swab in VTM    Specimen: Respirate   Result Value Ref Range    Influenza virus A RNA POSITIVE (A) Negative    Influenza virus B, PCR Negative Negative    RSV, PCR Negative Negative    SARS-CoV-2 by PCR NotDetected     SARS-CoV-2 Source Nasal Swab    LACTIC ACID   Result Value Ref Range    Lactic Acid 1.5 0.5 - 2.0 mmol/L   CBC WITH DIFFERENTIAL   Result Value Ref Range    WBC 6.1 4.8 - 10.8 K/uL    RBC 4.77 4.20 - 5.40 M/uL    Hemoglobin 13.7 12.0 - 16.0 g/dL    Hematocrit 43.4 37.0 - 47.0 %    MCV 91.0 81.4 - 97.8 fL    MCH 28.7 27.0 - 33.0 pg    MCHC 31.6 (L) 33.6 - 35.0 g/dL    RDW 48.1 35.9 - 50.0 fL    Platelet Count 142 (L) 164 - 446 K/uL    MPV 11.4 9.0 - 12.9 fL    Neutrophils-Polys 88.00 (H) 44.00 - 72.00 %    Lymphocytes 3.10 (L) 22.00 - 41.00 %    Monocytes 7.60 0.00 - 13.40 %    Eosinophils 0.50 0.00 - 6.90 %    Basophils 0.50 0.00 - 1.80 %    Immature Granulocytes 0.30 0.00 - 0.90 %    Nucleated RBC 0.00 /100 WBC    Neutrophils (Absolute) 5.36 2.00 - 7.15 K/uL    Lymphs (Absolute) 0.19 (L) 1.00 - 4.80 K/uL    Monos (Absolute) 0.46 0.00 - 0.85 K/uL    Eos (Absolute) 0.03 0.00 - 0.51 K/uL    Baso (Absolute) 0.03 0.00 - 0.12 K/uL    Immature Granulocytes (abs) 0.02 0.00 - 0.11 K/uL    NRBC (Absolute) 0.00 K/uL   COMP METABOLIC PANEL   Result Value Ref Range    Sodium 139 135 - 145 mmol/L    Potassium 4.2 3.6 - 5.5 mmol/L    Chloride 104 96 - 112 mmol/L    Co2 23 20 - 33 mmol/L    Anion Gap 12.0 7.0 - 16.0    Glucose 97 65 - 99 mg/dL    Bun 22 8 - 22 mg/dL    Creatinine 1.05 0.50 - 1.40 mg/dL    Calcium 8.7 8.4 - 10.2 mg/dL    AST(SGOT) 21 12 - 45 U/L    ALT(SGPT) 15 2 - 50 U/L    Alkaline Phosphatase 76 30 - 99 U/L    Total Bilirubin 0.4 0.1 - 1.5 mg/dL    Albumin 4.2 3.2 - 4.9 g/dL    Total Protein 7.0 6.0 - 8.2 g/dL    Globulin 2.8 1.9 - 3.5 g/dL    A-G Ratio 1.5 g/dL    ESTIMATED GFR   Result Value Ref Range    GFR (CKD-EPI) 53 (A) >60 mL/min/1.73 m 2      An IV was started and labs were drawn.  Antibiotics, Levaquin was ordered per the sepsis protocol for pneumonia    Treated the patient with Tylenol for her fever and an albuterol HFA for her wheezing    10:46 AM the patient is positive for influenza A.  We will ambulate her with a pulse ox and if she does not drop her oxygen she will be discharged home with Tamiflu.  I did order her an oral dose of Tamiflu here.    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  Chest x-ray however was clear and no treatment was deemed necessary by infectious disease.  They suspected a false positive test.      11:05 AM The patient was able to ambulate without hypoxia.  I will discharge her home with Tamiflu and an albuterol inhaler.  I advised her to take Tylenol and Motrin as needed for fevers encourage fluids and if she has any worsening shortness of breath or worsening symptoms she should return for further evaluation.      The patient will return for new or worsening symptoms and is stable at the time of discharge.    The patient is referred to a primary physician for blood pressure management, diabetic screening, and for all other preventative health concerns.      DISPOSITION:  Patient will be discharged home in stable condition.    FOLLOW UP:  Stephen Alcantara M.D.  [address]  [facility] [id number] [phone]      As needed, If symptoms worsen    [facility], Emergency Dept  [address]  [facility] [id number] [phone]    As needed, If symptoms worsen    OUTPATIENT MEDICATIONS:  New Prescriptions    ALBUTEROL 108 (90 BASE) MCG/ACT AERO SOLN INHALATION AEROSOL    Inhale 2 Puffs every 6 hours as needed for Shortness of Breath.    OSELTAMIVIR (TAMIFLU) 30 MG CAP    Take 1 Capsule by mouth every 12 hours for 5 days.        FINAL IMPRESSION  1. Influenza A           PLAN/DISPOSITION  Discharged in stable condition          Electronically signed by: Carol Varghese M.D., 11/27/2022 9:10 AM

## 2022-12-02 LAB
BACTERIA BLD CULT: NORMAL
BACTERIA BLD CULT: NORMAL
SIGNIFICANT IND 70042: NORMAL
SIGNIFICANT IND 70042: NORMAL
SITE SITE: NORMAL
SITE SITE: NORMAL
SOURCE SOURCE: NORMAL
SOURCE SOURCE: NORMAL

## 2023-02-28 NOTE — PROGRESS NOTES
K = 3.1, patient receiving IV 40mg lasix BID additionally.   Dr. Edgar paged to obtain replacement. New order placed for 40mEq KCL daily, including now; TORB.   Stable

## 2023-03-10 DIAGNOSIS — I10 ESSENTIAL HYPERTENSION: ICD-10-CM

## 2023-03-10 DIAGNOSIS — M79.89 LEG SWELLING: ICD-10-CM

## 2023-03-10 RX ORDER — FUROSEMIDE 40 MG/1
40 TABLET ORAL 2 TIMES DAILY
Qty: 180 TABLET | Refills: 1 | Status: SHIPPED | OUTPATIENT
Start: 2023-03-10 | End: 2023-11-20

## 2023-03-10 NOTE — TELEPHONE ENCOUNTER
Received request via: Patient    Was the patient seen in the last year in this department? Yes    Does the patient have an active prescription (recently filled or refills available) for medication(s) requested? No    Does the patient have snf Plus and need 100 day supply (blood pressure, diabetes and cholesterol meds only)? Patient does not have SCP     furosemide (LASIX) 40 MG Tab

## 2023-03-29 NOTE — OP REPORT
DATE OF SERVICE:  05/11/2021     PREOPERATIVE DIAGNOSES:  Right shoulder glenohumeral degenerative joint   disease with severe glenoid bone loss, proximal biceps pathology and rotator   cuff insufficiency.     POSTOPERATIVE DIAGNOSES:  Right shoulder glenohumeral degenerative joint   disease with severe glenoid bone loss, proximal biceps pathology and rotator   cuff insufficiency.     PROCEDURES:  1.  Right reverse total shoulder arthroplasty.  2.  Right proximal biceps tenodesis.     SURGEON:  Roque Edmond MD     ASSISTANT:  Altagracia Hahn PA-C     ANESTHESIA:  General and interscalene nerve block.     ANESTHESIOLOGIST:  Tobey Gansert, MD     IMPLANTS:  Tornier Aequalis Ascend Flex size 4B ingrowth humeral stem, a low   offset reverse humeral adapter, a 39 +6 mm articular insert, a 39 mm centered   glenosphere, a 25x15 mm full wedge glenoid baseplate with a 7 mm ingrowth   central stem, 2 locking screws measuring 34 and 30 mm and one 18 mm   compression screw.     COMPLICATIONS:  None.     DISPOSITION:  Stable to postanesthesia care unit.     INDICATIONS:  The patient has had progressive severe shoulder pain which has   been unresponsive to conservative management.  The risks, benefits,   alternatives, and limitations of surgical intervention were discussed in   detail.  She expressed understanding and desired to proceed.     PROCEDURE:  The patient and the correct operative extremity were identified in   the preoperative area.  The knee was marked.  She was brought to the   operating room where the correct operative extremity was again confirmed.  She   was placed supine on the OR table.  She underwent an interscalene nerve block   performed at my request for postoperative pain control.  She underwent   general anesthesia without complication.  Examination under anesthesia showed   limited range of motion in all planes.  No instability.  The shoulder was   prepped with alcohol.  This was allowed to dry.  The  shoulder was then prepped   and draped in the usual sterile fashion using ChloraPrep.  A 12 cm   longitudinal incision was centered over the deltopectoral interval.  Sharp   dissection was carried down to the level of the deltoid fascia.  The   deltopectoral interval was then developed.  The biceps was identified.  It was   tenodesed to the pectoralis stump and released up proximally.  Subscapularis   peel was then performed to expose the humeral head.  There were severe   osteophytes squaring of the humeral head with very large anterior osteophytes   tenting the subscapularis.  We removed those osteophytes, performed a freehand   humeral head osteotomy, sounded canal, broached up to a 4B ingrowth humeral   stem.  We placed a humeral head protector and exposed the glenoid.  She had   severe bone loss on the glenoid with posterior wear.  We placed a centering   drill with the full wedge guide, then reamed for the wedged glenoid baseplate,   then drilled centrally, tapped.  There was really minimal bone on the   proximal cortex, so we elected to go with a 7 mm ingrowth stem, impacted that   and then placed 2 locking screws, placed them down about MCFP.  I then   placed a compression screw anteriorly, which had good purchase.  I then placed   the locking screws down completely, both obtaining excellent bicortical   purchase.  I then placed a 39 mm centered glenosphere, impacted it.  I then   dislocated the humerus, placed reverse adapter.  Then, undertook a trial   reduction, had excellent range of motion, excellent stability.  I then   dislocated the humerus, impacted the real reverse adapter and stem construct   at the back table.  I then impacted them within the metaphysis, placed the   real 39 +6 mm articular insert, reduced the humerus, had excellent range of   motion and excellent stability.  We had previously placed five #2 Ultrabraid   through drill tunnels in the lesser tuberosity.  We used those along with  #1   Vicryl suture to repair the subscapularis in the rotator interval.  I did a   Betadine flush of the wound, copiously irrigated the wound, closed the   deltopectoral interval with interrupted #1 Vicryl suture.  The deep subcu was   closed with Monocryl.  Skin was closed with staples.  Sterile dressings were   applied.  The patient was placed into an UltraSling, allowed to awaken from   anesthesia, transferred to her hospital cart and taken to the postanesthesia   care unit in stable condition.  She tolerated the procedure well.  There were   no immediate complications.        ______________________________  JEANNE MOSQUEDA MD RD/JAYLAN    DD:  05/11/2021 16:10  DT:  05/11/2021 17:08    Job#:  [id number]   Estlander Flap (Upper To Lower Lip) Text: The defect of the lower lip was assessed and measured.  Given the location and size of the defect, an Estlander flap was deemed most appropriate. Using a sterile surgical marker, an appropriate Estlander flap was measured and drawn on the upper lip. Local anesthesia was then infiltrated. A scalpel was then used to incise the lateral aspect of the flap, through skin, muscle and mucosa, leaving the flap pedicled medially.  The flap was then rotated and positioned to fill the lower lip defect.  The flap was then sutured into place with a three layer technique, closing the orbicularis oris muscle layer with subcutaneous buried sutures, followed by a mucosal layer and an epidermal layer.

## 2023-04-02 ENCOUNTER — APPOINTMENT (OUTPATIENT)
Dept: RADIOLOGY | Facility: MEDICAL CENTER | Age: 83
End: 2023-04-02
Attending: STUDENT IN AN ORGANIZED HEALTH CARE EDUCATION/TRAINING PROGRAM
Payer: MEDICARE

## 2023-04-02 ENCOUNTER — HOSPITAL ENCOUNTER (EMERGENCY)
Facility: MEDICAL CENTER | Age: 83
End: 2023-04-02
Attending: STUDENT IN AN ORGANIZED HEALTH CARE EDUCATION/TRAINING PROGRAM
Payer: MEDICARE

## 2023-04-02 VITALS
OXYGEN SATURATION: 95 % | SYSTOLIC BLOOD PRESSURE: 150 MMHG | HEART RATE: 58 BPM | BODY MASS INDEX: 39.27 KG/M2 | TEMPERATURE: 97.8 F | DIASTOLIC BLOOD PRESSURE: 75 MMHG | HEIGHT: 60 IN | WEIGHT: 200 LBS | RESPIRATION RATE: 18 BRPM

## 2023-04-02 DIAGNOSIS — R51.9 NONINTRACTABLE HEADACHE, UNSPECIFIED CHRONICITY PATTERN, UNSPECIFIED HEADACHE TYPE: ICD-10-CM

## 2023-04-02 DIAGNOSIS — E87.6 HYPOKALEMIA: ICD-10-CM

## 2023-04-02 DIAGNOSIS — R05.2 SUBACUTE COUGH: ICD-10-CM

## 2023-04-02 DIAGNOSIS — J40 BRONCHITIS: ICD-10-CM

## 2023-04-02 DIAGNOSIS — R00.2 PALPITATIONS: ICD-10-CM

## 2023-04-02 DIAGNOSIS — I48.0 PAROXYSMAL ATRIAL FIBRILLATION (HCC): ICD-10-CM

## 2023-04-02 LAB
ALBUMIN SERPL BCP-MCNC: 3.7 G/DL (ref 3.2–4.9)
ALBUMIN/GLOB SERPL: 1.7 G/DL
ALP SERPL-CCNC: 86 U/L (ref 30–99)
ALT SERPL-CCNC: 19 U/L (ref 2–50)
ANION GAP SERPL CALC-SCNC: 10 MMOL/L (ref 7–16)
AST SERPL-CCNC: 21 U/L (ref 12–45)
BASOPHILS # BLD AUTO: 0.3 % (ref 0–1.8)
BASOPHILS # BLD: 0.01 K/UL (ref 0–0.12)
BILIRUB SERPL-MCNC: 0.2 MG/DL (ref 0.1–1.5)
BUN SERPL-MCNC: 21 MG/DL (ref 8–22)
CALCIUM ALBUM COR SERPL-MCNC: 8.4 MG/DL (ref 8.5–10.5)
CALCIUM SERPL-MCNC: 8.2 MG/DL (ref 8.4–10.2)
CHLORIDE SERPL-SCNC: 106 MMOL/L (ref 96–112)
CO2 SERPL-SCNC: 23 MMOL/L (ref 20–33)
CREAT SERPL-MCNC: 0.96 MG/DL (ref 0.5–1.4)
EKG IMPRESSION: NORMAL
EOSINOPHIL # BLD AUTO: 0.02 K/UL (ref 0–0.51)
EOSINOPHIL NFR BLD: 0.5 % (ref 0–6.9)
ERYTHROCYTE [DISTWIDTH] IN BLOOD BY AUTOMATED COUNT: 45.8 FL (ref 35.9–50)
GFR SERPLBLD CREATININE-BSD FMLA CKD-EPI: 59 ML/MIN/1.73 M 2
GLOBULIN SER CALC-MCNC: 2.2 G/DL (ref 1.9–3.5)
GLUCOSE SERPL-MCNC: 95 MG/DL (ref 65–99)
HCT VFR BLD AUTO: 39.7 % (ref 37–47)
HGB BLD-MCNC: 13.1 G/DL (ref 12–16)
IMM GRANULOCYTES # BLD AUTO: 0.03 K/UL (ref 0–0.11)
IMM GRANULOCYTES NFR BLD AUTO: 0.8 % (ref 0–0.9)
LYMPHOCYTES # BLD AUTO: 0.53 K/UL (ref 1–4.8)
LYMPHOCYTES NFR BLD: 13.8 % (ref 22–41)
MCH RBC QN AUTO: 29.2 PG (ref 27–33)
MCHC RBC AUTO-ENTMCNC: 33 G/DL (ref 33.6–35)
MCV RBC AUTO: 88.6 FL (ref 81.4–97.8)
MONOCYTES # BLD AUTO: 0.45 K/UL (ref 0–0.85)
MONOCYTES NFR BLD AUTO: 11.7 % (ref 0–13.4)
NEUTROPHILS # BLD AUTO: 2.79 K/UL (ref 2–7.15)
NEUTROPHILS NFR BLD: 72.9 % (ref 44–72)
NRBC # BLD AUTO: 0 K/UL
NRBC BLD-RTO: 0 /100 WBC
PLATELET # BLD AUTO: 131 K/UL (ref 164–446)
PMV BLD AUTO: 11 FL (ref 9–12.9)
POTASSIUM SERPL-SCNC: 3.4 MMOL/L (ref 3.6–5.5)
PROT SERPL-MCNC: 5.9 G/DL (ref 6–8.2)
RBC # BLD AUTO: 4.48 M/UL (ref 4.2–5.4)
SODIUM SERPL-SCNC: 139 MMOL/L (ref 135–145)
TROPONIN T SERPL-MCNC: 13 NG/L (ref 6–19)
WBC # BLD AUTO: 3.8 K/UL (ref 4.8–10.8)

## 2023-04-02 PROCEDURE — 700105 HCHG RX REV CODE 258: Performed by: STUDENT IN AN ORGANIZED HEALTH CARE EDUCATION/TRAINING PROGRAM

## 2023-04-02 PROCEDURE — 80053 COMPREHEN METABOLIC PANEL: CPT

## 2023-04-02 PROCEDURE — 94640 AIRWAY INHALATION TREATMENT: CPT

## 2023-04-02 PROCEDURE — 84484 ASSAY OF TROPONIN QUANT: CPT

## 2023-04-02 PROCEDURE — 93005 ELECTROCARDIOGRAM TRACING: CPT | Performed by: STUDENT IN AN ORGANIZED HEALTH CARE EDUCATION/TRAINING PROGRAM

## 2023-04-02 PROCEDURE — 71045 X-RAY EXAM CHEST 1 VIEW: CPT

## 2023-04-02 PROCEDURE — 99285 EMERGENCY DEPT VISIT HI MDM: CPT

## 2023-04-02 PROCEDURE — 36415 COLL VENOUS BLD VENIPUNCTURE: CPT

## 2023-04-02 PROCEDURE — 85025 COMPLETE CBC W/AUTO DIFF WBC: CPT

## 2023-04-02 PROCEDURE — 700102 HCHG RX REV CODE 250 W/ 637 OVERRIDE(OP): Performed by: STUDENT IN AN ORGANIZED HEALTH CARE EDUCATION/TRAINING PROGRAM

## 2023-04-02 PROCEDURE — A9270 NON-COVERED ITEM OR SERVICE: HCPCS | Performed by: STUDENT IN AN ORGANIZED HEALTH CARE EDUCATION/TRAINING PROGRAM

## 2023-04-02 RX ORDER — AZITHROMYCIN 250 MG/1
TABLET, FILM COATED ORAL
Qty: 6 TABLET | Refills: 0 | Status: SHIPPED | OUTPATIENT
Start: 2023-04-02 | End: 2023-04-02 | Stop reason: SDUPTHER

## 2023-04-02 RX ORDER — POTASSIUM CHLORIDE 20 MEQ/1
40 TABLET, EXTENDED RELEASE ORAL ONCE
Status: COMPLETED | OUTPATIENT
Start: 2023-04-02 | End: 2023-04-02

## 2023-04-02 RX ORDER — ALBUTEROL SULFATE 90 UG/1
2 AEROSOL, METERED RESPIRATORY (INHALATION) EVERY 6 HOURS PRN
Qty: 8.5 G | Refills: 0 | Status: SHIPPED | OUTPATIENT
Start: 2023-04-02 | End: 2023-04-02 | Stop reason: SDUPTHER

## 2023-04-02 RX ORDER — AZITHROMYCIN 250 MG/1
TABLET, FILM COATED ORAL
Qty: 6 TABLET | Refills: 0 | Status: SHIPPED | OUTPATIENT
Start: 2023-04-02 | End: 2023-04-07

## 2023-04-02 RX ORDER — SODIUM CHLORIDE 9 MG/ML
1000 INJECTION, SOLUTION INTRAVENOUS ONCE
Status: COMPLETED | OUTPATIENT
Start: 2023-04-02 | End: 2023-04-02

## 2023-04-02 RX ORDER — ALBUTEROL SULFATE 90 UG/1
2 AEROSOL, METERED RESPIRATORY (INHALATION) ONCE
Status: COMPLETED | OUTPATIENT
Start: 2023-04-02 | End: 2023-04-02

## 2023-04-02 RX ORDER — ALBUTEROL SULFATE 90 UG/1
2 AEROSOL, METERED RESPIRATORY (INHALATION) EVERY 6 HOURS PRN
Qty: 8.5 G | Refills: 0 | Status: SHIPPED | OUTPATIENT
Start: 2023-04-02 | End: 2024-03-14

## 2023-04-02 RX ADMIN — ALBUTEROL SULFATE 2 PUFF: 90 AEROSOL, METERED RESPIRATORY (INHALATION) at 05:42

## 2023-04-02 RX ADMIN — POTASSIUM CHLORIDE 40 MEQ: 1500 TABLET, EXTENDED RELEASE ORAL at 05:41

## 2023-04-02 RX ADMIN — SODIUM CHLORIDE 1000 ML: 9 INJECTION, SOLUTION INTRAVENOUS at 04:43

## 2023-04-02 ASSESSMENT — FIBROSIS 4 INDEX: FIB4 SCORE: 3.09

## 2023-04-02 ASSESSMENT — PAIN DESCRIPTION - PAIN TYPE: TYPE: ACUTE PAIN

## 2023-04-02 NOTE — ED PROVIDER NOTES
"ED Provider Note    CHIEF COMPLAINT  Chief Complaint   Patient presents with    Palpitations     Headache (Pt reports having palpitation started last evening on and off associated with headache, chest pressure and productive cough. Pt also has sinus infection on and off for months. Patient took tylenol at 3am with no relief. Denies N/V. )      Headache    Cough       EXTERNAL RECORDS REVIEWED  Outpatient Notes outpatient cardiology note from 11/15/2022 notes that patient had 1 episode of atrial fibrillation many years ago.  She is not currently anticoagulated as she has not had clear recurrent symptoms.    HPI/ROS  LIMITATION TO HISTORY   Select: : None    Sharon Callahan is a 82 y.o. female who presents with complaint of headache, cough, chest pressure, and palpitations.  Patient states she started with a gradual onset headache last night, slowly worsening.  She is also had a productive cough but denies fevers, shortness of breath.  The reason she came to the emergency department was she began to feel palpitations and states when she tried to take her heart rate the pulse ox would not read and felt \"fluttery\".  She denies chest pain but does report some pressure but this has resolved now.  She states she has a history of \"palpitations\" in the past and states she was hospitalized for them but states they never told her what they were apart from palpitations.  She has followed with a cardiologist, Dr. Crockett for a heart murmur.  She has a history of hypertension, hyperlipidemia, morbid obesity.  She denies diabetes or tobacco use.  She denies any numbness, weakness, vision changes.  She denies any new leg pain or leg swelling.    PAST MEDICAL HISTORY  Past Medical History:   Diagnosis Date    Chronic pain of both knees 1/9/2019    Pleural effusion, right 1/4/2017    Status post thoracotomy and decortication    Hemothorax on right 1/4/2017    Status post thoracotomy and decortication    Chronic nausea 1/12/2015    Has " "had GI work up done Dr voss    BMI 38.0-38.9,adult 9/18/2013    Depression with anxiety 5/3/2011    S/P bilateral mastectomy 4/4/2011    Hyperlipidemia 4/4/2011    MYESHA (obstructive sleep apnea) 4/4/2011    On cPAP     Arrhythmia     a-fib    Arthritis     osteo- shoulders knees    CATARACT     surgical correcttion bilateral    Heart burn     Heart valve disease     \"slight regurgitation\"    History of cholecystectomy     Hypertension     Indigestion     Pain     left hip    Range of motion deficit     left elbow, unable to completely extend    Sleep apnea     CPAP    Snoring         SURGICAL HISTORY  Past Surgical History:   Procedure Laterality Date    PB RECONSTR TOTAL SHOULDER IMPLANT Right 5/11/2021    Procedure: ARTHROPLASTY, SHOULDER, TOTAL, REVERSE;  Surgeon: Rqoue Edmond M.D.;  Location: SURGERY [facility];  Service: Orthopedics    BICEPS TENODESIS Right 5/11/2021    Procedure: TENODESIS, BICEPS - AND DUBOIS.;  Surgeon: Roque Edmond M.D.;  Location: SURGERY [facility];  Service: Orthopedics    PB TOTAL KNEE ARTHROPLASTY Left 11/8/2019    Procedure: ARTHROPLASTY, KNEE, TOTAL;  Surgeon: Roque Edmond M.D.;  Location: [facility];  Service: Orthopedics    THORACOSCOPY Right 12/6/2016    Procedure: RIGHT THORACOSCOPY ,DECORTICATION, EVACUATION OF HEMOTHORAX;  Surgeon: Ehsan D eLeon D.O.;  Location: [facility];  Service:     HIP ARTHROPLASTY TOTAL  12/3/2012    Performed by Jamie Kenney M.D. at [facility]    CATARACT PHACO WITH IOL  11/3/2011    Performed by DEENA BRADEN at [facility]    CATARACT PHACO WITH IOL  10/20/2011    Performed by DEENA BRADEN at SURGERY SAME DAY [facility]    MAXIM BY LAPAROSCOPY  2/1/2011    Performed by DORON HINOJOSA at SURGERY SAME DAY [facility]    HIP ARTHROPLASTY TOTAL  3/26/2009    Right; Perfmed by THERESE LEMA at SURGERY [facility]    UVULOPHARYNGOPALATOPLASTY  1990    OTHER  1989    tummy " reyna    OTHER  1988    bilateral mastectomies with implants    BREAST BIOPSY  1980    bilateral benign mastectomy    ORIF, HUMERUS  1950's    left    SD BREAST AUGMENTATION WITH IMPLANT          FAMILY HISTORY  Family History   Problem Relation Age of Onset    Hypertension Mother     Cancer Father         lung    Diabetes Father     Hypertension Father     Heart Disease Father         MI    Hypertension Brother     Sleep Apnea Brother     Hypertension Maternal Grandmother     Sleep Apnea Brother     Hypertension Brother     Diabetes Brother     Cancer Paternal Aunt         stomach    Diabetes Paternal Aunt     Heart Disease Brother         MI    Hyperlipidemia Neg Hx     Stroke Neg Hx        SOCIAL HISTORY       CURRENT MEDICATIONS  Home Medications    Medication Sig Taking? Last Dose Authorizing Provider   albuterol 108 (90 Base) MCG/ACT Aero Soln inhalation aerosol Inhale 2 Puffs every 6 hours as needed for Shortness of Breath. Yes  Galilea Wyatt M.D.   azithromycin (ZITHROMAX) 250 MG Tab Take 2 Tablets by mouth every day for 1 day, THEN 1 Tablet every day for 4 days. Yes  Galilea Wyatt M.D.   furosemide (LASIX) 40 MG Tab Take 1 Tablet by mouth 2 times a day.   Stephen Alcantara M.D.   acetaminophen (TYLENOL) 500 MG Tab Take 1,000 mg by mouth every 6 hours as needed. Indications: Pain   Physician Outpatient   potassium Chloride ER (K-TAB) 20 MEQ Tab CR tablet Take 20 mEq by mouth 2 times a day.   Physician Outpatient   omeprazole (PRILOSEC) 40 MG delayed-release capsule Take 40 mg by mouth as needed.   Physician Outpatient   Cholecalciferol (D3 PO) Take 1 Capsule by mouth every day.   Physician Outpatient   lisinopril (PRINIVIL) 40 MG tablet Take 1 Tablet by mouth every day.   Stephen Alcantara M.D.   losartan (COZAAR) 100 MG Tab Take 1 Tablet by mouth every day for 360 days.   Stephen Alcantara M.D.   aspirin EC (ECOTRIN) 81 MG Tablet Delayed Response Take 1 Tablet by mouth every  day.   Physician Outpatient   Cyanocobalamin (VITAMIN B-12 PO) Take 1 Tablet by mouth every day.   Physician Outpatient   traZODone (DESYREL) 50 MG Tab Take 1-2 Tablets by mouth at bedtime as needed for Sleep.   Physician Outpatient       ALLERGIES  No Known Allergies    PHYSICAL EXAM  BP (!) 150/75   Pulse (!) 58   Temp 36.6 °C (97.8 °F) (Temporal)   Resp 18   Ht 1.524 m (5')   Wt 90.7 kg (200 lb)   SpO2 95%   Constitutional: Alert in no apparent distress.  Morbidly obese female  HENT: No signs of trauma, Bilateral external ears normal, Nose normal.   Eyes: Pupils are equal and reactive, Conjunctiva normal, Non-icteric. EOMI  Neck: Normal range of motion, No tenderness, Supple, No stridor.   Cardiovascular: Regular rate and rhythm, no murmurs.   Thorax & Lungs: Normal breath sounds, No respiratory distress, No wheezing  Abdomen: Soft, No tenderness, No peritoneal signs, No masses, No pulsatile masses.   Skin: Warm, Dry, No erythema, No rash.   Extremities: Intact distal pulses, No edema, No tenderness, No cyanosis  Musculoskeletal: Good range of motion in all major joints. No major deformities noted.   Neurologic: GCS 15, normal motor function and sensation, clear speech, symmetric facial movements  Psychiatric: Affect normal, Judgment normal, Mood normal.         DIAGNOSTIC STUDIES / PROCEDURES    LABS & EKG  I have independently interpreted this EKG     Results for orders placed or performed during the hospital encounter of 04/02/23   CBC WITH DIFFERENTIAL   Result Value Ref Range    WBC 3.8 (L) 4.8 - 10.8 K/uL    RBC 4.48 4.20 - 5.40 M/uL    Hemoglobin 13.1 12.0 - 16.0 g/dL    Hematocrit 39.7 37.0 - 47.0 %    MCV 88.6 81.4 - 97.8 fL    MCH 29.2 27.0 - 33.0 pg    MCHC 33.0 (L) 33.6 - 35.0 g/dL    RDW 45.8 35.9 - 50.0 fL    Platelet Count 131 (L) 164 - 446 K/uL    MPV 11.0 9.0 - 12.9 fL    Neutrophils-Polys 72.90 (H) 44.00 - 72.00 %    Lymphocytes 13.80 (L) 22.00 - 41.00 %    Monocytes 11.70 0.00 - 13.40 %     Eosinophils 0.50 0.00 - 6.90 %    Basophils 0.30 0.00 - 1.80 %    Immature Granulocytes 0.80 0.00 - 0.90 %    Nucleated RBC 0.00 /100 WBC    Neutrophils (Absolute) 2.79 2.00 - 7.15 K/uL    Lymphs (Absolute) 0.53 (L) 1.00 - 4.80 K/uL    Monos (Absolute) 0.45 0.00 - 0.85 K/uL    Eos (Absolute) 0.02 0.00 - 0.51 K/uL    Baso (Absolute) 0.01 0.00 - 0.12 K/uL    Immature Granulocytes (abs) 0.03 0.00 - 0.11 K/uL    NRBC (Absolute) 0.00 K/uL   COMP METABOLIC PANEL   Result Value Ref Range    Sodium 139 135 - 145 mmol/L    Potassium 3.4 (L) 3.6 - 5.5 mmol/L    Chloride 106 96 - 112 mmol/L    Co2 23 20 - 33 mmol/L    Anion Gap 10.0 7.0 - 16.0    Glucose 95 65 - 99 mg/dL    Bun 21 8 - 22 mg/dL    Creatinine 0.96 0.50 - 1.40 mg/dL    Calcium 8.2 (L) 8.4 - 10.2 mg/dL    AST(SGOT) 21 12 - 45 U/L    ALT(SGPT) 19 2 - 50 U/L    Alkaline Phosphatase 86 30 - 99 U/L    Total Bilirubin 0.2 0.1 - 1.5 mg/dL    Albumin 3.7 3.2 - 4.9 g/dL    Total Protein 5.9 (L) 6.0 - 8.2 g/dL    Globulin 2.2 1.9 - 3.5 g/dL    A-G Ratio 1.7 g/dL   TROPONIN   Result Value Ref Range    Troponin T 13 6 - 19 ng/L   CORRECTED CALCIUM   Result Value Ref Range    Correct Calcium 8.4 (L) 8.5 - 10.5 mg/dL   ESTIMATED GFR   Result Value Ref Range    GFR (CKD-EPI) 59 (A) >60 mL/min/1.73 m 2   EKG   Result Value Ref Range    Report       [facility] Emergency Dept.    Test Date:  2023  Pt Name:    SHARON CALLAHAN              Department: E[facility]  MRN:        [id number]                      Room:       -ROOM 2  Gender:     Female                       Technician: [id number]  :        [id number]                   Requested By:GALILEA TUCKER  Order #:    [id number]                    Reading MD: Galilea Tucker    Measurements  Intervals                                Axis  Rate:       61                           P:          17  VT:         156                          QRS:        11  QRSD:       102                          T:           47  QT:         439  QTc:        443    Interpretive Statements  Normal sinus rhythm rate of 61, normal axis, normal intervals, no ST  elevations,  depressions, or T wave inversions, unchanged from EKG 11/15/2022  Electronically Signed On 4-2-2023 4:23:44 PDT by Galilea Wyatt         RADIOLOGY  I have independently interpreted the diagnostic imaging associated with this visit and am waiting the final reading from the radiologist.   My preliminary interpretation is a follows: 1 view chest x-ray with no focal infiltrates, no pneumonia, no pneumothorax, unchanged cardiomegaly, right-sided coil unchanged from prior    Radiologist interpretation:   DX-CHEST-PORTABLE (1 VIEW)   Final Result      1.  No acute cardiac or pulmonary abnormalities are identified. Lung volumes are low.   2.  Persistently enlarged cardiac silhouette          COURSE & MEDICAL DECISION MAKING    ED Observation Status? Yes; I am placing the patient in to an observation status due to a diagnostic uncertainty as well as therapeutic intensity. Patient placed in observation status at 4:37 AM, 4/2/2023.     Observation plan is as follows: Patient will require x-ray, lab work-up to evaluate for possible ACS    Upon Reevaluation, the patient's condition has: Improved; and will be discharged.    Patient discharged from ED Observation status at 5:30 AM (Time) 04/02/23 (Date).     INITIAL ASSESSMENT, COURSE AND PLAN  Care Narrative: 82-year-old female presenting with complaint of headache, cough, palpitations, and chest pressure.  Patient is hypertensive in triage and tachypneic but her heart rate is normal.  EKG shows no evidence of ischemia, no block, and no A-fib.  Will check troponin, basic labs, chest x-ray to evaluate for pneumonia.  Patient's headache was gradual in onset, no additional focal neurologic changes, do not suspect stroke, subarachnoid hemorrhage.  It is already improving after Tylenol.  Will give fluids but no additional pain  medications at this time.  Patient denies any nausea or vomiting.    5:16 AM  Labs reassuring, patient with mild hypokalemia will replete orally as mild electrolyte disturbances could make dysrhythmia such as paroxysmal A-fib more likely.  Troponin is normal.  Chest x-ray shows no evidence of pneumonia or large mass.    5:31 AM  Rechecked patient, she states she feels much better.  Discussed results.  She has not had any further palpitations, will start her on azithromycin for bronchitis.  We will also give albuterol inhaler to see if this helps with her cough.  Will refer for outpatient event monitor to see if she is having episodes of A-fib with RVR.  Encourage follow-up with PCP and cardiology with which patient is agreeable.        ADDITIONAL PROBLEM LIST    Palpitations  Cough   Headache    DISPOSITION AND DISCUSSIONS    Escalation of care considered, and ultimately not performed:acute inpatient care management, however at this time, the patient is most appropriate for outpatient management    Decision tools and prescription drugs considered including, but not limited to: Antibiotics azithromycin .    Discharged home in stable condition    FINAL DIAGNOSIS  1. Palpitations Acute Cardiac Event Monitor      2. Hypokalemia        3. Subacute cough  albuterol 108 (90 Base) MCG/ACT Aero Soln inhalation aerosol      4. Nonintractable headache, unspecified chronicity pattern, unspecified headache type Acute       5. Bronchitis  azithromycin (ZITHROMAX) 250 MG Tab      6. Paroxysmal atrial fibrillation (HCC)  Cardiac Event Monitor            Electronically signed by: Galilea Wyatt M.D., 04/02/23 4:34 AM

## 2023-04-02 NOTE — DISCHARGE INSTRUCTIONS
As we discussed.  Prescribing you antibiotics to help with bronchitis.  You may also use the albuterol inhaler to see if this helps your cough.  We would like you to follow-up with your primary care doctor for recheck in the next 2 to 3 days and to follow-up for a cardiac event monitor to see if you are having episodes of atrial fibrillation that could be causing your palpitations.  The event monitor will also see if there are any other rhythms contributing to your symptoms if they occur again.    Return to the emergency department if you develop worsening chest pain, focal numbness or weakness, difficulty breathing, or other concerns.

## 2023-04-02 NOTE — ED NOTES
Vital signs taken and recorded. IV removed. Discharge in stable condition ambulatory. Health teachings given to patient and family with full understanding of the information given. No personal belongings left.

## 2023-04-05 ENCOUNTER — OFFICE VISIT (OUTPATIENT)
Dept: MEDICAL GROUP | Facility: LAB | Age: 83
End: 2023-04-05
Payer: MEDICARE

## 2023-04-05 VITALS
HEIGHT: 60 IN | TEMPERATURE: 97.1 F | WEIGHT: 208 LBS | OXYGEN SATURATION: 98 % | BODY MASS INDEX: 40.84 KG/M2 | SYSTOLIC BLOOD PRESSURE: 130 MMHG | HEART RATE: 50 BPM | DIASTOLIC BLOOD PRESSURE: 76 MMHG

## 2023-04-05 DIAGNOSIS — I48.0 PAROXYSMAL ATRIAL FIBRILLATION (HCC): ICD-10-CM

## 2023-04-05 PROCEDURE — 99215 OFFICE O/P EST HI 40 MIN: CPT | Performed by: FAMILY MEDICINE

## 2023-04-05 ASSESSMENT — ENCOUNTER SYMPTOMS
CHILLS: 0
BLOOD IN STOOL: 0
FEVER: 0
SHORTNESS OF BREATH: 0
VOMITING: 0
DIARRHEA: 0
DEPRESSION: 0
PALPITATIONS: 1
CONSTIPATION: 0
NAUSEA: 0
WHEEZING: 0
ABDOMINAL PAIN: 0
NERVOUS/ANXIOUS: 0

## 2023-04-05 ASSESSMENT — FIBROSIS 4 INDEX: FIB4 SCORE: 2.98

## 2023-04-05 ASSESSMENT — PATIENT HEALTH QUESTIONNAIRE - PHQ9: CLINICAL INTERPRETATION OF PHQ2 SCORE: 0

## 2023-04-05 NOTE — PROGRESS NOTES
"Subjective:   Sharon Callahan is a 82 y.o. female here today for   Chief Complaint   Patient presents with    Hospital Follow-up    Results     X-RAY, Found a metal piece on x-ray      #Palpitations:  -Patient recent seen emergency department on 4/2/2020 after experiencing palpitations.  At that time labs, EKG were unremarkable.  There was some concerns for possible paroxysmal atrial fibrillation and cardiac event monitor was ordered at that time.  -Patient has had similar symptoms for which she is follow-up with cardiology.  His EKGs have all been negative.  Because of no clinical evidence, no rate control or anticoagulation was never started.  -Patient states that she continues to have occasional palpitations but states that she is able to \"talk yourself out of it\".  That led to urgency room visit on 4/2/2023 was more pervasive and persistent that she had felt before.  -Patient denies any lightheadedness, dizziness, syncope, chest pain, shortness of breath, increased swelling of lower extremities, paroxysmal nocturnal dyspnea, exercise intolerance.    Allergies   Allergen Reactions    Pcn [Penicillins] Anaphylaxis     Skin turns black    Clindamycin Rash     Rash      Influenza Virus Vacc Rash     Red in face    Norco [Hydrocodone-Acetaminophen] Vomiting and Nausea    Pneumococcal Vaccine Rash    Tetracycline Rash     Rash     Xarelto [Rivaroxaban] Rash         Current medicines (including changes today)  Current Outpatient Medications   Medication Sig Dispense Refill    potassium Chloride ER (K-TAB) 20 MEQ Tab CR tablet TAKE 1 TABLET BY MOUTH EVERY DAY. 90 Tablet 1    albuterol 108 (90 Base) MCG/ACT Aero Soln inhalation aerosol Inhale 2 Puffs every 6 hours as needed for Shortness of Breath. 8.5 g 0    azithromycin (ZITHROMAX) 250 MG Tab Take 2 Tablets by mouth every day for 1 day, THEN 1 Tablet every day for 4 days. 6 Tablet 0    furosemide (LASIX) 40 MG Tab Take 1 Tablet by mouth 2 times a day. 180 Tablet 1    " acetaminophen (TYLENOL) 500 MG Tab Take 1,000 mg by mouth every 6 hours as needed. Indications: Pain      omeprazole (PRILOSEC) 40 MG delayed-release capsule Take 40 mg by mouth as needed.      Cholecalciferol (D3 PO) Take 1 Capsule by mouth every day.      lisinopril (PRINIVIL) 40 MG tablet Take 1 Tablet by mouth every day. 90 Tablet 3    losartan (COZAAR) 100 MG Tab Take 1 Tablet by mouth every day for 360 days. 90 Each 3    aspirin EC (ECOTRIN) 81 MG Tablet Delayed Response Take 1 Tablet by mouth every day.      Cyanocobalamin (VITAMIN B-12 PO) Take 1 Tablet by mouth every day.      traZODone (DESYREL) 50 MG Tab Take 1-2 Tablets by mouth at bedtime as needed for Sleep.       No current facility-administered medications for this visit.     She  has a past medical history of Arrhythmia, Arthritis, BMI 38.0-38.9,adult (9/18/2013), CATARACT, Chronic nausea (1/12/2015), Chronic pain of both knees (1/9/2019), Depression with anxiety (5/3/2011), Heart burn, Heart valve disease, Hemothorax on right (1/4/2017), History of cholecystectomy, Hyperlipidemia (4/4/2011), Hypertension, Indigestion, MYESHA (obstructive sleep apnea) (4/4/2011), Pain, Pleural effusion, right (1/4/2017), Range of motion deficit, S/P bilateral mastectomy (4/4/2011), Sleep apnea, and Snoring.    ROS   Review of Systems   Constitutional:  Negative for chills and fever.   Respiratory:  Negative for shortness of breath and wheezing.    Cardiovascular:  Positive for palpitations. Negative for chest pain.   Gastrointestinal:  Negative for abdominal pain, blood in stool, constipation, diarrhea, nausea and vomiting.   Psychiatric/Behavioral:  Negative for depression. The patient is not nervous/anxious.       Objective:     Physical Exam:  /76   Pulse (!) 50   Temp 36.2 °C (97.1 °F) (Temporal)   Ht 1.524 m (5')   Wt 94.3 kg (208 lb)   SpO2 98%  Body mass index is 40.62 kg/m².   Constitutional: Alert, no distress.  Skin: Warm, dry, good turgor, no  rashes in visible areas.  Eye: Equal, round and reactive, conjunctiva clear, lids normal.  Respiratory: Unlabored respiratory effort, lungs clear to auscultation, no wheezes, no rhonchi.  Cardiovascular: Normal S1, S2, no murmur, no edema.  Abdomen: Soft, non-tender, no masses, no hepatosplenomegaly.  Psych: Alert and oriented x3, normal affect and mood.    Assessment and Plan:     1. Paroxysmal atrial fibrillation (HCC)  -At this time patient will follow-up to complete a cardiac event monitor.  Given patient's symptoms and findings in emergency department I am quite convinced of diagnosis of paroxysmal atrial fibrillation.  Given this diagnosis, which will be confirmed by cardiac event monitor, discussion regarding anticoagulation is needed given her [id number] score equals 4.  -Reviewed labs, imaging from the emergency department.  Chest x-ray does show rather enlarged cardiac silhouette and thus we will complete echocardiogram to assure normal ejection fraction.  Patient will complete cardiac event monitor, follow-up with PCP, cardiologist once event monitor and echocardiogram is completed.  -Strict return precautions were given, patient will follow-up as needed.  - EC-ECHOCARDIOGRAM COMPLETE W/O CONT; Future    My total time spent caring for the patient on the day of the encounter was 40 minutes.   This does not include time spent on separately billable procedures/tests.      Followup: No follow-ups on file.         PLEASE NOTE: This dictation was created using voice recognition software. I have made every reasonable attempt to correct obvious errors, but I expect that there are errors of grammar and possibly content that I did not discover before finalizing the note.

## 2023-04-06 ENCOUNTER — NON-PROVIDER VISIT (OUTPATIENT)
Dept: CARDIOLOGY | Facility: MEDICAL CENTER | Age: 83
End: 2023-04-06
Attending: STUDENT IN AN ORGANIZED HEALTH CARE EDUCATION/TRAINING PROGRAM
Payer: MEDICARE

## 2023-04-06 DIAGNOSIS — I48.0 PAROXYSMAL ATRIAL FIBRILLATION (HCC): ICD-10-CM

## 2023-04-06 DIAGNOSIS — R00.2 PALPITATIONS: ICD-10-CM

## 2023-04-24 ENCOUNTER — TELEPHONE (OUTPATIENT)
Dept: CARDIOLOGY | Facility: MEDICAL CENTER | Age: 83
End: 2023-04-24
Payer: MEDICARE

## 2023-05-17 ENCOUNTER — ANCILLARY PROCEDURE (OUTPATIENT)
Dept: CARDIOLOGY | Facility: MEDICAL CENTER | Age: 83
End: 2023-05-17
Attending: FAMILY MEDICINE
Payer: MEDICARE

## 2023-05-17 DIAGNOSIS — I48.0 PAROXYSMAL ATRIAL FIBRILLATION (HCC): ICD-10-CM

## 2023-05-17 PROCEDURE — 93306 TTE W/DOPPLER COMPLETE: CPT

## 2023-05-18 LAB
LV EJECT FRACT  99904: 65
LV EJECT FRACT MOD 2C 99903: 38.68
LV EJECT FRACT MOD 4C 99902: 79.31
LV EJECT FRACT MOD BP 99901: 61.79

## 2023-05-18 PROCEDURE — 93306 TTE W/DOPPLER COMPLETE: CPT | Mod: 26 | Performed by: INTERNAL MEDICINE

## 2023-08-18 ENCOUNTER — TELEPHONE (OUTPATIENT)
Dept: MEDICAL GROUP | Facility: LAB | Age: 83
End: 2023-08-18
Payer: MEDICARE
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
## 2023-08-21 ENCOUNTER — OFFICE VISIT (OUTPATIENT)
Dept: MEDICAL GROUP | Facility: LAB | Age: 83
End: 2023-08-21
Payer: MEDICARE

## 2023-08-21 VITALS
DIASTOLIC BLOOD PRESSURE: 70 MMHG | TEMPERATURE: 97.6 F | HEIGHT: 60 IN | HEART RATE: 50 BPM | SYSTOLIC BLOOD PRESSURE: 114 MMHG | WEIGHT: 210 LBS | BODY MASS INDEX: 41.23 KG/M2 | OXYGEN SATURATION: 96 % | RESPIRATION RATE: 16 BRPM

## 2023-08-21 DIAGNOSIS — M62.838 NECK MUSCLE SPASM: ICD-10-CM

## 2023-08-21 PROCEDURE — 3074F SYST BP LT 130 MM HG: CPT | Performed by: FAMILY MEDICINE

## 2023-08-21 PROCEDURE — 99213 OFFICE O/P EST LOW 20 MIN: CPT | Performed by: FAMILY MEDICINE

## 2023-08-21 PROCEDURE — 3078F DIAST BP <80 MM HG: CPT | Performed by: FAMILY MEDICINE

## 2023-08-21 ASSESSMENT — FIBROSIS 4 INDEX: FIB4 SCORE: 2.98
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
## 2023-08-31 RX ORDER — LOSARTAN POTASSIUM 100 MG/1
100 TABLET ORAL
Qty: 90 TABLET | Refills: 3 | Status: SHIPPED | OUTPATIENT
Start: 2023-08-31 | End: 2024-08-25

## 2023-08-31 NOTE — TELEPHONE ENCOUNTER
Received request via: Pharmacy    Was the patient seen in the last year in this department? Yes    Does the patient have an active prescription (recently filled or refills available) for medication(s) requested? No    Does the patient have halfway Plus and need 100 day supply (blood pressure, diabetes and cholesterol meds only)? Patient does not have SCP    LOSARTAN POTASSIUM 100 MG TAB
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
## 2023-10-28 ENCOUNTER — APPOINTMENT (OUTPATIENT)
Dept: RADIOLOGY | Facility: MEDICAL CENTER | Age: 83
End: 2023-10-28
Attending: EMERGENCY MEDICINE
Payer: OTHER MISCELLANEOUS

## 2023-10-28 ENCOUNTER — HOSPITAL ENCOUNTER (EMERGENCY)
Facility: MEDICAL CENTER | Age: 83
End: 2023-10-31
Payer: MEDICARE

## 2023-10-28 ENCOUNTER — HOSPITAL ENCOUNTER (EMERGENCY)
Facility: MEDICAL CENTER | Age: 83
End: 2023-10-28
Attending: EMERGENCY MEDICINE
Payer: OTHER MISCELLANEOUS

## 2023-10-28 ENCOUNTER — PHARMACY VISIT (OUTPATIENT)
Dept: PHARMACY | Facility: MEDICAL CENTER | Age: 83
End: 2023-10-28
Payer: COMMERCIAL

## 2023-10-28 VITALS
OXYGEN SATURATION: 95 % | HEIGHT: 61 IN | TEMPERATURE: 98.5 F | BODY MASS INDEX: 41.54 KG/M2 | DIASTOLIC BLOOD PRESSURE: 70 MMHG | SYSTOLIC BLOOD PRESSURE: 171 MMHG | RESPIRATION RATE: 18 BRPM | HEART RATE: 48 BPM | WEIGHT: 220 LBS

## 2023-10-28 DIAGNOSIS — S50.312A ABRASION OF LEFT ELBOW, INITIAL ENCOUNTER: ICD-10-CM

## 2023-10-28 DIAGNOSIS — S20.211A CHEST WALL CONTUSION, RIGHT, INITIAL ENCOUNTER: ICD-10-CM

## 2023-10-28 DIAGNOSIS — M25.531 RIGHT WRIST PAIN: ICD-10-CM

## 2023-10-28 DIAGNOSIS — S20.312A ABRASION OF LEFT CHEST WALL, INITIAL ENCOUNTER: ICD-10-CM

## 2023-10-28 LAB
ABO + RH BLD: NORMAL
ABO GROUP BLD: NORMAL
ALBUMIN SERPL BCP-MCNC: 3.8 G/DL (ref 3.2–4.9)
ALBUMIN/GLOB SERPL: 1.5 G/DL
ALP SERPL-CCNC: 78 U/L (ref 30–99)
ALT SERPL-CCNC: 9 U/L (ref 2–50)
ANION GAP SERPL CALC-SCNC: 13 MMOL/L (ref 7–16)
APTT PPP: 26.9 SEC (ref 24.7–36)
AST SERPL-CCNC: 15 U/L (ref 12–45)
BILIRUB SERPL-MCNC: 0.3 MG/DL (ref 0.1–1.5)
BLD GP AB SCN SERPL QL: NORMAL
BUN SERPL-MCNC: 16 MG/DL (ref 8–22)
CALCIUM ALBUM COR SERPL-MCNC: 9.1 MG/DL (ref 8.5–10.5)
CALCIUM SERPL-MCNC: 8.9 MG/DL (ref 8.5–10.5)
CHLORIDE SERPL-SCNC: 109 MMOL/L (ref 96–112)
CO2 SERPL-SCNC: 22 MMOL/L (ref 20–33)
CREAT SERPL-MCNC: 0.92 MG/DL (ref 0.5–1.4)
ERYTHROCYTE [DISTWIDTH] IN BLOOD BY AUTOMATED COUNT: 48.1 FL (ref 35.9–50)
ETHANOL BLD-MCNC: <10.1 MG/DL
GFR SERPLBLD CREATININE-BSD FMLA CKD-EPI: 62 ML/MIN/1.73 M 2
GLOBULIN SER CALC-MCNC: 2.6 G/DL (ref 1.9–3.5)
GLUCOSE SERPL-MCNC: 79 MG/DL (ref 65–99)
HCT VFR BLD AUTO: 44.6 % (ref 37–47)
HGB BLD-MCNC: 14 G/DL (ref 12–16)
INR PPP: 0.97 (ref 0.87–1.13)
MCH RBC QN AUTO: 28.7 PG (ref 27–33)
MCHC RBC AUTO-ENTMCNC: 31.4 G/DL (ref 32.2–35.5)
MCV RBC AUTO: 91.4 FL (ref 81.4–97.8)
PLATELET # BLD AUTO: 142 K/UL (ref 164–446)
PMV BLD AUTO: 11.1 FL (ref 9–12.9)
POTASSIUM SERPL-SCNC: 3.8 MMOL/L (ref 3.6–5.5)
PROT SERPL-MCNC: 6.4 G/DL (ref 6–8.2)
PROTHROMBIN TIME: 13 SEC (ref 12–14.6)
RBC # BLD AUTO: 4.88 M/UL (ref 4.2–5.4)
RH BLD: NORMAL
SODIUM SERPL-SCNC: 144 MMOL/L (ref 135–145)
WBC # BLD AUTO: 8.2 K/UL (ref 4.8–10.8)

## 2023-10-28 PROCEDURE — 305948 HCHG GREEN TRAUMA ACT PRE-NOTIFY NO CC

## 2023-10-28 PROCEDURE — 85730 THROMBOPLASTIN TIME PARTIAL: CPT

## 2023-10-28 PROCEDURE — 86850 RBC ANTIBODY SCREEN: CPT

## 2023-10-28 PROCEDURE — RXMED WILLOW AMBULATORY MEDICATION CHARGE: Performed by: EMERGENCY MEDICINE

## 2023-10-28 PROCEDURE — 86901 BLOOD TYPING SEROLOGIC RH(D): CPT

## 2023-10-28 PROCEDURE — 71045 X-RAY EXAM CHEST 1 VIEW: CPT

## 2023-10-28 PROCEDURE — 72125 CT NECK SPINE W/O DYE: CPT

## 2023-10-28 PROCEDURE — A9270 NON-COVERED ITEM OR SERVICE: HCPCS | Performed by: EMERGENCY MEDICINE

## 2023-10-28 PROCEDURE — 85027 COMPLETE CBC AUTOMATED: CPT

## 2023-10-28 PROCEDURE — 71260 CT THORAX DX C+: CPT

## 2023-10-28 PROCEDURE — 82077 ASSAY SPEC XCP UR&BREATH IA: CPT

## 2023-10-28 PROCEDURE — 85610 PROTHROMBIN TIME: CPT

## 2023-10-28 PROCEDURE — 99285 EMERGENCY DEPT VISIT HI MDM: CPT

## 2023-10-28 PROCEDURE — 700102 HCHG RX REV CODE 250 W/ 637 OVERRIDE(OP): Performed by: EMERGENCY MEDICINE

## 2023-10-28 PROCEDURE — 70450 CT HEAD/BRAIN W/O DYE: CPT

## 2023-10-28 PROCEDURE — 73100 X-RAY EXAM OF WRIST: CPT | Mod: RT

## 2023-10-28 PROCEDURE — 86900 BLOOD TYPING SEROLOGIC ABO: CPT

## 2023-10-28 PROCEDURE — 302874 HCHG BANDAGE ACE 2 OR 3""

## 2023-10-28 PROCEDURE — 72128 CT CHEST SPINE W/O DYE: CPT

## 2023-10-28 PROCEDURE — 72170 X-RAY EXAM OF PELVIS: CPT

## 2023-10-28 PROCEDURE — 72131 CT LUMBAR SPINE W/O DYE: CPT

## 2023-10-28 PROCEDURE — 90471 IMMUNIZATION ADMIN: CPT

## 2023-10-28 PROCEDURE — 90715 TDAP VACCINE 7 YRS/> IM: CPT | Performed by: EMERGENCY MEDICINE

## 2023-10-28 PROCEDURE — 80053 COMPREHEN METABOLIC PANEL: CPT

## 2023-10-28 PROCEDURE — 700111 HCHG RX REV CODE 636 W/ 250 OVERRIDE (IP): Performed by: EMERGENCY MEDICINE

## 2023-10-28 PROCEDURE — 700117 HCHG RX CONTRAST REV CODE 255: Performed by: EMERGENCY MEDICINE

## 2023-10-28 PROCEDURE — 36415 COLL VENOUS BLD VENIPUNCTURE: CPT

## 2023-10-28 PROCEDURE — 29125 APPL SHORT ARM SPLINT STATIC: CPT

## 2023-10-28 RX ORDER — HYDROCODONE BITARTRATE AND ACETAMINOPHEN 5; 325 MG/1; MG/1
1 TABLET ORAL ONCE
Status: COMPLETED | OUTPATIENT
Start: 2023-10-28 | End: 2023-10-28

## 2023-10-28 RX ORDER — HYDROCODONE BITARTRATE AND ACETAMINOPHEN 5; 325 MG/1; MG/1
1 TABLET ORAL EVERY 4 HOURS PRN
Qty: 12 TABLET | Refills: 0 | Status: SHIPPED | OUTPATIENT
Start: 2023-10-28 | End: 2023-10-31

## 2023-10-28 RX ORDER — ASPIRIN 81 MG/1
81 TABLET ORAL DAILY
Status: SHIPPED | COMMUNITY
End: 2024-03-14

## 2023-10-28 RX ADMIN — IOHEXOL 100 ML: 350 INJECTION, SOLUTION INTRAVENOUS at 12:26

## 2023-10-28 RX ADMIN — CLOSTRIDIUM TETANI TOXOID ANTIGEN (FORMALDEHYDE INACTIVATED), CORYNEBACTERIUM DIPHTHERIAE TOXOID ANTIGEN (FORMALDEHYDE INACTIVATED), BORDETELLA PERTUSSIS TOXOID ANTIGEN (GLUTARALDEHYDE INACTIVATED), BORDETELLA PERTUSSIS FILAMENTOUS HEMAGGLUTININ ANTIGEN (FORMALDEHYDE INACTIVATED), BORDETELLA PERTUSSIS PERTACTIN ANTIGEN, AND BORDETELLA PERTUSSIS FIMBRIAE 2/3 ANTIGEN 0.5 ML: 5; 2; 2.5; 5; 3; 5 INJECTION, SUSPENSION INTRAMUSCULAR at 14:03

## 2023-10-28 RX ADMIN — HYDROCODONE BITARTRATE AND ACETAMINOPHEN 1 TABLET: 5; 325 TABLET ORAL at 14:04
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
## 2023-10-30 ENCOUNTER — OFFICE VISIT (OUTPATIENT)
Dept: MEDICAL GROUP | Facility: LAB | Age: 83
End: 2023-10-30
Payer: MEDICARE

## 2023-10-30 VITALS
DIASTOLIC BLOOD PRESSURE: 62 MMHG | WEIGHT: 212 LBS | HEIGHT: 60 IN | TEMPERATURE: 98.2 F | SYSTOLIC BLOOD PRESSURE: 98 MMHG | BODY MASS INDEX: 41.62 KG/M2 | RESPIRATION RATE: 16 BRPM | HEART RATE: 54 BPM | OXYGEN SATURATION: 95 %

## 2023-10-30 DIAGNOSIS — S51.012S ELBOW LACERATION, LEFT, SEQUELA: ICD-10-CM

## 2023-10-30 DIAGNOSIS — M25.531 RIGHT WRIST PAIN: ICD-10-CM

## 2023-10-30 PROCEDURE — 3078F DIAST BP <80 MM HG: CPT | Performed by: FAMILY MEDICINE

## 2023-10-30 PROCEDURE — 3074F SYST BP LT 130 MM HG: CPT | Performed by: FAMILY MEDICINE

## 2023-10-30 PROCEDURE — 99214 OFFICE O/P EST MOD 30 MIN: CPT | Performed by: FAMILY MEDICINE

## 2023-10-30 ASSESSMENT — FIBROSIS 4 INDEX: FIB4 SCORE: 3.02
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
## 2023-11-15 ENCOUNTER — APPOINTMENT (OUTPATIENT)
Dept: CARDIOLOGY | Facility: MEDICAL CENTER | Age: 83
End: 2023-11-15
Attending: INTERNAL MEDICINE
Payer: MEDICARE

## 2023-11-17 DIAGNOSIS — I10 ESSENTIAL HYPERTENSION: ICD-10-CM

## 2023-11-17 DIAGNOSIS — M79.89 LEG SWELLING: ICD-10-CM

## 2023-11-17 NOTE — TELEPHONE ENCOUNTER
Received request via: Pharmacy    Was the patient seen in the last year in this department? Yes    Does the patient have an active prescription (recently filled or refills available) for medication(s) requested? yes    Does the patient have half-way Plus and need 100 day supply (blood pressure, diabetes and cholesterol meds only)? Patient does not have SCP   Requested Prescriptions     Pending Prescriptions Disp Refills    potassium Chloride ER (K-TAB) 20 MEQ Tab CR tablet [Pharmacy Med Name: POTASSIUM CL ER 20 MEQ TABLET] 90 Tablet 1     Sig: TAKE 1 TABLET BY MOUTH EVERY DAY    furosemide (LASIX) 40 MG Tab [Pharmacy Med Name: FUROSEMIDE 40 MG TABLET] 180 Tablet 1     Sig: TAKE 1 TABLET BY MOUTH TWICE A DAY

## 2023-11-20 RX ORDER — FUROSEMIDE 40 MG/1
40 TABLET ORAL 2 TIMES DAILY
Qty: 180 TABLET | Refills: 1 | Status: SHIPPED | OUTPATIENT
Start: 2023-11-20 | End: 2024-03-18

## 2023-11-20 RX ORDER — POTASSIUM CHLORIDE 1500 MG/1
TABLET, EXTENDED RELEASE ORAL
Qty: 90 TABLET | Refills: 1 | Status: SHIPPED | OUTPATIENT
Start: 2023-11-20

## 2024-01-08 ENCOUNTER — APPOINTMENT (OUTPATIENT)
Dept: RADIOLOGY | Facility: MEDICAL CENTER | Age: 84
End: 2024-01-08
Attending: EMERGENCY MEDICINE
Payer: MEDICARE

## 2024-01-08 ENCOUNTER — HOSPITAL ENCOUNTER (EMERGENCY)
Facility: MEDICAL CENTER | Age: 84
End: 2024-01-08
Attending: EMERGENCY MEDICINE
Payer: MEDICARE

## 2024-01-08 VITALS
WEIGHT: 210.1 LBS | OXYGEN SATURATION: 98 % | RESPIRATION RATE: 20 BRPM | HEART RATE: 60 BPM | BODY MASS INDEX: 41.25 KG/M2 | SYSTOLIC BLOOD PRESSURE: 170 MMHG | TEMPERATURE: 98.1 F | HEIGHT: 60 IN | DIASTOLIC BLOOD PRESSURE: 88 MMHG

## 2024-01-08 DIAGNOSIS — J06.9 VIRAL UPPER RESPIRATORY TRACT INFECTION: ICD-10-CM

## 2024-01-08 LAB
FLUAV RNA SPEC QL NAA+PROBE: NEGATIVE
FLUBV RNA SPEC QL NAA+PROBE: NEGATIVE
RSV RNA SPEC QL NAA+PROBE: NEGATIVE
SARS-COV-2 RNA RESP QL NAA+PROBE: NOTDETECTED
SPECIMEN SOURCE: NORMAL

## 2024-01-08 PROCEDURE — 94760 N-INVAS EAR/PLS OXIMETRY 1: CPT

## 2024-01-08 PROCEDURE — 700111 HCHG RX REV CODE 636 W/ 250 OVERRIDE (IP): Performed by: EMERGENCY MEDICINE

## 2024-01-08 PROCEDURE — 99284 EMERGENCY DEPT VISIT MOD MDM: CPT

## 2024-01-08 PROCEDURE — A9270 NON-COVERED ITEM OR SERVICE: HCPCS | Performed by: EMERGENCY MEDICINE

## 2024-01-08 PROCEDURE — 71045 X-RAY EXAM CHEST 1 VIEW: CPT

## 2024-01-08 PROCEDURE — 0241U HCHG SARS-COV-2 COVID-19 NFCT DS RESP RNA 4 TRGT MIC: CPT

## 2024-01-08 PROCEDURE — 700102 HCHG RX REV CODE 250 W/ 637 OVERRIDE(OP): Performed by: EMERGENCY MEDICINE

## 2024-01-08 RX ORDER — ALBUTEROL SULFATE 90 UG/1
2 AEROSOL, METERED RESPIRATORY (INHALATION) ONCE
Status: COMPLETED | OUTPATIENT
Start: 2024-01-08 | End: 2024-01-08

## 2024-01-08 RX ORDER — PREDNISONE 10 MG/1
40 TABLET ORAL DAILY
Status: DISCONTINUED | OUTPATIENT
Start: 2024-01-08 | End: 2024-01-08 | Stop reason: HOSPADM

## 2024-01-08 RX ORDER — PREDNISONE 20 MG/1
40 TABLET ORAL DAILY
Qty: 6 TABLET | Refills: 0 | Status: SHIPPED | OUTPATIENT
Start: 2024-01-08 | End: 2024-01-11

## 2024-01-08 RX ADMIN — PREDNISONE 40 MG: 10 TABLET ORAL at 10:33

## 2024-01-08 RX ADMIN — ALBUTEROL SULFATE 2 PUFF: 90 AEROSOL, METERED RESPIRATORY (INHALATION) at 10:48

## 2024-01-08 ASSESSMENT — FIBROSIS 4 INDEX: FIB4 SCORE: 2.89

## 2024-01-08 NOTE — ED TRIAGE NOTES
Chief Complaint   Patient presents with    Cough     X 1 week      Lightheadedness     States became lightheaded after coughing this am       /82   Pulse (!) 55   Temp 36.8 °C (98.2 °F) (Temporal)   Resp 19   Ht 1.524 m (5')   Wt 95.3 kg (210 lb 1.6 oz)   SpO2 94%   BMI 41.03 kg/m²     Pt ambulated to ED by self for c/o increasing cough, congestion, and feeling lightheaded this am after coughing.

## 2024-01-08 NOTE — DISCHARGE INSTRUCTIONS
Stay well-hydrated take Tylenol as needed for discomfort.  Use the albuterol inhaler as needed.  Also utilize nasal saline spray and spray 2 sprays in each nostril every couple of hours while you are awake.

## 2024-01-08 NOTE — ED PROVIDER NOTES
ED Provider Note    CHIEF COMPLAINT  Chief Complaint   Patient presents with    Cough     X 1 week      Lightheadedness     States became lightheaded after coughing this am         HPI/ROS    Sharon Callahan is a 83 y.o. female who presents with a cough and congestion.  The patient's been sick for just over a week.  She states she has a lot of cough and congestion as well as sinus pressure.  She has had an increased work of breathing and therefore she presents for evaluation.  She has not had any associated fevers.  She does not have any known history of COPD nor lung disease.  She denies tobacco abuse.  She has had some generalized malaise and she is also had some diarrhea.  She has not had any vomiting.    PAST MEDICAL HISTORY   has a past medical history of Arrhythmia, Arthritis, BMI 38.0-38.9,adult (9/18/2013), Breast cancer (HCC), CATARACT, Chronic nausea (1/12/2015), Chronic pain of both knees (1/9/2019), Depression with anxiety (5/3/2011), Heart burn, Heart valve disease, Hemothorax on right (1/4/2017), History of cholecystectomy, total knee replacement, Hyperlipidemia (4/4/2011), Hypertension, Indigestion, MYESHA (obstructive sleep apnea) (4/4/2011), Pain, Pleural effusion, right (1/4/2017), Range of motion deficit, S/P bilateral mastectomy (4/4/2011), Sleep apnea, and Snoring.    SURGICAL HISTORY   has a past surgical history that includes hip replacement, total (Bilateral); other (Bilateral); lisa by laparoscopy (2/1/2011); other (1988); other (1989); uvulopharyngopalatoplasty (1990); cataract phaco with iol (10/20/2011); cataract phaco with iol (11/3/2011); orif, humerus (1950's); breast augmentation with implant; thoracoscopy (Right, 12/6/2016); breast biopsy (1980); hip arthroplasty total (3/26/2009); hip arthroplasty total (12/3/2012); total knee arthroplasty (Left, 11/8/2019); reconstr total shoulder implant (Right, 5/11/2021); and biceps tenodesis (Right, 5/11/2021).    FAMILY HISTORY  Family History    Problem Relation Age of Onset    Hypertension Mother     Cancer Father         lung    Diabetes Father     Hypertension Father     Heart Disease Father         MI    Hypertension Brother     Sleep Apnea Brother     Hypertension Maternal Grandmother     Sleep Apnea Brother     Hypertension Brother     Diabetes Brother     Cancer Paternal Aunt         stomach    Diabetes Paternal Aunt     Heart Disease Brother         MI    Hyperlipidemia Neg Hx     Stroke Neg Hx        SOCIAL HISTORY  Social History     Tobacco Use    Smoking status: Former     Current packs/day: 0.00     Average packs/day: 1 pack/day for 15.0 years (15.0 ttl pk-yrs)     Types: Cigarettes     Start date: 1960     Quit date: 1975     Years since quittin.0    Smokeless tobacco: Never   Vaping Use    Vaping Use: Never used   Substance and Sexual Activity    Alcohol use: Yes     Alcohol/week: 6.0 oz     Types: 10 Standard drinks or equivalent per week    Drug use: No    Sexual activity: Never     Comment: lives with ex-       CURRENT MEDICATIONS  Home Medications    **Home medications have not yet been reviewed for this encounter**         ALLERGIES  Allergies   Allergen Reactions    Pcn [Penicillins] Anaphylaxis     Skin turns black    Clindamycin Rash     Rash      Flu Virus Vaccine     Influenza Virus Vacc Rash     Red in face    Norco [Hydrocodone-Acetaminophen] Vomiting and Nausea    Penicillins     Pneumococcal Vaccine Rash    Tetracycline Rash     Rash     Xarelto [Rivaroxaban] Rash       PHYSICAL EXAM  VITAL SIGNS: /82   Pulse (!) 55   Temp 36.8 °C (98.2 °F) (Temporal)   Resp 19   Ht 1.524 m (5')   Wt 95.3 kg (210 lb 1.6 oz)   SpO2 94%   BMI 41.03 kg/m²    In general the patient does not appear toxic    Ears tympanic membranes retracted bilaterally, nares have swollen turbinates, oropharynx is erythematous without exudates    Neck is supple without adenopathy    Pulmonary the patient's lungs are symmetrically  diminished throughout with diffuse rhonchi but no wheezing or rales    Cardiovascular S1-S2 with a regular rate and rhythm    GI abdomen soft    Skin no rashes, pallor, no jaundice    Extremities no distal edema    Neurologic examination is grossly intact        DIAGNOSTIC STUDIES   Results for orders placed or performed during the hospital encounter of 01/08/24   CoV-2, Flu A/B, And RSV by PCR ([facility] [place])    Specimen: Respirate   Result Value Ref Range    Influenza virus A RNA Negative Negative    Influenza virus B, PCR Negative Negative    RSV, PCR Negative Negative    SARS-CoV-2 by PCR NotDetected     SARS-CoV-2 Source NP Swab        RADIOLOGY  DX-CHEST-PORTABLE (1 VIEW)   Final Result      1.  Linear bibasilar atelectasis.      2.  Mild cardiomegaly.      Chest x-ray was reviewed and shows no evidence of a focal process such as pneumonia    COURSE & MEDICAL DECISION MAKING    This an 83-year-old female who presents to the emergency department with signs and symptoms consistent with a viral process.  I suspect she does have a viral pneumonitis.  I did order albuterol as well as oral prednisone.  On repeat exam she states she feels better with the albuterol.  Chest x-ray does not show any evidence of a focal process such as pneumonia.  Therefore the patient be discharged home with instructions to utilize the albuterol as needed and will place the patient on 2 more days of prednisone.  She will also utilize nasal saline spray.  I like the patient to recheck if she is not better in 5 to 7 days and sooner if worse.    FINAL DIAGNOSIS  1.  Viral respiratory infection  2.  Reactive airway disease    Disposition  The patient will be discharged in stable condition       Electronically signed by: Paolo Alcantar M.D., 1/8/2024 10:15 AM

## 2024-01-08 NOTE — ED NOTES
Pt provided with DC paper work and follow up care. Pt declines questions and ambulated out of ER.

## 2024-03-14 ENCOUNTER — OFFICE VISIT (OUTPATIENT)
Dept: MEDICAL GROUP | Facility: LAB | Age: 84
End: 2024-03-14
Payer: MEDICARE

## 2024-03-14 VITALS
WEIGHT: 205 LBS | HEART RATE: 84 BPM | HEIGHT: 60 IN | RESPIRATION RATE: 16 BRPM | DIASTOLIC BLOOD PRESSURE: 70 MMHG | SYSTOLIC BLOOD PRESSURE: 124 MMHG | TEMPERATURE: 97.9 F | BODY MASS INDEX: 40.25 KG/M2 | OXYGEN SATURATION: 96 %

## 2024-03-14 DIAGNOSIS — F41.1 GAD (GENERALIZED ANXIETY DISORDER): ICD-10-CM

## 2024-03-14 PROCEDURE — 3078F DIAST BP <80 MM HG: CPT | Performed by: FAMILY MEDICINE

## 2024-03-14 PROCEDURE — 3074F SYST BP LT 130 MM HG: CPT | Performed by: FAMILY MEDICINE

## 2024-03-14 PROCEDURE — 99214 OFFICE O/P EST MOD 30 MIN: CPT | Performed by: FAMILY MEDICINE

## 2024-03-14 RX ORDER — PROPRANOLOL HYDROCHLORIDE 10 MG/1
10 TABLET ORAL 2 TIMES DAILY PRN
Qty: 60 TABLET | Refills: 3 | Status: SHIPPED | OUTPATIENT
Start: 2024-03-14 | End: 2024-03-20

## 2024-03-14 ASSESSMENT — ANXIETY QUESTIONNAIRES
3. WORRYING TOO MUCH ABOUT DIFFERENT THINGS: SEVERAL DAYS
6. BECOMING EASILY ANNOYED OR IRRITABLE: SEVERAL DAYS
1. FEELING NERVOUS, ANXIOUS, OR ON EDGE: MORE THAN HALF THE DAYS
7. FEELING AFRAID AS IF SOMETHING AWFUL MIGHT HAPPEN: NOT AT ALL
5. BEING SO RESTLESS THAT IT IS HARD TO SIT STILL: SEVERAL DAYS
2. NOT BEING ABLE TO STOP OR CONTROL WORRYING: SEVERAL DAYS
4. TROUBLE RELAXING: SEVERAL DAYS
GAD7 TOTAL SCORE: 7

## 2024-03-14 ASSESSMENT — FIBROSIS 4 INDEX: FIB4 SCORE: 2.89
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
## 2024-03-18 ENCOUNTER — APPOINTMENT (OUTPATIENT)
Dept: RADIOLOGY | Facility: MEDICAL CENTER | Age: 84
End: 2024-03-18
Attending: EMERGENCY MEDICINE
Payer: MEDICARE

## 2024-03-18 ENCOUNTER — HOSPITAL ENCOUNTER (EMERGENCY)
Facility: MEDICAL CENTER | Age: 84
End: 2024-03-18
Attending: EMERGENCY MEDICINE
Payer: MEDICARE

## 2024-03-18 VITALS
HEIGHT: 60 IN | OXYGEN SATURATION: 98 % | RESPIRATION RATE: 18 BRPM | HEART RATE: 56 BPM | TEMPERATURE: 98.2 F | SYSTOLIC BLOOD PRESSURE: 177 MMHG | WEIGHT: 214.07 LBS | DIASTOLIC BLOOD PRESSURE: 74 MMHG | BODY MASS INDEX: 42.03 KG/M2

## 2024-03-18 DIAGNOSIS — F41.9 ANXIETY: ICD-10-CM

## 2024-03-18 DIAGNOSIS — R51.9 ACUTE NONINTRACTABLE HEADACHE, UNSPECIFIED HEADACHE TYPE: ICD-10-CM

## 2024-03-18 LAB
ALBUMIN SERPL BCP-MCNC: 3.7 G/DL (ref 3.2–4.9)
ALBUMIN/GLOB SERPL: 1.4 G/DL
ALP SERPL-CCNC: 79 U/L (ref 30–99)
ALT SERPL-CCNC: 12 U/L (ref 2–50)
ANION GAP SERPL CALC-SCNC: 8 MMOL/L (ref 7–16)
APTT PPP: 26.6 SEC (ref 24.7–36)
AST SERPL-CCNC: 13 U/L (ref 12–45)
BASOPHILS # BLD AUTO: 0.6 % (ref 0–1.8)
BASOPHILS # BLD: 0.04 K/UL (ref 0–0.12)
BILIRUB SERPL-MCNC: 0.4 MG/DL (ref 0.1–1.5)
BUN SERPL-MCNC: 19 MG/DL (ref 8–22)
CALCIUM ALBUM COR SERPL-MCNC: 9.3 MG/DL (ref 8.5–10.5)
CALCIUM SERPL-MCNC: 9.1 MG/DL (ref 8.4–10.2)
CHLORIDE SERPL-SCNC: 110 MMOL/L (ref 96–112)
CO2 SERPL-SCNC: 25 MMOL/L (ref 20–33)
CREAT SERPL-MCNC: 0.89 MG/DL (ref 0.5–1.4)
EKG IMPRESSION: NORMAL
EOSINOPHIL # BLD AUTO: 0.07 K/UL (ref 0–0.51)
EOSINOPHIL NFR BLD: 1.1 % (ref 0–6.9)
ERYTHROCYTE [DISTWIDTH] IN BLOOD BY AUTOMATED COUNT: 50.6 FL (ref 35.9–50)
GFR SERPLBLD CREATININE-BSD FMLA CKD-EPI: 65 ML/MIN/1.73 M 2
GLOBULIN SER CALC-MCNC: 2.6 G/DL (ref 1.9–3.5)
GLUCOSE SERPL-MCNC: 97 MG/DL (ref 65–99)
HCT VFR BLD AUTO: 43.4 % (ref 37–47)
HGB BLD-MCNC: 14.1 G/DL (ref 12–16)
IMM GRANULOCYTES # BLD AUTO: 0.03 K/UL (ref 0–0.11)
IMM GRANULOCYTES NFR BLD AUTO: 0.5 % (ref 0–0.9)
INR PPP: 0.91 (ref 0.87–1.13)
LYMPHOCYTES # BLD AUTO: 0.8 K/UL (ref 1–4.8)
LYMPHOCYTES NFR BLD: 12.5 % (ref 22–41)
MCH RBC QN AUTO: 30 PG (ref 27–33)
MCHC RBC AUTO-ENTMCNC: 32.5 G/DL (ref 32.2–35.5)
MCV RBC AUTO: 92.3 FL (ref 81.4–97.8)
MONOCYTES # BLD AUTO: 0.46 K/UL (ref 0–0.85)
MONOCYTES NFR BLD AUTO: 7.2 % (ref 0–13.4)
NEUTROPHILS # BLD AUTO: 4.98 K/UL (ref 1.82–7.42)
NEUTROPHILS NFR BLD: 78.1 % (ref 44–72)
NRBC # BLD AUTO: 0 K/UL
NRBC BLD-RTO: 0 /100 WBC (ref 0–0.2)
PLATELET # BLD AUTO: 160 K/UL (ref 164–446)
PMV BLD AUTO: 10.9 FL (ref 9–12.9)
POTASSIUM SERPL-SCNC: 4.4 MMOL/L (ref 3.6–5.5)
PROT SERPL-MCNC: 6.3 G/DL (ref 6–8.2)
PROTHROMBIN TIME: 12.8 SEC (ref 12–14.6)
RBC # BLD AUTO: 4.7 M/UL (ref 4.2–5.4)
SODIUM SERPL-SCNC: 143 MMOL/L (ref 135–145)
TROPONIN T SERPL-MCNC: 11 NG/L (ref 6–19)
TSH SERPL DL<=0.005 MIU/L-ACNC: 2.72 UIU/ML (ref 0.38–5.33)
WBC # BLD AUTO: 6.4 K/UL (ref 4.8–10.8)

## 2024-03-18 PROCEDURE — 96374 THER/PROPH/DIAG INJ IV PUSH: CPT

## 2024-03-18 PROCEDURE — 85025 COMPLETE CBC W/AUTO DIFF WBC: CPT

## 2024-03-18 PROCEDURE — 85730 THROMBOPLASTIN TIME PARTIAL: CPT

## 2024-03-18 PROCEDURE — 99284 EMERGENCY DEPT VISIT MOD MDM: CPT

## 2024-03-18 PROCEDURE — 93005 ELECTROCARDIOGRAM TRACING: CPT | Performed by: EMERGENCY MEDICINE

## 2024-03-18 PROCEDURE — 84443 ASSAY THYROID STIM HORMONE: CPT

## 2024-03-18 PROCEDURE — 36415 COLL VENOUS BLD VENIPUNCTURE: CPT

## 2024-03-18 PROCEDURE — 700111 HCHG RX REV CODE 636 W/ 250 OVERRIDE (IP): Performed by: EMERGENCY MEDICINE

## 2024-03-18 PROCEDURE — 70450 CT HEAD/BRAIN W/O DYE: CPT

## 2024-03-18 PROCEDURE — 84484 ASSAY OF TROPONIN QUANT: CPT

## 2024-03-18 PROCEDURE — 93005 ELECTROCARDIOGRAM TRACING: CPT

## 2024-03-18 PROCEDURE — 80053 COMPREHEN METABOLIC PANEL: CPT

## 2024-03-18 PROCEDURE — 85610 PROTHROMBIN TIME: CPT

## 2024-03-18 RX ORDER — TRAZODONE HYDROCHLORIDE 50 MG/1
50 TABLET ORAL NIGHTLY
Qty: 30 TABLET | Refills: 3 | Status: SHIPPED | OUTPATIENT
Start: 2024-03-18

## 2024-03-18 RX ORDER — FUROSEMIDE 40 MG/1
40 TABLET ORAL DAILY
COMMUNITY

## 2024-03-18 RX ORDER — MIDAZOLAM HYDROCHLORIDE 1 MG/ML
1 INJECTION INTRAMUSCULAR; INTRAVENOUS ONCE
Status: COMPLETED | OUTPATIENT
Start: 2024-03-18 | End: 2024-03-18

## 2024-03-18 RX ADMIN — MIDAZOLAM HYDROCHLORIDE 1 MG: 2 INJECTION, SOLUTION INTRAMUSCULAR; INTRAVENOUS at 11:29

## 2024-03-18 ASSESSMENT — FIBROSIS 4 INDEX: FIB4 SCORE: 2.89

## 2024-03-18 NOTE — ED NOTES
Medication history reviewed with pt. Med rec is complete.  Allergies reviewed, per pt  Interviewed pt with friend at bedside with   permission from pt.    Pt was not sure the names of all her medications, called [facility] @ [phone] to verify all medications.  Asked pt last time she took her medications.     Patient has not had any outpatient antibiotics in the last 30 days.    Pt is not on any anticoagulants

## 2024-03-18 NOTE — ED TRIAGE NOTES
"Chief Complaint   Patient presents with    Headache     Right side   Described as \"a lot of pressure\"  Started Friday    Lightheadedness     Started Friday    Dizziness     BP (!) 201/85   Pulse (!) 50   Temp 36.8 °C (98.2 °F) (Temporal)   Resp (!) 22   Ht 1.524 m (5')   Wt 97.1 kg (214 lb 1.1 oz)   SpO2 98%   BMI 41.81 kg/m²     Pt to ED via WC w/ visitor for c/o ongoing HA, lightheadedness and dizziness since Friday.  Pt saw PCP on Friday, was scheduled for OP labs, has not completed them r/t has not been fasting.   "

## 2024-03-18 NOTE — ED PROVIDER NOTES
"ED Provider Note    CHIEF COMPLAINT  Chief Complaint   Patient presents with    Headache     Right side   Described as \"a lot of pressure\"  Started Friday    Lightheadedness     Started Friday    Dizziness       EXTERNAL RECORDS REVIEWED  Other reviewed an outpatient note from the patient's primary care provider on 14 March.  The patient was diagnosed with generalized anxiety disorder the patient's primary care provider ordered a complete metabolic panel as well as thyroid studies and started the patient on propranolol and the patient was going to receive a referral to psychology..    HPI/ROS    Sharon Callahan is a 83 y.o. female who presents with a headache.  The patient states she has a headache that started on Friday.  She states is in the right temporal region.  She states it feels like there is a lot of pressure.  The patient has had some associated lightheadedness and dizziness.  She was recently seen by her primary care provider for anxiety.  She states at times she does have some chest pressure as well as some shortness of breath.  She does not have any associated focal weakness.  She has not had any vomiting or diarrhea.    PAST MEDICAL HISTORY   has a past medical history of Arrhythmia, Arthritis, BMI 38.0-38.9,adult (9/18/2013), Breast cancer (HCC), CATARACT, Chronic nausea (1/12/2015), Chronic pain of both knees (1/9/2019), Depression with anxiety (5/3/2011), Heart burn, Heart valve disease, Hemothorax on right (1/4/2017), History of cholecystectomy, total knee replacement, Hyperlipidemia (4/4/2011), Hypertension, Indigestion, MYESHA (obstructive sleep apnea) (4/4/2011), Pain, Pleural effusion, right (1/4/2017), Range of motion deficit, S/P bilateral mastectomy (4/4/2011), Sleep apnea, and Snoring.    SURGICAL HISTORY   has a past surgical history that includes hip replacement, total (Bilateral); other (Bilateral); lisa by laparoscopy (2/1/2011); other (1988); other (1989); uvulopharyngopalatoplasty " (); cataract phaco with iol (10/20/2011); cataract phaco with iol (11/3/2011); orif, humerus (s); breast augmentation with implant; thoracoscopy (Right, 2016); breast biopsy (); hip arthroplasty total (3/26/2009); hip arthroplasty total (12/3/2012); total knee arthroplasty (Left, 2019); reconstr total shoulder implant (Right, 2021); and biceps tenodesis (Right, 2021).    FAMILY HISTORY  Family History   Problem Relation Age of Onset    Hypertension Mother     Cancer Father         lung    Diabetes Father     Hypertension Father     Heart Disease Father         MI    Hypertension Brother     Sleep Apnea Brother     Hypertension Maternal Grandmother     Sleep Apnea Brother     Hypertension Brother     Diabetes Brother     Cancer Paternal Aunt         stomach    Diabetes Paternal Aunt     Heart Disease Brother         MI    Hyperlipidemia Neg Hx     Stroke Neg Hx        SOCIAL HISTORY  Social History     Tobacco Use    Smoking status: Former     Current packs/day: 0.00     Average packs/day: 1 pack/day for 15.0 years (15.0 ttl pk-yrs)     Types: Cigarettes     Start date: 1960     Quit date: 1975     Years since quittin.2    Smokeless tobacco: Never   Vaping Use    Vaping Use: Never used   Substance and Sexual Activity    Alcohol use: Yes     Alcohol/week: 6.0 oz     Types: 10 Standard drinks or equivalent per week    Drug use: No    Sexual activity: Never     Comment: lives with ex-       CURRENT MEDICATIONS  Home Medications    **Home medications have not yet been reviewed for this encounter**         ALLERGIES  Allergies   Allergen Reactions    Pcn [Penicillins] Anaphylaxis     Skin turns black    Clindamycin Rash     Rash      Flu Virus Vaccine     Influenza Virus Vacc Rash     Red in face    Norco [Hydrocodone-Acetaminophen] Vomiting and Nausea    Penicillins     Pneumococcal Vaccine Rash    Tetracycline Rash     Rash     Xarelto [Rivaroxaban] Rash        PHYSICAL EXAM  VITAL SIGNS: BP (!) 201/85   Pulse (!) 50   Temp 36.8 °C (98.2 °F) (Temporal)   Resp (!) 22   Ht 1.524 m (5')   Wt 97.1 kg (214 lb 1.1 oz)   SpO2 98%   BMI 41.81 kg/m²    General the patient appears anxious but nontoxic    HEENT unremarkable    Pulmonary the patient's lungs are clear to auscultation bilaterally    Cardiovascular S1-S2 with a regular rate and rhythm    GI abdomen soft    Skin no rashes, pallor, no jaundice    Extremities no distal edema    Neurologic examination cranials 2 through 12 are intact and motor is 5 out of 5 and symmetric throughout.  There is no sensory deficits    DIAGNOSTIC STUDIES  Results for orders placed or performed during the hospital encounter of 03/18/24   CBC WITH DIFFERENTIAL   Result Value Ref Range    WBC 6.4 4.8 - 10.8 K/uL    RBC 4.70 4.20 - 5.40 M/uL    Hemoglobin 14.1 12.0 - 16.0 g/dL    Hematocrit 43.4 37.0 - 47.0 %    MCV 92.3 81.4 - 97.8 fL    MCH 30.0 27.0 - 33.0 pg    MCHC 32.5 32.2 - 35.5 g/dL    RDW 50.6 (H) 35.9 - 50.0 fL    Platelet Count 160 (L) 164 - 446 K/uL    MPV 10.9 9.0 - 12.9 fL    Neutrophils-Polys 78.10 (H) 44.00 - 72.00 %    Lymphocytes 12.50 (L) 22.00 - 41.00 %    Monocytes 7.20 0.00 - 13.40 %    Eosinophils 1.10 0.00 - 6.90 %    Basophils 0.60 0.00 - 1.80 %    Immature Granulocytes 0.50 0.00 - 0.90 %    Nucleated RBC 0.00 0.00 - 0.20 /100 WBC    Neutrophils (Absolute) 4.98 1.82 - 7.42 K/uL    Lymphs (Absolute) 0.80 (L) 1.00 - 4.80 K/uL    Monos (Absolute) 0.46 0.00 - 0.85 K/uL    Eos (Absolute) 0.07 0.00 - 0.51 K/uL    Baso (Absolute) 0.04 0.00 - 0.12 K/uL    Immature Granulocytes (abs) 0.03 0.00 - 0.11 K/uL    NRBC (Absolute) 0.00 K/uL   COMP METABOLIC PANEL   Result Value Ref Range    Sodium 143 135 - 145 mmol/L    Potassium 4.4 3.6 - 5.5 mmol/L    Chloride 110 96 - 112 mmol/L    Co2 25 20 - 33 mmol/L    Anion Gap 8.0 7.0 - 16.0    Glucose 97 65 - 99 mg/dL    Bun 19 8 - 22 mg/dL    Creatinine 0.89 0.50 - 1.40 mg/dL     Calcium 9.1 8.4 - 10.2 mg/dL    Correct Calcium 9.3 8.5 - 10.5 mg/dL    AST(SGOT) 13 12 - 45 U/L    ALT(SGPT) 12 2 - 50 U/L    Alkaline Phosphatase 79 30 - 99 U/L    Total Bilirubin 0.4 0.1 - 1.5 mg/dL    Albumin 3.7 3.2 - 4.9 g/dL    Total Protein 6.3 6.0 - 8.2 g/dL    Globulin 2.6 1.9 - 3.5 g/dL    A-G Ratio 1.4 g/dL   TROPONIN   Result Value Ref Range    Troponin T 11 6 - 19 ng/L   PROTHROMBIN TIME   Result Value Ref Range    PT 12.8 12.0 - 14.6 sec    INR 0.91 0.87 - 1.13   APTT   Result Value Ref Range    APTT 26.6 24.7 - 36.0 sec   TSH WITH REFLEX TO FT4   Result Value Ref Range    TSH 2.720 0.380 - 5.330 uIU/mL   ESTIMATED GFR   Result Value Ref Range    GFR (CKD-EPI) 65 >60 mL/min/1.73 m 2   EKG   Result Value Ref Range    Report       [facility] Emergency Dept.    Test Date:  2024  Pt Name:    SHARON CALLAHAN              Department: [facility]  MRN:        [id number]                      Room:  Gender:     Female                       Technician: [id number]  :        [id number]                   Requested By:ER TRIAGE PROTOCOL  Order #:    [id number]                    Reading MD: ALLAN PRICE MD    Measurements  Intervals                                Axis  Rate:       46                           P:          44  OH:         170                          QRS:        64  QRSD:       87                           T:          68  QT:         452  QTc:        396    Interpretive Statements  Twelve-lead EKG shows a sinus bradycardia with a ventricular rate of 46,  normal QRS, normal intervals, no ST segment elevation or depression, normal T  waves.  Electronically Signed On 2024 12:34:12 PDT by ALLAN PRICE MD         EKG  I have independently interpreted this EKG  Please see my interpretation above      RADIOLOGY  CT-HEAD W/O   Final Result         1. No acute intracranial abnormality. No evidence of acute intracranial hemorrhage or mass lesion.                            COURSE & MEDICAL DECISION MAKING    This an 83-year-old female who presents the emergency department with lightheadedness, dizziness, and a headache.  She is also some periodic chest pressure and difficulty with breathing.  This could be from anxiety as she was recently diagnosed with anxiety from her primary care provider.  She has been taking propranolol and this could be causing some of the bradycardia.  The patient states she has been on trazodone in the past which has been effective for anxiety will put her back on this medication.  In reviewing her medication list it also states she is on lisinopril as well as losartan.  We will make sure the patient is only on the losartan.  Again we will stop the propranolol.  I like the patient to recheck with her doctor in about a week for repeat blood pressure check as well as to see how she is doing with her anxiety.  I do not suspect the bradycardia is causing her lightheadedness and dizziness as she is hypertensive.  I did check a thyroid test and her TSH is appropriate.  Otherwise she does not have any metabolic derangements that could cause cardiac irritability and dizziness.  Neurologically she is intact and CT scan of the head shows no acute abnormalities.  I suspect a lot of her presenting symptoms could be from anxiety.  She will be discharged home in stable condition with clear instructions to return if she is acutely worse.  Suspect the patient's chest pain is also associated with her anxiety.  I had any chest pain today and her EKG and troponin effectively rules out ischemia.    FINAL DIAGNOSIS  1.  Headache  2.  Dizziness  3.  Chest pain  4.  Generalized anxiety    Disposition  The patient will be discharged in stable condition         Electronically signed by: Paolo Alcantar M.D., 3/18/2024 11:01 AM

## 2024-03-18 NOTE — DISCHARGE INSTRUCTIONS
Follow-up with your primary care provider in 1 week.  Stop your propranolol.  Take the trazodone at night for sleep.

## 2024-03-18 NOTE — ED NOTES
Iv placed , labs drawn and taken to lab.  Pt medicated as directed by AISSATOU tabor, poc update given to pt. Further orders and dispo pending at this time. No further questions from pt at this time

## 2024-03-20 ENCOUNTER — OFFICE VISIT (OUTPATIENT)
Dept: MEDICAL GROUP | Facility: LAB | Age: 84
End: 2024-03-20
Payer: MEDICARE

## 2024-03-20 VITALS
TEMPERATURE: 98 F | OXYGEN SATURATION: 98 % | HEIGHT: 60 IN | HEART RATE: 52 BPM | SYSTOLIC BLOOD PRESSURE: 158 MMHG | BODY MASS INDEX: 41.23 KG/M2 | DIASTOLIC BLOOD PRESSURE: 74 MMHG | RESPIRATION RATE: 15 BRPM | WEIGHT: 210 LBS

## 2024-03-20 DIAGNOSIS — F51.01 PRIMARY INSOMNIA: ICD-10-CM

## 2024-03-20 DIAGNOSIS — I10 ESSENTIAL HYPERTENSION: ICD-10-CM

## 2024-03-20 DIAGNOSIS — I48.0 PAROXYSMAL ATRIAL FIBRILLATION (HCC): ICD-10-CM

## 2024-03-20 PROCEDURE — 99214 OFFICE O/P EST MOD 30 MIN: CPT | Performed by: FAMILY MEDICINE

## 2024-03-20 PROCEDURE — 3077F SYST BP >= 140 MM HG: CPT | Performed by: FAMILY MEDICINE

## 2024-03-20 PROCEDURE — 3078F DIAST BP <80 MM HG: CPT | Performed by: FAMILY MEDICINE

## 2024-03-20 RX ORDER — AMLODIPINE BESYLATE 5 MG/1
5 TABLET ORAL DAILY
Qty: 90 TABLET | Refills: 3 | Status: SHIPPED | OUTPATIENT
Start: 2024-03-20

## 2024-03-20 ASSESSMENT — ENCOUNTER SYMPTOMS
FEVER: 0
CHILLS: 0
PALPITATIONS: 0
WHEEZING: 0
SHORTNESS OF BREATH: 0

## 2024-03-20 ASSESSMENT — PATIENT HEALTH QUESTIONNAIRE - PHQ9: CLINICAL INTERPRETATION OF PHQ2 SCORE: 0

## 2024-03-20 ASSESSMENT — FIBROSIS 4 INDEX: FIB4 SCORE: 1.92

## 2024-03-20 NOTE — PROGRESS NOTES
Verbal consent was acquired by the patient to use QBE ambient listening note generation during this visit Yes    Subjective:   Sharon Callahan is a 83 y.o. female here today for   Chief Complaint   Patient presents with    Hospital Follow-up     ER visit BP      History of Present Illness  The patient is an 83-year-old female who is here for follow-up after being seen in the emergency department on 2024. At that time, she was having significant symptoms of lightheadedness, dizziness, and a headache. During emergency room visit, it was found that blood pressure was elevated. The patient was discharged and told to follow up with primary care provider. During the emergency room visit, there were some concerns about ongoing anxiety.    She had a headache, lightheadedness, and dizziness, so her neighbor took her to the hospital. Her vision was slightly blurred, so she did not want to drive. Her blood pressure was 201/80 with a pulse of 50. Her pulse rate was down to 56. She has pressure in her head, but it is not like she had before. Her anxiety has slowed down. She was told in the hospital that she was being overmedicated and they did not want her to take the propranolol. She was told to back off until she saw me. She was taking lisinopril and losartan. Her  spent 10 years of bad health and he  in 2023. Her daughter has bipolar disorder, which has given her a lot of trouble. She is not taking propranolol. She has a blood pressure cuff at home, but she needs to get batteries for it. She thinks she took amlodipine years ago and she did fine on it. She lives in the apartment complex behind [facility] on the fourth floor and she has to walk to the elevator. She does it at least 4 times a day at a fast pace plus the regular walking. She had chest pain when she went to the emergency room. She is feeling good now. She denies any shortness of breath. She occasionally has palpitations. When she has  palpitations, she usually sits in a chair with her feet flat on the floor and tries to drain it. She still has a little bit of pressure across the right side of her head. She is taking Tylenol, which is helping. Patient states she needs to drink more water.    Supplemental Information  Her chest felt heavy when she went to the emergency room.      Allergies   Allergen Reactions    Pcn [Penicillins] Anaphylaxis     Skin turns black    Clindamycin Rash     Rash      Flu Virus Vaccine     Influenza Virus Vacc Rash     Red in face    Norco [Hydrocodone-Acetaminophen] Vomiting and Nausea    Penicillins Rash     .    Pneumococcal Vaccine Rash    Tetracycline Rash     Rash     Xarelto [Rivaroxaban] Rash         Current medicines (including changes today)  Current Outpatient Medications   Medication Sig Dispense Refill    amLODIPine (NORVASC) 5 MG Tab Take 1 Tablet by mouth every day. 90 Tablet 3    traZODone (DESYREL) 50 MG Tab Take 1 Tablet by mouth every evening. 30 Tablet 3    furosemide (LASIX) 40 MG Tab Take 40 mg by mouth every day. Pt takes once a day, not BID      potassium Chloride ER (K-TAB) 20 MEQ Tab CR tablet TAKE 1 TABLET BY MOUTH EVERY DAY (Patient taking differently: Take 20 mEq by mouth every day.) 90 Tablet 1    losartan (COZAAR) 100 MG Tab Take 1 Tablet by mouth every day for 360 days. 90 Tablet 3    acetaminophen (TYLENOL) 500 MG Tab Take 1,000 mg by mouth every 6 hours as needed for Moderate Pain. Indications: Pain      Cholecalciferol (D3 PO) Take 1 Capsule by mouth every day.      aspirin EC (ECOTRIN) 81 MG Tablet Delayed Response Take 1 Tablet by mouth every day.      Cyanocobalamin (VITAMIN B-12 PO) Take 1 Tablet by mouth every day.       No current facility-administered medications for this visit.     She  has a past medical history of Arrhythmia, Arthritis, BMI 38.0-38.9,adult (9/18/2013), Breast cancer (HCC), CATARACT, Chronic nausea (1/12/2015), Chronic pain of both knees (1/9/2019), Depression  with anxiety (5/3/2011), Heart burn, Heart valve disease, Hemothorax on right (1/4/2017), History of cholecystectomy, total knee replacement, Hyperlipidemia (4/4/2011), Hypertension, Indigestion, MYESHA (obstructive sleep apnea) (4/4/2011), Pain, Pleural effusion, right (1/4/2017), Range of motion deficit, S/P bilateral mastectomy (4/4/2011), Sleep apnea, and Snoring.    ROS   Review of Systems   Constitutional:  Negative for chills and fever.   Respiratory:  Negative for shortness of breath and wheezing.    Cardiovascular:  Negative for chest pain and palpitations.     -See HPI     Objective:     Physical Exam:  BP (!) 158/74   Pulse (!) 52   Temp 36.7 °C (98 °F) (Temporal)   Resp 15   Ht 1.524 m (5')   Wt 95.3 kg (210 lb)   SpO2 98%  Body mass index is 41.01 kg/m².   Constitutional: Alert, no distress.  Skin: Warm, dry, good turgor, no rashes in visible areas.  Eye: Equal, round and reactive, conjunctiva clear, lids normal.  Respiratory: Unlabored respiratory effort, lungs clear to auscultation, no wheezes, no rhonchi.  Cardiovascular: Normal S1, S2, no murmur, no edema.  Abdomen: Soft, non-tender, no masses, no hepatosplenomegaly.  Psych: Alert and oriented x3, normal affect and mood.    Results      Assessment and Plan:     Assessment & Plan  1. Hypertension.  Chronic condition unstable.  Medication change needed.  Her blood pressure is still slightly elevated. Her anxiety could be contributing to her elevated blood pressure. We will discontinue the propranolol given low pulse rate. I will start her on amlodipine. She will take losartan and Lasix daily. She will check her blood pressure at home about 3 times a week. She was advised to walk at least 30 minutes every day. She was advised to watch her sodium intake. She was advised to drink plenty of water.    2. Insomnia.  She will continue the trazodone.    3. Palpitations.  This is most likely due to atrial fibrillation.  Continue watchful observation.   Patient will follow-up if symptoms persist.    Follow-up  The patient will follow up in 2 to 4 weeks.    Orders:  1. Essential hypertension  - amLODIPine (NORVASC) 5 MG Tab; Take 1 Tablet by mouth every day.  Dispense: 90 Tablet; Refill: 3    2. Primary insomnia    3. Paroxysmal atrial fibrillation (HCC)        Followup: No follow-ups on file.         PLEASE NOTE: This dictation was created using voice recognition and link bird ambient listening software. I have made every reasonable attempt to correct obvious errors, but I expect that there are errors of grammar and possibly content that I did not discover before finalizing the note.

## 2024-04-10 ENCOUNTER — APPOINTMENT (OUTPATIENT)
Dept: MEDICAL GROUP | Facility: LAB | Age: 84
End: 2024-04-10
Payer: MEDICARE

## 2024-04-11 ENCOUNTER — APPOINTMENT (OUTPATIENT)
Dept: RADIOLOGY | Facility: MEDICAL CENTER | Age: 84
End: 2024-04-11
Attending: EMERGENCY MEDICINE
Payer: MEDICARE

## 2024-04-11 ENCOUNTER — HOSPITAL ENCOUNTER (EMERGENCY)
Facility: MEDICAL CENTER | Age: 84
End: 2024-04-11
Attending: EMERGENCY MEDICINE
Payer: MEDICARE

## 2024-04-11 ENCOUNTER — APPOINTMENT (OUTPATIENT)
Dept: RADIOLOGY | Facility: MEDICAL CENTER | Age: 84
End: 2024-04-11
Payer: MEDICARE

## 2024-04-11 VITALS
HEIGHT: 61 IN | WEIGHT: 210 LBS | HEART RATE: 67 BPM | DIASTOLIC BLOOD PRESSURE: 63 MMHG | RESPIRATION RATE: 25 BRPM | BODY MASS INDEX: 39.65 KG/M2 | TEMPERATURE: 99.2 F | OXYGEN SATURATION: 95 % | SYSTOLIC BLOOD PRESSURE: 141 MMHG

## 2024-04-11 DIAGNOSIS — S51.012A SKIN TEAR OF LEFT ELBOW WITHOUT COMPLICATION, INITIAL ENCOUNTER: ICD-10-CM

## 2024-04-11 DIAGNOSIS — R07.89 CHEST WALL PAIN: ICD-10-CM

## 2024-04-11 DIAGNOSIS — S06.0X1A CONCUSSION WITH LOSS OF CONSCIOUSNESS OF 30 MINUTES OR LESS, INITIAL ENCOUNTER: ICD-10-CM
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
## 2024-04-11 LAB
ABO GROUP BLD: NORMAL
ALBUMIN SERPL BCP-MCNC: 4 G/DL (ref 3.2–4.9)
ALBUMIN/GLOB SERPL: 1.4 G/DL
ALP SERPL-CCNC: 75 U/L (ref 30–99)
ALT SERPL-CCNC: 10 U/L (ref 2–50)
ANION GAP SERPL CALC-SCNC: 12 MMOL/L (ref 7–16)
APTT PPP: 25.7 SEC (ref 24.7–36)
AST SERPL-CCNC: 13 U/L (ref 12–45)
BILIRUB SERPL-MCNC: 0.4 MG/DL (ref 0.1–1.5)
BLD GP AB SCN SERPL QL: NORMAL
BUN SERPL-MCNC: 14 MG/DL (ref 8–22)
CALCIUM ALBUM COR SERPL-MCNC: 8.7 MG/DL (ref 8.5–10.5)
CALCIUM SERPL-MCNC: 8.7 MG/DL (ref 8.5–10.5)
CHLORIDE SERPL-SCNC: 106 MMOL/L (ref 96–112)
CO2 SERPL-SCNC: 23 MMOL/L (ref 20–33)
CREAT SERPL-MCNC: 0.91 MG/DL (ref 0.5–1.4)
ERYTHROCYTE [DISTWIDTH] IN BLOOD BY AUTOMATED COUNT: 46.3 FL (ref 35.9–50)
ETHANOL BLD-MCNC: <10.1 MG/DL
GFR SERPLBLD CREATININE-BSD FMLA CKD-EPI: 63 ML/MIN/1.73 M 2
GLOBULIN SER CALC-MCNC: 2.8 G/DL (ref 1.9–3.5)
GLUCOSE SERPL-MCNC: 96 MG/DL (ref 65–99)
HCT VFR BLD AUTO: 41.8 % (ref 37–47)
HGB BLD-MCNC: 13.8 G/DL (ref 12–16)
INR PPP: 0.99 (ref 0.87–1.13)
MCH RBC QN AUTO: 29.3 PG (ref 27–33)
MCHC RBC AUTO-ENTMCNC: 33 G/DL (ref 32.2–35.5)
MCV RBC AUTO: 88.7 FL (ref 81.4–97.8)
PLATELET # BLD AUTO: 195 K/UL (ref 164–446)
PMV BLD AUTO: 10.6 FL (ref 9–12.9)
POTASSIUM SERPL-SCNC: 3.1 MMOL/L (ref 3.6–5.5)
PROT SERPL-MCNC: 6.8 G/DL (ref 6–8.2)
PROTHROMBIN TIME: 13.2 SEC (ref 12–14.6)
RBC # BLD AUTO: 4.71 M/UL (ref 4.2–5.4)
RH BLD: NORMAL
SODIUM SERPL-SCNC: 141 MMOL/L (ref 135–145)
WBC # BLD AUTO: 8.4 K/UL (ref 4.8–10.8)

## 2024-04-11 PROCEDURE — 82077 ASSAY SPEC XCP UR&BREATH IA: CPT

## 2024-04-11 PROCEDURE — 85027 COMPLETE CBC AUTOMATED: CPT

## 2024-04-11 PROCEDURE — 85730 THROMBOPLASTIN TIME PARTIAL: CPT

## 2024-04-11 PROCEDURE — 85610 PROTHROMBIN TIME: CPT

## 2024-04-11 PROCEDURE — 72128 CT CHEST SPINE W/O DYE: CPT

## 2024-04-11 PROCEDURE — 86850 RBC ANTIBODY SCREEN: CPT

## 2024-04-11 PROCEDURE — 305948 HCHG GREEN TRAUMA ACT PRE-NOTIFY NO CC

## 2024-04-11 PROCEDURE — 80053 COMPREHEN METABOLIC PANEL: CPT

## 2024-04-11 PROCEDURE — 36415 COLL VENOUS BLD VENIPUNCTURE: CPT

## 2024-04-11 PROCEDURE — 700117 HCHG RX CONTRAST REV CODE 255: Performed by: EMERGENCY MEDICINE

## 2024-04-11 PROCEDURE — 72170 X-RAY EXAM OF PELVIS: CPT

## 2024-04-11 PROCEDURE — 700101 HCHG RX REV CODE 250: Performed by: EMERGENCY MEDICINE

## 2024-04-11 PROCEDURE — 73070 X-RAY EXAM OF ELBOW: CPT | Mod: LT

## 2024-04-11 PROCEDURE — 70450 CT HEAD/BRAIN W/O DYE: CPT

## 2024-04-11 PROCEDURE — 71045 X-RAY EXAM CHEST 1 VIEW: CPT

## 2024-04-11 PROCEDURE — 72131 CT LUMBAR SPINE W/O DYE: CPT

## 2024-04-11 PROCEDURE — 86900 BLOOD TYPING SEROLOGIC ABO: CPT

## 2024-04-11 PROCEDURE — 72125 CT NECK SPINE W/O DYE: CPT

## 2024-04-11 PROCEDURE — 99285 EMERGENCY DEPT VISIT HI MDM: CPT

## 2024-04-11 PROCEDURE — 86901 BLOOD TYPING SEROLOGIC RH(D): CPT

## 2024-04-11 PROCEDURE — 71260 CT THORAX DX C+: CPT

## 2024-04-11 RX ORDER — ACETAMINOPHEN 500 MG
1000 TABLET ORAL ONCE
Status: DISCONTINUED | OUTPATIENT
Start: 2024-04-11 | End: 2024-04-11 | Stop reason: HOSPADM

## 2024-04-11 RX ADMIN — Medication 3 ML: at 14:12

## 2024-04-11 RX ADMIN — IOHEXOL 100 ML: 350 INJECTION, SOLUTION INTRAVENOUS at 14:15
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
## 2024-04-11 NOTE — DISCHARGE INSTRUCTIONS
Change dressings daily  Please discuss the following findings with your regular doctor:  CT-TSPINE W/O PLUS RECONS   Final Result      1.  No acute traumatic injury of the thoracic spine.   2.  Multilevel degenerative changes.   3.  Dextroscoliosis of the thoracic spine.      CT-LSPINE W/O PLUS RECONS   Final Result      1.  Suboptimal exam related to decreased osseous mineralization and patient body habitus.   2.  No evidence of acute traumatic injury of the lumbar spine.   3.  Multiple disc facet degenerative changes      CT-CHEST,ABDOMEN,PELVIS WITH   Final Result      1.  No acute abnormality of the chest, abdomen or pelvis.   2.  Atherosclerosis with coronary artery disease.   3.  Status post cholecystectomy.   4.  Colonic diverticulosis without evidence of diverticulitis.      CT-CSPINE WITHOUT PLUS RECONS   Final Result      1.  Decreased osseous mineralization without evidence of displaced fracture.   2.  Multilevel disc and facet joint degenerative changes.   3.  Multilevel mild listheses as above, presumably degenerative.   4.  6 mm sclerotic lesion of the C4 vertebral body, possibly a bone island. Consider nonemergent nuclear medicine bone scan to exclude underlying neoplasm.      CT-HEAD W/O   Final Result      No acute intracranial abnormality.         DX-ELBOW-LIMITED 2- LEFT   Final Result      Degenerative and postsurgical changes of left elbow without underlying acute osseous abnormality.      DX-PELVIS-1 OR 2 VIEWS   Final Result      1.  No acute fracture or dislocation.   2.  Bilateral hip arthroplasty without evidence of acute hardware complication.   3.  The acetabular component of the left-sided arthroplasty has a fixation screw projecting into the pelvis.      DX-CHEST-LIMITED (1 VIEW)   Final Result      1.  Cardiomegaly. No gross acute traumatic injury of the chest.        Labs Reviewed   COMP METABOLIC PANEL - Abnormal; Notable for the following components:       Result Value    Potassium  3.1 (*)     All other components within normal limits   CBC WITHOUT DIFFERENTIAL - Abnormal; Notable for the following components:    Hemoglobin 13.8 (*)     Hematocrit 41.8 (*)     All other components within normal limits   PROTHROMBIN TIME   APTT   COD (ADULT)   ESTIMATED GFR   DIAGNOSTIC ALCOHOL   ABO RH CONFIRM

## 2024-04-11 NOTE — ED NOTES
(Assist RN) Patient discharged home per ERP.  Discharge teaching and education discussed with patient. POC discussed.   Patient verbalized understanding of discharge teaching and education. No other questions at this time.     VSS. Patient alert and oriented. Patient arranged ride for self. Able to ambulate off unit safely with steady gait. By W/C for most of the walk to the lobby to ride home with family.
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
## 2024-04-14 ENCOUNTER — HOSPITAL ENCOUNTER (OUTPATIENT)
Facility: MEDICAL CENTER | Age: 84
End: 2024-04-15
Attending: STUDENT IN AN ORGANIZED HEALTH CARE EDUCATION/TRAINING PROGRAM | Admitting: STUDENT IN AN ORGANIZED HEALTH CARE EDUCATION/TRAINING PROGRAM
Payer: MEDICARE

## 2024-04-14 ENCOUNTER — APPOINTMENT (OUTPATIENT)
Dept: RADIOLOGY | Facility: MEDICAL CENTER | Age: 84
End: 2024-04-14
Attending: STUDENT IN AN ORGANIZED HEALTH CARE EDUCATION/TRAINING PROGRAM
Payer: MEDICARE

## 2024-04-14 DIAGNOSIS — R40.4 TRANSIENT ALTERATION OF AWARENESS: ICD-10-CM

## 2024-04-14 DIAGNOSIS — F07.81 CONCUSSION SYNDROME: ICD-10-CM

## 2024-04-14 DIAGNOSIS — S06.0X1A CONCUSSION WITH LOSS OF CONSCIOUSNESS OF 30 MINUTES OR LESS, INITIAL ENCOUNTER: ICD-10-CM

## 2024-04-14 PROCEDURE — 700102 HCHG RX REV CODE 250 W/ 637 OVERRIDE(OP): Performed by: STUDENT IN AN ORGANIZED HEALTH CARE EDUCATION/TRAINING PROGRAM

## 2024-04-14 PROCEDURE — A9270 NON-COVERED ITEM OR SERVICE: HCPCS | Performed by: STUDENT IN AN ORGANIZED HEALTH CARE EDUCATION/TRAINING PROGRAM

## 2024-04-14 PROCEDURE — 700111 HCHG RX REV CODE 636 W/ 250 OVERRIDE (IP): Performed by: STUDENT IN AN ORGANIZED HEALTH CARE EDUCATION/TRAINING PROGRAM

## 2024-04-14 PROCEDURE — 70450 CT HEAD/BRAIN W/O DYE: CPT

## 2024-04-14 PROCEDURE — 99285 EMERGENCY DEPT VISIT HI MDM: CPT

## 2024-04-14 PROCEDURE — 36415 COLL VENOUS BLD VENIPUNCTURE: CPT

## 2024-04-14 RX ORDER — ONDANSETRON 4 MG/1
4 TABLET, ORALLY DISINTEGRATING ORAL ONCE
Status: COMPLETED | OUTPATIENT
Start: 2024-04-14 | End: 2024-04-14

## 2024-04-14 RX ORDER — ACETAMINOPHEN 325 MG/1
650 TABLET ORAL ONCE
Status: COMPLETED | OUTPATIENT
Start: 2024-04-14 | End: 2024-04-14

## 2024-04-14 RX ADMIN — ACETAMINOPHEN 650 MG: 325 TABLET, FILM COATED ORAL at 23:21

## 2024-04-14 RX ADMIN — ONDANSETRON 4 MG: 4 TABLET, ORALLY DISINTEGRATING ORAL at 23:21

## 2024-04-14 ASSESSMENT — PAIN DESCRIPTION - PAIN TYPE
TYPE: ACUTE PAIN
TYPE: ACUTE PAIN

## 2024-04-14 ASSESSMENT — FIBROSIS 4 INDEX: FIB4 SCORE: 1.73

## 2024-04-15 ENCOUNTER — APPOINTMENT (OUTPATIENT)
Dept: RADIOLOGY | Facility: MEDICAL CENTER | Age: 84
End: 2024-04-15
Attending: STUDENT IN AN ORGANIZED HEALTH CARE EDUCATION/TRAINING PROGRAM
Payer: MEDICARE

## 2024-04-15 VITALS
WEIGHT: 215.39 LBS | OXYGEN SATURATION: 98 % | DIASTOLIC BLOOD PRESSURE: 61 MMHG | TEMPERATURE: 97.1 F | BODY MASS INDEX: 42.29 KG/M2 | HEART RATE: 42 BPM | RESPIRATION RATE: 16 BRPM | HEIGHT: 60 IN | SYSTOLIC BLOOD PRESSURE: 143 MMHG

## 2024-04-15 PROBLEM — Z71.89 ACP (ADVANCE CARE PLANNING): Status: ACTIVE | Noted: 2018-06-29

## 2024-04-15 PROBLEM — F07.81 CONCUSSION SYNDROME: Status: ACTIVE | Noted: 2024-04-15

## 2024-04-15 LAB
ALBUMIN SERPL BCP-MCNC: 3.9 G/DL (ref 3.2–4.9)
ALBUMIN/GLOB SERPL: 1.7 G/DL
ALP SERPL-CCNC: 74 U/L (ref 30–99)
ALT SERPL-CCNC: 10 U/L (ref 2–50)
ANION GAP SERPL CALC-SCNC: 12 MMOL/L (ref 7–16)
APPEARANCE UR: CLEAR
AST SERPL-CCNC: 16 U/L (ref 12–45)
BASOPHILS # BLD AUTO: 0.6 % (ref 0–1.8)
BASOPHILS # BLD: 0.05 K/UL (ref 0–0.12)
BILIRUB SERPL-MCNC: 0.3 MG/DL (ref 0.1–1.5)
BILIRUB UR QL STRIP.AUTO: NEGATIVE
BUN SERPL-MCNC: 13 MG/DL (ref 8–22)
CALCIUM ALBUM COR SERPL-MCNC: 8.8 MG/DL (ref 8.5–10.5)
CALCIUM SERPL-MCNC: 8.7 MG/DL (ref 8.5–10.5)
CHLORIDE SERPL-SCNC: 105 MMOL/L (ref 96–112)
CO2 SERPL-SCNC: 22 MMOL/L (ref 20–33)
COLOR UR: YELLOW
CREAT SERPL-MCNC: 0.81 MG/DL (ref 0.5–1.4)
EOSINOPHIL # BLD AUTO: 0.12 K/UL (ref 0–0.51)
EOSINOPHIL NFR BLD: 1.4 % (ref 0–6.9)
ERYTHROCYTE [DISTWIDTH] IN BLOOD BY AUTOMATED COUNT: 46.4 FL (ref 35.9–50)
EXTRA TUBE BLU BLU: NORMAL
GFR SERPLBLD CREATININE-BSD FMLA CKD-EPI: 72 ML/MIN/1.73 M 2
GLOBULIN SER CALC-MCNC: 2.3 G/DL (ref 1.9–3.5)
GLUCOSE SERPL-MCNC: 111 MG/DL (ref 65–99)
GLUCOSE UR STRIP.AUTO-MCNC: NEGATIVE MG/DL
HCT VFR BLD AUTO: 38.6 % (ref 37–47)
HGB BLD-MCNC: 12.9 G/DL (ref 12–16)
IMM GRANULOCYTES # BLD AUTO: 0.03 K/UL (ref 0–0.11)
IMM GRANULOCYTES NFR BLD AUTO: 0.4 % (ref 0–0.9)
KETONES UR STRIP.AUTO-MCNC: NEGATIVE MG/DL
LEUKOCYTE ESTERASE UR QL STRIP.AUTO: NEGATIVE
LYMPHOCYTES # BLD AUTO: 0.58 K/UL (ref 1–4.8)
LYMPHOCYTES NFR BLD: 7 % (ref 22–41)
MCH RBC QN AUTO: 29.4 PG (ref 27–33)
MCHC RBC AUTO-ENTMCNC: 33.4 G/DL (ref 32.2–35.5)
MCV RBC AUTO: 87.9 FL (ref 81.4–97.8)
MICRO URNS: NORMAL
MONOCYTES # BLD AUTO: 0.63 K/UL (ref 0–0.85)
MONOCYTES NFR BLD AUTO: 7.6 % (ref 0–13.4)
NEUTROPHILS # BLD AUTO: 6.93 K/UL (ref 1.82–7.42)
NEUTROPHILS NFR BLD: 83 % (ref 44–72)
NITRITE UR QL STRIP.AUTO: NEGATIVE
NRBC # BLD AUTO: 0 K/UL
NRBC BLD-RTO: 0 /100 WBC (ref 0–0.2)
PH UR STRIP.AUTO: 6 [PH] (ref 5–8)
PLATELET # BLD AUTO: 184 K/UL (ref 164–446)
PMV BLD AUTO: 10.1 FL (ref 9–12.9)
POTASSIUM SERPL-SCNC: 3 MMOL/L (ref 3.6–5.5)
PROT SERPL-MCNC: 6.2 G/DL (ref 6–8.2)
PROT UR QL STRIP: NEGATIVE MG/DL
RBC # BLD AUTO: 4.39 M/UL (ref 4.2–5.4)
RBC UR QL AUTO: NEGATIVE
SODIUM SERPL-SCNC: 139 MMOL/L (ref 135–145)
SP GR UR STRIP.AUTO: 1.01
UROBILINOGEN UR STRIP.AUTO-MCNC: 0.2 MG/DL
WBC # BLD AUTO: 8.3 K/UL (ref 4.8–10.8)

## 2024-04-15 PROCEDURE — 97162 PT EVAL MOD COMPLEX 30 MIN: CPT

## 2024-04-15 PROCEDURE — 97535 SELF CARE MNGMENT TRAINING: CPT

## 2024-04-15 PROCEDURE — 36415 COLL VENOUS BLD VENIPUNCTURE: CPT

## 2024-04-15 PROCEDURE — 94660 CPAP INITIATION&MGMT: CPT

## 2024-04-15 PROCEDURE — 81003 URINALYSIS AUTO W/O SCOPE: CPT

## 2024-04-15 PROCEDURE — 99497 ADVNCD CARE PLAN 30 MIN: CPT | Performed by: STUDENT IN AN ORGANIZED HEALTH CARE EDUCATION/TRAINING PROGRAM

## 2024-04-15 PROCEDURE — 700102 HCHG RX REV CODE 250 W/ 637 OVERRIDE(OP): Performed by: STUDENT IN AN ORGANIZED HEALTH CARE EDUCATION/TRAINING PROGRAM

## 2024-04-15 PROCEDURE — 85025 COMPLETE CBC W/AUTO DIFF WBC: CPT

## 2024-04-15 PROCEDURE — G0378 HOSPITAL OBSERVATION PER HR: HCPCS

## 2024-04-15 PROCEDURE — 70551 MRI BRAIN STEM W/O DYE: CPT

## 2024-04-15 PROCEDURE — 97165 OT EVAL LOW COMPLEX 30 MIN: CPT

## 2024-04-15 PROCEDURE — A9270 NON-COVERED ITEM OR SERVICE: HCPCS | Performed by: STUDENT IN AN ORGANIZED HEALTH CARE EDUCATION/TRAINING PROGRAM

## 2024-04-15 PROCEDURE — 80053 COMPREHEN METABOLIC PANEL: CPT

## 2024-04-15 PROCEDURE — 99223 1ST HOSP IP/OBS HIGH 75: CPT | Mod: 25 | Performed by: STUDENT IN AN ORGANIZED HEALTH CARE EDUCATION/TRAINING PROGRAM

## 2024-04-15 RX ORDER — LOSARTAN POTASSIUM 50 MG/1
100 TABLET ORAL
Status: DISCONTINUED | OUTPATIENT
Start: 2024-04-15 | End: 2024-04-15 | Stop reason: HOSPADM

## 2024-04-15 RX ORDER — ENOXAPARIN SODIUM 100 MG/ML
40 INJECTION SUBCUTANEOUS DAILY
Status: DISCONTINUED | OUTPATIENT
Start: 2024-04-15 | End: 2024-04-15 | Stop reason: HOSPADM

## 2024-04-15 RX ORDER — ASPIRIN 81 MG/1
81 TABLET ORAL DAILY
Status: DISCONTINUED | OUTPATIENT
Start: 2024-04-15 | End: 2024-04-15 | Stop reason: HOSPADM

## 2024-04-15 RX ORDER — FUROSEMIDE 40 MG/1
40 TABLET ORAL DAILY
Status: DISCONTINUED | OUTPATIENT
Start: 2024-04-15 | End: 2024-04-15 | Stop reason: HOSPADM

## 2024-04-15 RX ORDER — KETOROLAC TROMETHAMINE 15 MG/ML
15 INJECTION, SOLUTION INTRAMUSCULAR; INTRAVENOUS EVERY 6 HOURS PRN
Status: DISCONTINUED | OUTPATIENT
Start: 2024-04-15 | End: 2024-04-15 | Stop reason: HOSPADM

## 2024-04-15 RX ORDER — AMLODIPINE BESYLATE 5 MG/1
5 TABLET ORAL DAILY
Status: DISCONTINUED | OUTPATIENT
Start: 2024-04-15 | End: 2024-04-15 | Stop reason: HOSPADM

## 2024-04-15 RX ORDER — TRAZODONE HYDROCHLORIDE 50 MG/1
50 TABLET ORAL NIGHTLY
Status: DISCONTINUED | OUTPATIENT
Start: 2024-04-15 | End: 2024-04-15 | Stop reason: HOSPADM

## 2024-04-15 RX ORDER — ACETAMINOPHEN 325 MG/1
650 TABLET ORAL EVERY 6 HOURS PRN
Status: DISCONTINUED | OUTPATIENT
Start: 2024-04-15 | End: 2024-04-15 | Stop reason: HOSPADM

## 2024-04-15 RX ORDER — ONDANSETRON 2 MG/ML
4 INJECTION INTRAMUSCULAR; INTRAVENOUS EVERY 4 HOURS PRN
Status: DISCONTINUED | OUTPATIENT
Start: 2024-04-15 | End: 2024-04-15 | Stop reason: HOSPADM

## 2024-04-15 RX ADMIN — LOSARTAN POTASSIUM 100 MG: 50 TABLET, FILM COATED ORAL at 05:05

## 2024-04-15 RX ADMIN — AMLODIPINE BESYLATE 5 MG: 5 TABLET ORAL at 05:05

## 2024-04-15 RX ADMIN — FUROSEMIDE 40 MG: 40 TABLET ORAL at 05:05

## 2024-04-15 RX ADMIN — ASPIRIN 81 MG: 81 TABLET, COATED ORAL at 05:06

## 2024-04-15 ASSESSMENT — LIFESTYLE VARIABLES
TOTAL SCORE: 0
EVER FELT BAD OR GUILTY ABOUT YOUR DRINKING: NO
CONSUMPTION TOTAL: NEGATIVE
ON A TYPICAL DAY WHEN YOU DRINK ALCOHOL HOW MANY DRINKS DO YOU HAVE: 1
HOW MANY TIMES IN THE PAST YEAR HAVE YOU HAD 5 OR MORE DRINKS IN A DAY: 0
HAVE YOU EVER FELT YOU SHOULD CUT DOWN ON YOUR DRINKING: NO
ALCOHOL_USE: YES
HAVE PEOPLE ANNOYED YOU BY CRITICIZING YOUR DRINKING: NO
EVER HAD A DRINK FIRST THING IN THE MORNING TO STEADY YOUR NERVES TO GET RID OF A HANGOVER: NO
AVERAGE NUMBER OF DAYS PER WEEK YOU HAVE A DRINK CONTAINING ALCOHOL: 7
DOES PATIENT WANT TO STOP DRINKING: NO
TOTAL SCORE: 0
TOTAL SCORE: 0

## 2024-04-15 ASSESSMENT — ENCOUNTER SYMPTOMS
EYES NEGATIVE: 1
WEAKNESS: 1
PSYCHIATRIC NEGATIVE: 1
MUSCULOSKELETAL NEGATIVE: 1
RESPIRATORY NEGATIVE: 1
DIZZINESS: 1
NAUSEA: 1
HEADACHES: 1
CARDIOVASCULAR NEGATIVE: 1

## 2024-04-15 ASSESSMENT — COGNITIVE AND FUNCTIONAL STATUS - GENERAL
SUGGESTED CMS G CODE MODIFIER DAILY ACTIVITY: CJ
SUGGESTED CMS G CODE MODIFIER MOBILITY: CJ
WALKING IN HOSPITAL ROOM: A LITTLE
CLIMB 3 TO 5 STEPS WITH RAILING: A LITTLE
DAILY ACTIVITIY SCORE: 20
MOBILITY SCORE: 22
WALKING IN HOSPITAL ROOM: A LITTLE
MOVING TO AND FROM BED TO CHAIR: A LITTLE
DRESSING REGULAR UPPER BODY CLOTHING: A LITTLE
MOBILITY SCORE: 21
HELP NEEDED FOR BATHING: A LITTLE
SUGGESTED CMS G CODE MODIFIER MOBILITY: CJ
CLIMB 3 TO 5 STEPS WITH RAILING: A LITTLE
DRESSING REGULAR LOWER BODY CLOTHING: A LITTLE
SUGGESTED CMS G CODE MODIFIER DAILY ACTIVITY: CJ
DAILY ACTIVITIY SCORE: 22
TOILETING: A LITTLE
PERSONAL GROOMING: A LITTLE
HELP NEEDED FOR BATHING: A LITTLE

## 2024-04-15 ASSESSMENT — PATIENT HEALTH QUESTIONNAIRE - PHQ9
1. LITTLE INTEREST OR PLEASURE IN DOING THINGS: NOT AT ALL
SUM OF ALL RESPONSES TO PHQ9 QUESTIONS 1 AND 2: 0
SUM OF ALL RESPONSES TO PHQ9 QUESTIONS 1 AND 2: 0
2. FEELING DOWN, DEPRESSED, IRRITABLE, OR HOPELESS: NOT AT ALL
2. FEELING DOWN, DEPRESSED, IRRITABLE, OR HOPELESS: NOT AT ALL
1. LITTLE INTEREST OR PLEASURE IN DOING THINGS: NOT AT ALL

## 2024-04-15 ASSESSMENT — ACTIVITIES OF DAILY LIVING (ADL): TOILETING: INDEPENDENT

## 2024-04-15 ASSESSMENT — GAIT ASSESSMENTS
DEVIATION: BRADYKINETIC;SHUFFLED GAIT
GAIT LEVEL OF ASSIST: SUPERVISED
DISTANCE (FEET): 200

## 2024-04-15 ASSESSMENT — FIBROSIS 4 INDEX: FIB4 SCORE: 1.73
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
## 2024-04-15 NOTE — ED NOTES
"Pt's  called and report Pt had temporary mental illness ? \"not right\",   was not recognized by the Pt for 15 seconds.    ERP made aware.    "
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
## 2024-04-15 NOTE — DISCHARGE INSTR - WOUND CARE
Remove old dressing. Cleanse wound with NS or wound cleanser and gauze. Pat dry. Apply no sting to parth-wound. Cut a piece of Xeroform (yellow Petrolatum) and apply over open wound. Secure in place with ABD pads, roll gauze and hypafix tape. Nursing to change daily and PRN dislodgement and or drainage saturation.
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
## 2024-04-15 NOTE — DIETARY
NUTRITION SERVICES: BMI - Pt with BMI >40 (=Body mass index is 42.07 kg/m².), Class III obesity. Weight loss counseling not appropriate in acute care setting.     RECOMMEND - If appropriate at DC please refer to outpatient nutrition services for weight management.      RD available PRN per department policy.

## 2024-04-15 NOTE — PROGRESS NOTES
Patient arrived from er via stretcher. Ambulated to bed without incident. Alert and oriented x4 with no complaints of pain at this time. MRI Screening completed. Skin check completed see flow sheet. Oriented to room, call bell in place, will continue to monitor.

## 2024-04-15 NOTE — FACE TO FACE
Face to Face Supporting Documentation - Home Health    The encounter with this patient was in whole or in part the primary reason for home health admission.    Date of encounter:   Patient:                    MRN:                       YOB: 2023  Sharon Callahan  [id number]  1940     Home health to see patient for:  Skilled Nursing care for assessment, interventions & education, Physical Therapy evaluation and treatment, and Occupational therapy evaluation and treatment, and wound care therapy evaluation and treatment.    Skilled need for:  New Onset Medical Diagnosis concussion    Skilled nursing interventions to include:  Comment: PT/OT  wound care    Homebound status evidenced by:  Needs the assistance of another person in order to leave the home. Leaving home requires a considerable and taxing effort. There is a normal inability to leave the home.    Community Physician to provide follow up care: Stephen Alcantara M.D.     Optional Interventions? No      I certify the face to face encounter for this home health care referral meets the CMS requirements and the encounter/clinical assessment with the patient was, in whole, or in part, for the medical condition(s) listed above, which is the primary reason for home health care. Based on my clinical findings: the service(s) are medically necessary, support the need for home health care, and the homebound criteria are met.  I certify that this patient has had a face to face encounter by myself.  Eric Weber M.D. - NPI: [id number]
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
## 2024-04-15 NOTE — ED NOTES
Pt wheeled back to room Blue 15 able to transfer to bed with steady gait. Pt placed into gown and placed on the monitor.  Pt states same complaints  from triage.    Alert and oriented not in acute distress.  Chart up for ERP to see.

## 2024-04-15 NOTE — CARE PLAN
The patient is Stable - Low risk of patient condition declining or worsening    Shift Goals  Clinical Goals: mri  Patient Goals: rest    Progress made toward(s) clinical / shift goals:      Patients pain will be managed     Patient is not progressing towards the following goals:

## 2024-04-15 NOTE — DISCHARGE PLANNING
Patient lives in a 4th story apartment alone and is able to adequately care for herself. She will need a ride home at the time or discharge. No other CM needs identified.     Case Management Discharge Planning    Admission Date: 4/14/2024  GMLOS: 2.3  ALOS: 0    6-Clicks ADL Score: 22  6-Clicks Mobility Score: 21      Anticipated Discharge Dispo: Discharge Disposition: Discharged to home/self care (01)  Discharge Address: [address] [place] [address] [place] [address]  Discharge Contact Phone Number: [phone]    DME Needed: No    Action(s) Taken: Updated Provider/Nurse on Discharge Plan, Patient Conference, and DC Assessment Complete (See below)    Escalations Completed: None    Medically Clear: Yes    Barriers to Discharge: None    Care Transition Team Assessment    Information Source  Orientation Level: Oriented X4  Information Given By: Patient  Informant's Name: Trice  Who is responsible for making decisions for patient? : Patient    Readmission Evaluation  Is this a readmission?: No    Elopement Risk  Legal Hold: No  Ambulatory or Self Mobile in Wheelchair: Yes  Disoriented: No  Psychiatric Symptoms: None  History of Wandering: No  Elopement this Admit: No  Vocalizing Wanting to Leave: No  Displays Behaviors, Body Language Wanting to Leave: No-Not at Risk for Elopement  Elopement Risk: Not at Risk for Elopement    Interdisciplinary Discharge Planning  Does Admitting Nurse Feel This Could be a Complex Discharge?: No  Primary Care Physician: Stephen Hernandez  Lives with - Patient's Self Care Capacity: Alone and Able to Care For Self  Patient or legal guardian wants to designate a caregiver: No  Support Systems: Spouse / Significant Other, Family Member(s)  Housing / Facility: 1 Story Apartment / Condo (4th floor)  Prior Services: Home-Independent  Durable Medical Equipment: Not Applicable              Finances  Financial Barriers to Discharge: No  Prescription Coverage: Yes    Vision / Hearing Impairment  Vision  Impairment : No  Hearing Impairment : No    Values / Beliefs / Concerns  Values / Beliefs Concerns : No    Advance Directive  Advance Directive?: None    Domestic Abuse  Have you ever been the victim of abuse or violence?: No  Physical Abuse or Sexual Abuse: No  Verbal Abuse or Emotional Abuse: No  Possible Abuse/Neglect Reported to:: Not Applicable              Anticipated Discharge Information  Discharge Disposition: Discharged to home/self care (01)  Discharge Address: [address] [place] [address] [place] [address]  Discharge Contact Phone Number: [phone]

## 2024-04-15 NOTE — PROGRESS NOTES
4 Eyes Skin Assessment Completed by MASSIMO landry and MASSIMO ernandez.    Head WDL  Ears WDL  Nose WDL  Mouth WDL  Neck WDL  Breast/Chest WDL  Shoulder Blades WDL  Spine WDL  (R) Arm/Elbow/Hand Bruising  (L) Arm/Elbow/Hand Bruising and Abrasion  Abdomen Bruising  Groin WDL  Scrotum/Coccyx/Buttocks WDL  (R) Leg Bruising  (L) Leg Bruising  (R) Heel/Foot/Toe WDL  (L) Heel/Foot/Toe WDL

## 2024-04-15 NOTE — DISCHARGE PLANNING
Patient rolled back to observation/outpatient status per attending MD determination (Dr. Weber) and UR committee MD secondary review (Dr. Villareal).  Condition code 44 completed.
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
## 2024-04-16 ENCOUNTER — PATIENT OUTREACH (OUTPATIENT)
Dept: MEDICAL GROUP | Facility: LAB | Age: 84
End: 2024-04-16
Payer: MEDICARE
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
## 2024-04-17 ENCOUNTER — NON-PROVIDER VISIT (OUTPATIENT)
Dept: WOUND CARE | Facility: MEDICAL CENTER | Age: 84
End: 2024-04-17
Attending: EMERGENCY MEDICINE
Payer: MEDICARE

## 2024-04-17 PROCEDURE — 97597 DBRDMT OPN WND 1ST 20 CM/<: CPT

## 2024-04-17 PROCEDURE — 99211 OFF/OP EST MAY X REQ PHY/QHP: CPT
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
## 2024-04-17 NOTE — PATIENT INSTRUCTIONS
-Keep your wound dressing clean, dry, and intact. Only change dressing if it's over saturated, soiled or falls off.     -Change your dressing if it becomes soiled, soaked, or falls off.    -Should you experience any significant changes in your wound(s), such as infection (redness, swelling, localized heat, increased pain, fever > 101 F, chills) or have any questions regarding your home care instructions, please contact the wound center at [phone]. If after hours, contact your primary care physician or go to the hospital emergency room.

## 2024-04-19 ENCOUNTER — OFFICE VISIT (OUTPATIENT)
Dept: MEDICAL GROUP | Facility: LAB | Age: 84
End: 2024-04-19
Payer: MEDICARE

## 2024-04-19 VITALS
WEIGHT: 213.85 LBS | BODY MASS INDEX: 41.98 KG/M2 | OXYGEN SATURATION: 95 % | HEART RATE: 57 BPM | TEMPERATURE: 97.6 F | RESPIRATION RATE: 16 BRPM | SYSTOLIC BLOOD PRESSURE: 128 MMHG | DIASTOLIC BLOOD PRESSURE: 68 MMHG | HEIGHT: 60 IN

## 2024-04-19 DIAGNOSIS — L98.9 SKIN LESION: ICD-10-CM

## 2024-04-19 DIAGNOSIS — R93.0 ABNORMAL FINDINGS ON DIAGNOSTIC IMAGING OF SKULL AND HEAD, NOT ELSEWHERE CLASSIFIED: ICD-10-CM

## 2024-04-19 DIAGNOSIS — F07.81 POST CONCUSSION SYNDROME: ICD-10-CM

## 2024-04-19 DIAGNOSIS — M89.9 LESION OF BONE OF CERVICAL SPINE: ICD-10-CM

## 2024-04-19 DIAGNOSIS — Z09 HOSPITAL DISCHARGE FOLLOW-UP: Primary | ICD-10-CM

## 2024-04-19 ASSESSMENT — FIBROSIS 4 INDEX: FIB4 SCORE: 2.25
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
## 2024-04-25 ENCOUNTER — OFFICE VISIT (OUTPATIENT)
Dept: WOUND CARE | Facility: MEDICAL CENTER | Age: 84
End: 2024-04-25
Attending: EMERGENCY MEDICINE
Payer: MEDICARE

## 2024-04-25 VITALS
TEMPERATURE: 97.7 F | OXYGEN SATURATION: 98 % | RESPIRATION RATE: 18 BRPM | HEART RATE: 69 BPM | DIASTOLIC BLOOD PRESSURE: 58 MMHG | SYSTOLIC BLOOD PRESSURE: 144 MMHG

## 2024-04-25 DIAGNOSIS — R52 PAIN ASSOCIATED WITH WOUND: ICD-10-CM

## 2024-04-25 DIAGNOSIS — T14.8XXA PAIN ASSOCIATED WITH WOUND: ICD-10-CM

## 2024-04-25 DIAGNOSIS — S51.811D SKIN TEAR OF RIGHT FOREARM WITHOUT COMPLICATION, SUBSEQUENT ENCOUNTER: ICD-10-CM

## 2024-04-25 DIAGNOSIS — S40.812D: ICD-10-CM

## 2024-04-25 PROCEDURE — 99214 OFFICE O/P EST MOD 30 MIN: CPT

## 2024-04-25 PROCEDURE — 11042 DBRDMT SUBQ TIS 1ST 20SQCM/<: CPT

## 2024-04-25 PROCEDURE — 3078F DIAST BP <80 MM HG: CPT | Performed by: NURSE PRACTITIONER

## 2024-04-25 PROCEDURE — 3077F SYST BP >= 140 MM HG: CPT | Performed by: NURSE PRACTITIONER

## 2024-04-25 PROCEDURE — 11042 DBRDMT SUBQ TIS 1ST 20SQCM/<: CPT | Performed by: NURSE PRACTITIONER

## 2024-04-25 ASSESSMENT — ENCOUNTER SYMPTOMS
VOMITING: 0
COUGH: 0
BACK PAIN: 0
SHORTNESS OF BREATH: 0
FEVER: 0
NAUSEA: 0
DIARRHEA: 1
CHILLS: 0
DIZZINESS: 0

## 2024-04-25 NOTE — PATIENT INSTRUCTIONS
-Keep your wound dressing clean, dry, and intact.    -Change your dressing every 3 to 4 days or if it becomes soiled, soaked, or falls off.    -Should you experience any significant changes in your wound(s), such as infection (redness, swelling, localized heat, increased pain, fever > 101 F, chills) or have any questions regarding your home care instructions, please contact the wound center at [phone]. If after hours, contact your primary care physician or go to the hospital emergency room.
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
## 2024-04-25 NOTE — PROGRESS NOTES
[facility]   [facility]   [address]   [address]   [place] [address]   [phone] Fax: [phone]      Supplies ordered via [employer].  Duration of Supply Order: 90 Days  Dispense as written.       Wound 04/15/24 Skin Tear Arm Proximal;Anterior Left (Active)   Wound Image    04/25/24 1500   Site Assessment Red;Yellow 04/25/24 1500   Periwound Assessment Fragile;Edema 04/25/24 1500   Margins Attached edges 04/25/24 1500   Closure Secondary intention 04/17/24 0900   Drainage Amount Moderate 04/25/24 1500   Drainage Description Serosanguineous 04/25/24 1500   Treatments Cleansed;Topical Lidocaine;Provider debridement 04/25/24 1500   Wound Cleansing Hypochlorus Acid 04/25/24 1500   Periwound Protectant No-sting Skin Prep; Triad paste 04/25/24 1500   Dressing Status Intact;Old drainage 04/17/24 0900   Dressing Changed New 04/25/24 1500   Dressing Cleansing/Solutions Not Applicable 04/25/24 1500   Dressing Options Mepitel One;Hydrofera Blue Ready;Soft Conforming Roll;Hypafix Tape;Tubigrip 04/25/24 1500   Dressing Change/Treatment Frequency Every 72 hrs, and As Needed 04/25/24 1500   Wound Team Following Weekly 04/25/24 1500   Non-staged Wound Description Full thickness 04/25/24 1500   Wound Length (cm) 1.5 cm 04/25/24 1500   Wound Width (cm) 4.8 cm 04/25/24 1500   Wound Depth (cm) 0.1 cm 04/25/24 1500   Wound Surface Area (cm^2) 7.2 cm^2 04/25/24 1500   Wound Volume (cm^3) 0.72 cm^3 04/25/24 1500   Post-Procedure Length (cm) 1.6 cm 04/25/24 1500   Post-Procedure Width (cm) 4.9 cm 04/25/24 1500   Post-Procedure Depth (cm) 0.1 cm 04/25/24 1500   Post-Procedure Surface Area (cm^2) 7.84 cm^2 04/25/24 1500   Post-Procedure Volume (cm^3) 0.784 cm^3 04/25/24 1500   Wound Healing % 83 04/25/24 1500   Tunneling (cm) 0 cm 04/25/24 1500   Undermining (cm) 0 cm 04/25/24 1500   Wound Odor None 04/25/24 1500       Wound 04/17/24 Skin Tear Arm Lower Right (Active)   Wound Image     04/25/24 1500   Site Assessment Red 04/25/24 1500   Periwound Assessment Fragile;Edema 04/25/24 1500   Margins Attached edges 04/25/24 1500   Closure Secondary intention 04/17/24 0900   Drainage Amount Moderate 04/25/24 1500   Drainage Description Sanguineous 04/25/24 1500   Treatments Cleansed;Topical Lidocaine;Provider debridement 04/25/24 1500   Wound Cleansing Hypochlorus Acid 04/25/24 1500   Periwound Protectant No-sting Skin Prep 04/25/24 1500   Dressing Status Open to Air; Triad paste 04/17/24 0900   Dressing Changed Changed 04/25/24 1500   Dressing Cleansing/Solutions Not Applicable 04/25/24 1500   Dressing Options Mepitel One;Hydrofera Blue Ready;Soft Conforming Roll;Hypafix Tape;Tubigrip;Spandage 04/25/24 1500   Dressing Change/Treatment Frequency Every 72 hrs, and As Needed 04/25/24 1500   Wound Team Following Weekly 04/25/24 1500   Non-staged Wound Description Full thickness 04/25/24 1500   Wound Length (cm) 0.9 cm 04/25/24 1500   Wound Width (cm) 0.9 cm 04/25/24 1500   Wound Depth (cm) 0.1 cm 04/25/24 1500   Wound Surface Area (cm^2) 0.81 cm^2 04/25/24 1500   Wound Volume (cm^3) 0.081 cm^3 04/25/24 1500   Post-Procedure Length (cm) 1 cm 04/25/24 1500   Post-Procedure Width (cm) 1 cm 04/25/24 1500   Post-Procedure Depth (cm) 0.1 cm 04/25/24 1500   Post-Procedure Surface Area (cm^2) 1 cm^2 04/25/24 1500   Post-Procedure Volume (cm^3) 0.1 cm^3 04/25/24 1500   Tunneling (cm) 0 cm 04/25/24 1500   Undermining (cm) 0 cm 04/25/24 1500   Wound Odor None 04/25/24 1500

## 2024-05-01 ENCOUNTER — APPOINTMENT (OUTPATIENT)
Dept: MEDICAL GROUP | Facility: LAB | Age: 84
End: 2024-05-01
Payer: MEDICARE

## 2024-05-02 ENCOUNTER — OFFICE VISIT (OUTPATIENT)
Dept: WOUND CARE | Facility: MEDICAL CENTER | Age: 84
End: 2024-05-02
Attending: EMERGENCY MEDICINE
Payer: MEDICARE

## 2024-05-02 VITALS
OXYGEN SATURATION: 95 % | HEART RATE: 50 BPM | SYSTOLIC BLOOD PRESSURE: 119 MMHG | TEMPERATURE: 97.6 F | DIASTOLIC BLOOD PRESSURE: 68 MMHG | RESPIRATION RATE: 18 BRPM

## 2024-05-02 DIAGNOSIS — T14.8XXA PAIN ASSOCIATED WITH WOUND: ICD-10-CM

## 2024-05-02 DIAGNOSIS — R52 PAIN ASSOCIATED WITH WOUND: ICD-10-CM

## 2024-05-02 DIAGNOSIS — S40.812D: Primary | ICD-10-CM

## 2024-05-02 DIAGNOSIS — S51.811D SKIN TEAR OF RIGHT FOREARM WITHOUT COMPLICATION, SUBSEQUENT ENCOUNTER: ICD-10-CM

## 2024-05-02 PROCEDURE — 3078F DIAST BP <80 MM HG: CPT | Performed by: NURSE PRACTITIONER

## 2024-05-02 PROCEDURE — 3074F SYST BP LT 130 MM HG: CPT | Performed by: NURSE PRACTITIONER

## 2024-05-02 PROCEDURE — 11042 DBRDMT SUBQ TIS 1ST 20SQCM/<: CPT | Performed by: NURSE PRACTITIONER
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
## 2024-05-02 NOTE — PATIENT INSTRUCTIONS
-Change your dressing as instructed and immediately if it becomes soiled, soaked, or falls off.    -Should you experience any significant changes in your wound(s), such as infection (redness, swelling, localized heat, increased pain, fever > 101 F, chills) or have any questions regarding your home care instructions, please contact the wound center at [phone]. If after hours, contact your primary care physician or go to the hospital emergency room.

## 2024-05-07 ENCOUNTER — APPOINTMENT (OUTPATIENT)
Dept: RADIOLOGY | Facility: MEDICAL CENTER | Age: 84
DRG: 392 | End: 2024-05-07
Attending: EMERGENCY MEDICINE
Payer: MEDICARE

## 2024-05-07 ENCOUNTER — HOSPITAL ENCOUNTER (INPATIENT)
Facility: MEDICAL CENTER | Age: 84
LOS: 2 days | DRG: 392 | End: 2024-05-10
Attending: EMERGENCY MEDICINE | Admitting: HOSPITALIST
Payer: MEDICARE

## 2024-05-07 DIAGNOSIS — G47.33 OSA ON CPAP: Chronic | ICD-10-CM

## 2024-05-07 DIAGNOSIS — M25.562 CHRONIC PAIN OF BOTH KNEES: ICD-10-CM

## 2024-05-07 DIAGNOSIS — R11.2 NAUSEA AND VOMITING, UNSPECIFIED VOMITING TYPE: ICD-10-CM

## 2024-05-07 DIAGNOSIS — F07.81 CONCUSSION SYNDROME: ICD-10-CM

## 2024-05-07 DIAGNOSIS — Z71.89 ACP (ADVANCE CARE PLANNING): ICD-10-CM

## 2024-05-07 DIAGNOSIS — R19.7 DIARRHEA, UNSPECIFIED TYPE: ICD-10-CM

## 2024-05-07 DIAGNOSIS — K21.9 GASTROESOPHAGEAL REFLUX DISEASE WITHOUT ESOPHAGITIS: ICD-10-CM

## 2024-05-07 DIAGNOSIS — G89.29 CHRONIC PAIN OF BOTH KNEES: ICD-10-CM

## 2024-05-07 DIAGNOSIS — I48.0 PAROXYSMAL ATRIAL FIBRILLATION (HCC): ICD-10-CM

## 2024-05-07 DIAGNOSIS — F51.01 PRIMARY INSOMNIA: ICD-10-CM

## 2024-05-07 DIAGNOSIS — K52.9 GASTROENTERITIS: ICD-10-CM

## 2024-05-07 DIAGNOSIS — I10 ESSENTIAL HYPERTENSION: ICD-10-CM

## 2024-05-07 DIAGNOSIS — E78.5 DYSLIPIDEMIA: ICD-10-CM

## 2024-05-07 DIAGNOSIS — M25.561 CHRONIC PAIN OF BOTH KNEES: ICD-10-CM

## 2024-05-07 DIAGNOSIS — R60.0 BILATERAL LEG EDEMA: ICD-10-CM

## 2024-05-07 DIAGNOSIS — S51.801A WOUND, OPEN, ARM, FOREARM, RIGHT, INITIAL ENCOUNTER: ICD-10-CM

## 2024-05-07 DIAGNOSIS — E55.9 HYPOVITAMINOSIS D: ICD-10-CM

## 2024-05-07 LAB
ALBUMIN SERPL BCP-MCNC: 4 G/DL (ref 3.2–4.9)
ALBUMIN/GLOB SERPL: 1.4 G/DL
ALP SERPL-CCNC: 74 U/L (ref 30–99)
ALT SERPL-CCNC: 11 U/L (ref 2–50)
ANION GAP SERPL CALC-SCNC: 10 MMOL/L (ref 7–16)
APPEARANCE UR: CLEAR
AST SERPL-CCNC: 16 U/L (ref 12–45)
BASOPHILS # BLD AUTO: 0.2 % (ref 0–1.8)
BASOPHILS # BLD: 0.03 K/UL (ref 0–0.12)
BILIRUB SERPL-MCNC: 0.4 MG/DL (ref 0.1–1.5)
BILIRUB UR QL STRIP.AUTO: NEGATIVE
BUN SERPL-MCNC: 23 MG/DL (ref 8–22)
CALCIUM ALBUM COR SERPL-MCNC: 8.6 MG/DL (ref 8.5–10.5)
CALCIUM SERPL-MCNC: 8.6 MG/DL (ref 8.4–10.2)
CHLORIDE SERPL-SCNC: 106 MMOL/L (ref 96–112)
CO2 SERPL-SCNC: 24 MMOL/L (ref 20–33)
COLOR UR: YELLOW
CREAT SERPL-MCNC: 0.94 MG/DL (ref 0.5–1.4)
EOSINOPHIL # BLD AUTO: 0.03 K/UL (ref 0–0.51)
EOSINOPHIL NFR BLD: 0.2 % (ref 0–6.9)
ERYTHROCYTE [DISTWIDTH] IN BLOOD BY AUTOMATED COUNT: 48.4 FL (ref 35.9–50)
FLUAV RNA SPEC QL NAA+PROBE: NEGATIVE
FLUBV RNA SPEC QL NAA+PROBE: NEGATIVE
GFR SERPLBLD CREATININE-BSD FMLA CKD-EPI: 60 ML/MIN/1.73 M 2
GLOBULIN SER CALC-MCNC: 2.8 G/DL (ref 1.9–3.5)
GLUCOSE SERPL-MCNC: 113 MG/DL (ref 65–99)
GLUCOSE UR STRIP.AUTO-MCNC: NEGATIVE MG/DL
HCT VFR BLD AUTO: 45.3 % (ref 37–47)
HGB BLD-MCNC: 14.4 G/DL (ref 12–16)
IMM GRANULOCYTES # BLD AUTO: 0.05 K/UL (ref 0–0.11)
IMM GRANULOCYTES NFR BLD AUTO: 0.4 % (ref 0–0.9)
KETONES UR STRIP.AUTO-MCNC: NEGATIVE MG/DL
LACTATE SERPL-SCNC: 0.9 MMOL/L (ref 0.5–2)
LEUKOCYTE ESTERASE UR QL STRIP.AUTO: NEGATIVE
LIPASE SERPL-CCNC: 30 U/L (ref 11–82)
LYMPHOCYTES # BLD AUTO: 0.14 K/UL (ref 1–4.8)
LYMPHOCYTES NFR BLD: 1.1 % (ref 22–41)
MCH RBC QN AUTO: 29.6 PG (ref 27–33)
MCHC RBC AUTO-ENTMCNC: 31.8 G/DL (ref 32.2–35.5)
MCV RBC AUTO: 93.2 FL (ref 81.4–97.8)
MICRO URNS: NORMAL
MONOCYTES # BLD AUTO: 0.41 K/UL (ref 0–0.85)
MONOCYTES NFR BLD AUTO: 3.4 % (ref 0–13.4)
NEUTROPHILS # BLD AUTO: 11.52 K/UL (ref 1.82–7.42)
NEUTROPHILS NFR BLD: 94.7 % (ref 44–72)
NITRITE UR QL STRIP.AUTO: NEGATIVE
NRBC # BLD AUTO: 0 K/UL
NRBC BLD-RTO: 0 /100 WBC (ref 0–0.2)
PH UR STRIP.AUTO: 6 [PH] (ref 5–8)
PLATELET # BLD AUTO: 177 K/UL (ref 164–446)
PMV BLD AUTO: 10.8 FL (ref 9–12.9)
POTASSIUM SERPL-SCNC: 4.2 MMOL/L (ref 3.6–5.5)
PROT SERPL-MCNC: 6.8 G/DL (ref 6–8.2)
PROT UR QL STRIP: NEGATIVE MG/DL
RBC # BLD AUTO: 4.86 M/UL (ref 4.2–5.4)
RBC UR QL AUTO: NEGATIVE
RSV RNA SPEC QL NAA+PROBE: NEGATIVE
SARS-COV-2 RNA RESP QL NAA+PROBE: NOTDETECTED
SODIUM SERPL-SCNC: 140 MMOL/L (ref 135–145)
SP GR UR STRIP.AUTO: 1.02
SPECIMEN SOURCE: NORMAL
WBC # BLD AUTO: 12.2 K/UL (ref 4.8–10.8)

## 2024-05-07 PROCEDURE — 99223 1ST HOSP IP/OBS HIGH 75: CPT | Mod: 25,AI | Performed by: HOSPITALIST

## 2024-05-07 PROCEDURE — 99497 ADVNCD CARE PLAN 30 MIN: CPT | Performed by: HOSPITALIST

## 2024-05-07 RX ORDER — ENOXAPARIN SODIUM 100 MG/ML
40 INJECTION SUBCUTANEOUS DAILY
Status: DISCONTINUED | OUTPATIENT
Start: 2024-05-08 | End: 2024-05-10 | Stop reason: HOSPADM

## 2024-05-07 RX ORDER — ASPIRIN 81 MG/1
81 TABLET ORAL DAILY
Status: DISCONTINUED | OUTPATIENT
Start: 2024-05-07 | End: 2024-05-10 | Stop reason: HOSPADM

## 2024-05-07 RX ORDER — CALCIUM CARBONATE 500 MG/1
500 TABLET, CHEWABLE ORAL 2 TIMES DAILY PRN
Status: DISCONTINUED | OUTPATIENT
Start: 2024-05-07 | End: 2024-05-10 | Stop reason: HOSPADM

## 2024-05-07 RX ORDER — FUROSEMIDE 40 MG/1
40 TABLET ORAL DAILY
Status: DISCONTINUED | OUTPATIENT
Start: 2024-05-08 | End: 2024-05-10 | Stop reason: HOSPADM

## 2024-05-07 RX ORDER — ONDANSETRON 2 MG/ML
4 INJECTION INTRAMUSCULAR; INTRAVENOUS EVERY 4 HOURS PRN
Status: DISCONTINUED | OUTPATIENT
Start: 2024-05-07 | End: 2024-05-10 | Stop reason: HOSPADM

## 2024-05-07 RX ORDER — ONDANSETRON 2 MG/ML
4 INJECTION INTRAMUSCULAR; INTRAVENOUS ONCE
Status: COMPLETED | OUTPATIENT
Start: 2024-05-07 | End: 2024-05-07

## 2024-05-07 RX ORDER — ONDANSETRON 4 MG/1
4 TABLET, ORALLY DISINTEGRATING ORAL EVERY 4 HOURS PRN
Status: DISCONTINUED | OUTPATIENT
Start: 2024-05-07 | End: 2024-05-10 | Stop reason: HOSPADM

## 2024-05-07 RX ORDER — SODIUM CHLORIDE 9 MG/ML
1000 INJECTION, SOLUTION INTRAVENOUS ONCE
Status: COMPLETED | OUTPATIENT
Start: 2024-05-07 | End: 2024-05-07

## 2024-05-07 RX ORDER — SODIUM CHLORIDE, SODIUM LACTATE, POTASSIUM CHLORIDE, CALCIUM CHLORIDE 600; 310; 30; 20 MG/100ML; MG/100ML; MG/100ML; MG/100ML
INJECTION, SOLUTION INTRAVENOUS CONTINUOUS
Status: ACTIVE | OUTPATIENT
Start: 2024-05-07 | End: 2024-05-07

## 2024-05-07 RX ORDER — IBUPROFEN 200 MG
400 TABLET ORAL EVERY 6 HOURS PRN
Status: ON HOLD | COMMUNITY
End: 2024-05-09

## 2024-05-07 RX ORDER — LOSARTAN POTASSIUM 50 MG/1
100 TABLET ORAL DAILY
Status: DISCONTINUED | OUTPATIENT
Start: 2024-05-08 | End: 2024-05-10 | Stop reason: HOSPADM

## 2024-05-07 RX ORDER — AMLODIPINE BESYLATE 5 MG/1
5 TABLET ORAL DAILY
Status: DISCONTINUED | OUTPATIENT
Start: 2024-05-07 | End: 2024-05-10 | Stop reason: HOSPADM

## 2024-05-07 RX ORDER — TRAZODONE HYDROCHLORIDE 50 MG/1
50 TABLET ORAL NIGHTLY
Status: DISCONTINUED | OUTPATIENT
Start: 2024-05-07 | End: 2024-05-10 | Stop reason: HOSPADM

## 2024-05-07 RX ADMIN — ONDANSETRON 4 MG: 2 INJECTION INTRAMUSCULAR; INTRAVENOUS at 12:31

## 2024-05-07 RX ADMIN — ONDANSETRON 4 MG: 2 INJECTION INTRAMUSCULAR; INTRAVENOUS at 10:56

## 2024-05-07 RX ADMIN — AMLODIPINE BESYLATE 5 MG: 5 TABLET ORAL at 15:45

## 2024-05-07 RX ADMIN — FENTANYL CITRATE 25 MCG: 50 INJECTION, SOLUTION INTRAMUSCULAR; INTRAVENOUS at 12:31

## 2024-05-07 RX ADMIN — ASPIRIN 81 MG: 81 TABLET, COATED ORAL at 15:45

## 2024-05-07 RX ADMIN — SODIUM CHLORIDE 1000 ML: 9 INJECTION, SOLUTION INTRAVENOUS at 10:55

## 2024-05-07 RX ADMIN — ONDANSETRON 4 MG: 2 INJECTION INTRAMUSCULAR; INTRAVENOUS at 18:07

## 2024-05-07 RX ADMIN — IOHEXOL 100 ML: 350 INJECTION, SOLUTION INTRAVENOUS at 12:23

## 2024-05-07 RX ADMIN — SODIUM CHLORIDE, POTASSIUM CHLORIDE, SODIUM LACTATE AND CALCIUM CHLORIDE: 600; 310; 30; 20 INJECTION, SOLUTION INTRAVENOUS at 15:48

## 2024-05-07 RX ADMIN — TRAZODONE HYDROCHLORIDE 50 MG: 50 TABLET ORAL at 20:33

## 2024-05-07 RX ADMIN — ONDANSETRON 4 MG: 2 INJECTION INTRAMUSCULAR; INTRAVENOUS at 22:03

## 2024-05-07 ASSESSMENT — LIFESTYLE VARIABLES
HAVE PEOPLE ANNOYED YOU BY CRITICIZING YOUR DRINKING: NO
AVERAGE NUMBER OF DAYS PER WEEK YOU HAVE A DRINK CONTAINING ALCOHOL: 0
ON A TYPICAL DAY WHEN YOU DRINK ALCOHOL HOW MANY DRINKS DO YOU HAVE: 1
TOTAL SCORE: 0
HOW MANY TIMES IN THE PAST YEAR HAVE YOU HAD 5 OR MORE DRINKS IN A DAY: 0
HAVE YOU EVER FELT YOU SHOULD CUT DOWN ON YOUR DRINKING: NO
EVER HAD A DRINK FIRST THING IN THE MORNING TO STEADY YOUR NERVES TO GET RID OF A HANGOVER: NO
DOES PATIENT WANT TO STOP DRINKING: NO
TOTAL SCORE: 0
ALCOHOL_USE: YES
EVER FELT BAD OR GUILTY ABOUT YOUR DRINKING: NO
TOTAL SCORE: 0
CONSUMPTION TOTAL: NEGATIVE

## 2024-05-07 ASSESSMENT — ENCOUNTER SYMPTOMS
BRUISES/BLEEDS EASILY: 0
ABDOMINAL PAIN: 0
FEVER: 0
VOMITING: 0
CHILLS: 0
BACK PAIN: 1
EYE DISCHARGE: 0
STRIDOR: 0
FLANK PAIN: 0
EYE REDNESS: 0
COUGH: 0
MYALGIAS: 0
NERVOUS/ANXIOUS: 0
SHORTNESS OF BREATH: 0
DIARRHEA: 1
FOCAL WEAKNESS: 0

## 2024-05-07 ASSESSMENT — COGNITIVE AND FUNCTIONAL STATUS - GENERAL
SUGGESTED CMS G CODE MODIFIER MOBILITY: CI
SUGGESTED CMS G CODE MODIFIER DAILY ACTIVITY: CI
MOBILITY SCORE: 23
DAILY ACTIVITIY SCORE: 23
DRESSING REGULAR LOWER BODY CLOTHING: A LITTLE
CLIMB 3 TO 5 STEPS WITH RAILING: A LITTLE

## 2024-05-07 ASSESSMENT — PAIN DESCRIPTION - PAIN TYPE
TYPE: ACUTE PAIN

## 2024-05-07 ASSESSMENT — FIBROSIS 4 INDEX
FIB4 SCORE: 2.23
FIB4 SCORE: 2.25
FIB4 SCORE: 2.23

## 2024-05-07 NOTE — PROGRESS NOTES
4 Eyes Skin Assessment Completed by MASSIMO Leong and MASSIMO Motta.    Head WDL  Ears WDL  Nose WDL  Mouth WDL  Neck WDL  Breast/Chest WDL  Shoulder Blades WDL  Spine WDL  (R) Arm/Elbow/Hand Bruising, Abrasion, and Scab  (L) Arm/Elbow/Hand Bruising, Abrasion, and Scab ongoing outpatient wound care   Abdomen WDL  Groin WDL  Scrotum/Coccyx/Buttocks WDL  (R) Leg WDL  (L) Leg WDL  (R) Heel/Foot/Toe WDL  (L) Heel/Foot/Toe WDL          Devices In Places Pulse Ox      Interventions In Place N/A    Possible Skin Injury Yes    Pictures Uploaded Into Epic Yes  Wound Consult Placed Yes  RN Wound Prevention Protocol Ordered No

## 2024-05-07 NOTE — ASSESSMENT & PLAN NOTE
Will check CT abdomen for further evaluation.  Patient likely has a viral enteritis differentials include, C. difficile colitis    I will check stool for C. difficile toxins.  Supportive care with intravenous fluids, modified diet, and will monitor electrolytes and replace accordingly     5/8: Continue IVF, follow work up.    5/9: Initially the patient had said that the diarrhea have resolved we are planning on discharging patient.  However we were waiting on discharge the patient had significant 3 watery episodes of diarrhea.  We have ordered GI PCR.  Will continue with IV fluids for now.  Continue to monitor closely.
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
## 2024-05-07 NOTE — ED TRIAGE NOTES
Chief Complaint   Patient presents with    Nausea/Vomiting/Diarrhea     Started this am  Denies c/o abd pain    Low Back Pain     C/o low back pain started last night  Denies dysuria  Denies any recent injuries/illnesses     Pt BIB [facility] from home for c/o N/V/D and low back pain started this am.  Pt was given 300mL NS and 4mg Zofran by [facility] PTA.

## 2024-05-07 NOTE — ED NOTES
Pt states lower back pain is 4/10, declines wanting additional ain medication at this time.     Lunch tray provided. Pt educated on ordered full liquid diet.

## 2024-05-07 NOTE — H&P
[facility] Medicine History & Physical Note    Date of Service  5/7/2024    Primary Care Physician  Stephen Alcantara M.D.    Consultants  None     Code Status  Full Code    Chief Complaint  Chief Complaint   Patient presents with    Nausea/Vomiting/Diarrhea     Started this am  Denies c/o abd pain    Low Back Pain     C/o low back pain started last night  Denies dysuria  Denies any recent injuries/illnesses     History of Presenting Illness  Sharon Callahan is a 83 y.o. female with a past medical history of elevated BMI of 41 and primary hypertension who presented 5/7/2024 with generalized weakness, diarrhea, nausea and vomiting.  Patient reports that she has not been feeling well for the past 2-3 days.  She initially had low back pain then started to have chills myalgia and diarrhea the next day.  She denies noticing any blood in her stool or her vomitus.  She denies having any focal motor weakness, fecal or urinary incontinence.    I discussed the plan of care with emergency physician, and the patient.    Review of Systems  Review of Systems   Constitutional:  Positive for malaise/fatigue. Negative for chills and fever.   Eyes:  Negative for discharge and redness.   Respiratory:  Negative for cough, shortness of breath and stridor.    Cardiovascular:  Negative for chest pain and leg swelling.   Gastrointestinal:  Positive for diarrhea. Negative for abdominal pain and vomiting.   Genitourinary:  Negative for flank pain.   Musculoskeletal:  Positive for back pain. Negative for myalgias.   Skin: Negative.    Neurological:  Negative for focal weakness.   Endo/Heme/Allergies:  Does not bruise/bleed easily.   Psychiatric/Behavioral:  The patient is not nervous/anxious.      Past Medical History   has a past medical history of Arrhythmia, Arthritis, BMI 38.0-38.9,adult (9/18/2013), Breast cancer (HCC), CATARACT, Chronic nausea (1/12/2015), Chronic pain of both knees (1/9/2019), Depression with anxiety (5/3/2011),  Heart burn, Heart valve disease, Hemothorax on right (1/4/2017), History of cholecystectomy, total knee replacement, Hyperlipidemia (4/4/2011), Hypertension, Indigestion, MYESHA (obstructive sleep apnea) (4/4/2011), Pain, Pleural effusion, right (1/4/2017), Range of motion deficit, S/P bilateral mastectomy (4/4/2011), Sleep apnea, and Snoring.    Surgical History   has a past surgical history that includes hip replacement, total (Bilateral); other (Bilateral); lisa by laparoscopy (2/1/2011); other (1988); other (1989); uvulopharyngopalatoplasty (1990); cataract phaco with iol (10/20/2011); cataract phaco with iol (11/3/2011); orif, humerus (1950's); pr breast augmentation with implant; thoracoscopy (Right, 12/6/2016); breast biopsy (1980); hip arthroplasty total (3/26/2009); hip arthroplasty total (12/3/2012); pr total knee arthroplasty (Left, 11/8/2019); pr reconstr total shoulder implant (Right, 5/11/2021); and biceps tenodesis (Right, 5/11/2021).     Family History  family history includes Cancer in her father and paternal aunt; Diabetes in her brother, father, and paternal aunt; Heart Disease in her brother and father; Hypertension in her brother, brother, father, maternal grandmother, and mother; Sleep Apnea in her brother and brother.      Social History   reports that she quit smoking about 49 years ago. Her smoking use included cigarettes. She started smoking about 64 years ago. She has a 15 pack-year smoking history. She has never used smokeless tobacco. She reports current alcohol use of about 6.0 oz of alcohol per week. She reports that she does not use drugs.    Allergies  Allergies   Allergen Reactions    Pcn [Penicillins] Anaphylaxis     Skin turns black    Penicillins Anaphylaxis     .    Clindamycin Rash     Rash      Flu Virus Vaccine Rash     .    Influenza Virus Vacc Rash     Red in face    Norco [Hydrocodone-Acetaminophen] Vomiting and Nausea    Pneumococcal Vaccine Rash    Tetracycline Rash      Rash     Xarelto [Rivaroxaban] Rash     Medications  Prior to Admission Medications   Prescriptions Last Dose Informant Patient Reported? Taking?   Cholecalciferol (D3 PO) 5/6/2024 at 0700 Patient Yes Yes   Sig: Take 1 Capsule by mouth every day.   Cyanocobalamin (VITAMIN B-12 PO) 5/6/2024 at 0700 Patient Yes Yes   Sig: Take 1 Tablet by mouth every day.   acetaminophen (TYLENOL) 500 MG Tab 5/7/2024 at 0700 Patient Yes Yes   Sig: Take 1,000 mg by mouth every 6 hours as needed for Moderate Pain. Indications: Pain   amLODIPine (NORVASC) 5 MG Tab 5/6/2024 at 0700 Patient No Yes   Sig: Take 1 Tablet by mouth every day.   aspirin EC (ECOTRIN) 81 MG Tablet Delayed Response 5/6/2024 at 0700 Patient Yes Yes   Sig: Take 1 Tablet by mouth every day.   furosemide (LASIX) 40 MG Tab 5/6/2024 at 0700 Patient Yes Yes   Sig: Take 40 mg by mouth every day. Pt takes once a day, not BID   ibuprofen (MOTRIN) 200 MG Tab > 2 weeks at Unknown Patient Yes Yes   Sig: Take 400 mg by mouth every 6 hours as needed. Indications: Pain   losartan (COZAAR) 100 MG Tab 5/6/2024 at 0700 Patient No Yes   Sig: Take 1 Tablet by mouth every day for 360 days.   potassium Chloride ER (K-TAB) 20 MEQ Tab CR tablet 5/6/2024 at 0700 Patient No Yes   Sig: TAKE 1 TABLET BY MOUTH EVERY DAY   Patient taking differently: Take 20 mEq by mouth every day.   traZODone (DESYREL) 50 MG Tab 5/6/2024 at 2200 Patient No Yes   Sig: Take 1 Tablet by mouth every evening.      Facility-Administered Medications: None     Physical Exam  Temp:  [36.2 °C (97.2 °F)-37 °C (98.6 °F)] 36.2 °C (97.2 °F)  Pulse:  [54-61] 60  Resp:  [16-18] 16  BP: (136-159)/(66-77) 159/69  SpO2:  [91 %-97 %] 94 %  Blood Pressure : (!) 146/70   Temperature: 36.9 °C (98.4 °F)   Pulse: (!) 54   Respiration: 18   Pulse Oximetry: 93 %     Physical Exam  Constitutional:       General: She is not in acute distress.  HENT:      Head: Normocephalic and atraumatic.      Right Ear: External ear normal.      Left  "Ear: External ear normal.      Nose: No congestion or rhinorrhea.      Mouth/Throat:      Mouth: Mucous membranes are moist.      Pharynx: No oropharyngeal exudate or posterior oropharyngeal erythema.   Eyes:      General: No scleral icterus.        Right eye: No discharge.         Left eye: No discharge.      Conjunctiva/sclera: Conjunctivae normal.      Pupils: Pupils are equal, round, and reactive to light.   Cardiovascular:      Rate and Rhythm: Regular rhythm. Bradycardia present.      Heart sounds:      No friction rub. No gallop.   Pulmonary:      Effort: Pulmonary effort is normal.   Abdominal:      General: Abdomen is flat. There is no distension.      Tenderness: There is no guarding.   Musculoskeletal:         General: No swelling.      Cervical back: Neck supple. No rigidity. No muscular tenderness.      Right lower leg: No edema.      Left lower leg: No edema.   Skin:     Capillary Refill: Capillary refill takes 2 to 3 seconds.      Coloration: Skin is not jaundiced or pale.      Findings: No bruising or erythema.   Neurological:      Mental Status: She is alert and oriented to person, place, and time.   Psychiatric:         Mood and Affect: Mood normal.         Judgment: Judgment normal.       Laboratory:  Recent Labs     05/07/24  1055   WBC 12.2*   RBC 4.86   HEMOGLOBIN 14.4   HEMATOCRIT 45.3   MCV 93.2   MCH 29.6   MCHC 31.8*   RDW 48.4   PLATELETCT 177   MPV 10.8     Recent Labs     05/07/24  1055   SODIUM 140   POTASSIUM 4.2   CHLORIDE 106   CO2 24   GLUCOSE 113*   BUN 23*   CREATININE 0.94   CALCIUM 8.6     Recent Labs     05/07/24  1055   ALTSGPT 11   ASTSGOT 16   ALKPHOSPHAT 74   TBILIRUBIN 0.4   LIPASE 30   GLUCOSE 113*         No results for input(s): \"NTPROBNP\" in the last 72 hours.      No results for input(s): \"TROPONINT\" in the last 72 hours.    Imaging:  CT-ABDOMEN-PELVIS WITH   Final Result      1.  Moderate distal diverticulosis of the colon without evidence of diverticulitis      2.  " Small hiatal hernia      3.  Stable moderate multichamber cardiac enlargement      4.  Bilateral hip arthroplasties obscures pelvic detail        I personally reviewed patient EKG shows sinus bradycardia with a rate of 46.  There is not ST elevation.  There is T wave inversions in lead aVL.  QTc is 396.    Assessment/Plan:  Justification for Admission Status  I anticipate this patient is appropriate for observation status at this time because likely discharge after 1 midnight.    Patient will need a  bed on medical service.    * Diarrhea- (present on admission)  Assessment & Plan  Will check CT abdomen for further evaluation.  Patient likely has a viral enteritis differentials include, C. difficile colitis    I will check stool for C. difficile toxins.  Supportive care with intravenous fluids, modified diet, and will monitor electrolytes and replace accordingly     Stage 3 chronic kidney disease- (present on admission)  Assessment & Plan  Avoid/minimize nephrotoxins as much as possible, renally dose medications, monitor inputs and outputs     Essential hypertension- (present on admission)  Assessment & Plan  I will start amlodipine and losartan with hold parameters.    ACP (advance care planning)- (present on admission)  Assessment & Plan  I had a discussion with the patient regarding goals of care, diagnoses, prognosis, and CODE STATUS. We discussed her prognosis and comorbidities. The patient has advanced age and number of comorbid conditions including chronic kidney disease, hyperlipidemia and hypertension.  She is presenting with diarrhea likely viral.  At this point the patient wants to maintain a full code.  She is open to all forms of invasive or noninvasive diagnostic and therapeutic interventions.       Dyslipidemia- (present on admission)  Assessment & Plan  Cardiac diet      VTE prophylaxis: SCDs/TEDs and enoxaparin ppx    I had a discussion with the patient regarding goals of care, diagnoses, prognosis,  and CODE STATUS. We discussed her prognosis and comorbidities. The patient has advanced age and number of comorbid conditions including chronic kidney disease, hyperlipidemia and hypertension.  She is presenting with diarrhea likely viral.  At this point the patient wants to maintain a full code.  She is open to all forms of invasive or noninvasive diagnostic and therapeutic interventions.  I spent 16 minutes on advanced care planning in addition to the time spent managing the other medical problems.

## 2024-05-07 NOTE — ED NOTES
Blood drawn and sent to lab.  Covid culture collected and sent to lab.  Medicated as ordered.  Pt and visitor updated on POC including pending tests and chart review by ERP.

## 2024-05-07 NOTE — ED NOTES
Pt amb to BR with steady gait, UA sent to lab. Pt states she had x1 episode of diarrhea and is c/o worsening nausea and low back pain and is requesting medication for both.

## 2024-05-07 NOTE — ASSESSMENT & PLAN NOTE
5/8: I discussed with patient for at least 16 minutes further goals of care.  This included CODE STATUS which includes full code and DNR/DNI.  The patient would like to be full code.  We also discussed further treatment goals.  For now the patient like to have full treatment options.  This includes invasive or noninvasive procedures as needed.  In the event that the patient cannot make decisions for herself she would like her son and/or brothers to make decisions for her.

## 2024-05-08 LAB
ALBUMIN SERPL BCP-MCNC: 3.3 G/DL (ref 3.2–4.9)
ALBUMIN/GLOB SERPL: 1.4 G/DL
ALP SERPL-CCNC: 60 U/L (ref 30–99)
ALT SERPL-CCNC: 11 U/L (ref 2–50)
ANION GAP SERPL CALC-SCNC: 10 MMOL/L (ref 7–16)
AST SERPL-CCNC: 16 U/L (ref 12–45)
BILIRUB SERPL-MCNC: 0.4 MG/DL (ref 0.1–1.5)
BUN SERPL-MCNC: 15 MG/DL (ref 8–22)
C DIFF DNA SPEC QL NAA+PROBE: NEGATIVE
C DIFF TOX GENS STL QL NAA+PROBE: NEGATIVE
CALCIUM ALBUM COR SERPL-MCNC: 8.5 MG/DL (ref 8.5–10.5)
CALCIUM SERPL-MCNC: 7.9 MG/DL (ref 8.4–10.2)
CHLORIDE SERPL-SCNC: 106 MMOL/L (ref 96–112)
CO2 SERPL-SCNC: 19 MMOL/L (ref 20–33)
CREAT SERPL-MCNC: 0.82 MG/DL (ref 0.5–1.4)
ERYTHROCYTE [DISTWIDTH] IN BLOOD BY AUTOMATED COUNT: 48.8 FL (ref 35.9–50)
GFR SERPLBLD CREATININE-BSD FMLA CKD-EPI: 71 ML/MIN/1.73 M 2
GLOBULIN SER CALC-MCNC: 2.3 G/DL (ref 1.9–3.5)
GLUCOSE SERPL-MCNC: 78 MG/DL (ref 65–99)
HCT VFR BLD AUTO: 40.1 % (ref 37–47)
HGB BLD-MCNC: 12.7 G/DL (ref 12–16)
MAGNESIUM SERPL-MCNC: 2 MG/DL (ref 1.5–2.5)
MCH RBC QN AUTO: 29.6 PG (ref 27–33)
MCHC RBC AUTO-ENTMCNC: 31.7 G/DL (ref 32.2–35.5)
MCV RBC AUTO: 93.5 FL (ref 81.4–97.8)
PLATELET # BLD AUTO: 163 K/UL (ref 164–446)
PMV BLD AUTO: 11.3 FL (ref 9–12.9)
POTASSIUM SERPL-SCNC: 3.4 MMOL/L (ref 3.6–5.5)
PROT SERPL-MCNC: 5.6 G/DL (ref 6–8.2)
RBC # BLD AUTO: 4.29 M/UL (ref 4.2–5.4)
SODIUM SERPL-SCNC: 135 MMOL/L (ref 135–145)
WBC # BLD AUTO: 6.6 K/UL (ref 4.8–10.8)

## 2024-05-08 PROCEDURE — 99233 SBSQ HOSP IP/OBS HIGH 50: CPT | Mod: 25 | Performed by: INTERNAL MEDICINE

## 2024-05-08 PROCEDURE — 99497 ADVNCD CARE PLAN 30 MIN: CPT | Performed by: INTERNAL MEDICINE

## 2024-05-08 RX ORDER — SODIUM CHLORIDE 9 MG/ML
INJECTION, SOLUTION INTRAVENOUS CONTINUOUS
Status: DISCONTINUED | OUTPATIENT
Start: 2024-05-08 | End: 2024-05-10

## 2024-05-08 RX ORDER — POTASSIUM CHLORIDE 20 MEQ/1
40 TABLET, EXTENDED RELEASE ORAL ONCE
Status: COMPLETED | OUTPATIENT
Start: 2024-05-08 | End: 2024-05-08

## 2024-05-08 RX ADMIN — FUROSEMIDE 40 MG: 40 TABLET ORAL at 06:01

## 2024-05-08 RX ADMIN — TRAZODONE HYDROCHLORIDE 50 MG: 50 TABLET ORAL at 20:33

## 2024-05-08 RX ADMIN — LOSARTAN POTASSIUM 100 MG: 50 TABLET, FILM COATED ORAL at 06:01

## 2024-05-08 RX ADMIN — SODIUM CHLORIDE: 9 INJECTION, SOLUTION INTRAVENOUS at 20:36

## 2024-05-08 RX ADMIN — AMLODIPINE BESYLATE 5 MG: 5 TABLET ORAL at 06:01

## 2024-05-08 RX ADMIN — POTASSIUM CHLORIDE 40 MEQ: 1500 TABLET, EXTENDED RELEASE ORAL at 06:01

## 2024-05-08 RX ADMIN — CALCIUM CARBONATE 500 MG: 500 TABLET, CHEWABLE ORAL at 20:34

## 2024-05-08 RX ADMIN — ENOXAPARIN SODIUM 40 MG: 100 INJECTION SUBCUTANEOUS at 18:07

## 2024-05-08 RX ADMIN — ASPIRIN 81 MG: 81 TABLET, COATED ORAL at 06:01

## 2024-05-08 RX ADMIN — SODIUM CHLORIDE: 9 INJECTION, SOLUTION INTRAVENOUS at 11:37

## 2024-05-08 ASSESSMENT — ENCOUNTER SYMPTOMS
HEADACHES: 0
ABDOMINAL PAIN: 0
BACK PAIN: 0
SHORTNESS OF BREATH: 0
DIZZINESS: 0
PALPITATIONS: 0
DIARRHEA: 1
VOMITING: 0
NERVOUS/ANXIOUS: 0
CHILLS: 0
BLURRED VISION: 0
FEVER: 0
COUGH: 0
FALLS: 0
HEARTBURN: 0
DOUBLE VISION: 0

## 2024-05-08 ASSESSMENT — PAIN DESCRIPTION - PAIN TYPE
TYPE: ACUTE PAIN
TYPE: ACUTE PAIN

## 2024-05-08 ASSESSMENT — LIFESTYLE VARIABLES: SUBSTANCE_ABUSE: 0

## 2024-05-08 NOTE — CARE PLAN
The patient is Stable - Low risk of patient condition declining or worsening    Shift Goals  Clinical Goals: Pt without uncontrolled nausea; Pt without falls and injury; C diff resulted (-) negative, DC iso.  Patient Goals: Rest; Go home.    Progress made toward(s) clinical / shift goals:  Pt without uncontrolled nausea; Pt without falls and injur; C diff resulted continue/DC iso.    Patient is not progressing towards the following goals:

## 2024-05-08 NOTE — PROGRESS NOTES
"Hospital Medicine Daily Progress Note    Date of Service  5/8/2024    Chief Complaint  Sharon Callahan is a 83 y.o. female admitted 5/7/2024 with N/V/D    Hospital Course  As per chart review:  \"83 y.o. female with a past medical history of elevated BMI of 41 and primary hypertension who presented 5/7/2024 with generalized weakness, diarrhea, nausea and vomiting.  Patient reports that she has not been feeling well for the past 2-3 days.  She initially had low back pain then started to have chills myalgia and diarrhea the next day.  She denies noticing any blood in her stool or her vomitus.  She denies having any focal motor weakness, fecal or urinary incontinence.\"     Interval Problem Update  5/8: Patient seen at bedside this morning.  Overall the patient feels better, still having diarrhea.  Will continue with IV fluids.  Continue to monitor closely.    I have discussed this patient's plan of care and discharge plan at IDT rounds today with Case Management, Nursing, Nursing leadership, and other members of the IDT team.    Consultants/Specialty  None for now    Code Status  Full Code    Disposition  The patient is not medically cleared for discharge to home or a post-acute facility.        Review of Systems  Review of Systems   Constitutional:  Positive for malaise/fatigue. Negative for chills and fever.   HENT:  Negative for hearing loss and nosebleeds.    Eyes:  Negative for blurred vision and double vision.   Respiratory:  Negative for cough and shortness of breath.    Cardiovascular:  Negative for chest pain and palpitations.   Gastrointestinal:  Positive for diarrhea. Negative for abdominal pain, heartburn and vomiting.   Genitourinary:  Negative for dysuria and urgency.   Musculoskeletal:  Negative for back pain and falls.   Skin:  Negative for itching and rash.   Neurological:  Negative for dizziness and headaches.   Psychiatric/Behavioral:  Negative for substance abuse. The patient is not nervous/anxious.  "   All other systems reviewed and are negative.       Physical Exam  Temp:  [36.1 °C (97 °F)-36.7 °C (98.1 °F)] 36.1 °C (97 °F)  Pulse:  [56-66] 66  Resp:  [16-18] 18  BP: (117-159)/(50-77) 117/50  SpO2:  [92 %-95 %] 94 %    Physical Exam  Vitals and nursing note reviewed.   Constitutional:       General: She is not in acute distress.     Appearance: She is obese. She is ill-appearing.   HENT:      Head: Normocephalic and atraumatic.      Right Ear: External ear normal.      Left Ear: External ear normal.      Mouth/Throat:      Pharynx: No oropharyngeal exudate or posterior oropharyngeal erythema.   Eyes:      General:         Right eye: No discharge.         Left eye: No discharge.   Cardiovascular:      Rate and Rhythm: Normal rate and regular rhythm.      Pulses: Normal pulses.      Heart sounds: No murmur heard.     No gallop.   Pulmonary:      Effort: Pulmonary effort is normal. No respiratory distress.      Breath sounds: Normal breath sounds. No wheezing or rhonchi.   Abdominal:      General: Bowel sounds are normal. There is no distension.      Palpations: Abdomen is soft.      Tenderness: There is no abdominal tenderness. There is no guarding.   Musculoskeletal:         General: No swelling or tenderness. Normal range of motion.      Cervical back: Normal range of motion and neck supple. No tenderness.   Skin:     General: Skin is warm.      Findings: Bruising and lesion present.   Neurological:      General: No focal deficit present.      Mental Status: She is alert and oriented to person, place, and time. Mental status is at baseline.      Motor: No weakness.   Psychiatric:         Mood and Affect: Mood normal.         Behavior: Behavior normal.         Fluids    Intake/Output Summary (Last 24 hours) at 5/8/2024 1252  Last data filed at 5/8/2024 1100  Gross per 24 hour   Intake 2695.52 ml   Output --   Net 2695.52 ml       Laboratory  Recent Labs     05/07/24  1055 05/08/24  0133   WBC 12.2* 6.6   RBC  4.86 4.29   HEMOGLOBIN 14.4 12.7   HEMATOCRIT 45.3 40.1   MCV 93.2 93.5   MCH 29.6 29.6   MCHC 31.8* 31.7*   RDW 48.4 48.8   PLATELETCT 177 163*   MPV 10.8 11.3     Recent Labs     05/07/24  1055 05/08/24  0133   SODIUM 140 135   POTASSIUM 4.2 3.4*   CHLORIDE 106 106   CO2 24 19*   GLUCOSE 113* 78   BUN 23* 15   CREATININE 0.94 0.82   CALCIUM 8.6 7.9*                   Imaging  CT-ABDOMEN-PELVIS WITH   Final Result      1.  Moderate distal diverticulosis of the colon without evidence of diverticulitis      2.  Small hiatal hernia      3.  Stable moderate multichamber cardiac enlargement      4.  Bilateral hip arthroplasties obscures pelvic detail           Assessment/Plan  * Diarrhea- (present on admission)  Assessment & Plan  Will check CT abdomen for further evaluation.  Patient likely has a viral enteritis differentials include, C. difficile colitis    I will check stool for C. difficile toxins.  Supportive care with intravenous fluids, modified diet, and will monitor electrolytes and replace accordingly     5/8: Continue IVF, follow work up.    Stage 3 chronic kidney disease- (present on admission)  Assessment & Plan  Avoid/minimize nephrotoxins as much as possible, renally dose medications, monitor inputs and outputs     Advance care planning- (present on admission)  Assessment & Plan  I discussed with patient for at least 16 minutes further goals of care.  This included CODE STATUS which includes full code and DNR/DNI.  The patient would like to be full code.  We also discussed further treatment goals.  For now the patient like to have full treatment options.  This includes invasive or noninvasive procedures as needed.  In the event that the patient cannot make decisions for herself she would like her son and/or brothers to make decisions for her.    Dyslipidemia- (present on admission)  Assessment & Plan  Hx of ?  Cardiac diet    Essential hypertension- (present on admission)  Assessment & Plan  I will start  amlodipine and losartan with hold parameters.    Hypokalemia- (present on admission)  Assessment & Plan  Replace as needed  monitor         VTE prophylaxis: Lovenox    I have performed a physical exam and reviewed and updated ROS and Plan today (5/8/2024). In review of yesterday's note (5/7/2024), there are no changes except as documented above.      I spend at least 51 minutes providing care for this patient.  This included face-to-face interview, physical examination.  Review of lab work including CBC, CMP, magnesium.  Discussion with multidisciplinary team including case management, nursing staff and pharmacy.  Creating plan of care, reviewing orders.    Moreover, I discussed with patient for at least 16 minutes further goals of care.  This included CODE STATUS which includes full code and DNR/DNI.  The patient would like to be full code.  We also discussed further treatment goals.  For now the patient like to have full treatment options.  This includes invasive or noninvasive procedures as needed.  In the event that the patient cannot make decisions for herself she would like her son and/or brothers to make decisions for her.

## 2024-05-08 NOTE — CARE PLAN
The patient is Stable - Low risk of patient condition declining or worsening    Shift Goals  Clinical Goals: management of nausea, no falls, C-diff sample collected  Patient Goals: no nausea, sleep    Progress made toward(s) clinical / shift goals:  C-diff sample collected and sent to lab at beginning of shift. Patient's nausea controlled as per MAR via PRNs. Pt sustained no falls throughout shift. Pt used CPAP during night and had >6hrs uninterrupted rest.    Patient is not progressing towards the following goals:

## 2024-05-08 NOTE — DIETARY
NUTRITION SERVICES: BMI - Pt with BMI >40 (=Body mass index is 41.42 kg/m².), Class III obesity. Weight loss counseling not appropriate in acute care setting. RECOMMEND - If appropriate at DC please refer to outpatient nutrition services for weight management.
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
## 2024-05-08 NOTE — PROGRESS NOTES
Assumed care of pt at 1915, bedside report. Pt is AAOx 4, resting comfortably in bed with NADN, pain currently 2/10. LR running @100ml/hr.    Pt using call light appropriately and to get up with assistance. Discussed plan of care for the night with patient, bed in lowest position, call light in reach, personal belongings in reach. No other complaints at this time.

## 2024-05-08 NOTE — PROGRESS NOTES
Received report from night shift nurse. Assumed pt care at 0715. Pt is  resting comfortably in bed. Pt on RA. No signs of SOB/respiratory distress. Labs, VS, medications reviewed.  Fall precautions in place. Bed at lowest position. Call light and personal belongings within reach. Plan of care discussed, no further concerns at this time.

## 2024-05-09 ENCOUNTER — APPOINTMENT (OUTPATIENT)
Dept: WOUND CARE | Facility: MEDICAL CENTER | Age: 84
End: 2024-05-09
Attending: EMERGENCY MEDICINE
Payer: OTHER MISCELLANEOUS

## 2024-05-09 PROBLEM — D69.6 THROMBOCYTOPENIA (HCC): Status: ACTIVE | Noted: 2024-05-09

## 2024-05-09 LAB
ALBUMIN SERPL BCP-MCNC: 3.2 G/DL (ref 3.2–4.9)
ALBUMIN/GLOB SERPL: 1.3 G/DL
ALP SERPL-CCNC: 62 U/L (ref 30–99)
ALT SERPL-CCNC: 13 U/L (ref 2–50)
ANION GAP SERPL CALC-SCNC: 11 MMOL/L (ref 7–16)
AST SERPL-CCNC: 15 U/L (ref 12–45)
BILIRUB SERPL-MCNC: 0.3 MG/DL (ref 0.1–1.5)
BUN SERPL-MCNC: 12 MG/DL (ref 8–22)
CALCIUM ALBUM COR SERPL-MCNC: 8.6 MG/DL (ref 8.5–10.5)
CALCIUM SERPL-MCNC: 8 MG/DL (ref 8.4–10.2)
CHLORIDE SERPL-SCNC: 108 MMOL/L (ref 96–112)
CO2 SERPL-SCNC: 20 MMOL/L (ref 20–33)
CREAT SERPL-MCNC: 0.8 MG/DL (ref 0.5–1.4)
ERYTHROCYTE [DISTWIDTH] IN BLOOD BY AUTOMATED COUNT: 49.1 FL (ref 35.9–50)
GFR SERPLBLD CREATININE-BSD FMLA CKD-EPI: 73 ML/MIN/1.73 M 2
GLOBULIN SER CALC-MCNC: 2.4 G/DL (ref 1.9–3.5)
GLUCOSE SERPL-MCNC: 81 MG/DL (ref 65–99)
HCT VFR BLD AUTO: 40.2 % (ref 37–47)
HGB BLD-MCNC: 12.6 G/DL (ref 12–16)
MAGNESIUM SERPL-MCNC: 2 MG/DL (ref 1.5–2.5)
MCH RBC QN AUTO: 29.6 PG (ref 27–33)
MCHC RBC AUTO-ENTMCNC: 31.3 G/DL (ref 32.2–35.5)
MCV RBC AUTO: 94.6 FL (ref 81.4–97.8)
PLATELET # BLD AUTO: 145 K/UL (ref 164–446)
PMV BLD AUTO: 11.1 FL (ref 9–12.9)
POTASSIUM SERPL-SCNC: 3.7 MMOL/L (ref 3.6–5.5)
PROT SERPL-MCNC: 5.6 G/DL (ref 6–8.2)
RBC # BLD AUTO: 4.25 M/UL (ref 4.2–5.4)
SODIUM SERPL-SCNC: 139 MMOL/L (ref 135–145)
WBC # BLD AUTO: 4.8 K/UL (ref 4.8–10.8)

## 2024-05-09 PROCEDURE — 99233 SBSQ HOSP IP/OBS HIGH 50: CPT | Performed by: INTERNAL MEDICINE

## 2024-05-09 RX ORDER — ACETAMINOPHEN 325 MG/1
650 TABLET ORAL EVERY 4 HOURS PRN
Status: DISCONTINUED | OUTPATIENT
Start: 2024-05-09 | End: 2024-05-10 | Stop reason: HOSPADM

## 2024-05-09 RX ORDER — ACETAMINOPHEN 500 MG
1000 TABLET ORAL EVERY 8 HOURS PRN
Status: SHIPPED
Start: 2024-05-09

## 2024-05-09 RX ORDER — LOPERAMIDE HYDROCHLORIDE 2 MG/1
2 CAPSULE ORAL 4 TIMES DAILY PRN
Status: DISCONTINUED | OUTPATIENT
Start: 2024-05-09 | End: 2024-05-09

## 2024-05-09 RX ORDER — SIMETHICONE 125 MG
125 TABLET,CHEWABLE ORAL 3 TIMES DAILY PRN
Status: DISCONTINUED | OUTPATIENT
Start: 2024-05-09 | End: 2024-05-10 | Stop reason: HOSPADM

## 2024-05-09 RX ADMIN — CALCIUM CARBONATE 500 MG: 500 TABLET, CHEWABLE ORAL at 19:15

## 2024-05-09 RX ADMIN — LOSARTAN POTASSIUM 100 MG: 50 TABLET, FILM COATED ORAL at 05:28

## 2024-05-09 RX ADMIN — TRAZODONE HYDROCHLORIDE 50 MG: 50 TABLET ORAL at 21:34

## 2024-05-09 RX ADMIN — ENOXAPARIN SODIUM 40 MG: 100 INJECTION SUBCUTANEOUS at 19:15

## 2024-05-09 RX ADMIN — ACETAMINOPHEN 650 MG: 325 TABLET ORAL at 11:30

## 2024-05-09 RX ADMIN — ASPIRIN 81 MG: 81 TABLET, COATED ORAL at 05:28

## 2024-05-09 RX ADMIN — SODIUM CHLORIDE: 9 INJECTION, SOLUTION INTRAVENOUS at 17:23

## 2024-05-09 RX ADMIN — FUROSEMIDE 40 MG: 40 TABLET ORAL at 05:28

## 2024-05-09 RX ADMIN — ONDANSETRON 4 MG: 4 TABLET, ORALLY DISINTEGRATING ORAL at 11:14

## 2024-05-09 RX ADMIN — AMLODIPINE BESYLATE 5 MG: 5 TABLET ORAL at 05:28

## 2024-05-09 RX ADMIN — SODIUM CHLORIDE: 9 INJECTION, SOLUTION INTRAVENOUS at 05:26

## 2024-05-09 RX ADMIN — SIMETHICONE 125 MG: 125 TABLET, CHEWABLE ORAL at 11:30

## 2024-05-09 ASSESSMENT — ENCOUNTER SYMPTOMS
DOUBLE VISION: 0
NERVOUS/ANXIOUS: 0
FEVER: 0
DIZZINESS: 0
ABDOMINAL PAIN: 0
HEADACHES: 0
PALPITATIONS: 0
DIARRHEA: 1
FALLS: 0
CHILLS: 0
SHORTNESS OF BREATH: 0
VOMITING: 0
COUGH: 0
BACK PAIN: 0
HEARTBURN: 0
BLURRED VISION: 0

## 2024-05-09 ASSESSMENT — PAIN DESCRIPTION - PAIN TYPE
TYPE: ACUTE PAIN
TYPE: ACUTE PAIN

## 2024-05-09 ASSESSMENT — LIFESTYLE VARIABLES: SUBSTANCE_ABUSE: 0

## 2024-05-09 ASSESSMENT — FIBROSIS 4 INDEX: FIB4 SCORE: 2.35

## 2024-05-09 NOTE — ASSESSMENT & PLAN NOTE
Daily Note     Today's date: 2021  Patient name: Blaze Medina  : 1963  MRN: [id number]  Referring provider: Mike Reyes DO  Dx:   Encounter Diagnosis     ICD-10-CM    1  Closed avulsion fracture of right ankle, initial encounter  S82 891A    2  Balance problem  R26 89        Start Time: 1216  Stop Time: 1258  Total time in clinic (min): 42 minutes    Subjective: Report the ankle is feeling better  Objective: See treatment diary below      Assessment: Pt was most challenged by L/R static balance on the rocker board  She was appropriately challenged by tip toe walking; added this to HEP  Pt will benefit from continued skilled outpatient PT to improve strength, to address therapy goals, to reduce pain, and improve function  Plan: Continue per plan of care  Progress treatment as tolerated         Precautions: standard    FOTO   = 57/74      Manuals  9/2          ST eversion mobs DS DS DS          TC distraction and AP DS DS DS          distal fib post glide DS gentle DS np          Tape distal fib into post glide DS DS   DS          Neuro Re-Ed             SLS 10"x6 hep           Tandem balance 15"x4 hep           Rocker AP and L/R  static  30"x3 ea 30"x3          Rocker AP taps  x30 x30          Tandem amb on 1/2 foam  x2 min 2'          Natalee castellano             S[employer]l [employer]             Ther Ex             MWM DF with belt/band             Heel raises  x20 no UE np On 1/2 foam         Slant board  x2 min 2'          Step downs             Tip toe walking   30"x3 hep                                                Ther Activity                                       Gait Training                                       Modalities Mild  monitor

## 2024-05-09 NOTE — PROGRESS NOTES
"Hospital Medicine Daily Progress Note    Date of Service  5/9/2024    Chief Complaint  Sharon Callahan is a 83 y.o. female admitted 5/7/2024 with N/V/D    Hospital Course  As per chart review:  \"83 y.o. female with a past medical history of elevated BMI of 41 and primary hypertension who presented 5/7/2024 with generalized weakness, diarrhea, nausea and vomiting.  Patient reports that she has not been feeling well for the past 2-3 days.  She initially had low back pain then started to have chills myalgia and diarrhea the next day.  She denies noticing any blood in her stool or her vomitus.  She denies having any focal motor weakness, fecal or urinary incontinence.\"     Interval Problem Update  5/8: Patient seen at bedside this morning.  Overall the patient feels better, still having diarrhea.  Will continue with IV fluids.  Continue to monitor closely.    5/9: Patient seen at bedside this morning.  Overall the patient initially had said the diarrhea had resolved.  We were planning on discharging the patient, however while the patient was waiting for discharge the patient developed 3 episodes of watery diarrhea.  I went again to talk to the patient, we will give the patient at least 1 more day.  We have ordered GI PCR panel.  Continue to monitor closely.    I have discussed this patient's plan of care and discharge plan at IDT rounds today with Case Management, Nursing, Nursing leadership, and other members of the IDT team.    Consultants/Specialty  None for now    Code Status  Full Code    Disposition  The patient is not medically cleared for discharge to home or a post-acute facility.        Review of Systems  Review of Systems   Constitutional:  Positive for malaise/fatigue. Negative for chills and fever.   HENT:  Negative for hearing loss and nosebleeds.    Eyes:  Negative for blurred vision and double vision.   Respiratory:  Negative for cough and shortness of breath.    Cardiovascular:  Negative for chest pain " and palpitations.   Gastrointestinal:  Positive for diarrhea. Negative for abdominal pain, heartburn and vomiting.   Genitourinary:  Negative for dysuria and urgency.   Musculoskeletal:  Negative for back pain and falls.   Skin:  Negative for itching and rash.   Neurological:  Negative for dizziness and headaches.   Psychiatric/Behavioral:  Negative for substance abuse. The patient is not nervous/anxious.    All other systems reviewed and are negative.       Physical Exam  Temp:  [36.1 °C (97 °F)-37 °C (98.6 °F)] 36.9 °C (98.4 °F)  Pulse:  [55-66] 60  Resp:  [16-18] 16  BP: (117-155)/(50-69) 155/66  SpO2:  [94 %] 94 %    Physical Exam  Vitals and nursing note reviewed.   Constitutional:       General: She is not in acute distress.     Appearance: She is obese. She is ill-appearing.   HENT:      Head: Normocephalic and atraumatic.      Right Ear: External ear normal.      Left Ear: External ear normal.      Mouth/Throat:      Pharynx: No oropharyngeal exudate or posterior oropharyngeal erythema.   Eyes:      General:         Right eye: No discharge.         Left eye: No discharge.   Cardiovascular:      Rate and Rhythm: Normal rate and regular rhythm.      Pulses: Normal pulses.      Heart sounds: No murmur heard.     No gallop.   Pulmonary:      Effort: Pulmonary effort is normal. No respiratory distress.      Breath sounds: Normal breath sounds. No wheezing or rhonchi.   Abdominal:      General: Bowel sounds are normal. There is no distension.      Palpations: Abdomen is soft.      Tenderness: There is no abdominal tenderness. There is no guarding.   Musculoskeletal:         General: No swelling or tenderness. Normal range of motion.      Cervical back: Normal range of motion and neck supple. No tenderness.   Skin:     General: Skin is warm.      Findings: Bruising and lesion present.   Neurological:      General: No focal deficit present.      Mental Status: She is alert and oriented to person, place, and time.  Mental status is at baseline.      Motor: No weakness.   Psychiatric:         Mood and Affect: Mood normal.         Behavior: Behavior normal.         Fluids    Intake/Output Summary (Last 24 hours) at 5/9/2024 0922  Last data filed at 5/9/2024 0810  Gross per 24 hour   Intake 720 ml   Output --   Net 720 ml       Laboratory  Recent Labs     05/07/24  1055 05/08/24  0133 05/09/24  0131   WBC 12.2* 6.6 4.8   RBC 4.86 4.29 4.25   HEMOGLOBIN 14.4 12.7 12.6   HEMATOCRIT 45.3 40.1 40.2   MCV 93.2 93.5 94.6   MCH 29.6 29.6 29.6   MCHC 31.8* 31.7* 31.3*   RDW 48.4 48.8 49.1   PLATELETCT 177 163* 145*   MPV 10.8 11.3 11.1     Recent Labs     05/07/24  1055 05/08/24  0133 05/09/24  0131   SODIUM 140 135 139   POTASSIUM 4.2 3.4* 3.7   CHLORIDE 106 106 108   CO2 24 19* 20   GLUCOSE 113* 78 81   BUN 23* 15 12   CREATININE 0.94 0.82 0.80   CALCIUM 8.6 7.9* 8.0*                   Imaging  CT-ABDOMEN-PELVIS WITH   Final Result      1.  Moderate distal diverticulosis of the colon without evidence of diverticulitis      2.  Small hiatal hernia      3.  Stable moderate multichamber cardiac enlargement      4.  Bilateral hip arthroplasties obscures pelvic detail           Assessment/Plan  * Diarrhea- (present on admission)  Assessment & Plan  Will check CT abdomen for further evaluation.  Patient likely has a viral enteritis differentials include, C. difficile colitis    I will check stool for C. difficile toxins.  Supportive care with intravenous fluids, modified diet, and will monitor electrolytes and replace accordingly     5/8: Continue IVF, follow work up.    5/9: Initially the patient had said that the diarrhea have resolved we are planning on discharging patient.  However we were waiting on discharge the patient had significant 3 watery episodes of diarrhea.  We have ordered GI PCR.  Will continue with IV fluids for now.  Continue to monitor closely.    Thrombocytopenia (HCC)  Assessment & Plan  Mild  monitor    Stage 3 chronic  kidney disease- (present on admission)  Assessment & Plan  Avoid/minimize nephrotoxins as much as possible, renally dose medications, monitor inputs and outputs     Advance care planning- (present on admission)  Assessment & Plan  5/8: I discussed with patient for at least 16 minutes further goals of care.  This included CODE STATUS which includes full code and DNR/DNI.  The patient would like to be full code.  We also discussed further treatment goals.  For now the patient like to have full treatment options.  This includes invasive or noninvasive procedures as needed.  In the event that the patient cannot make decisions for herself she would like her son and/or brothers to make decisions for her.    Dyslipidemia- (present on admission)  Assessment & Plan  Hx of ?  Cardiac diet    Essential hypertension- (present on admission)  Assessment & Plan  I will start amlodipine and losartan with hold parameters.    Hypokalemia- (present on admission)  Assessment & Plan  Replace as needed  monitor         VTE prophylaxis: Lovenox    I have performed a physical exam and reviewed and updated ROS and Plan today (5/9/2024). In review of yesterday's note (5/8/2024), there are no changes except as documented above.      I spend at least 52 minutes providing care for this patient.  This included face-to-face interview, physical examination.  Review of lab work including CBC, CMP, and magnesium.  Discussion with patient regarding plan of care.  Discussion with multidisciplinary team including case management, nursing staff and pharmacy.  Creating plan of care, reviewing orders.

## 2024-05-09 NOTE — CARE PLAN
The patient is Stable - Low risk of patient condition declining or worsening    Shift Goals  Clinical Goals: Pt will remain free from falls, pt will remain free from diarrhea overnight  Patient Goals: rest comfortably    Progress made toward(s) clinical / shift goals:  Fall precautions in place, pt remained free from falls. Pt remained free from episodes of diarrhea throughout shift.     Patient is not progressing towards the following goals:

## 2024-05-09 NOTE — PROGRESS NOTES
Received report from day shift nurse. Assumed pt care. Pt is A&Ox4, resting comfortably in bed. No signs of SOB/respiratory distress. No complaints of pain at this time. Went over plan of night with patient. Fall precautions in place. Bed at lowest position. Call light and personal belongings within reach. No further needs at this time.

## 2024-05-09 NOTE — CARE PLAN
The patient is Stable - Low risk of patient condition declining or worsening    Shift Goals  Clinical Goals: remain free from falls, manage pain at or above 3/10 with medication per MAR, tolerate current diet  Patient Goals: rest, D/C home today  Family Goals: stay updated on POC    Progress made toward(s) clinical / shift goals:  remained free from falls, pain was managed with medication per MAR, tolerating current diet, pt had complaint of nausea; medication given per MAR and a decreased appetite.    Problem: Pain - Standard  Goal: Alleviation of pain or a reduction in pain to the patient’s comfort goal  Outcome: Progressing     Problem: Knowledge Deficit - Standard  Goal: Patient and family/care givers will demonstrate understanding of plan of care, disease process/condition, diagnostic tests and medications  Outcome: Progressing     Problem: Fall Risk  Goal: Patient will remain free from falls  Outcome: Progressing     Problem: Bowel Elimination  Goal: Establish and maintain regular bowel function  Outcome: Progressing     Problem: Gastrointestinal Irritability  Goal: Nausea and vomiting will be absent or improve  Outcome: Progressing  Goal: Diarrhea will be absent or improved  Outcome: Progressing     Problem: Infection - Standard  Goal: Patient will remain free from infection  Outcome: Progressing       Patient is not progressing towards the following goals:

## 2024-05-09 NOTE — DISCHARGE PLANNING
Case Management Discharge Planning    Admission Date: 5/7/2024  GMLOS: 2.6  ALOS: 1    6-Clicks ADL Score: 23  6-Clicks Mobility Score: 23      Anticipated Discharge Dispo: Discharge Disposition: Discharged to home/self care (01)  Discharge Address: [address]  Discharge Contact Phone Number: [phone]    DME Needed: No    Action(s) Taken: DC Assessment Complete (See below)    LSW consulted for readmission. Completed assessment, no discharge needs identified at this time.     Escalations Completed: None    Medically Clear: No    Next Steps: LSW to follow for discharge needs    Barriers to Discharge: Medical clearance    Is the patient up for discharge tomorrow: No          Care Transition Team Assessment    LSW met with pt at bedside to complete discharge planning assessment.     Pt reported she currently lives alone in a 4th floor apartment. Pt stated she has access to an elevator.   Pt stated she has her two brothers in [facility], and her son in [place].   Pt's [facility] [address].  Pt reported being independent prior to admission, no DME at baseline, no home O2. Pt stated she works part time with [employer].     Information Source  Orientation Level: Oriented X4  Information Given By: Patient  Who is responsible for making decisions for patient? : Patient    Readmission Evaluation  Is this a readmission?: Yes - unplanned readmission    Elopement Risk  Legal Hold: No  Ambulatory or Self Mobile in Wheelchair: Yes  Disoriented: No  Psychiatric Symptoms: None  History of Wandering: No  Elopement this Admit: No  Vocalizing Wanting to Leave: No  Displays Behaviors, Body Language Wanting to Leave: No-Not at Risk for Elopement  Elopement Risk: Not at Risk for Elopement    Interdisciplinary Discharge Planning  Lives with - Patient's Self Care Capacity: Alone and Able to Care For Self  Patient or legal guardian wants to designate a caregiver: No  Support Systems: Family  Member(s)  Housing / Facility: Other (Comments)  Name of Care Facility: Senior complex  Durable Medical Equipment: Not Applicable    Discharge Preparedness  What is your plan after discharge?: Home with help  What are your discharge supports?: Child, Sibling  Prior Functional Level: Ambulatory, Independent with Activities of Daily Living  Difficulity with ADLs: None  Difficulity with IADLs: None    Functional Assesment  Prior Functional Level: Ambulatory, Independent with Activities of Daily Living    Finances  Financial Barriers to Discharge: No  Prescription Coverage: Yes    Vision / Hearing Impairment  Vision Impairment : Yes  Right Eye Vision: Wears Contacts, Impaired  Left Eye Vision: Wears Contacts, Impaired  Hearing Impairment : No    Advance Directive  Advance Directive?: None    Domestic Abuse  Have you ever been the victim of abuse or violence?: No  Physical Abuse or Sexual Abuse: No  Verbal Abuse or Emotional Abuse: No  Possible Abuse/Neglect Reported to:: Not Applicable    Psychological Assessment  History of Substance Abuse: None  History of Psychiatric Problems: No  Non-compliant with Treatment: No  Newly Diagnosed Illness: No    Discharge Risks or Barriers  Discharge risks or barriers?: No    Anticipated Discharge Information  Discharge Disposition: Discharged to home/self care (01)  Discharge Address: [address] [place] [address] Road  Discharge Contact Phone Number: [phone]

## 2024-05-09 NOTE — PROGRESS NOTES
0700 Received report from Night shift nurse  0750 Performed assessment  Pt is A&Ox4, no complaints of pain, updated on POC: D/C today.  Call light within reach, hourly rounding in place, bed in lowest position.    0855: called [phone] to schedule outpt PCP follow up. Wednesday May 15th @ 4:45. Pt states she is okay with this appointment    0922: pt is no longer D/C, due to increased abd pain, x3 watery BM's, nausea and bloating. M.D. notified, new orders received.

## 2024-05-10 ENCOUNTER — TELEPHONE (OUTPATIENT)
Dept: MEDICAL GROUP | Facility: LAB | Age: 84
End: 2024-05-10
Payer: MEDICARE

## 2024-05-10 VITALS
TEMPERATURE: 97.7 F | HEART RATE: 47 BPM | HEIGHT: 60 IN | OXYGEN SATURATION: 93 % | RESPIRATION RATE: 16 BRPM | BODY MASS INDEX: 42.68 KG/M2 | WEIGHT: 217.37 LBS | SYSTOLIC BLOOD PRESSURE: 140 MMHG | DIASTOLIC BLOOD PRESSURE: 53 MMHG

## 2024-05-10 LAB
ADV 40+41 DNA STL QL NAA+NON-PROBE: NOT DETECTED
ALBUMIN SERPL BCP-MCNC: 3.3 G/DL (ref 3.2–4.9)
ALBUMIN/GLOB SERPL: 1.4 G/DL
ALP SERPL-CCNC: 63 U/L (ref 30–99)
ALT SERPL-CCNC: 14 U/L (ref 2–50)
ANION GAP SERPL CALC-SCNC: 10 MMOL/L (ref 7–16)
AST SERPL-CCNC: 19 U/L (ref 12–45)
ASTRO TYP 1-8 RNA STL QL NAA+NON-PROBE: NOT DETECTED
BILIRUB SERPL-MCNC: 0.3 MG/DL (ref 0.1–1.5)
BUN SERPL-MCNC: 10 MG/DL (ref 8–22)
C CAYETANENSIS DNA STL QL NAA+NON-PROBE: NOT DETECTED
C COLI+JEJ+UPSA DNA STL QL NAA+NON-PROBE: NOT DETECTED
CALCIUM ALBUM COR SERPL-MCNC: 8.7 MG/DL (ref 8.5–10.5)
CALCIUM SERPL-MCNC: 8.1 MG/DL (ref 8.4–10.2)
CHLORIDE SERPL-SCNC: 108 MMOL/L (ref 96–112)
CO2 SERPL-SCNC: 20 MMOL/L (ref 20–33)
CREAT SERPL-MCNC: 0.74 MG/DL (ref 0.5–1.4)
CRYPTOSP DNA STL QL NAA+NON-PROBE: NOT DETECTED
E HISTOLYT DNA STL QL NAA+NON-PROBE: NOT DETECTED
EAEC PAA PLAS AGGR+AATA ST NAA+NON-PRB: NOT DETECTED
EC STX1+STX2 GENES STL QL NAA+NON-PROBE: NOT DETECTED
EPEC EAE GENE STL QL NAA+NON-PROBE: NOT DETECTED
ERYTHROCYTE [DISTWIDTH] IN BLOOD BY AUTOMATED COUNT: 47.6 FL (ref 35.9–50)
ETEC LTA+ST1A+ST1B TOX ST NAA+NON-PROBE: NOT DETECTED
G LAMBLIA DNA STL QL NAA+NON-PROBE: NOT DETECTED
GFR SERPLBLD CREATININE-BSD FMLA CKD-EPI: 80 ML/MIN/1.73 M 2
GLOBULIN SER CALC-MCNC: 2.4 G/DL (ref 1.9–3.5)
GLUCOSE SERPL-MCNC: 83 MG/DL (ref 65–99)
HCT VFR BLD AUTO: 41.3 % (ref 37–47)
HGB BLD-MCNC: 13.2 G/DL (ref 12–16)
MAGNESIUM SERPL-MCNC: 1.9 MG/DL (ref 1.5–2.5)
MCH RBC QN AUTO: 29.7 PG (ref 27–33)
MCHC RBC AUTO-ENTMCNC: 32 G/DL (ref 32.2–35.5)
MCV RBC AUTO: 93 FL (ref 81.4–97.8)
NOROVIRUS GI+II RNA STL QL NAA+NON-PROBE: DETECTED
P SHIGELLOIDES DNA STL QL NAA+NON-PROBE: NOT DETECTED
PHOSPHATE SERPL-MCNC: 2.7 MG/DL (ref 2.5–4.5)
PLATELET # BLD AUTO: 134 K/UL (ref 164–446)
PMV BLD AUTO: 10.7 FL (ref 9–12.9)
POTASSIUM SERPL-SCNC: 3.3 MMOL/L (ref 3.6–5.5)
PROT SERPL-MCNC: 5.7 G/DL (ref 6–8.2)
RBC # BLD AUTO: 4.44 M/UL (ref 4.2–5.4)
RVA RNA STL QL NAA+NON-PROBE: NOT DETECTED
S ENT+BONG DNA STL QL NAA+NON-PROBE: NOT DETECTED
SAPO I+II+IV+V RNA STL QL NAA+NON-PROBE: NOT DETECTED
SHIGELLA SP+EIEC IPAH ST NAA+NON-PROBE: NOT DETECTED
SODIUM SERPL-SCNC: 138 MMOL/L (ref 135–145)
V CHOL+PARA+VUL DNA STL QL NAA+NON-PROBE: NOT DETECTED
V CHOLERAE DNA STL QL NAA+NON-PROBE: NOT DETECTED
WBC # BLD AUTO: 5.4 K/UL (ref 4.8–10.8)
Y ENTEROCOL DNA STL QL NAA+NON-PROBE: NOT DETECTED

## 2024-05-10 PROCEDURE — 99239 HOSP IP/OBS DSCHRG MGMT >30: CPT | Performed by: INTERNAL MEDICINE

## 2024-05-10 RX ORDER — SIMETHICONE 125 MG
125 TABLET,CHEWABLE ORAL 3 TIMES DAILY PRN
Qty: 120 TABLET | Refills: 0 | Status: SHIPPED | OUTPATIENT
Start: 2024-05-10

## 2024-05-10 RX ORDER — POTASSIUM CHLORIDE 20 MEQ/1
40 TABLET, EXTENDED RELEASE ORAL ONCE
Status: COMPLETED | OUTPATIENT
Start: 2024-05-10 | End: 2024-05-10

## 2024-05-10 RX ADMIN — LOSARTAN POTASSIUM 100 MG: 50 TABLET, FILM COATED ORAL at 06:00

## 2024-05-10 RX ADMIN — SODIUM CHLORIDE: 9 INJECTION, SOLUTION INTRAVENOUS at 05:01

## 2024-05-10 RX ADMIN — FUROSEMIDE 40 MG: 40 TABLET ORAL at 06:00

## 2024-05-10 RX ADMIN — POTASSIUM CHLORIDE 40 MEQ: 1500 TABLET, EXTENDED RELEASE ORAL at 05:15

## 2024-05-10 RX ADMIN — ASPIRIN 81 MG: 81 TABLET, COATED ORAL at 06:00

## 2024-05-10 RX ADMIN — AMLODIPINE BESYLATE 5 MG: 5 TABLET ORAL at 06:00

## 2024-05-10 ASSESSMENT — PAIN DESCRIPTION - PAIN TYPE: TYPE: ACUTE PAIN

## 2024-05-10 NOTE — PROGRESS NOTES
Received report from day shift nurse. Assumed pt care. Pt is A&Ox4, resting comfortably in bed. No signs of SOB/respiratory distress. Pt states she is having some acid reflux, medicated pt per MAR with TUMS. No complaints of pain at this time.Went over plan of night with patient. Fall precautions in place. Bed at lowest position. Call light and personal belongings within reach. No further needs at this time.

## 2024-05-10 NOTE — TELEPHONE ENCOUNTER
Called to follow-up on patient request for guidance with GI Panel positive for norovirus.    Patient was discharged today following admission for dehydration r/t GI illness. IV fluids given during admission and diarrhea improved significantly, warranting discharge.    Patient has had diarrheal episodes twice today, reports clear/pale urine in a normal volume/frequency as is typical, is slightly fatigued, reports some RLQ tenderness at same level as during admission (Abd CT performed 5/7 demonstrating some moderate distal diverticulosis)    Denies nausea, vomiting, dizziness, excessive thirst, and fever    /72 home reading, denies headache, dizziness, chest pain, loss of consciousness, numbness, stroke-like symptoms or shortness of breath    Discussed plan for remaining hydrated, taking her medications as prescribed, and possibly trying some imodium or pepto-bismol over the counter following review of her medications for interaction.     Follow-up planned for 5/15 with PCP.    Silvana Miller R.N.

## 2024-05-10 NOTE — DISCHARGE SUMMARY
"Discharge Summary    CHIEF COMPLAINT ON ADMISSION  Chief Complaint   Patient presents with    Nausea/Vomiting/Diarrhea     Started this am  Denies c/o abd pain    Low Back Pain     C/o low back pain started last night  Denies dysuria  Denies any recent injuries/illnesses       Reason for Admission  EMS     Admission Date  5/7/2024    CODE STATUS  Full Code    HPI & HOSPITAL COURSE  As per chart review:  \"83 y.o. female with a past medical history of elevated BMI of 41 and primary hypertension who presented 5/7/2024 with generalized weakness, diarrhea, nausea and vomiting.  Patient reports that she has not been feeling well for the past 2-3 days.  She initially had low back pain then started to have chills myalgia and diarrhea the next day.  She denies noticing any blood in her stool or her vomitus.  She denies having any focal motor weakness, fecal or urinary incontinence.\"     Patient was admitted for further management and care for diarrhea.  The patient was started on IV fluids.  The patient had a C. difficile which was negative.    Eventually the patient's diarrhea improved significantly.  The patient states that she lives at a elderly community facility.  She states that other members have had similar symptoms.  I suspect this was most likely a viral infection. We have ordered GI PCR Panel which is pending, however patient's symptoms have improved significantly and the patient would like to go home.    The patient currently feeling much better.  She would like to go home.  The patient will be discharged home.  She will require close follow-up with PCP as an outpatient.    Of note, CT scan reported: \"Stable moderate multichamber cardiac enlargement\".  The patient does have an echo from last year, which reported an ejection fraction of 65 to 70%, normal diastolic function normal left ventricular systolic function right ventricle grossly normal in size and systolic function.  Currently the patient denying any " chest pain or she does not seem to be on volume overload at this time.  Will defer to PCP for further management if needed.    ADDENDUM: I received a call from microbiology that the patient tested positive for norovirus.  I called the patient to get further results.  The patient says that she continues to improve.  We did talk earlier about her coming back to the hospital if her symptoms got any worse.  She understood.        Therefore, she is discharged in fair and stable condition to home with close outpatient follow-up.    The patient met 2-midnight criteria for an inpatient stay at the time of discharge.    Discharge Date  05/10/2024    FOLLOW UP ITEMS POST DISCHARGE  Patient will require close follow up with PCP as outpatient.    DISCHARGE DIAGNOSES  Principal Problem:    Diarrhea (POA: Yes)  Active Problems:    Hypokalemia (POA: Yes)    Essential hypertension (POA: Yes)    Dyslipidemia (POA: Yes)    Advance care planning (POA: Yes)    Stage 3 chronic kidney disease (POA: Yes)    Thrombocytopenia (HCC) (POA: Unknown)  Resolved Problems:    * No resolved hospital problems. *      FOLLOW UP  Future Appointments   Date Time Provider Department Center   5/16/2024  2:30 PM NORY Eng [address] [place] [address]   5/23/2024  2:30 PM JEAN Cabezas [address] [place] [address]   5/30/2024  9:00 AM [facility] [address]   5/30/2024  2:30 PM NORY Eng [address] [place] [address]     Stephen Alcantara M.D.  [address] [address]  [phone]    Schedule an appointment as soon as possible for a visit        MEDICATIONS ON DISCHARGE     Medication List        START taking these medications        Instructions   simethicone 125 MG chewable tablet  Commonly known as: Mylicon   Chew 1 Tablet 3 times a day as needed for Flatulence.  Dose: 125 mg            CHANGE how you take these medications        Instructions   acetaminophen 500 MG Tabs  What changed: when to take  this  Commonly known as: Tylenol   Take 2 Tablets by mouth every 8 hours as needed for Moderate Pain. Indications: Pain  Dose: 1,000 mg     potassium Chloride ER 20 MEQ Tbcr tablet  What changed: when to take this  Commonly known as: K-Tab   TAKE 1 TABLET BY MOUTH EVERY DAY            CONTINUE taking these medications        Instructions   amLODIPine 5 MG Tabs  Commonly known as: Norvasc   Take 1 Tablet by mouth every day.  Dose: 5 mg     aspirin EC 81 MG Tbec  Commonly known as: Ecotrin   Take 1 Tablet by mouth every day.  Dose: 81 mg     D3 PO   Take 1 Capsule by mouth every day.  Dose: 1 Capsule     furosemide 40 MG Tabs  Commonly known as: Lasix   Take 40 mg by mouth every day. Pt takes once a day, not BID  Dose: 40 mg     losartan 100 MG Tabs  Commonly known as: Cozaar   Take 1 Tablet by mouth every day for 360 days.  Dose: 100 mg     traZODone 50 MG Tabs  Commonly known as: Desyrel   Take 1 Tablet by mouth every evening.  Dose: 50 mg     VITAMIN B-12 PO   Take 1 Tablet by mouth every day.  Dose: 1 Tablet            STOP taking these medications      ibuprofen 200 MG Tabs  Commonly known as: Motrin              Allergies  Allergies   Allergen Reactions    Pcn [Penicillins] Anaphylaxis     Skin turns black    Penicillins Anaphylaxis     .    Clindamycin Rash     Rash      Flu Virus Vaccine Rash     .    Influenza Virus Vacc Rash     Red in face    Norco [Hydrocodone-Acetaminophen] Vomiting and Nausea    Pneumococcal Vaccine Rash    Tetracycline Rash     Rash     Xarelto [Rivaroxaban] Rash       DIET  Orders Placed This Encounter   Procedures    Diet Order Diet: Cardiac     Standing Status:   Standing     Number of Occurrences:   1     Order Specific Question:   Diet:     Answer:   Cardiac [6]       ACTIVITY  As tolerated.  Weight bearing as tolerated    CONSULTATIONS  None    PROCEDURES      CT-ABDOMEN-PELVIS WITH   Final Result      1.  Moderate distal diverticulosis of the colon without evidence of  diverticulitis      2.  Small hiatal hernia      3.  Stable moderate multichamber cardiac enlargement      4.  Bilateral hip arthroplasties obscures pelvic detail           LABORATORY  Lab Results   Component Value Date    SODIUM 138 05/10/2024    POTASSIUM 3.3 (L) 05/10/2024    CHLORIDE 108 05/10/2024    CO2 20 05/10/2024    GLUCOSE 83 05/10/2024    BUN 10 05/10/2024    CREATININE 0.74 05/10/2024    CREATININE 0.79 04/04/2011        Lab Results   Component Value Date    WBC 5.4 05/10/2024    HEMOGLOBIN 13.2 05/10/2024    HEMATOCRIT 41.3 05/10/2024    PLATELETCT 134 (L) 05/10/2024        Total time of the discharge process exceeds 31 minutes.

## 2024-05-10 NOTE — TELEPHONE ENCOUNTER
1. Caller Name: Sharon Callahan                          Call Back Number: [phone] (home)         How would the patient prefer to be contacted with a response: Phone call OK to leave a detailed message      Patient has been recently discharge today, received a phone call from the ER about her stool sample, positive for Norovirus, Patient would like a call back to discuss next steps of what to do.   Did not receive advice on what to do regarding this positive result.

## 2024-05-10 NOTE — CARE PLAN
The patient is Stable - Low risk of patient condition declining or worsening    Shift Goals  Clinical Goals: Stool sample, pt will remain free from falls  Patient Goals: Be able to give stool sample, rest comfortably      Progress made toward(s) clinical / shift goals:  Fall precautions in place, pt remained free from falls throughout shift. Pt was able to have BM, stool sample sent to lab.     Patient is not progressing towards the following goals:

## 2024-05-10 NOTE — PROGRESS NOTES
Pt discharged home in stable condition via wheelchair. Stated understanding of meds and instructions.

## 2024-05-13 ENCOUNTER — PATIENT OUTREACH (OUTPATIENT)
Dept: MEDICAL GROUP | Facility: LAB | Age: 84
End: 2024-05-13
Payer: MEDICARE

## 2024-05-13 ENCOUNTER — PATIENT MESSAGE (OUTPATIENT)
Dept: MEDICAL GROUP | Facility: LAB | Age: 84
End: 2024-05-13
Payer: MEDICARE

## 2024-05-15 ENCOUNTER — OFFICE VISIT (OUTPATIENT)
Dept: MEDICAL GROUP | Facility: LAB | Age: 84
End: 2024-05-15
Payer: MEDICARE

## 2024-05-15 VITALS
OXYGEN SATURATION: 96 % | TEMPERATURE: 96.6 F | HEIGHT: 60 IN | BODY MASS INDEX: 41.23 KG/M2 | HEART RATE: 49 BPM | WEIGHT: 210 LBS | SYSTOLIC BLOOD PRESSURE: 138 MMHG | RESPIRATION RATE: 16 BRPM | DIASTOLIC BLOOD PRESSURE: 62 MMHG

## 2024-05-15 DIAGNOSIS — A08.11 GASTROENTERITIS DUE TO NOROVIRUS: ICD-10-CM

## 2024-05-15 DIAGNOSIS — Z09 HOSPITAL DISCHARGE FOLLOW-UP: Primary | ICD-10-CM

## 2024-05-15 ASSESSMENT — FIBROSIS 4 INDEX: FIB4 SCORE: 3.11
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
## 2024-05-15 NOTE — LETTER
May 15, 2024       Patient: Sharon Callahan   YOB: 1940   Date of Visit: 5/15/2024         To Whom It May Concern:    In my medical opinion, I recommend that Sharon Callahan return to full duty, no restrictions.    If you have any questions or concerns, please don't hesitate to call [phone]          Sincerely,          Stephen Alcantara M.D.

## 2024-05-16 ENCOUNTER — OFFICE VISIT (OUTPATIENT)
Dept: WOUND CARE | Facility: MEDICAL CENTER | Age: 84
End: 2024-05-16
Attending: EMERGENCY MEDICINE
Payer: MEDICARE

## 2024-05-16 VITALS
RESPIRATION RATE: 18 BRPM | TEMPERATURE: 97.2 F | DIASTOLIC BLOOD PRESSURE: 70 MMHG | HEART RATE: 73 BPM | SYSTOLIC BLOOD PRESSURE: 118 MMHG | OXYGEN SATURATION: 97 %

## 2024-05-16 DIAGNOSIS — R52 PAIN ASSOCIATED WITH WOUND: ICD-10-CM

## 2024-05-16 DIAGNOSIS — S40.812D: ICD-10-CM

## 2024-05-16 DIAGNOSIS — T14.8XXA PAIN ASSOCIATED WITH WOUND: ICD-10-CM

## 2024-05-16 DIAGNOSIS — S51.811D SKIN TEAR OF RIGHT FOREARM WITHOUT COMPLICATION, SUBSEQUENT ENCOUNTER: ICD-10-CM

## 2024-05-16 PROCEDURE — 11045 DBRDMT SUBQ TISS EACH ADDL: CPT | Performed by: NURSE PRACTITIONER

## 2024-05-16 PROCEDURE — 3074F SYST BP LT 130 MM HG: CPT | Performed by: NURSE PRACTITIONER

## 2024-05-16 PROCEDURE — 3078F DIAST BP <80 MM HG: CPT | Performed by: NURSE PRACTITIONER

## 2024-05-16 PROCEDURE — 11042 DBRDMT SUBQ TIS 1ST 20SQCM/<: CPT | Performed by: NURSE PRACTITIONER

## 2024-05-16 ASSESSMENT — ENCOUNTER SYMPTOMS
BACK PAIN: 0
FALLS: 0
CHILLS: 0
DIAPHORESIS: 0
COUGH: 0
EYES NEGATIVE: 1
FEVER: 0
DEPRESSION: 0
NERVOUS/ANXIOUS: 0
WHEEZING: 0
WEAKNESS: 0
VOMITING: 0
CLAUDICATION: 0
PALPITATIONS: 0
SHORTNESS OF BREATH: 0
NAUSEA: 0

## 2024-05-16 NOTE — PATIENT INSTRUCTIONS
-Keep your wound dressing clean, dry, and intact. Only change dressing if it's over saturated, soiled or falls off.     -Should you experience any significant changes in your wound(s), such as infection (redness, swelling, localized heat, increased pain, fever > 101 F, chills) or have any questions regarding your home care instructions, please contact the wound center at [phone]. If after hours, contact your primary care physician or go to the hospital emergency room.
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
## 2024-05-17 ASSESSMENT — ENCOUNTER SYMPTOMS: ROS SKIN COMMENTS: BOTH FOREARMS

## 2024-05-23 ENCOUNTER — OFFICE VISIT (OUTPATIENT)
Dept: WOUND CARE | Facility: MEDICAL CENTER | Age: 84
End: 2024-05-23
Attending: EMERGENCY MEDICINE
Payer: MEDICARE

## 2024-05-23 VITALS
DIASTOLIC BLOOD PRESSURE: 61 MMHG | RESPIRATION RATE: 16 BRPM | SYSTOLIC BLOOD PRESSURE: 145 MMHG | TEMPERATURE: 96.9 F | OXYGEN SATURATION: 95 % | HEART RATE: 54 BPM

## 2024-05-23 DIAGNOSIS — S40.812D: ICD-10-CM

## 2024-05-23 DIAGNOSIS — R52 PAIN ASSOCIATED WITH WOUND: ICD-10-CM

## 2024-05-23 DIAGNOSIS — T14.8XXA PAIN ASSOCIATED WITH WOUND: ICD-10-CM

## 2024-05-23 DIAGNOSIS — S51.811D SKIN TEAR OF RIGHT FOREARM WITHOUT COMPLICATION, SUBSEQUENT ENCOUNTER: ICD-10-CM

## 2024-05-23 PROCEDURE — 3078F DIAST BP <80 MM HG: CPT | Performed by: NURSE PRACTITIONER

## 2024-05-23 PROCEDURE — 3077F SYST BP >= 140 MM HG: CPT | Performed by: NURSE PRACTITIONER

## 2024-05-23 PROCEDURE — 11042 DBRDMT SUBQ TIS 1ST 20SQCM/<: CPT | Performed by: NURSE PRACTITIONER

## 2024-05-23 NOTE — PATIENT INSTRUCTIONS
-Keep dressings clean and dry. Change dressings if they become over saturated, soiled or fall off.     -If you need to change your dressings at home: Wash your wound with normal saline, wound cleanser, or unscented soap and water prior to applying your new dressings. Please avoid cleansing with hydrogen peroxide or rubbing alcohol. Hydrogen peroxide and rubbing alcohol are toxic to new tissue and skin cells.    -Should you experience any significant changes in your wounds, such as signs of infection (increasing redness, swelling, localized heat, increased pain, fever > 101 F, chills) or have any questions regarding your home care instructions, please contact the wound center at [phone]. If after hours, contact your primary care physician or go to the hospital emergency room.     -If you are 5 or more minutes late for an appointment, we reserve the right to cancel and reschedule that appointment. Additionally, if you are habitually late or not showing (3 late cancellations and/or no shows), we reserve the right to cancel your remaining appointments and it will be your responsibility to obtain a new referral if services are still needed.
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
## 2024-05-30 ENCOUNTER — OFFICE VISIT (OUTPATIENT)
Dept: WOUND CARE | Facility: MEDICAL CENTER | Age: 84
End: 2024-05-30
Attending: EMERGENCY MEDICINE
Payer: MEDICARE

## 2024-05-30 ENCOUNTER — APPOINTMENT (OUTPATIENT)
Dept: RADIOLOGY | Facility: MEDICAL CENTER | Age: 84
End: 2024-05-30
Attending: FAMILY MEDICINE
Payer: MEDICARE

## 2024-05-30 VITALS
OXYGEN SATURATION: 97 % | SYSTOLIC BLOOD PRESSURE: 129 MMHG | RESPIRATION RATE: 18 BRPM | HEART RATE: 52 BPM | DIASTOLIC BLOOD PRESSURE: 69 MMHG | TEMPERATURE: 96.8 F

## 2024-05-30 DIAGNOSIS — S51.811D SKIN TEAR OF RIGHT FOREARM WITHOUT COMPLICATION, SUBSEQUENT ENCOUNTER: ICD-10-CM

## 2024-05-30 DIAGNOSIS — T14.8XXA PAIN ASSOCIATED WITH WOUND: ICD-10-CM

## 2024-05-30 DIAGNOSIS — R52 PAIN ASSOCIATED WITH WOUND: ICD-10-CM

## 2024-05-30 DIAGNOSIS — S40.812D: ICD-10-CM

## 2024-05-30 NOTE — PATIENT INSTRUCTIONS
-Keep dressings clean and dry. Change dressings every 3-4 days, and if they become over saturated, soiled or fall off.     -If you need to change your dressings at home: Wash your wound with normal saline, wound cleanser, or unscented soap and water prior to applying your new dressings. Please avoid cleansing with hydrogen peroxide or rubbing alcohol. Hydrogen peroxide and rubbing alcohol are toxic to new tissue and skin cells.    -Should you experience any significant changes in your wound, such as signs of infection (increasing redness, swelling, localized heat, increased pain, fever > 101 F, chills) or have any questions regarding your home care instructions, please contact the wound center at [phone]. If after hours, contact your primary care physician or go to the hospital emergency room.     -If you are 5 or more minutes late for an appointment, we reserve the right to cancel and reschedule that appointment. Additionally, if you are habitually late or not showing (3 late cancellations and/or no shows), we reserve the right to cancel your remaining appointments and it will be your responsibility to obtain a new referral if services are still needed.
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
## 2024-06-06 ENCOUNTER — OFFICE VISIT (OUTPATIENT)
Dept: WOUND CARE | Facility: MEDICAL CENTER | Age: 84
End: 2024-06-06
Attending: HOSPITALIST
Payer: MEDICARE

## 2024-06-06 VITALS
SYSTOLIC BLOOD PRESSURE: 122 MMHG | TEMPERATURE: 97.8 F | DIASTOLIC BLOOD PRESSURE: 76 MMHG | RESPIRATION RATE: 18 BRPM | OXYGEN SATURATION: 94 % | HEART RATE: 58 BPM

## 2024-06-06 DIAGNOSIS — S40.812D: ICD-10-CM

## 2024-06-06 DIAGNOSIS — R52 PAIN ASSOCIATED WITH WOUND: ICD-10-CM

## 2024-06-06 DIAGNOSIS — T14.8XXA PAIN ASSOCIATED WITH WOUND: ICD-10-CM

## 2024-06-06 DIAGNOSIS — S51.811D SKIN TEAR OF RIGHT FOREARM WITHOUT COMPLICATION, SUBSEQUENT ENCOUNTER: ICD-10-CM

## 2024-06-06 PROCEDURE — 11042 DBRDMT SUBQ TIS 1ST 20SQCM/<: CPT

## 2024-06-06 PROCEDURE — 3078F DIAST BP <80 MM HG: CPT | Performed by: NURSE PRACTITIONER

## 2024-06-06 PROCEDURE — 11042 DBRDMT SUBQ TIS 1ST 20SQCM/<: CPT | Performed by: NURSE PRACTITIONER

## 2024-06-06 PROCEDURE — 3074F SYST BP LT 130 MM HG: CPT | Performed by: NURSE PRACTITIONER

## 2024-06-06 PROCEDURE — 99213 OFFICE O/P EST LOW 20 MIN: CPT
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
## 2024-06-06 NOTE — PATIENT INSTRUCTIONS
Apply mepitel to wound bed as directed by provider. Cover wound with Aquael Ag and change dressing every 3~4 days. Secure dressing with dry dressing.    Should you experience any significant changes in your wound(s), such as infection (redness, swelling, localized heat, increased pain, fever > 101 F, chills) or have any questions regarding your home care instructions, please contact the wound center at [phone]. If after hours, contact your primary care physician or go to the hospital emergency room.   Keep dressing clean, dry and covered while bathing. Only change dressing if it becomes over saturated, soiled or falls off.

## 2024-06-10 RX ORDER — LOSARTAN POTASSIUM 100 MG/1
100 TABLET ORAL
Qty: 90 TABLET | Refills: 3 | Status: SHIPPED | OUTPATIENT
Start: 2024-06-10 | End: 2025-06-05

## 2024-06-10 NOTE — TELEPHONE ENCOUNTER
Received request via: Pharmacy    Was the patient seen in the last year in this department? Yes    Does the patient have an active prescription (recently filled or refills available) for medication(s) requested? No    Pharm[facility] Name: [facility]     Does the patient have long term Plus and need 100 day supply (blood pressure, diabetes and cholesterol meds only)? Patient does not have SCP

## 2024-06-13 ENCOUNTER — NON-PROVIDER VISIT (OUTPATIENT)
Dept: WOUND CARE | Facility: MEDICAL CENTER | Age: 84
End: 2024-06-13
Attending: HOSPITALIST
Payer: MEDICARE

## 2024-06-13 VITALS
HEART RATE: 54 BPM | SYSTOLIC BLOOD PRESSURE: 158 MMHG | RESPIRATION RATE: 18 BRPM | TEMPERATURE: 97.9 F | DIASTOLIC BLOOD PRESSURE: 91 MMHG | OXYGEN SATURATION: 97 %

## 2024-06-13 PROCEDURE — 99211 OFF/OP EST MAY X REQ PHY/QHP: CPT
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
## 2024-06-20 ENCOUNTER — APPOINTMENT (OUTPATIENT)
Dept: WOUND CARE | Facility: MEDICAL CENTER | Age: 84
End: 2024-06-20
Attending: HOSPITALIST
Payer: OTHER MISCELLANEOUS

## 2024-06-27 ENCOUNTER — APPOINTMENT (OUTPATIENT)
Dept: WOUND CARE | Facility: MEDICAL CENTER | Age: 84
End: 2024-06-27
Attending: HOSPITALIST
Payer: OTHER MISCELLANEOUS

## 2024-07-15 RX ORDER — POTASSIUM CHLORIDE 1500 MG/1
TABLET, EXTENDED RELEASE ORAL
Qty: 90 TABLET | Refills: 3 | Status: SHIPPED | OUTPATIENT
Start: 2024-07-15

## 2024-07-22 ENCOUNTER — PATIENT MESSAGE (OUTPATIENT)
Dept: MEDICAL GROUP | Facility: LAB | Age: 84
End: 2024-07-22
Payer: MEDICARE

## 2024-07-22 DIAGNOSIS — F41.9 ANXIETY: ICD-10-CM

## 2024-07-22 RX ORDER — LOSARTAN POTASSIUM 100 MG/1
100 TABLET ORAL
Qty: 90 TABLET | Refills: 3 | Status: SHIPPED | OUTPATIENT
Start: 2024-07-22 | End: 2024-07-24 | Stop reason: SDUPTHER

## 2024-07-22 RX ORDER — TRAZODONE HYDROCHLORIDE 50 MG/1
50 TABLET ORAL NIGHTLY
Qty: 90 TABLET | Refills: 3 | Status: SHIPPED | OUTPATIENT
Start: 2024-07-22 | End: 2024-07-24 | Stop reason: SDUPTHER

## 2024-07-24 RX ORDER — TRAZODONE HYDROCHLORIDE 50 MG/1
50 TABLET ORAL NIGHTLY
Qty: 90 TABLET | Refills: 3 | Status: SHIPPED | OUTPATIENT
Start: 2024-07-24 | End: 2025-07-19

## 2024-07-24 RX ORDER — LOSARTAN POTASSIUM 100 MG/1
100 TABLET ORAL
Qty: 90 TABLET | Refills: 3 | Status: SHIPPED | OUTPATIENT
Start: 2024-07-24 | End: 2025-07-19

## 2024-08-14 ENCOUNTER — OFFICE VISIT (OUTPATIENT)
Dept: MEDICAL GROUP | Facility: LAB | Age: 84
End: 2024-08-14
Payer: MEDICARE

## 2024-08-14 VITALS
HEART RATE: 55 BPM | HEIGHT: 60 IN | BODY MASS INDEX: 39.85 KG/M2 | TEMPERATURE: 97.2 F | SYSTOLIC BLOOD PRESSURE: 116 MMHG | DIASTOLIC BLOOD PRESSURE: 62 MMHG | RESPIRATION RATE: 18 BRPM | OXYGEN SATURATION: 93 % | WEIGHT: 203 LBS

## 2024-08-14 DIAGNOSIS — L98.9 SKIN LESIONS: ICD-10-CM

## 2024-08-14 DIAGNOSIS — Z78.0 POSTMENOPAUSAL: ICD-10-CM

## 2024-08-14 DIAGNOSIS — H91.92 HEARING LOSS OF LEFT EAR, UNSPECIFIED HEARING LOSS TYPE: ICD-10-CM

## 2024-08-14 DIAGNOSIS — J30.2 SEASONAL ALLERGIES: ICD-10-CM

## 2024-08-14 PROCEDURE — 99214 OFFICE O/P EST MOD 30 MIN: CPT | Performed by: FAMILY MEDICINE

## 2024-08-14 PROCEDURE — 3074F SYST BP LT 130 MM HG: CPT | Performed by: FAMILY MEDICINE

## 2024-08-14 PROCEDURE — 3078F DIAST BP <80 MM HG: CPT | Performed by: FAMILY MEDICINE

## 2024-08-14 ASSESSMENT — FIBROSIS 4 INDEX: FIB4 SCORE: 3.11

## 2024-08-14 NOTE — PROGRESS NOTES
Verbal consent was acquired by the patient to use [employer] ambient listening note generation during this visit Yes    Subjective:   Sharon Callahan is a 83 y.o. female here today for   Chief Complaint   Patient presents with    Ear Fullness     Left ear, bilateral     Sinus Problem     Phlegm in throat     Skin Cancer     X possible growth coming back from a previous removal, or age spots on a few other spots.      History of Present Illness  The patient is an 83-year-old female here today with concerns regarding sinusitis as well as skin lesions.    She suspects a recurrence of her sinus infection, accompanied by a sensation of ear blockage, which she attributes to possible earwax accumulation. She has been producing a significant amount of phlegm for the past month. She reports no fevers or chills but mentions mild ear pressure. She also reports no chest pain, shortness of breath, or cough. Occasionally, she needs to clear her throat due to phlegm buildup. She has previously used Flonase for similar symptoms.    She believes her skin cancer may be recurring, with new spots appearing on her nose, forehead, and leg. She has an upcoming appointment with her dermatologist in mid-September 2024. She expresses concern about the spot on her nose, fearing it might extend to her eye socket. She describes the two spots as rough and similar to previous ones that were surgically removed.    She reports difficulty hearing certain tones, particularly in her left ear, which she attributes to aging. This has led to frequent requests for repetition during conversations.      Allergies   Allergen Reactions    Pcn [Penicillins] Anaphylaxis     Skin turns black    Penicillins Anaphylaxis     .    Clindamycin Rash     Rash      Flu Virus Vaccine Rash     .    Influenza Virus Vacc Rash     Red in face    Norco [Hydrocodone-Acetaminophen] Vomiting and Nausea    Pneumococcal Vaccine Rash    Tetracycline Rash     Rash     Xarelto  [Rivaroxaban] Rash         Current medicines (including changes today)  Current Outpatient Medications   Medication Sig Dispense Refill    traZODone (DESYREL) 50 MG Tab Take 1 Tablet by mouth every evening for 360 days. 90 Tablet 3    losartan (COZAAR) 100 MG Tab Take 1 Tablet by mouth every day for 360 days. 90 Tablet 3    potassium Chloride ER (K-TAB) 20 MEQ Tab CR tablet TAKE 1 TABLET BY MOUTH EVERY DAY 90 Tablet 3    simethicone (MYLICON) 125 MG chewable tablet Chew 1 Tablet 3 times a day as needed for Flatulence. 120 Tablet 0    acetaminophen (TYLENOL) 500 MG Tab Take 2 Tablets by mouth every 8 hours as needed for Moderate Pain. Indications: Pain      amLODIPine (NORVASC) 5 MG Tab Take 1 Tablet by mouth every day. 90 Tablet 3    furosemide (LASIX) 40 MG Tab Take 40 mg by mouth every day. Pt takes once a day, not BID      Cholecalciferol (D3 PO) Take 1 Capsule by mouth every day.      aspirin EC (ECOTRIN) 81 MG Tablet Delayed Response Take 1 Tablet by mouth every day.      Cyanocobalamin (VITAMIN B-12 PO) Take 1 Tablet by mouth every day.       No current facility-administered medications for this visit.     She  has a past medical history of Arrhythmia, Arthritis, BMI 38.0-38.9,adult (9/18/2013), Breast cancer (HCC), CATARACT, Chronic nausea (1/12/2015), Chronic pain of both knees (1/9/2019), Depression with anxiety (5/3/2011), Heart burn, Heart valve disease, Hemothorax on right (1/4/2017), History of cholecystectomy, total knee replacement, Hyperlipidemia (4/4/2011), Hypertension, Indigestion, MYESHA (obstructive sleep apnea) (4/4/2011), Pain, Pleural effusion, right (1/4/2017), Range of motion deficit, S/P bilateral mastectomy (4/4/2011), Sleep apnea, and Snoring.    ROS   ROS  -See HPI     Objective:     Physical Exam:  /62   Pulse (!) 55   Temp 36.2 °C (97.2 °F) (Temporal)   Resp 18   Ht 1.524 m (5')   Wt 92.1 kg (203 lb)   SpO2 93%  Body mass index is 39.65 kg/m².   Constitutional: Alert, no  distress.  Skin: Warm, dry, good turgor, no rashes in visible areas.  Eye: Equal, round and reactive, conjunctiva clear, lids normal.  ENMT: TM's clear bilaterally, lips without lesions, good dentition, oropharynx clear.  Neck: Trachea midline, no masses, no thyromegaly. No cervical or supraclavicular lymphadenopathy.  Respiratory: Unlabored respiratory effort, lungs clear to auscultation, no wheezes, no rhonchi.  Cardiovascular: Normal S1, S2, no murmur, no edema.  Abdomen: Soft, non-tender, no masses, no hepatosplenomegaly.  Psych: Alert and oriented x3, normal affect and mood.    Results      Assessment and Plan:     Assessment & Plan  1. Sinusitis.  Symptoms suggest an allergic reaction rather than an infection. Flonase will be reintroduced at a dosage of 2 sprays in each nostril daily. If symptoms persist, further evaluation will be considered.    2. Skin lesions.  The skin discoloration may be indicative of seborrheic keratosis, which is not necessarily cancerous. She was reassured that waiting until her dermatology appointment in mid-September 2024 is acceptable. If there are any changes or concerns before the appointment, she should notify the clinic.    3. Hearing loss.  A referral to an audiologist will be made for further evaluation of her hearing. If significant hearing loss is detected, the possibility of a hearing aid will be discussed.    4. Health Maintenance.  A bone density scan will be ordered to assess bone health. She will be contacted to schedule this test.      Orders:  1. Seasonal allergies    2. Hearing loss of left ear, unspecified hearing loss type  - Referral to Audiology    3. Postmenopausal  - DS-BONE DENSITY STUDY (DEXA); Future    4. Skin lesions        Followup: No follow-ups on file.         PLEASE NOTE: This dictation was created using voice recognition and [employer] ambient listening software. I have made every reasonable attempt to correct obvious errors, but I expect that  there are errors of grammar and possibly content that I did not discover before finalizing the note.

## 2024-10-03 ENCOUNTER — APPOINTMENT (OUTPATIENT)
Dept: RADIOLOGY | Facility: MEDICAL CENTER | Age: 84
End: 2024-10-03
Attending: EMERGENCY MEDICINE
Payer: OTHER MISCELLANEOUS

## 2024-10-03 ENCOUNTER — HOSPITAL ENCOUNTER (EMERGENCY)
Facility: MEDICAL CENTER | Age: 84
End: 2024-10-03
Attending: EMERGENCY MEDICINE
Payer: OTHER MISCELLANEOUS

## 2024-10-03 VITALS
HEIGHT: 61 IN | HEART RATE: 70 BPM | OXYGEN SATURATION: 93 % | RESPIRATION RATE: 18 BRPM | SYSTOLIC BLOOD PRESSURE: 110 MMHG | TEMPERATURE: 97.3 F | WEIGHT: 211.64 LBS | DIASTOLIC BLOOD PRESSURE: 74 MMHG | BODY MASS INDEX: 39.96 KG/M2

## 2024-10-03 DIAGNOSIS — S16.1XXA STRAIN OF NECK MUSCLE, INITIAL ENCOUNTER: ICD-10-CM

## 2024-10-03 DIAGNOSIS — S80.01XA CONTUSION OF RIGHT KNEE, INITIAL ENCOUNTER: ICD-10-CM
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
## 2024-10-03 DIAGNOSIS — S20.211A CONTUSION OF RIGHT CHEST WALL, INITIAL ENCOUNTER: ICD-10-CM

## 2024-10-03 PROCEDURE — 73564 X-RAY EXAM KNEE 4 OR MORE: CPT | Mod: RT

## 2024-10-03 PROCEDURE — 71045 X-RAY EXAM CHEST 1 VIEW: CPT

## 2024-10-03 PROCEDURE — 700102 HCHG RX REV CODE 250 W/ 637 OVERRIDE(OP): Performed by: EMERGENCY MEDICINE

## 2024-10-03 PROCEDURE — A9270 NON-COVERED ITEM OR SERVICE: HCPCS | Performed by: EMERGENCY MEDICINE

## 2024-10-03 PROCEDURE — 72125 CT NECK SPINE W/O DYE: CPT | Performed by: RADIOLOGY

## 2024-10-03 PROCEDURE — 99284 EMERGENCY DEPT VISIT MOD MDM: CPT

## 2024-10-03 PROCEDURE — 72125 CT NECK SPINE W/O DYE: CPT

## 2024-10-03 PROCEDURE — 36415 COLL VENOUS BLD VENIPUNCTURE: CPT

## 2024-10-03 PROCEDURE — 700111 HCHG RX REV CODE 636 W/ 250 OVERRIDE (IP): Performed by: EMERGENCY MEDICINE

## 2024-10-03 PROCEDURE — 70450 CT HEAD/BRAIN W/O DYE: CPT

## 2024-10-03 RX ORDER — ACETAMINOPHEN 500 MG
500 TABLET ORAL EVERY 6 HOURS PRN
COMMUNITY

## 2024-10-03 RX ORDER — ONDANSETRON 4 MG/1
4 TABLET, ORALLY DISINTEGRATING ORAL ONCE
Status: COMPLETED | OUTPATIENT
Start: 2024-10-03 | End: 2024-10-03

## 2024-10-03 RX ORDER — CYCLOBENZAPRINE HCL 10 MG
10 TABLET ORAL 3 TIMES DAILY PRN
Qty: 30 TABLET | Refills: 0 | Status: SHIPPED | OUTPATIENT
Start: 2024-10-03

## 2024-10-03 RX ORDER — OXYCODONE AND ACETAMINOPHEN 5; 325 MG/1; MG/1
1 TABLET ORAL ONCE
Status: COMPLETED | OUTPATIENT
Start: 2024-10-03 | End: 2024-10-03

## 2024-10-03 RX ADMIN — ONDANSETRON 4 MG: 4 TABLET, ORALLY DISINTEGRATING ORAL at 16:17

## 2024-10-03 RX ADMIN — OXYCODONE HYDROCHLORIDE AND ACETAMINOPHEN 1 TABLET: 5; 325 TABLET ORAL at 16:17

## 2024-10-03 ASSESSMENT — FIBROSIS 4 INDEX: FIB4 SCORE: 3.11

## 2024-10-08 ENCOUNTER — TELEPHONE (OUTPATIENT)
Dept: MEDICAL GROUP | Facility: LAB | Age: 84
End: 2024-10-08

## 2024-10-08 ENCOUNTER — APPOINTMENT (OUTPATIENT)
Dept: RADIOLOGY | Facility: MEDICAL CENTER | Age: 84
DRG: 194 | End: 2024-10-08
Attending: EMERGENCY MEDICINE
Payer: OTHER MISCELLANEOUS

## 2024-10-08 ENCOUNTER — HOSPITAL ENCOUNTER (INPATIENT)
Facility: MEDICAL CENTER | Age: 84
LOS: 3 days | DRG: 194 | End: 2024-10-11
Attending: EMERGENCY MEDICINE | Admitting: HOSPITALIST
Payer: OTHER MISCELLANEOUS

## 2024-10-08 DIAGNOSIS — I48.0 PAROXYSMAL ATRIAL FIBRILLATION (HCC): ICD-10-CM

## 2024-10-08 DIAGNOSIS — S22.41XA CLOSED FRACTURE OF MULTIPLE RIBS OF RIGHT SIDE, INITIAL ENCOUNTER: ICD-10-CM

## 2024-10-08 DIAGNOSIS — I10 HYPERTENSION, UNSPECIFIED TYPE: ICD-10-CM

## 2024-10-08 DIAGNOSIS — R91.8 BILATERAL PULMONARY INFILTRATES ON CHEST X-RAY: ICD-10-CM

## 2024-10-08 DIAGNOSIS — R91.1 PULMONARY NODULE: ICD-10-CM

## 2024-10-08 DIAGNOSIS — R09.02 HYPOXIA: ICD-10-CM

## 2024-10-08 DIAGNOSIS — J18.9 COMMUNITY ACQUIRED PNEUMONIA OF BOTH LUNGS: ICD-10-CM

## 2024-10-08 LAB
ANION GAP SERPL CALC-SCNC: 10 MMOL/L (ref 7–16)
BASOPHILS # BLD AUTO: 0.7 % (ref 0–1.8)
BASOPHILS # BLD: 0.06 K/UL (ref 0–0.12)
BUN SERPL-MCNC: 23 MG/DL (ref 8–22)
CALCIUM SERPL-MCNC: 8.8 MG/DL (ref 8.4–10.2)
CHLORIDE SERPL-SCNC: 104 MMOL/L (ref 96–112)
CO2 SERPL-SCNC: 26 MMOL/L (ref 20–33)
CREAT SERPL-MCNC: 1.29 MG/DL (ref 0.5–1.4)
EOSINOPHIL # BLD AUTO: 0.18 K/UL (ref 0–0.51)
EOSINOPHIL NFR BLD: 2 % (ref 0–6.9)
ERYTHROCYTE [DISTWIDTH] IN BLOOD BY AUTOMATED COUNT: 47.3 FL (ref 35.9–50)
GFR SERPLBLD CREATININE-BSD FMLA CKD-EPI: 41 ML/MIN/1.73 M 2
GLUCOSE SERPL-MCNC: 92 MG/DL (ref 65–99)
HCT VFR BLD AUTO: 41.5 % (ref 37–47)
HGB BLD-MCNC: 13.5 G/DL (ref 12–16)
IMM GRANULOCYTES # BLD AUTO: 0.05 K/UL (ref 0–0.11)
IMM GRANULOCYTES NFR BLD AUTO: 0.6 % (ref 0–0.9)
INR PPP: 0.94 (ref 0.87–1.13)
LYMPHOCYTES # BLD AUTO: 0.99 K/UL (ref 1–4.8)
LYMPHOCYTES NFR BLD: 11.2 % (ref 22–41)
MCH RBC QN AUTO: 29.4 PG (ref 27–33)
MCHC RBC AUTO-ENTMCNC: 32.5 G/DL (ref 32.2–35.5)
MCV RBC AUTO: 90.4 FL (ref 81.4–97.8)
MONOCYTES # BLD AUTO: 0.71 K/UL (ref 0–0.85)
MONOCYTES NFR BLD AUTO: 8 % (ref 0–13.4)
NEUTROPHILS # BLD AUTO: 6.86 K/UL (ref 1.82–7.42)
NEUTROPHILS NFR BLD: 77.5 % (ref 44–72)
NRBC # BLD AUTO: 0 K/UL
NRBC BLD-RTO: 0 /100 WBC (ref 0–0.2)
PLATELET # BLD AUTO: 195 K/UL (ref 164–446)
PMV BLD AUTO: 10.6 FL (ref 9–12.9)
POTASSIUM SERPL-SCNC: 3.5 MMOL/L (ref 3.6–5.5)
PROTHROMBIN TIME: 12.8 SEC (ref 12–14.6)
RBC # BLD AUTO: 4.59 M/UL (ref 4.2–5.4)
SODIUM SERPL-SCNC: 140 MMOL/L (ref 135–145)
WBC # BLD AUTO: 8.9 K/UL (ref 4.8–10.8)

## 2024-10-08 PROCEDURE — 700102 HCHG RX REV CODE 250 W/ 637 OVERRIDE(OP): Performed by: HOSPITALIST

## 2024-10-08 PROCEDURE — 74018 RADEX ABDOMEN 1 VIEW: CPT

## 2024-10-08 PROCEDURE — 700111 HCHG RX REV CODE 636 W/ 250 OVERRIDE (IP): Mod: JZ | Performed by: EMERGENCY MEDICINE

## 2024-10-08 PROCEDURE — 770020 HCHG ROOM/CARE - TELE (206)

## 2024-10-08 PROCEDURE — 71101 X-RAY EXAM UNILAT RIBS/CHEST: CPT | Mod: RT

## 2024-10-08 PROCEDURE — 99223 1ST HOSP IP/OBS HIGH 75: CPT | Mod: AI | Performed by: HOSPITALIST

## 2024-10-08 PROCEDURE — 99285 EMERGENCY DEPT VISIT HI MDM: CPT

## 2024-10-08 PROCEDURE — 85610 PROTHROMBIN TIME: CPT

## 2024-10-08 PROCEDURE — 700101 HCHG RX REV CODE 250: Performed by: EMERGENCY MEDICINE

## 2024-10-08 PROCEDURE — 85025 COMPLETE CBC W/AUTO DIFF WBC: CPT

## 2024-10-08 PROCEDURE — 96375 TX/PRO/DX INJ NEW DRUG ADDON: CPT

## 2024-10-08 PROCEDURE — 36415 COLL VENOUS BLD VENIPUNCTURE: CPT

## 2024-10-08 PROCEDURE — 80048 BASIC METABOLIC PNL TOTAL CA: CPT

## 2024-10-08 PROCEDURE — 770001 HCHG ROOM/CARE - MED/SURG/GYN PRIV*

## 2024-10-08 PROCEDURE — 96374 THER/PROPH/DIAG INJ IV PUSH: CPT

## 2024-10-08 PROCEDURE — A9270 NON-COVERED ITEM OR SERVICE: HCPCS | Performed by: HOSPITALIST

## 2024-10-08 RX ORDER — SIMETHICONE 125 MG
125 TABLET,CHEWABLE ORAL 3 TIMES DAILY PRN
Status: DISCONTINUED | OUTPATIENT
Start: 2024-10-08 | End: 2024-10-11 | Stop reason: HOSPADM

## 2024-10-08 RX ORDER — ONDANSETRON 2 MG/ML
4 INJECTION INTRAMUSCULAR; INTRAVENOUS EVERY 4 HOURS PRN
Status: DISCONTINUED | OUTPATIENT
Start: 2024-10-08 | End: 2024-10-11 | Stop reason: HOSPADM

## 2024-10-08 RX ORDER — OXYCODONE HYDROCHLORIDE 5 MG/1
2.5 TABLET ORAL
Status: DISCONTINUED | OUTPATIENT
Start: 2024-10-08 | End: 2024-10-11 | Stop reason: HOSPADM

## 2024-10-08 RX ORDER — HYDROMORPHONE HYDROCHLORIDE 1 MG/ML
0.25 INJECTION, SOLUTION INTRAMUSCULAR; INTRAVENOUS; SUBCUTANEOUS
Status: DISCONTINUED | OUTPATIENT
Start: 2024-10-08 | End: 2024-10-11 | Stop reason: HOSPADM

## 2024-10-08 RX ORDER — LIDOCAINE 4 G/G
1 PATCH TOPICAL EVERY 24 HOURS
Status: DISCONTINUED | OUTPATIENT
Start: 2024-10-08 | End: 2024-10-11 | Stop reason: HOSPADM

## 2024-10-08 RX ORDER — AZITHROMYCIN 500 MG/1
500 INJECTION, POWDER, LYOPHILIZED, FOR SOLUTION INTRAVENOUS ONCE
Status: COMPLETED | OUTPATIENT
Start: 2024-10-08 | End: 2024-10-08

## 2024-10-08 RX ORDER — AMOXICILLIN 250 MG
2 CAPSULE ORAL 2 TIMES DAILY
Status: DISCONTINUED | OUTPATIENT
Start: 2024-10-08 | End: 2024-10-11 | Stop reason: HOSPADM

## 2024-10-08 RX ORDER — ACETAMINOPHEN 500 MG
1000 TABLET ORAL EVERY 6 HOURS
Status: DISCONTINUED | OUTPATIENT
Start: 2024-10-09 | End: 2024-10-09

## 2024-10-08 RX ORDER — OXYCODONE HYDROCHLORIDE 5 MG/1
5 TABLET ORAL
Status: DISCONTINUED | OUTPATIENT
Start: 2024-10-08 | End: 2024-10-11 | Stop reason: HOSPADM

## 2024-10-08 RX ORDER — CEFTRIAXONE 2 G/1
2000 INJECTION, POWDER, FOR SOLUTION INTRAMUSCULAR; INTRAVENOUS ONCE
Status: COMPLETED | OUTPATIENT
Start: 2024-10-08 | End: 2024-10-08

## 2024-10-08 RX ORDER — SODIUM CHLORIDE, SODIUM LACTATE, POTASSIUM CHLORIDE, AND CALCIUM CHLORIDE .6; .31; .03; .02 G/100ML; G/100ML; G/100ML; G/100ML
500 INJECTION, SOLUTION INTRAVENOUS
Status: DISCONTINUED | OUTPATIENT
Start: 2024-10-08 | End: 2024-10-11 | Stop reason: HOSPADM

## 2024-10-08 RX ORDER — ACETAMINOPHEN 500 MG
1000 TABLET ORAL EVERY 6 HOURS PRN
Status: DISCONTINUED | OUTPATIENT
Start: 2024-10-14 | End: 2024-10-09

## 2024-10-08 RX ORDER — POLYETHYLENE GLYCOL 3350 17 G/17G
1 POWDER, FOR SOLUTION ORAL
Status: DISCONTINUED | OUTPATIENT
Start: 2024-10-08 | End: 2024-10-11 | Stop reason: HOSPADM

## 2024-10-08 RX ORDER — ACETAMINOPHEN 325 MG/1
650 TABLET ORAL EVERY 6 HOURS PRN
Status: DISCONTINUED | OUTPATIENT
Start: 2024-10-08 | End: 2024-10-08

## 2024-10-08 RX ORDER — ASPIRIN 81 MG/1
81 TABLET ORAL DAILY
Status: DISCONTINUED | OUTPATIENT
Start: 2024-10-09 | End: 2024-10-11 | Stop reason: HOSPADM

## 2024-10-08 RX ORDER — AMLODIPINE BESYLATE 5 MG/1
5 TABLET ORAL DAILY
Status: DISCONTINUED | OUTPATIENT
Start: 2024-10-09 | End: 2024-10-11

## 2024-10-08 RX ORDER — ONDANSETRON 4 MG/1
4 TABLET, ORALLY DISINTEGRATING ORAL EVERY 4 HOURS PRN
Status: DISCONTINUED | OUTPATIENT
Start: 2024-10-08 | End: 2024-10-11 | Stop reason: HOSPADM

## 2024-10-08 RX ORDER — TRAZODONE HYDROCHLORIDE 50 MG/1
50 TABLET, FILM COATED ORAL NIGHTLY
Status: DISCONTINUED | OUTPATIENT
Start: 2024-10-08 | End: 2024-10-11 | Stop reason: HOSPADM

## 2024-10-08 RX ORDER — LOSARTAN POTASSIUM 50 MG/1
100 TABLET ORAL DAILY
Status: DISCONTINUED | OUTPATIENT
Start: 2024-10-09 | End: 2024-10-10

## 2024-10-08 RX ADMIN — TRAZODONE HYDROCHLORIDE 50 MG: 50 TABLET ORAL at 22:40

## 2024-10-08 RX ADMIN — CEFTRIAXONE SODIUM 2000 MG: 2 INJECTION, POWDER, FOR SOLUTION INTRAMUSCULAR; INTRAVENOUS at 21:52

## 2024-10-08 RX ADMIN — AZITHROMYCIN 500 MG: 500 INJECTION, POWDER, LYOPHILIZED, FOR SOLUTION INTRAVENOUS at 22:37

## 2024-10-08 RX ADMIN — OXYCODONE HYDROCHLORIDE 5 MG: 5 TABLET ORAL at 23:18

## 2024-10-08 RX ADMIN — LIDOCAINE 1 PATCH: 4 PATCH TOPICAL at 20:14

## 2024-10-08 SDOH — ECONOMIC STABILITY: TRANSPORTATION INSECURITY
IN THE PAST 12 MONTHS, HAS THE LACK OF TRANSPORTATION KEPT YOU FROM MEDICAL APPOINTMENTS OR FROM GETTING MEDICATIONS?: NO

## 2024-10-08 SDOH — ECONOMIC STABILITY: TRANSPORTATION INSECURITY
IN THE PAST 12 MONTHS, HAS LACK OF RELIABLE TRANSPORTATION KEPT YOU FROM MEDICAL APPOINTMENTS, MEETINGS, WORK OR FROM GETTING THINGS NEEDED FOR DAILY LIVING?: NO

## 2024-10-08 ASSESSMENT — COGNITIVE AND FUNCTIONAL STATUS - GENERAL
STANDING UP FROM CHAIR USING ARMS: A LOT
DRESSING REGULAR LOWER BODY CLOTHING: A LITTLE
TURNING FROM BACK TO SIDE WHILE IN FLAT BAD: A LITTLE
WALKING IN HOSPITAL ROOM: A LITTLE
MOBILITY SCORE: 16
MOVING TO AND FROM BED TO CHAIR: A LOT
DRESSING REGULAR UPPER BODY CLOTHING: A LITTLE
SUGGESTED CMS G CODE MODIFIER DAILY ACTIVITY: CJ
TOILETING: A LITTLE
CLIMB 3 TO 5 STEPS WITH RAILING: A LITTLE
SUGGESTED CMS G CODE MODIFIER MOBILITY: CK
DAILY ACTIVITIY SCORE: 20
HELP NEEDED FOR BATHING: A LITTLE
MOVING FROM LYING ON BACK TO SITTING ON SIDE OF FLAT BED: A LITTLE

## 2024-10-08 ASSESSMENT — PAIN DESCRIPTION - PAIN TYPE
TYPE: ACUTE PAIN
TYPE: ACUTE PAIN

## 2024-10-08 ASSESSMENT — ENCOUNTER SYMPTOMS
SHORTNESS OF BREATH: 1
HEADACHES: 0
FEVER: 0
NECK PAIN: 0
SORE THROAT: 0
BLURRED VISION: 0
DIZZINESS: 0
NAUSEA: 0
WEAKNESS: 0
PALPITATIONS: 0
VOMITING: 0
MYALGIAS: 0
DOUBLE VISION: 0
BRUISES/BLEEDS EASILY: 0
COUGH: 0
INSOMNIA: 0
DEPRESSION: 0

## 2024-10-08 ASSESSMENT — LIFESTYLE VARIABLES
HAVE PEOPLE ANNOYED YOU BY CRITICIZING YOUR DRINKING: NO
ALCOHOL_USE: NO
TOTAL SCORE: 0
TOTAL SCORE: 0
EVER HAD A DRINK FIRST THING IN THE MORNING TO STEADY YOUR NERVES TO GET RID OF A HANGOVER: NO
AVERAGE NUMBER OF DAYS PER WEEK YOU HAVE A DRINK CONTAINING ALCOHOL: 0
HAVE YOU EVER FELT YOU SHOULD CUT DOWN ON YOUR DRINKING: NO
TOTAL SCORE: 0
CONSUMPTION TOTAL: NEGATIVE
ON A TYPICAL DAY WHEN YOU DRINK ALCOHOL HOW MANY DRINKS DO YOU HAVE: 0
HOW MANY TIMES IN THE PAST YEAR HAVE YOU HAD 5 OR MORE DRINKS IN A DAY: 0
EVER FELT BAD OR GUILTY ABOUT YOUR DRINKING: NO

## 2024-10-08 ASSESSMENT — FIBROSIS 4 INDEX
FIB4 SCORE: 2.135348794356703545
FIB4 SCORE: 3.11

## 2024-10-08 ASSESSMENT — SOCIAL DETERMINANTS OF HEALTH (SDOH)

## 2024-10-09 ENCOUNTER — APPOINTMENT (OUTPATIENT)
Dept: CARDIOLOGY | Facility: MEDICAL CENTER | Age: 84
DRG: 194 | End: 2024-10-09
Attending: INTERNAL MEDICINE
Payer: OTHER MISCELLANEOUS

## 2024-10-09 PROBLEM — I10 ESSENTIAL HYPERTENSION: Status: RESOLVED | Noted: 2017-06-16 | Resolved: 2024-10-09

## 2024-10-09 PROBLEM — I10 HTN (HYPERTENSION): Status: ACTIVE | Noted: 2024-10-09

## 2024-10-09 PROBLEM — S22.41XA CLOSED FRACTURE OF MULTIPLE RIBS OF RIGHT SIDE: Status: ACTIVE | Noted: 2024-10-09

## 2024-10-09 LAB
ANION GAP SERPL CALC-SCNC: 14 MMOL/L (ref 7–16)
APPEARANCE UR: CLEAR
BILIRUB UR QL STRIP.AUTO: NEGATIVE
BUN SERPL-MCNC: 24 MG/DL (ref 8–22)
CALCIUM SERPL-MCNC: 8.5 MG/DL (ref 8.4–10.2)
CHLORIDE SERPL-SCNC: 104 MMOL/L (ref 96–112)
CO2 SERPL-SCNC: 21 MMOL/L (ref 20–33)
COLOR UR: YELLOW
CREAT SERPL-MCNC: 0.99 MG/DL (ref 0.5–1.4)
EKG IMPRESSION: NORMAL
ERYTHROCYTE [DISTWIDTH] IN BLOOD BY AUTOMATED COUNT: 47.8 FL (ref 35.9–50)
GFR SERPLBLD CREATININE-BSD FMLA CKD-EPI: 57 ML/MIN/1.73 M 2
GLUCOSE SERPL-MCNC: 93 MG/DL (ref 65–99)
GLUCOSE UR STRIP.AUTO-MCNC: NEGATIVE MG/DL
HCT VFR BLD AUTO: 40 % (ref 37–47)
HGB BLD-MCNC: 12.7 G/DL (ref 12–16)
KETONES UR STRIP.AUTO-MCNC: NEGATIVE MG/DL
LACTATE SERPL-SCNC: 0.6 MMOL/L (ref 0.5–2)
LEUKOCYTE ESTERASE UR QL STRIP.AUTO: NEGATIVE
LV EJECT FRACT MOD 2C 99903: 67.59
LV EJECT FRACT MOD 4C 99902: 60.97
LV EJECT FRACT MOD BP 99901: 55
MCH RBC QN AUTO: 29.2 PG (ref 27–33)
MCHC RBC AUTO-ENTMCNC: 31.8 G/DL (ref 32.2–35.5)
MCV RBC AUTO: 92 FL (ref 81.4–97.8)
MICRO URNS: NORMAL
NITRITE UR QL STRIP.AUTO: NEGATIVE
PH UR STRIP.AUTO: 6 [PH] (ref 5–8)
PLATELET # BLD AUTO: 177 K/UL (ref 164–446)
PMV BLD AUTO: 10.9 FL (ref 9–12.9)
POTASSIUM SERPL-SCNC: 3.7 MMOL/L (ref 3.6–5.5)
PROT UR QL STRIP: NEGATIVE MG/DL
RBC # BLD AUTO: 4.35 M/UL (ref 4.2–5.4)
RBC UR QL AUTO: NEGATIVE
SODIUM SERPL-SCNC: 139 MMOL/L (ref 135–145)
SP GR UR STRIP.AUTO: 1.01
WBC # BLD AUTO: 7.6 K/UL (ref 4.8–10.8)

## 2024-10-09 PROCEDURE — 99497 ADVNCD CARE PLAN 30 MIN: CPT | Performed by: INTERNAL MEDICINE

## 2024-10-09 PROCEDURE — 93010 ELECTROCARDIOGRAM REPORT: CPT | Performed by: STUDENT IN AN ORGANIZED HEALTH CARE EDUCATION/TRAINING PROGRAM

## 2024-10-09 PROCEDURE — 94760 N-INVAS EAR/PLS OXIMETRY 1: CPT

## 2024-10-09 PROCEDURE — 94669 MECHANICAL CHEST WALL OSCILL: CPT

## 2024-10-09 PROCEDURE — 97162 PT EVAL MOD COMPLEX 30 MIN: CPT

## 2024-10-09 PROCEDURE — 700102 HCHG RX REV CODE 250 W/ 637 OVERRIDE(OP): Performed by: INTERNAL MEDICINE

## 2024-10-09 PROCEDURE — 93005 ELECTROCARDIOGRAM TRACING: CPT | Performed by: INTERNAL MEDICINE

## 2024-10-09 PROCEDURE — 81003 URINALYSIS AUTO W/O SCOPE: CPT

## 2024-10-09 PROCEDURE — 85027 COMPLETE CBC AUTOMATED: CPT

## 2024-10-09 PROCEDURE — A9270 NON-COVERED ITEM OR SERVICE: HCPCS | Performed by: INTERNAL MEDICINE

## 2024-10-09 PROCEDURE — 99233 SBSQ HOSP IP/OBS HIGH 50: CPT | Performed by: INTERNAL MEDICINE

## 2024-10-09 PROCEDURE — 700105 HCHG RX REV CODE 258: Performed by: INTERNAL MEDICINE

## 2024-10-09 PROCEDURE — 93306 TTE W/DOPPLER COMPLETE: CPT | Mod: 26 | Performed by: STUDENT IN AN ORGANIZED HEALTH CARE EDUCATION/TRAINING PROGRAM

## 2024-10-09 PROCEDURE — 700102 HCHG RX REV CODE 250 W/ 637 OVERRIDE(OP): Performed by: HOSPITALIST

## 2024-10-09 PROCEDURE — 700101 HCHG RX REV CODE 250: Performed by: EMERGENCY MEDICINE

## 2024-10-09 PROCEDURE — 700111 HCHG RX REV CODE 636 W/ 250 OVERRIDE (IP): Mod: JZ | Performed by: INTERNAL MEDICINE

## 2024-10-09 PROCEDURE — A9270 NON-COVERED ITEM OR SERVICE: HCPCS | Performed by: HOSPITALIST

## 2024-10-09 PROCEDURE — 36415 COLL VENOUS BLD VENIPUNCTURE: CPT

## 2024-10-09 PROCEDURE — 93306 TTE W/DOPPLER COMPLETE: CPT

## 2024-10-09 PROCEDURE — 83605 ASSAY OF LACTIC ACID: CPT

## 2024-10-09 PROCEDURE — 80048 BASIC METABOLIC PNL TOTAL CA: CPT

## 2024-10-09 PROCEDURE — 97535 SELF CARE MNGMENT TRAINING: CPT

## 2024-10-09 PROCEDURE — 97165 OT EVAL LOW COMPLEX 30 MIN: CPT

## 2024-10-09 PROCEDURE — 94660 CPAP INITIATION&MGMT: CPT

## 2024-10-09 PROCEDURE — 770020 HCHG ROOM/CARE - TELE (206)

## 2024-10-09 RX ORDER — AZITHROMYCIN 250 MG/1
500 TABLET, FILM COATED ORAL EVERY EVENING
Status: COMPLETED | OUTPATIENT
Start: 2024-10-09 | End: 2024-10-10

## 2024-10-09 RX ORDER — ACETAMINOPHEN 500 MG
1000 TABLET ORAL 3 TIMES DAILY
Status: DISCONTINUED | OUTPATIENT
Start: 2024-10-09 | End: 2024-10-11 | Stop reason: HOSPADM

## 2024-10-09 RX ORDER — IBUPROFEN 200 MG
200 TABLET ORAL EVERY 6 HOURS PRN
COMMUNITY

## 2024-10-09 RX ORDER — GABAPENTIN 100 MG/1
100 CAPSULE ORAL 3 TIMES DAILY
Status: DISCONTINUED | OUTPATIENT
Start: 2024-10-09 | End: 2024-10-11 | Stop reason: HOSPADM

## 2024-10-09 RX ORDER — AZITHROMYCIN 250 MG/1
500 TABLET, FILM COATED ORAL DAILY
Status: DISCONTINUED | OUTPATIENT
Start: 2024-10-09 | End: 2024-10-09

## 2024-10-09 RX ADMIN — ACETAMINOPHEN 1000 MG: 500 TABLET ORAL at 13:06

## 2024-10-09 RX ADMIN — AZITHROMYCIN DIHYDRATE 500 MG: 250 TABLET ORAL at 17:42

## 2024-10-09 RX ADMIN — LIDOCAINE 1 PATCH: 4 PATCH TOPICAL at 20:46

## 2024-10-09 RX ADMIN — GABAPENTIN 100 MG: 100 CAPSULE ORAL at 13:06

## 2024-10-09 RX ADMIN — ACETAMINOPHEN 1000 MG: 500 TABLET ORAL at 20:46

## 2024-10-09 RX ADMIN — LOSARTAN POTASSIUM 100 MG: 50 TABLET, FILM COATED ORAL at 05:22

## 2024-10-09 RX ADMIN — ACETAMINOPHEN 1000 MG: 500 TABLET ORAL at 05:21

## 2024-10-09 RX ADMIN — AMLODIPINE BESYLATE 5 MG: 5 TABLET ORAL at 05:22

## 2024-10-09 RX ADMIN — SENNOSIDES AND DOCUSATE SODIUM 2 TABLET: 50; 8.6 TABLET ORAL at 05:21

## 2024-10-09 RX ADMIN — TRAZODONE HYDROCHLORIDE 50 MG: 50 TABLET ORAL at 20:40

## 2024-10-09 RX ADMIN — SENNOSIDES AND DOCUSATE SODIUM 2 TABLET: 50; 8.6 TABLET ORAL at 17:42

## 2024-10-09 RX ADMIN — GABAPENTIN 100 MG: 100 CAPSULE ORAL at 17:43

## 2024-10-09 RX ADMIN — CEFTRIAXONE SODIUM 1000 MG: 1 INJECTION, POWDER, FOR SOLUTION INTRAMUSCULAR; INTRAVENOUS at 17:51

## 2024-10-09 RX ADMIN — ACETAMINOPHEN 1000 MG: 500 TABLET ORAL at 00:12

## 2024-10-09 RX ADMIN — ASPIRIN 81 MG: 81 TABLET, COATED ORAL at 05:22

## 2024-10-09 ASSESSMENT — ENCOUNTER SYMPTOMS
HEADACHES: 0
PALPITATIONS: 0
FALLS: 0
SHORTNESS OF BREATH: 1
ABDOMINAL PAIN: 0
DIZZINESS: 0
FEVER: 0
COUGH: 1
BLURRED VISION: 0
BACK PAIN: 0
VOMITING: 0
NERVOUS/ANXIOUS: 0
HEARTBURN: 0
CHILLS: 0
DOUBLE VISION: 0

## 2024-10-09 ASSESSMENT — GAIT ASSESSMENTS
GAIT LEVEL OF ASSIST: SUPERVISED
ASSISTIVE DEVICE: 4 WHEEL WALKER
DISTANCE (FEET): 15
DISTANCE (FEET): 125

## 2024-10-09 ASSESSMENT — COGNITIVE AND FUNCTIONAL STATUS - GENERAL
CLIMB 3 TO 5 STEPS WITH RAILING: A LOT
TURNING FROM BACK TO SIDE WHILE IN FLAT BAD: A LOT
MOBILITY SCORE: 15
STANDING UP FROM CHAIR USING ARMS: A LITTLE
DAILY ACTIVITIY SCORE: 20
HELP NEEDED FOR BATHING: A LITTLE
WALKING IN HOSPITAL ROOM: A LITTLE
SUGGESTED CMS G CODE MODIFIER MOBILITY: CK
TOILETING: A LITTLE
DRESSING REGULAR LOWER BODY CLOTHING: A LITTLE
MOVING FROM LYING ON BACK TO SITTING ON SIDE OF FLAT BED: A LOT
DRESSING REGULAR UPPER BODY CLOTHING: A LITTLE
MOVING TO AND FROM BED TO CHAIR: A LITTLE
SUGGESTED CMS G CODE MODIFIER DAILY ACTIVITY: CJ

## 2024-10-09 ASSESSMENT — COPD QUESTIONNAIRES
COPD SCREENING SCORE: 6
DO YOU EVER COUGH UP ANY MUCUS OR PHLEGM?: NO/ONLY WITH OCCASIONAL COLDS OR INFECTIONS
DURING THE PAST 4 WEEKS HOW MUCH DID YOU FEEL SHORT OF BREATH: MOST  OR ALL OF THE TIME
HAVE YOU SMOKED AT LEAST 100 CIGARETTES IN YOUR ENTIRE LIFE: YES

## 2024-10-09 ASSESSMENT — LIFESTYLE VARIABLES: SUBSTANCE_ABUSE: 0

## 2024-10-09 ASSESSMENT — PAIN DESCRIPTION - PAIN TYPE
TYPE: ACUTE PAIN

## 2024-10-09 ASSESSMENT — ACTIVITIES OF DAILY LIVING (ADL): TOILETING: INDEPENDENT

## 2024-10-10 ENCOUNTER — HOME HEALTH ADMISSION (OUTPATIENT)
Dept: HOME HEALTH SERVICES | Facility: HOME HEALTHCARE | Age: 84
End: 2024-10-10
Payer: MEDICARE

## 2024-10-10 PROBLEM — R00.1 BRADYCARDIA: Status: ACTIVE | Noted: 2024-10-10

## 2024-10-10 LAB
ALBUMIN SERPL BCP-MCNC: 3.4 G/DL (ref 3.2–4.9)
ALBUMIN/GLOB SERPL: 1.5 G/DL
ALP SERPL-CCNC: 66 U/L (ref 30–99)
ALT SERPL-CCNC: 9 U/L (ref 2–50)
ANION GAP SERPL CALC-SCNC: 9 MMOL/L (ref 7–16)
AST SERPL-CCNC: 13 U/L (ref 12–45)
BILIRUB SERPL-MCNC: 0.4 MG/DL (ref 0.1–1.5)
BUN SERPL-MCNC: 16 MG/DL (ref 8–22)
CALCIUM ALBUM COR SERPL-MCNC: 9 MG/DL (ref 8.5–10.5)
CALCIUM SERPL-MCNC: 8.5 MG/DL (ref 8.4–10.2)
CHLORIDE SERPL-SCNC: 108 MMOL/L (ref 96–112)
CO2 SERPL-SCNC: 23 MMOL/L (ref 20–33)
CREAT SERPL-MCNC: 0.97 MG/DL (ref 0.5–1.4)
ERYTHROCYTE [DISTWIDTH] IN BLOOD BY AUTOMATED COUNT: 47.5 FL (ref 35.9–50)
GFR SERPLBLD CREATININE-BSD FMLA CKD-EPI: 58 ML/MIN/1.73 M 2
GLOBULIN SER CALC-MCNC: 2.3 G/DL (ref 1.9–3.5)
GLUCOSE SERPL-MCNC: 83 MG/DL (ref 65–99)
HCT VFR BLD AUTO: 40.5 % (ref 37–47)
HGB BLD-MCNC: 13.1 G/DL (ref 12–16)
MAGNESIUM SERPL-MCNC: 2.2 MG/DL (ref 1.5–2.5)
MCH RBC QN AUTO: 29.3 PG (ref 27–33)
MCHC RBC AUTO-ENTMCNC: 32.3 G/DL (ref 32.2–35.5)
MCV RBC AUTO: 90.6 FL (ref 81.4–97.8)
PLATELET # BLD AUTO: 173 K/UL (ref 164–446)
PMV BLD AUTO: 11.3 FL (ref 9–12.9)
POTASSIUM SERPL-SCNC: 4.1 MMOL/L (ref 3.6–5.5)
PROT SERPL-MCNC: 5.7 G/DL (ref 6–8.2)
RBC # BLD AUTO: 4.47 M/UL (ref 4.2–5.4)
SODIUM SERPL-SCNC: 140 MMOL/L (ref 135–145)
WBC # BLD AUTO: 6.7 K/UL (ref 4.8–10.8)

## 2024-10-10 PROCEDURE — 700102 HCHG RX REV CODE 250 W/ 637 OVERRIDE(OP): Performed by: INTERNAL MEDICINE

## 2024-10-10 PROCEDURE — 94760 N-INVAS EAR/PLS OXIMETRY 1: CPT

## 2024-10-10 PROCEDURE — A9270 NON-COVERED ITEM OR SERVICE: HCPCS | Performed by: INTERNAL MEDICINE

## 2024-10-10 PROCEDURE — 36415 COLL VENOUS BLD VENIPUNCTURE: CPT

## 2024-10-10 PROCEDURE — 99233 SBSQ HOSP IP/OBS HIGH 50: CPT | Performed by: INTERNAL MEDICINE

## 2024-10-10 PROCEDURE — 700102 HCHG RX REV CODE 250 W/ 637 OVERRIDE(OP): Performed by: HOSPITALIST

## 2024-10-10 PROCEDURE — 700111 HCHG RX REV CODE 636 W/ 250 OVERRIDE (IP): Mod: JZ | Performed by: INTERNAL MEDICINE

## 2024-10-10 PROCEDURE — 700101 HCHG RX REV CODE 250: Performed by: EMERGENCY MEDICINE

## 2024-10-10 PROCEDURE — 85027 COMPLETE CBC AUTOMATED: CPT

## 2024-10-10 PROCEDURE — 83735 ASSAY OF MAGNESIUM: CPT

## 2024-10-10 PROCEDURE — 80053 COMPREHEN METABOLIC PANEL: CPT

## 2024-10-10 PROCEDURE — A9270 NON-COVERED ITEM OR SERVICE: HCPCS | Performed by: HOSPITALIST

## 2024-10-10 PROCEDURE — 94660 CPAP INITIATION&MGMT: CPT

## 2024-10-10 PROCEDURE — 94669 MECHANICAL CHEST WALL OSCILL: CPT

## 2024-10-10 PROCEDURE — 770020 HCHG ROOM/CARE - TELE (206)

## 2024-10-10 RX ORDER — SPIRONOLACTONE 25 MG/1
25 TABLET ORAL
Status: DISCONTINUED | OUTPATIENT
Start: 2024-10-10 | End: 2024-10-11 | Stop reason: HOSPADM

## 2024-10-10 RX ORDER — CEFUROXIME AXETIL 250 MG/1
500 TABLET ORAL EVERY 12 HOURS
Status: DISCONTINUED | OUTPATIENT
Start: 2024-10-10 | End: 2024-10-11 | Stop reason: HOSPADM

## 2024-10-10 RX ORDER — ENOXAPARIN SODIUM 100 MG/ML
40 INJECTION SUBCUTANEOUS DAILY
Status: DISCONTINUED | OUTPATIENT
Start: 2024-10-10 | End: 2024-10-11 | Stop reason: HOSPADM

## 2024-10-10 RX ORDER — CEFUROXIME AXETIL 250 MG/1
250 TABLET ORAL ONCE
Status: COMPLETED | OUTPATIENT
Start: 2024-10-10 | End: 2024-10-10

## 2024-10-10 RX ADMIN — LIDOCAINE 1 PATCH: 4 PATCH TOPICAL at 20:35

## 2024-10-10 RX ADMIN — TRAZODONE HYDROCHLORIDE 50 MG: 50 TABLET ORAL at 20:34

## 2024-10-10 RX ADMIN — ACETAMINOPHEN 1000 MG: 500 TABLET ORAL at 05:50

## 2024-10-10 RX ADMIN — GABAPENTIN 100 MG: 100 CAPSULE ORAL at 17:22

## 2024-10-10 RX ADMIN — GABAPENTIN 100 MG: 100 CAPSULE ORAL at 05:51

## 2024-10-10 RX ADMIN — AMLODIPINE BESYLATE 5 MG: 5 TABLET ORAL at 05:51

## 2024-10-10 RX ADMIN — CEFUROXIME AXETIL 500 MG: 250 TABLET ORAL at 17:22

## 2024-10-10 RX ADMIN — ACETAMINOPHEN 1000 MG: 500 TABLET ORAL at 20:34

## 2024-10-10 RX ADMIN — AZITHROMYCIN DIHYDRATE 500 MG: 250 TABLET ORAL at 17:22

## 2024-10-10 RX ADMIN — SPIRONOLACTONE 25 MG: 25 TABLET ORAL at 17:21

## 2024-10-10 RX ADMIN — ENOXAPARIN SODIUM 40 MG: 100 INJECTION SUBCUTANEOUS at 17:22

## 2024-10-10 RX ADMIN — ACETAMINOPHEN 1000 MG: 500 TABLET ORAL at 13:55

## 2024-10-10 RX ADMIN — ASPIRIN 81 MG: 81 TABLET, COATED ORAL at 05:51

## 2024-10-10 RX ADMIN — LOSARTAN POTASSIUM 100 MG: 50 TABLET, FILM COATED ORAL at 05:51

## 2024-10-10 RX ADMIN — SENNOSIDES AND DOCUSATE SODIUM 2 TABLET: 50; 8.6 TABLET ORAL at 05:51

## 2024-10-10 RX ADMIN — GABAPENTIN 100 MG: 100 CAPSULE ORAL at 12:17

## 2024-10-10 RX ADMIN — CEFUROXIME AXETIL 250 MG: 250 TABLET ORAL at 12:17

## 2024-10-10 ASSESSMENT — ENCOUNTER SYMPTOMS
FALLS: 0
VOMITING: 0
FEVER: 0
DIZZINESS: 0
NERVOUS/ANXIOUS: 0
BACK PAIN: 0
SHORTNESS OF BREATH: 1
COUGH: 1
ABDOMINAL PAIN: 0
HEARTBURN: 0
DOUBLE VISION: 0
HEADACHES: 0
CHILLS: 0
PALPITATIONS: 0
BLURRED VISION: 0

## 2024-10-10 ASSESSMENT — PAIN DESCRIPTION - PAIN TYPE
TYPE: ACUTE PAIN

## 2024-10-10 ASSESSMENT — LIFESTYLE VARIABLES: SUBSTANCE_ABUSE: 0

## 2024-10-11 ENCOUNTER — NON-PROVIDER VISIT (OUTPATIENT)
Dept: CARDIOLOGY | Facility: MEDICAL CENTER | Age: 84
End: 2024-10-11
Attending: INTERNAL MEDICINE
Payer: MEDICARE

## 2024-10-11 ENCOUNTER — APPOINTMENT (OUTPATIENT)
Dept: RADIOLOGY | Facility: MEDICAL CENTER | Age: 84
DRG: 194 | End: 2024-10-11
Attending: INTERNAL MEDICINE
Payer: OTHER MISCELLANEOUS

## 2024-10-11 VITALS
SYSTOLIC BLOOD PRESSURE: 151 MMHG | BODY MASS INDEX: 41.42 KG/M2 | WEIGHT: 219.36 LBS | HEART RATE: 58 BPM | TEMPERATURE: 97.1 F | RESPIRATION RATE: 18 BRPM | OXYGEN SATURATION: 98 % | HEIGHT: 61 IN | DIASTOLIC BLOOD PRESSURE: 67 MMHG

## 2024-10-11 DIAGNOSIS — R00.1 BRADYCARDIA: ICD-10-CM

## 2024-10-11 PROBLEM — R91.1 PULMONARY NODULE: Status: ACTIVE | Noted: 2024-10-11

## 2024-10-11 PROBLEM — R09.02 HYPOXIA: Status: ACTIVE | Noted: 2024-10-11

## 2024-10-11 LAB — D DIMER PPP IA.FEU-MCNC: 0.99 UG/ML (FEU) (ref 0–0.5)

## 2024-10-11 PROCEDURE — 71275 CT ANGIOGRAPHY CHEST: CPT

## 2024-10-11 PROCEDURE — 700117 HCHG RX CONTRAST REV CODE 255: Performed by: INTERNAL MEDICINE

## 2024-10-11 PROCEDURE — A9270 NON-COVERED ITEM OR SERVICE: HCPCS | Performed by: INTERNAL MEDICINE

## 2024-10-11 PROCEDURE — 700102 HCHG RX REV CODE 250 W/ 637 OVERRIDE(OP): Performed by: INTERNAL MEDICINE

## 2024-10-11 PROCEDURE — A9270 NON-COVERED ITEM OR SERVICE: HCPCS | Performed by: HOSPITALIST

## 2024-10-11 PROCEDURE — 94660 CPAP INITIATION&MGMT: CPT

## 2024-10-11 PROCEDURE — 94760 N-INVAS EAR/PLS OXIMETRY 1: CPT

## 2024-10-11 PROCEDURE — 99239 HOSP IP/OBS DSCHRG MGMT >30: CPT | Performed by: INTERNAL MEDICINE

## 2024-10-11 PROCEDURE — 85379 FIBRIN DEGRADATION QUANT: CPT

## 2024-10-11 PROCEDURE — 700102 HCHG RX REV CODE 250 W/ 637 OVERRIDE(OP): Performed by: HOSPITALIST

## 2024-10-11 PROCEDURE — 700105 HCHG RX REV CODE 258: Performed by: INTERNAL MEDICINE

## 2024-10-11 PROCEDURE — 36415 COLL VENOUS BLD VENIPUNCTURE: CPT

## 2024-10-11 RX ORDER — AMLODIPINE BESYLATE 5 MG/1
5 TABLET ORAL ONCE
Status: DISCONTINUED | OUTPATIENT
Start: 2024-10-11 | End: 2024-10-11

## 2024-10-11 RX ORDER — AMOXICILLIN 250 MG
2 CAPSULE ORAL 2 TIMES DAILY
Qty: 30 TABLET | Refills: 0 | Status: SHIPPED | OUTPATIENT
Start: 2024-10-11 | End: 2024-10-21

## 2024-10-11 RX ORDER — OXYCODONE HYDROCHLORIDE 5 MG/1
5 TABLET ORAL EVERY 12 HOURS PRN
Qty: 8 TABLET | Refills: 0 | Status: SHIPPED | OUTPATIENT
Start: 2024-10-11 | End: 2024-10-15

## 2024-10-11 RX ORDER — CEFUROXIME AXETIL 500 MG/1
500 TABLET ORAL EVERY 12 HOURS
Qty: 6 TABLET | Refills: 0 | Status: ACTIVE | OUTPATIENT
Start: 2024-10-11 | End: 2024-10-14

## 2024-10-11 RX ORDER — SODIUM CHLORIDE 9 MG/ML
INJECTION, SOLUTION INTRAVENOUS CONTINUOUS
Status: DISCONTINUED | OUTPATIENT
Start: 2024-10-11 | End: 2024-10-11 | Stop reason: HOSPADM

## 2024-10-11 RX ORDER — SPIRONOLACTONE 25 MG/1
25 TABLET ORAL DAILY
Qty: 30 TABLET | Refills: 0 | Status: SHIPPED | OUTPATIENT
Start: 2024-10-12

## 2024-10-11 RX ORDER — GABAPENTIN 100 MG/1
100 CAPSULE ORAL 3 TIMES DAILY
Qty: 90 CAPSULE | Refills: 0 | Status: SHIPPED | OUTPATIENT
Start: 2024-10-11 | End: 2024-10-21

## 2024-10-11 RX ORDER — AMLODIPINE BESYLATE 5 MG/1
10 TABLET ORAL DAILY
Status: DISCONTINUED | OUTPATIENT
Start: 2024-10-12 | End: 2024-10-11

## 2024-10-11 RX ORDER — LIDOCAINE 4 G/G
1 PATCH TOPICAL EVERY 24 HOURS
Qty: 30 PATCH | Refills: 0 | Status: SHIPPED | OUTPATIENT
Start: 2024-10-11 | End: 2024-10-21

## 2024-10-11 RX ORDER — POLYETHYLENE GLYCOL 3350 17 G/17G
17 POWDER, FOR SOLUTION ORAL
Qty: 10 PACKET | Refills: 0 | Status: SHIPPED | OUTPATIENT
Start: 2024-10-11

## 2024-10-11 RX ORDER — AMLODIPINE BESYLATE 5 MG/1
5 TABLET ORAL DAILY
Status: DISCONTINUED | OUTPATIENT
Start: 2024-10-12 | End: 2024-10-11 | Stop reason: HOSPADM

## 2024-10-11 RX ADMIN — GABAPENTIN 100 MG: 100 CAPSULE ORAL at 06:10

## 2024-10-11 RX ADMIN — IOHEXOL 80 ML: 350 INJECTION, SOLUTION INTRAVENOUS at 12:19

## 2024-10-11 RX ADMIN — ACETAMINOPHEN 1000 MG: 500 TABLET ORAL at 06:10

## 2024-10-11 RX ADMIN — CEFUROXIME AXETIL 500 MG: 250 TABLET ORAL at 06:11

## 2024-10-11 RX ADMIN — SODIUM CHLORIDE: 9 INJECTION, SOLUTION INTRAVENOUS at 11:30

## 2024-10-11 RX ADMIN — ASPIRIN 81 MG: 81 TABLET, COATED ORAL at 06:11

## 2024-10-11 RX ADMIN — SPIRONOLACTONE 25 MG: 25 TABLET ORAL at 06:10

## 2024-10-11 RX ADMIN — AMLODIPINE BESYLATE 5 MG: 5 TABLET ORAL at 06:11

## 2024-10-11 RX ADMIN — AMLODIPINE BESYLATE 5 MG: 5 TABLET ORAL at 13:02

## 2024-10-11 RX ADMIN — SENNOSIDES AND DOCUSATE SODIUM 2 TABLET: 50; 8.6 TABLET ORAL at 06:10

## 2024-10-11 RX ADMIN — GABAPENTIN 100 MG: 100 CAPSULE ORAL at 13:02

## 2024-10-11 RX ADMIN — ACETAMINOPHEN 1000 MG: 500 TABLET ORAL at 13:52

## 2024-10-11 ASSESSMENT — PAIN DESCRIPTION - PAIN TYPE: TYPE: ACUTE PAIN

## 2024-10-14 ENCOUNTER — PATIENT OUTREACH (OUTPATIENT)
Dept: MEDICAL GROUP | Facility: LAB | Age: 84
End: 2024-10-14
Payer: MEDICARE

## 2024-10-16 ENCOUNTER — HOME CARE VISIT (OUTPATIENT)
Dept: HOME HEALTH SERVICES | Facility: HOME HEALTHCARE | Age: 84
End: 2024-10-16

## 2024-10-16 ENCOUNTER — APPOINTMENT (OUTPATIENT)
Dept: MEDICAL GROUP | Facility: LAB | Age: 84
End: 2024-10-16
Payer: MEDICARE

## 2024-10-21 ENCOUNTER — OFFICE VISIT (OUTPATIENT)
Dept: SLEEP MEDICINE | Facility: MEDICAL CENTER | Age: 84
End: 2024-10-21
Attending: STUDENT IN AN ORGANIZED HEALTH CARE EDUCATION/TRAINING PROGRAM
Payer: MEDICARE

## 2024-10-21 VITALS
SYSTOLIC BLOOD PRESSURE: 126 MMHG | DIASTOLIC BLOOD PRESSURE: 72 MMHG | WEIGHT: 204 LBS | HEIGHT: 61 IN | BODY MASS INDEX: 38.51 KG/M2 | HEART RATE: 58 BPM | OXYGEN SATURATION: 94 %

## 2024-10-21 DIAGNOSIS — G47.33 OSA (OBSTRUCTIVE SLEEP APNEA): ICD-10-CM

## 2024-10-21 DIAGNOSIS — R91.1 PULMONARY NODULE: ICD-10-CM

## 2024-10-21 DIAGNOSIS — J96.01 ACUTE HYPOXEMIC RESPIRATORY FAILURE (HCC): ICD-10-CM

## 2024-10-21 PROCEDURE — 99213 OFFICE O/P EST LOW 20 MIN: CPT | Mod: XU | Performed by: STUDENT IN AN ORGANIZED HEALTH CARE EDUCATION/TRAINING PROGRAM

## 2024-10-21 PROCEDURE — 3074F SYST BP LT 130 MM HG: CPT | Performed by: STUDENT IN AN ORGANIZED HEALTH CARE EDUCATION/TRAINING PROGRAM

## 2024-10-21 PROCEDURE — 99203 OFFICE O/P NEW LOW 30 MIN: CPT | Performed by: STUDENT IN AN ORGANIZED HEALTH CARE EDUCATION/TRAINING PROGRAM

## 2024-10-21 PROCEDURE — 3078F DIAST BP <80 MM HG: CPT | Performed by: STUDENT IN AN ORGANIZED HEALTH CARE EDUCATION/TRAINING PROGRAM

## 2024-10-21 PROCEDURE — 94761 N-INVAS EAR/PLS OXIMETRY MLT: CPT | Performed by: STUDENT IN AN ORGANIZED HEALTH CARE EDUCATION/TRAINING PROGRAM

## 2024-10-21 ASSESSMENT — ENCOUNTER SYMPTOMS
CHILLS: 0
SHORTNESS OF BREATH: 0
FEVER: 0
SPUTUM PRODUCTION: 0
COUGH: 0

## 2024-10-21 ASSESSMENT — FIBROSIS 4 INDEX: FIB4 SCORE: 2.05

## 2024-10-23 ENCOUNTER — OFFICE VISIT (OUTPATIENT)
Dept: MEDICAL GROUP | Facility: LAB | Age: 84
End: 2024-10-23
Payer: MEDICARE

## 2024-10-23 VITALS
RESPIRATION RATE: 16 BRPM | HEIGHT: 61 IN | HEART RATE: 56 BPM | SYSTOLIC BLOOD PRESSURE: 126 MMHG | OXYGEN SATURATION: 96 % | DIASTOLIC BLOOD PRESSURE: 72 MMHG | TEMPERATURE: 97.2 F | WEIGHT: 205.25 LBS | BODY MASS INDEX: 38.75 KG/M2

## 2024-10-23 DIAGNOSIS — Z09 HOSPITAL DISCHARGE FOLLOW-UP: Primary | ICD-10-CM

## 2024-10-23 DIAGNOSIS — R00.1 BRADYCARDIA: ICD-10-CM

## 2024-10-23 DIAGNOSIS — R26.89 DECREASED MOBILITY: ICD-10-CM

## 2024-10-23 DIAGNOSIS — S22.41XA CLOSED FRACTURE OF MULTIPLE RIBS OF RIGHT SIDE, INITIAL ENCOUNTER: ICD-10-CM

## 2024-10-23 ASSESSMENT — FIBROSIS 4 INDEX: FIB4 SCORE: 2.05

## 2024-10-29 ENCOUNTER — TELEPHONE (OUTPATIENT)
Dept: CARDIOLOGY | Facility: MEDICAL CENTER | Age: 84
End: 2024-10-29
Payer: MEDICARE

## 2024-11-01 ENCOUNTER — APPOINTMENT (OUTPATIENT)
Dept: MEDICAL GROUP | Facility: LAB | Age: 84
End: 2024-11-01
Payer: MEDICARE

## 2024-11-01 VITALS
WEIGHT: 208 LBS | BODY MASS INDEX: 39.27 KG/M2 | TEMPERATURE: 97.5 F | OXYGEN SATURATION: 96 % | HEART RATE: 76 BPM | DIASTOLIC BLOOD PRESSURE: 62 MMHG | HEIGHT: 61 IN | RESPIRATION RATE: 15 BRPM | SYSTOLIC BLOOD PRESSURE: 128 MMHG

## 2024-11-01 DIAGNOSIS — F51.01 PRIMARY INSOMNIA: ICD-10-CM

## 2024-11-01 DIAGNOSIS — R00.1 SINUS BRADYCARDIA: ICD-10-CM

## 2024-11-01 PROCEDURE — 3078F DIAST BP <80 MM HG: CPT | Performed by: FAMILY MEDICINE

## 2024-11-01 PROCEDURE — 99213 OFFICE O/P EST LOW 20 MIN: CPT | Performed by: FAMILY MEDICINE

## 2024-11-01 PROCEDURE — 3074F SYST BP LT 130 MM HG: CPT | Performed by: FAMILY MEDICINE

## 2024-11-01 ASSESSMENT — FIBROSIS 4 INDEX: FIB4 SCORE: 2.08
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
## 2024-11-18 NOTE — TELEPHONE ENCOUNTER
Received request via: Patient    Was the patient seen in the last year in this department? Yes    Does the patient have an active prescription (recently filled or refills available) for medication(s) requested? No    [facility]    Does the patient have long term Plus and need 100-day supply? (This applies to ALL medications) Patient does not have SCP

## 2024-11-19 RX ORDER — FUROSEMIDE 40 MG/1
40 TABLET ORAL DAILY
Qty: 90 TABLET | Refills: 3 | Status: SHIPPED | OUTPATIENT
Start: 2024-11-19

## 2025-01-15 DIAGNOSIS — I10 ESSENTIAL HYPERTENSION: ICD-10-CM

## 2025-01-16 RX ORDER — AMLODIPINE BESYLATE 5 MG/1
5 TABLET ORAL DAILY
Qty: 90 TABLET | Refills: 3 | Status: SHIPPED | OUTPATIENT
Start: 2025-01-16

## 2025-02-14 ENCOUNTER — TELEPHONE (OUTPATIENT)
Dept: HEALTH INFORMATION MANAGEMENT | Facility: OTHER | Age: 85
End: 2025-02-14
Payer: MEDICARE

## 2025-03-16 DIAGNOSIS — F41.9 ANXIETY: ICD-10-CM

## 2025-03-17 RX ORDER — TRAZODONE HYDROCHLORIDE 50 MG/1
50 TABLET ORAL NIGHTLY
Qty: 90 TABLET | Refills: 3 | Status: SHIPPED | OUTPATIENT
Start: 2025-03-17 | End: 2026-03-12

## 2025-03-17 NOTE — TELEPHONE ENCOUNTER
Received request via: Pharmacy    Was the patient seen in the last year in this department? Yes    Does the patient have an active prescription (recently filled or refills available) for medication(s) requested? No    [facility]    Does the patient have [facility] Plus and need 100-day supply? (This applies to ALL medications) Patient does not have SCP

## 2025-03-31 NOTE — PROGRESS NOTES
Transitional Care Management  TCM Outreach Date and Time: Filed (5/13/2024  8:16 AM)    Discharge Questions  Actual Discharge Date: 05/10/24  Now that you are home, how are you feeling?: Very Good (pt states she is feeling much better and is asymptomatic)  Did you receive any new prescriptions?: No (OTC, not new per pt)  Do you have any questions about your current medications or new medications (Review Med Rec)?: No  Did you have any durable medical equipment ordered?: No  Do you have a follow up appointment scheduled with your PCP?: Yes  Appointment Date: 05/15/24  Appointment Time: 1700  Any issues or paperwork you wish to discuss with your PCP?: No  Are you (patient) able to get to the appointment?: Yes  If Home Health was ordered, have they contacted you (Patient): Not Applicable  Did you have enough support after your last discharge?: Yes  Does this patient qualify for the CCM program?: No    Transitional Care  Number of attempts made to contact patient: 1  Current or previous attempts completed within two business days of discharge? : Yes  Provided education regarding treatment plan, medications, self-management, ADLs?: Yes  Has patient completed an Advanced Directive?: No  Has the Care Manager's phone number provided?: No  Is there anything else I can help you with?: No    Discharge Summary  Chief Complaint: Nausea/Vomiting/Diarrhea - Started this am  Denies c/o abd pain. Low Back Pain - C/o low back pain started last night  Denies dysuria  Denies any recent injuries/illnesses  Admitting Diagnosis: EMS  Discharge Diagnosis: diarrhea     Opt out

## 2025-04-09 ENCOUNTER — OFFICE VISIT (OUTPATIENT)
Dept: MEDICAL GROUP | Facility: LAB | Age: 85
End: 2025-04-09
Payer: MEDICARE

## 2025-04-09 VITALS
DIASTOLIC BLOOD PRESSURE: 64 MMHG | OXYGEN SATURATION: 96 % | BODY MASS INDEX: 39.27 KG/M2 | SYSTOLIC BLOOD PRESSURE: 136 MMHG | HEIGHT: 61 IN | TEMPERATURE: 97.4 F | WEIGHT: 208 LBS | HEART RATE: 51 BPM | RESPIRATION RATE: 16 BRPM

## 2025-04-09 DIAGNOSIS — C44.609 MALIGNANT NEOPLASM OF SKIN OF LEFT UPPER EXTREMITY: ICD-10-CM

## 2025-04-09 DIAGNOSIS — J30.2 SEASONAL ALLERGIES: ICD-10-CM

## 2025-04-09 DIAGNOSIS — H69.91 DYSFUNCTION OF RIGHT EUSTACHIAN TUBE: ICD-10-CM

## 2025-04-09 PROCEDURE — 99213 OFFICE O/P EST LOW 20 MIN: CPT | Performed by: FAMILY MEDICINE

## 2025-04-09 PROCEDURE — 3078F DIAST BP <80 MM HG: CPT | Performed by: FAMILY MEDICINE

## 2025-04-09 PROCEDURE — 3075F SYST BP GE 130 - 139MM HG: CPT | Performed by: FAMILY MEDICINE

## 2025-04-09 ASSESSMENT — PATIENT HEALTH QUESTIONNAIRE - PHQ9: CLINICAL INTERPRETATION OF PHQ2 SCORE: 0

## 2025-04-09 ASSESSMENT — FIBROSIS 4 INDEX: FIB4 SCORE: 2.08

## 2025-04-09 NOTE — PROGRESS NOTES
Verbal consent was acquired by the patient to use [employer] ambient listening note generation during this visit Yes    Subjective:   Sharon Callahan is a 84 y.o. female here today for   Chief Complaint   Patient presents with    Headache    Sinusitis     History of Present Illness  The patient is an 84-year-old female presenting with ongoing headache and possible sinusitis.    She reports intermittent right temporal headaches for the past few months, attributed to sinus infection. Pain has recently subsided, with constant pressure somewhat alleviated by Tylenol. Discomfort has lessened over the past few days. She also experiences right eardrum tenderness. No chest pain, shortness of breath, fevers, or chills. History of Flonase use.    History of skin cancer with recent large excision on her hand; stitches removed Monday. Recommended blue light treatment for skin cancer on arms and body. Noticed increase in size of previously flat moles on her back. Family history of skin cancer in mother, grandfather, and brothers.    Experiencing seasonal allergies since moving into a [employer] complex, uses CPAP machine at night. Manages symptoms with Tylenol and over-the-counter allergy medication.    FAMILY HISTORY  - Mother, grandfather, and brothers had skin cancer      Allergies   Allergen Reactions    Pcn [Penicillins] Anaphylaxis     Skin turns black  Tolerated ceftriaxone 10/8/24  Tolerated cefuroxime 10/10/24    Clindamycin Rash     Rash      Influenza Virus Vacc Rash     Red in face    Pneumococcal Vaccine Rash     Rash     Tetracycline Rash     Rash     Xarelto [Rivaroxaban] Rash     Rash     Hydrocodone Vomiting and Nausea         Current medicines (including changes today)  Current Outpatient Medications   Medication Sig Dispense Refill    traZODone (DESYREL) 50 MG Tab TAKE 1 TABLET BY MOUTH EVERY EVENING  DAYS. 90 Tablet 3    amLODIPine (NORVASC) 5 MG Tab TAKE 1 TABLET BY MOUTH EVERY DAY 90 Tablet 3     furosemide (LASIX) 40 MG Tab Take 1 Tablet by mouth every day. 90 Tablet 3    polyethylene glycol/lytes (MIRALAX) Pack Take 1 Packet by mouth 1 time a day as needed (if sennosides and docusate ineffective after 24 hours). 10 Packet 0    spironolactone (ALDACTONE) 25 MG Tab Take 1 Tablet by mouth every day. 30 Tablet 0    ibuprofen (MOTRIN) 200 MG Tab Take 200 mg by mouth every 6 hours as needed. Indications: Pain      acetaminophen (TYLENOL) 500 MG Tab Take 500 mg by mouth every 6 hours as needed. Indications: Pain      cyclobenzaprine (FLEXERIL) 10 mg Tab Take 1 Tablet by mouth 3 times a day as needed for Muscle Spasms. 30 Tablet 0    potassium Chloride ER (K-TAB) 20 MEQ Tab CR tablet TAKE 1 TABLET BY MOUTH EVERY DAY (Patient taking differently: Take 20 mEq by mouth every day.) 90 Tablet 3    simethicone (MYLICON) 125 MG chewable tablet Chew 1 Tablet 3 times a day as needed for Flatulence. 120 Tablet 0    Cholecalciferol (D3 PO) Take 1 Capsule by mouth every day.      aspirin EC (ECOTRIN) 81 MG Tablet Delayed Response Take 1 Tablet by mouth every day.      Cyanocobalamin (VITAMIN B-12 PO) Take 1 Tablet by mouth every day.       No current facility-administered medications for this visit.     She  has a past medical history of Arrhythmia, Arthritis, BMI 38.0-38.9,adult (09/18/2013), Breast cancer (HCC), CATARACT, Chronic nausea (01/12/2015), Chronic pain of both knees (01/09/2019), Cough, Depression with anxiety (05/03/2011), Heart burn, Heart valve disease, Hemothorax on right (01/04/2017), History of cholecystectomy, total knee replacement, Hyperlipidemia (04/04/2011), Hypertension, Indigestion, MYESHA (obstructive sleep apnea) (04/04/2011), Pain, Painful breathing, Pleural effusion, right (01/04/2017), Range of motion deficit, S/P bilateral mastectomy (04/04/2011), Sleep apnea, Snoring, and Sputum production.  She has no past medical history of Shortness of breath or Wheezing.    ROS   ROS  -See HPI     Objective:  "    Physical Exam:  /64   Pulse (!) 51   Temp 36.3 °C (97.4 °F) (Temporal)   Resp 16   Ht 1.549 m (5' 0.98\")   Wt 94.3 kg (208 lb)   SpO2 96%  Body mass index is 39.32 kg/m².   Constitutional: Alert, no distress.  Skin: Warm, dry, good turgor, no rashes in visible areas.  Eye: Equal, round and reactive, conjunctiva clear, lids normal.  ENMT: TM's clear bilaterally, lips without lesions, good dentition, oropharynx clear.  Neck: Trachea midline, no masses, no thyromegaly. No cervical or supraclavicular lymphadenopathy.  Respiratory: Unlabored respiratory effort, lungs clear to auscultation, no wheezes, no rhonchi..  Psych: Alert and oriented x3, normal affect and mood.    Results      Assessment and Plan:     Assessment & Plan  Sinusitis  Symptoms suggest potential viral sinus infection, possibly exacerbated by eustachian tube dysfunction and seasonal allergies.  Treatment plan: Initiate Flonase (fluticasone) 2 sprays per nostril daily for 2-3 weeks.  Clinical decision making: Further evaluation if symptoms persist.    Skin cancer  Informed about safety and efficacy of blue light therapy for treating large areas with multiple precancerous growths.  Treatment plan: Proceed with blue light therapy.  Clinical decision making: Regular dermatology follow-ups and skin checks recommended.    Seasonal allergies  Continue current over-the-counter allergy medication regimen with Flonase.    Follow-up: Regular dermatology follow-ups and skin checks recommended.    Orders:  1. Dysfunction of right eustachian tube    2. Malignant neoplasm of skin of left upper extremity    3. Seasonal allergies        Followup: No follow-ups on file.         PLEASE NOTE: This dictation was created using voice recognition and [employer] ambient listening software. I have made every reasonable attempt to correct obvious errors, but I expect that there are errors of grammar and possibly content that I did not discover before finalizing " the note.

## 2025-06-24 ENCOUNTER — APPOINTMENT (OUTPATIENT)
Dept: SLEEP MEDICINE | Facility: MEDICAL CENTER | Age: 85
End: 2025-06-24
Attending: STUDENT IN AN ORGANIZED HEALTH CARE EDUCATION/TRAINING PROGRAM
Payer: MEDICARE

## 2025-07-03 ENCOUNTER — OFFICE VISIT (OUTPATIENT)
Dept: SLEEP MEDICINE | Facility: MEDICAL CENTER | Age: 85
End: 2025-07-03
Attending: STUDENT IN AN ORGANIZED HEALTH CARE EDUCATION/TRAINING PROGRAM
Payer: MEDICARE

## 2025-07-03 VITALS
RESPIRATION RATE: 14 BRPM | SYSTOLIC BLOOD PRESSURE: 122 MMHG | HEIGHT: 60 IN | WEIGHT: 205 LBS | DIASTOLIC BLOOD PRESSURE: 84 MMHG | OXYGEN SATURATION: 94 % | BODY MASS INDEX: 40.25 KG/M2 | HEART RATE: 63 BPM

## 2025-07-03 DIAGNOSIS — G47.33 OSA (OBSTRUCTIVE SLEEP APNEA): Primary | ICD-10-CM

## 2025-07-03 DIAGNOSIS — G44.229 CHRONIC TENSION-TYPE HEADACHE, NOT INTRACTABLE: ICD-10-CM

## 2025-07-03 PROCEDURE — 3074F SYST BP LT 130 MM HG: CPT

## 2025-07-03 PROCEDURE — 99214 OFFICE O/P EST MOD 30 MIN: CPT

## 2025-07-03 PROCEDURE — 3079F DIAST BP 80-89 MM HG: CPT

## 2025-07-03 ASSESSMENT — FIBROSIS 4 INDEX: FIB4 SCORE: 2.08

## 2025-07-03 NOTE — PROGRESS NOTES
[facility] Consult Note     Date: 7/3/2025 / Time: 3:30 PM      Thank you for requesting a sleep medicine consultation on Sharon Callahan at the sleep center. Presents today with the chief complaints of previous diagnosis of MYESHA. She is referred by pulmonology to establish care with sleep medicine .       HISTORY OF PRESENT ILLNESS:     Trice Boyd is a 84 y.o. female with focused history of afib, CKD, elevated BMI, GERD, HTN, previous smoker.  Trice presents to Sleep Clinic to establish care with sleep medicine .     Patient was diagnosed with MYESHA in . At that time, patient was started on CPAP.    18 - PSG AHI 44.2, supine AHI 63.8, dino O2 60%. Oxygen saturations were below 89% for 87.1% of sleep time. CPAP was tried from 6 to 9cmH2O. Rec: CPAP 9  4/7/10 - PSG AHI 32, dino O2 72%. Successful titration to CPAP 11.  Believes her last sleep study was a couple years ago but we do not have those records.  No one has checked up on her sleep apnea in a while so she was referred her for management during a recent pulmonary visit. She uses CPAP nightly and notes benefit. Denies leaking. Does use strap covers for skin irritation. Occasional dry mouth. Her main concern is that she has been having headaches daily which last most of the day. She is especially concerned because she has a family member who  of brain cancer.     S/p tonsillectomy and adenoidectomy, UPPP    Pertinent medications:  Trazodone 50 mg - nightly     As per supplemental questionnaire to be scanned or imported into chart:    [place] Sleepiness Score: 4    Sleep Schedule  Bedtime:  11 PM Weekend same  Wake time:  530 AM Weekend same  Sleep-onset latency: 30 min   Awakenings from sleep: 0-1  Difficulty falling back asleep: no   Bedroom partner: no  Naps: rarely     DAYTIME SYMPTOMS:   Excessive daytime sleepiness: no  Daytime fatigue: no  Difficulty concentrating: sometimes   Memory problems: yes  "  Irritability:yes  Work/school performance issues: no  Sleepiness with driving: N/A - does not drive  Caffeine/stimulant use: Yes, How Many? 2 coffee/1 soda   Alcohol use:Yes, How Many? 5-7/week, socially      SLEEP RELATED SYMPTOMS  Snoring: no  Witnessed apnea or gasping/choking: no  Dry mouth or mouth breathing: sometimes  Sweating: no  Teeth grinding/biting: no  Morning headaches: yes  Refreshed Upon Awakening: yes     SLEEP RELATED BEHAVIORS:  Parasomnias (walking, talking, eating, violence): No   Leg kicking: no  Restless legs - \"urge to move\": no  Nightmares: no Recurrent: No   Dream enactment: No      NARCOLEPSY:  Cataplexy: No   Sleep paralysis: No   Sleep attacks: No   Hypnagogic/hypnopompic hallucinations: No     Occupation: retired     MEDICAL HISTORY  Past Medical History[1]     SURGICAL HISTORY  Past Surgical History[2]     FAMILY HISTORY  Family History   Problem Relation Age of Onset    Hypertension Mother     Cancer Father         lung    Diabetes Father     Hypertension Father     Heart Disease Father         MI    Hypertension Brother     Sleep Apnea Brother     Hypertension Maternal Grandmother     Sleep Apnea Brother     Hypertension Brother     Diabetes Brother     Cancer Paternal Aunt         stomach    Diabetes Paternal Aunt     Heart Disease Brother         MI    Hyperlipidemia Neg Hx     Stroke Neg Hx        SOCIAL HISTORY  Social History[3]       CURRENT MEDICATIONS  Current Outpatient Medications   Medication Sig    traZODone (DESYREL) 50 MG Tab TAKE 1 TABLET BY MOUTH EVERY EVENING  DAYS.    amLODIPine (NORVASC) 5 MG Tab TAKE 1 TABLET BY MOUTH EVERY DAY    furosemide (LASIX) 40 MG Tab Take 1 Tablet by mouth every day.    polyethylene glycol/lytes (MIRALAX) Pack Take 1 Packet by mouth 1 time a day as needed (if sennosides and docusate ineffective after 24 hours).    spironolactone (ALDACTONE) 25 MG Tab Take 1 Tablet by mouth every day.    ibuprofen (MOTRIN) 200 MG Tab Take 200 mg " by mouth every 6 hours as needed. Indications: Pain    acetaminophen (TYLENOL) 500 MG Tab Take 500 mg by mouth every 6 hours as needed. Indications: Pain    cyclobenzaprine (FLEXERIL) 10 mg Tab Take 1 Tablet by mouth 3 times a day as needed for Muscle Spasms.    potassium Chloride ER (K-TAB) 20 MEQ Tab CR tablet TAKE 1 TABLET BY MOUTH EVERY DAY (Patient taking differently: Take 20 mEq by mouth every day.)    simethicone (MYLICON) 125 MG chewable tablet Chew 1 Tablet 3 times a day as needed for Flatulence.    Cholecalciferol (D3 PO) Take 1 Capsule by mouth every day.    aspirin EC (ECOTRIN) 81 MG Tablet Delayed Response Take 1 Tablet by mouth every day.    Cyanocobalamin (VITAMIN B-12 PO) Take 1 Tablet by mouth every day.       REVIEW OF SYSTEMS  Constitutional: Denies fevers, Denies weight changes  Ears/Nose/Throat/Mouth: Denies nasal congestion or sore throat   Cardiovascular: Denies chest pain  Respiratory: Denies shortness of breath, Denies cough  Gastrointestinal/Hepatic: Denies nausea, vomiting  Sleep: see HPI    Physical Examination:  Vitals/ General Appearance:   Encounter Vitals  Blood Pressure : 122/84  Pulse: 63  Respiration: 14  Pulse Oximetry: 94 %  Weight: 93 kg (205 lb) (per patient)  Height: 152.4 cm (5') (per patient)  BMI (Calculated): 40.04    Pt. is alert and oriented to time, place and person. Cooperative and in no apparent distress.     Constitutional: Alert, no distress, well-groomed.  Skin: No rashes in visible areas.  Eye: Round. Conjunctiva clear, lids normal. No icterus.   ENT EXAM  Nasal septum deviation: Yes  Nasal turbinate hypertrophy: Left: Grade 2   Right: Grade 2  Nasal erythema: Yes  Oropharyngeal erythema No   Hard palate narrow: No   Hard palate high: No   Soft palate/uvula (Mallampati score): 4  Tongue Scalloping: Yes  Retrognathia: No   Micrognathia: No   Cardiovascular: + murmur, 1-2/6 regular rate and irregular rhythm  Pulmonary:Normal breath sounds, Clear to  auscultation  Neurologic:Muscle tone normal, Awake, alert and oriented x 3  Extremities: No clubbing, cyanosis, or edema        Bicarb:   Lab Results   Component Value Date/Time    CO2 23 10/10/2024 0054    CO2 21 10/09/2024 0112    CO2 26 10/08/2024 2136     TSH:   Lab Results   Component Value Date/Time    TSHULTRASEN 2.720 03/18/2024 1122     CREATININE:   Lab Results   Component Value Date/Time    CREATININE 0.97 10/10/2024 0054     VIT D:   Lab Results   Component Value Date/Time    25HYDROXY 74 04/13/2020 1428     H/H:  Lab Results   Component Value Date/Time    HEMOGLOBIN 13.1 10/10/2024 12:54 AM         Medical Decision Making   Assessment and Plan  The medical record was reviewed including Diagnostic and titration nocturnal polysomnogram's  and compliance reports .    Obstructive sleep apnea  Chronic headaches   - Compliance data reviewed showing 100% usage > 4hours in last 30 days. Average AHI 0.6 events/hour.  Leak 95th percentile 14.4. Pressure 95% 12  - Current Settings APAP 9-12. Patient states she was told after the last sleep study that it should be at CPAP 10 and is unsure how it got changed to current settings. Discussed lowering to 10 to determine hopeful improvement in headaches. Can reach out over MyChart if further adjustments are needed.   - Pt continues to use and benefit from machine.   - Orders placed for mask and supplies and updated pressure settings   - Advised to reach out via MyChart with questions     MYESHA/PAP EDUCATION:  - Clean equipment frequently, and get new mask and supplies as allowed by insurance and DME.   - Avoid driving a motor vehicle when drowsy  - Patients with MYESHA are at increased risk of cardiovascular disease including coronary artery disease, systemic arterial hypertension, pulmonary arterial hypertension, cardiac arrythmias, and stroke. Positive airway pressure will favorably impact many of the adverse conditions and effects provoked by MYESHA.       Return in about 1  "year (around 7/3/2026), or if symptoms worsen or fail to improve.   - Recommend an earlier appointment, if significant treatment barriers develop.  - Patient to follow up with the appropriate healthcare practitioners for all other medical problems and issues.    Please note portions of this record was created using voice recognition software. I have made every reasonable attempt to correct obvious errors, but I expect that there are errors of grammar and possibly content I did not discover before finalizing the note.           [1]   Past Medical History:  Diagnosis Date    Arrhythmia     a-fib    Arthritis     osteo- shoulders knees    BMI 38.0-38.9,adult 09/18/2013    Breast cancer (HCC)     CATARACT     surgical correcttion bilateral    Chronic nausea 01/12/2015    Has had GI work up done Dr voss    Chronic pain of both knees 01/09/2019    Cough     Depression with anxiety 05/03/2011    Heart burn     Heart valve disease     \"slight regurgitation\"    Hemothorax on right 01/04/2017    Status post thoracotomy and decortication    History of cholecystectomy     Hx of total knee replacement     bilateral    Hyperlipidemia 04/04/2011    Hypertension     Indigestion     MYESHA (obstructive sleep apnea) 04/04/2011    On cPAP     Pain     left hip    Painful breathing     Pleural effusion, right 01/04/2017    Status post thoracotomy and decortication    Range of motion deficit     left elbow, unable to completely extend    S/P bilateral mastectomy 04/04/2011    Sleep apnea     CPAP    Snoring     Sputum production    [2]   Past Surgical History:  Procedure Laterality Date    PB RECONSTR TOTAL SHOULDER IMPLANT Right 05/11/2021    Procedure: ARTHROPLASTY, SHOULDER, TOTAL, REVERSE;  Surgeon: Roque Edmond M.D.;  Location: SURGERY [facility];  Service: Orthopedics    BICEPS TENODESIS Right 05/11/2021    Procedure: TENODESIS, BICEPS - AND DUBOIS.;  Surgeon: Roque Edmond M.D.;  Location: SURGERY [facility];  Service: " Orthopedics    PB TOTAL KNEE ARTHROPLASTY Left 2019    Procedure: ARTHROPLASTY, KNEE, TOTAL;  Surgeon: Roque Edmond M.D.;  Location: SURGERY [facility];  Service: Orthopedics    THORACOSCOPY Right 2016    Procedure: RIGHT THORACOSCOPY ,DECORTICATION, EVACUATION OF HEMOTHORAX;  Surgeon: Ehsan De Leon D.O.;  Location: SURGERY [facility];  Service:     HIP ARTHROPLASTY TOTAL  2012    Performed by Jamie Kenney M.D. at SURGERY [facility]    CATARACT PHACO WITH IOL  2011    Performed by DEENA BRADEN at SURGERY [facility]    CATARACT PHACO WITH IOL  10/20/2011    Performed by DEENA BRADEN at SURGERY SAME DAY [facility]    MAXIM BY LAPAROSCOPY  2011    Performed by DORON HINOJOSA at SURGERY SAME DAY [facility]    HIP ARTHROPLASTY TOTAL  2009    Right; Perfmed by THERESE LEMA at SURGERY [facility]    UVULOPHARYNGOPALATOPLASTY      OTHER      tummy reyna    OTHER      bilateral mastectomies with implants    BREAST BIOPSY      bilateral benign mastectomy    HIP REPLACEMENT, TOTAL Bilateral     ORIF, HUMERUS      left    OTHER Bilateral     Breast Implant    PA BREAST AUGMENTATION WITH IMPLANT      TONSILLECTOMY     [3]   Social History  Socioeconomic History    Marital status:    Occupational History    Occupation: retired      Comment: [employer]   Tobacco Use    Smoking status: Former     Current packs/day: 0.00     Average packs/day: 1 pack/day for 15.0 years (15.0 ttl pk-yrs)     Types: Cigarettes     Start date: 1960     Quit date: 1975     Years since quittin.5     Passive exposure: Never    Smokeless tobacco: Never   Vaping Use    Vaping status: Never Used   Substance and Sexual Activity    Alcohol use: Yes     Alcohol/week: 6.0 oz     Types: 10 Standard drinks or equivalent per week     Comment: socially    Drug use: No    Sexual activity: Never     Comment: lives with  ex-   Social History Narrative    ** Merged History Encounter **          Social Drivers of Health     Food Insecurity: No Food Insecurity (10/8/2024)    Hunger Vital Sign     Worried About Running Out of Food in the Last Year: Never true     Ran Out of Food in the Last Year: Never true   Transportation Needs: No Transportation Needs (10/8/2024)    PRAPARE - Transportation     Lack of Transportation (Medical): No     Lack of Transportation (Non-Medical): No   Intimate Partner Violence: Not At Risk (10/8/2024)    Humiliation, Afraid, Rape, and Kick questionnaire     Fear of Current or Ex-Partner: No     Emotionally Abused: No     Physically Abused: No     Sexually Abused: No   Housing Stability: Low Risk  (10/8/2024)    Housing Stability Vital Sign     Unable to Pay for Housing in the Last Year: No     Number of Times Moved in the Last Year: 0     Homeless in the Last Year: No

## 2025-07-07 ENCOUNTER — OFFICE VISIT (OUTPATIENT)
Dept: MEDICAL GROUP | Facility: LAB | Age: 85
End: 2025-07-07
Payer: MEDICARE

## 2025-07-07 VITALS
HEIGHT: 60 IN | OXYGEN SATURATION: 93 % | BODY MASS INDEX: 39.66 KG/M2 | DIASTOLIC BLOOD PRESSURE: 64 MMHG | SYSTOLIC BLOOD PRESSURE: 124 MMHG | WEIGHT: 202 LBS | RESPIRATION RATE: 14 BRPM | HEART RATE: 68 BPM | TEMPERATURE: 98.1 F

## 2025-07-07 DIAGNOSIS — R51.9 CHRONIC NONINTRACTABLE HEADACHE, UNSPECIFIED HEADACHE TYPE: ICD-10-CM

## 2025-07-07 DIAGNOSIS — M47.812 OSTEOARTHRITIS OF CERVICAL SPINE, UNSPECIFIED SPINAL OSTEOARTHRITIS COMPLICATION STATUS: Primary | ICD-10-CM

## 2025-07-07 DIAGNOSIS — G89.29 CHRONIC NONINTRACTABLE HEADACHE, UNSPECIFIED HEADACHE TYPE: ICD-10-CM

## 2025-07-07 PROCEDURE — 3074F SYST BP LT 130 MM HG: CPT | Performed by: FAMILY MEDICINE

## 2025-07-07 PROCEDURE — 99214 OFFICE O/P EST MOD 30 MIN: CPT | Performed by: FAMILY MEDICINE

## 2025-07-07 PROCEDURE — 3078F DIAST BP <80 MM HG: CPT | Performed by: FAMILY MEDICINE

## 2025-07-07 ASSESSMENT — FIBROSIS 4 INDEX: FIB4 SCORE: 2.08

## 2025-07-07 NOTE — PROGRESS NOTES
Verbal consent was acquired by the patient to use [employer] ambient listening note generation during this visit Yes    Subjective:   Sharon Callahan is a 84 y.o. female here today for   Chief Complaint   Patient presents with    Heart Murmur     Listen to heart, Sleep medicine was concern about her chest sound, Head ache on going, right side of head, pressure, Sinus infection previously now done, CMH20 10 setting now to increase 12, getting better.      History of Present Illness  The patient is an 84-year-old female who presents today with concerns regarding headache and potential heart murmur.    She continues to experience pressure on the right side of her head, which varies in intensity. She suspects that her CPAP settings might be contributing to her headaches. She was informed by her pulmonologist that her CPAP was set at 12 cm instead of the recommended 10 cm, possibly due to power surges at her apartment complex. She also reports memory issues, which she attributes to her age. She reports no pain in her right ear but mentions occasional swelling. She maintains good hydration and believes her neck stiffness, a result of a previous accident, could be related to her headaches. She has been using Flonase, which provides some relief.    During a recent visit to her pulmonologist, an abnormal heart sound was detected, prompting a recommendation for an EKG. She recalls having an echocardiogram within the past 8 months but is uncertain. She has experienced two episodes of feeling as if everything stopped, accompanied by an unusual sensation. She also reports occasional heart flutters. She has noticed swelling in her legs and ankles due to water retention, which is managed with a diuretic taken in the morning.    She has been experiencing neck stiffness, which has worsened since her last accident. She can hear a snapping sound when she moves her neck.    She reports a history of cancer removal from her face and  arms. She was advised to get rid of her cat due to allergies, which has helped reduce her symptoms.    PAST SURGICAL HISTORY:  Cancer removal from face and arms.      Allergies[1]      Current medicines (including changes today)  Current Medications[2]  She  has a past medical history of Arrhythmia, Arthritis, BMI 38.0-38.9,adult (09/18/2013), Breast cancer (HCC), CATARACT, Chronic nausea (01/12/2015), Chronic pain of both knees (01/09/2019), Cough, Depression with anxiety (05/03/2011), Heart burn, Heart valve disease, Hemothorax on right (01/04/2017), History of cholecystectomy, total knee replacement, Hyperlipidemia (04/04/2011), Hypertension, Indigestion, MYESHA (obstructive sleep apnea) (04/04/2011), Pain, Painful breathing, Pleural effusion, right (01/04/2017), Range of motion deficit, S/P bilateral mastectomy (04/04/2011), Sleep apnea, Snoring, and Sputum production.    She has no past medical history of Shortness of breath or Wheezing.    ROS   ROS  -See HPI     Objective:     Physical Exam:  /64 (BP Location: Right arm, Patient Position: Sitting, BP Cuff Size: Large adult)   Pulse 68   Temp 36.7 °C (98.1 °F) (Temporal)   Resp 14   Ht 1.524 m (5')   Wt 91.6 kg (202 lb)   SpO2 93%  Body mass index is 39.45 kg/m².   Constitutional: Alert, no distress.  Skin: Warm, dry, good turgor, no rashes in visible areas.  Eye: Equal, round and reactive, conjunctiva clear, lids normal.  ENMT: TM's clear bilaterally, lips without lesions, good dentition, oropharynx clear.  Neck: Trachea midline, no masses, no thyromegaly. No cervical or supraclavicular lymphadenopathy.  Respiratory: Unlabored respiratory effort, lungs clear to auscultation, no wheezes, no rhonchi.  Cardiovascular: Normal S1, S2, no murmur, no edema.  Psych: Alert and oriented x3, normal affect and mood.    Results  - Imaging:    - Echocardiogram: Completed 10/9/2024  CONCLUSIONS  Normal left ventricular systolic function.  The ejection fraction is    measured to be 55 % by Caban's biplane.  Normal right ventricular size and systolic function.  No significant valvular abnormalities.   Estimated right ventricular systolic pressure is 30 mmHg (normal).   Compared to the prior study on 05/17/23, there are no significant   changes.     Assessment and Plan:     Assessment & Plan  Headache  The headache could be attributed to tension or ongoing allergy symptoms. The discrepancy in CPAP settings may also be a contributing factor.  Treatment plan: She is advised to continue using Flonase and adjust her CPAP settings as per her pulmonologist's recommendations.  Clinical decision making: Referral to a specialist for neck pain evaluation will be made.    Potential heart murmur  Upon examination, a flow murmur was detected on the right side of the heart, which does not raise significant concern. Her last echocardiogram showed normal results.  Clinical decision making: She is advised to monitor for any new symptoms and report them promptly. No immediate intervention is required based on current findings.    Neck pain  Her neck pain could be due to arthritis, as indicated by an x-ray taken after her last accident. This arthritis could potentially cause radiating pain and head pressure.  Diagnostic plan: A referral to a physiatrist will be made for further evaluation and potential treatment options, including physical therapy and joint injections.  Treatment plan: Physical therapy and potential joint injections will be considered for management.    Follow-up: The patient will follow up in 6 months.    Orders:  1. Osteoarthritis of cervical spine, unspecified spinal osteoarthritis complication status  - Referral to Physiatry (PMR)    2. Chronic nonintractable headache, unspecified headache type        Followup: No follow-ups on file.         PLEASE NOTE: This dictation was created using voice recognition and [employer] ambient listening software. I have made every  reasonable attempt to correct obvious errors, but I expect that there are errors of grammar and possibly content that I did not discover before finalizing the note.         [1]   Allergies  Allergen Reactions    Pcn [Penicillins] Anaphylaxis     Skin turns black  Tolerated ceftriaxone 10/8/24  Tolerated cefuroxime 10/10/24    Clindamycin Rash     Rash      Influenza Virus Vacc Rash     Red in face    Pneumococcal Vaccine Rash     Rash     Tetracycline Rash     Rash     Xarelto [Rivaroxaban] Rash     Rash     Hydrocodone Vomiting and Nausea   [2]   Current Outpatient Medications   Medication Sig Dispense Refill    traZODone (DESYREL) 50 MG Tab TAKE 1 TABLET BY MOUTH EVERY EVENING  DAYS. 90 Tablet 3    amLODIPine (NORVASC) 5 MG Tab TAKE 1 TABLET BY MOUTH EVERY DAY 90 Tablet 3    furosemide (LASIX) 40 MG Tab Take 1 Tablet by mouth every day. 90 Tablet 3    polyethylene glycol/lytes (MIRALAX) Pack Take 1 Packet by mouth 1 time a day as needed (if sennosides and docusate ineffective after 24 hours). 10 Packet 0    spironolactone (ALDACTONE) 25 MG Tab Take 1 Tablet by mouth every day. 30 Tablet 0    ibuprofen (MOTRIN) 200 MG Tab Take 200 mg by mouth every 6 hours as needed. Indications: Pain      acetaminophen (TYLENOL) 500 MG Tab Take 500 mg by mouth every 6 hours as needed. Indications: Pain      cyclobenzaprine (FLEXERIL) 10 mg Tab Take 1 Tablet by mouth 3 times a day as needed for Muscle Spasms. 30 Tablet 0    potassium Chloride ER (K-TAB) 20 MEQ Tab CR tablet TAKE 1 TABLET BY MOUTH EVERY DAY (Patient taking differently: Take 20 mEq by mouth every day.) 90 Tablet 3    simethicone (MYLICON) 125 MG chewable tablet Chew 1 Tablet 3 times a day as needed for Flatulence. 120 Tablet 0    Cholecalciferol (D3 PO) Take 1 Capsule by mouth every day.      aspirin EC (ECOTRIN) 81 MG Tablet Delayed Response Take 1 Tablet by mouth every day.      Cyanocobalamin (VITAMIN B-12 PO) Take 1 Tablet by mouth every day.       No  current facility-administered medications for this visit.

## 2025-07-20 ENCOUNTER — APPOINTMENT (OUTPATIENT)
Dept: RADIOLOGY | Facility: MEDICAL CENTER | Age: 85
End: 2025-07-20
Attending: STUDENT IN AN ORGANIZED HEALTH CARE EDUCATION/TRAINING PROGRAM
Payer: MEDICARE

## 2025-07-20 ENCOUNTER — HOSPITAL ENCOUNTER (EMERGENCY)
Facility: MEDICAL CENTER | Age: 85
End: 2025-07-20
Attending: STUDENT IN AN ORGANIZED HEALTH CARE EDUCATION/TRAINING PROGRAM
Payer: MEDICARE

## 2025-07-20 VITALS
WEIGHT: 202 LBS | BODY MASS INDEX: 39.66 KG/M2 | RESPIRATION RATE: 17 BRPM | SYSTOLIC BLOOD PRESSURE: 134 MMHG | OXYGEN SATURATION: 96 % | HEART RATE: 56 BPM | HEIGHT: 60 IN | DIASTOLIC BLOOD PRESSURE: 68 MMHG | TEMPERATURE: 97.8 F

## 2025-07-20 DIAGNOSIS — Z96.641 HISTORY OF TOTAL RIGHT HIP ARTHROPLASTY: ICD-10-CM

## 2025-07-20 DIAGNOSIS — M25.551 ACUTE RIGHT HIP PAIN: Primary | ICD-10-CM

## 2025-07-20 PROCEDURE — A9270 NON-COVERED ITEM OR SERVICE: HCPCS | Performed by: STUDENT IN AN ORGANIZED HEALTH CARE EDUCATION/TRAINING PROGRAM

## 2025-07-20 PROCEDURE — 99284 EMERGENCY DEPT VISIT MOD MDM: CPT

## 2025-07-20 PROCEDURE — 73502 X-RAY EXAM HIP UNI 2-3 VIEWS: CPT | Mod: RT

## 2025-07-20 PROCEDURE — 73700 CT LOWER EXTREMITY W/O DYE: CPT | Mod: RT

## 2025-07-20 PROCEDURE — 700102 HCHG RX REV CODE 250 W/ 637 OVERRIDE(OP): Performed by: STUDENT IN AN ORGANIZED HEALTH CARE EDUCATION/TRAINING PROGRAM

## 2025-07-20 PROCEDURE — 700117 HCHG RX CONTRAST REV CODE 255: Performed by: STUDENT IN AN ORGANIZED HEALTH CARE EDUCATION/TRAINING PROGRAM

## 2025-07-20 RX ORDER — NAPROXEN 375 MG/1
375 TABLET ORAL 2 TIMES DAILY WITH MEALS
Qty: 14 TABLET | Refills: 0 | Status: SHIPPED | OUTPATIENT
Start: 2025-07-20 | End: 2025-07-27

## 2025-07-20 RX ORDER — OXYCODONE AND ACETAMINOPHEN 5; 325 MG/1; MG/1
1 TABLET ORAL EVERY 4 HOURS PRN
Qty: 15 TABLET | Refills: 0 | Status: SHIPPED | OUTPATIENT
Start: 2025-07-20 | End: 2025-07-20

## 2025-07-20 RX ORDER — OXYCODONE AND ACETAMINOPHEN 5; 325 MG/1; MG/1
1 TABLET ORAL EVERY 4 HOURS PRN
Qty: 15 TABLET | Refills: 0 | Status: SHIPPED | OUTPATIENT
Start: 2025-07-20 | End: 2025-07-26

## 2025-07-20 RX ORDER — NAPROXEN 375 MG/1
375 TABLET ORAL 2 TIMES DAILY WITH MEALS
Qty: 14 TABLET | Refills: 0 | Status: SHIPPED | OUTPATIENT
Start: 2025-07-20 | End: 2025-07-20

## 2025-07-20 RX ORDER — OXYCODONE AND ACETAMINOPHEN 5; 325 MG/1; MG/1
1 TABLET ORAL ONCE
Refills: 0 | Status: COMPLETED | OUTPATIENT
Start: 2025-07-20 | End: 2025-07-20

## 2025-07-20 RX ADMIN — OXYCODONE HYDROCHLORIDE AND ACETAMINOPHEN 1 TABLET: 5; 325 TABLET ORAL at 14:44

## 2025-07-20 ASSESSMENT — PAIN DESCRIPTION - PAIN TYPE: TYPE: ACUTE PAIN

## 2025-07-20 ASSESSMENT — FIBROSIS 4 INDEX: FIB4 SCORE: 2.08

## 2025-07-20 NOTE — ED TRIAGE NOTES
"/75   Pulse 60   Temp 36.6 °C (97.8 °F) (Temporal)   Resp 16   Ht 1.524 m (5')   Wt 91.6 kg (202 lb)   SpO2 94%   BMI 39.45 kg/m²   Chief Complaint   Patient presents with    Hip Pain     R hip pain  today was attempting to sit down after making a salad   felt sudden onset of pain heard snap and cracking sound from R hip  happened around 9 this morning   has been trying to \"work it out\"   Hx of R hip replacement 20 yrs ago   has not been able to ambulate since       Comes in w/ brother   using WC    "

## 2025-07-20 NOTE — ED NOTES
Pt ambulated without assistance using her personal cane. Pt informed this tech that she has two walkers at home and is comfortable in this state to accomplish her daily activities at home and would prefer to go home.

## 2025-07-20 NOTE — ED PROVIDER NOTES
"ED Provider Note    CHIEF COMPLAINT  Chief Complaint   Patient presents with    Hip Pain     R hip pain  today was attempting to sit down after making a salad   felt sudden onset of pain heard snap and cracking sound from R hip  happened around 9 this morning   has been trying to \"work it out\"   Hx of R hip replacement 20 yrs ago   has not been able to ambulate since         EXTERNAL RECORDS REVIEWED  Outpatient Notes office visit on 7/7/2025 for osteoarthritis of the cervical spine    HPI/ROS  LIMITATION TO HISTORY   Select: : None  OUTSIDE HISTORIAN(S):    Sharon Callahan is a 84 y.o. female who presents after a ground-level fall.  Patient attempted to sit down on a stool after making a salad but fell to the ground and heard a snap and cracking sound in the right hip.  This occurred at 9 AM this morning.  Patient reports that the pain has not improved.  Patient denies weakness numbness of the right lower extremity distally.  Patient denies back pain.  Patient reports significant pain with bearing weight.  Patient has been unable to put bear full weight on the right hip    PAST MEDICAL HISTORY   has a past medical history of Arrhythmia, Arthritis, BMI 38.0-38.9,adult (09/18/2013), Breast cancer (HCC), CATARACT, Chronic nausea (01/12/2015), Chronic pain of both knees (01/09/2019), Cough, Depression with anxiety (05/03/2011), Heart burn, Heart valve disease, Hemothorax on right (01/04/2017), History of cholecystectomy, total knee replacement, Hyperlipidemia (04/04/2011), Hypertension, Indigestion, MYESHA (obstructive sleep apnea) (04/04/2011), Pain, Painful breathing, Pleural effusion, right (01/04/2017), Range of motion deficit, S/P bilateral mastectomy (04/04/2011), Sleep apnea, Snoring, and Sputum production.    SURGICAL HISTORY   has a past surgical history that includes hip replacement, total (Bilateral); other (Bilateral); lisa by laparoscopy (02/01/2011); other (1988); other (1989); uvulopharyngopalatoplasty " (); cataract phaco with iol (10/20/2011); cataract phaco with iol (2011); orif, humerus (s); breast augmentation with implant; thoracoscopy (Right, 2016); breast biopsy (); hip arthroplasty total (2009); hip arthroplasty total (2012); total knee arthroplasty (Left, 2019); reconstr total shoulder implant (Right, 2021); biceps tenodesis (Right, 2021); and tonsillectomy.    FAMILY HISTORY  Family History   Problem Relation Age of Onset    Hypertension Mother     Cancer Father         lung    Diabetes Father     Hypertension Father     Heart Disease Father         MI    Hypertension Brother     Sleep Apnea Brother     Hypertension Maternal Grandmother     Sleep Apnea Brother     Hypertension Brother     Diabetes Brother     Cancer Paternal Aunt         stomach    Diabetes Paternal Aunt     Heart Disease Brother         MI    Hyperlipidemia Neg Hx     Stroke Neg Hx        SOCIAL HISTORY  Social History     Tobacco Use    Smoking status: Former     Current packs/day: 0.00     Average packs/day: 1 pack/day for 15.0 years (15.0 ttl pk-yrs)     Types: Cigarettes     Start date: 1960     Quit date: 1975     Years since quittin.5     Passive exposure: Never    Smokeless tobacco: Never   Vaping Use    Vaping status: Never Used   Substance and Sexual Activity    Alcohol use: Yes     Alcohol/week: 3.0 oz     Types: 5 Standard drinks or equivalent per week     Comment: socially    Drug use: No    Sexual activity: Never     Comment: lives with ex-       CURRENT MEDICATIONS  Home Medications       Reviewed by Macarena Perales (Pharmacy Tech) on 25 at 1452  Med List Status: Complete     Medication Last Dose Status   acetaminophen (TYLENOL) 500 MG Tab 2025 Active   amLODIPine (NORVASC) 5 MG Tab 2025 Active   Cholecalciferol (D3 PO) 2025 Active   Cyanocobalamin (VITAMIN B-12 PO) 2025 Active   furosemide (LASIX) 40 MG Tab 2025  Active   potassium Chloride ER (K-TAB) 20 MEQ Tab CR tablet 7/20/2025 Active   traZODone (DESYREL) 50 MG Tab 7/19/2025 Active                  Audit from Redirected Encounters    **Home medications have not yet been reviewed for this encounter**         ALLERGIES  Allergies[1]    PHYSICAL EXAM  VITAL SIGNS: /68   Pulse (!) 56   Temp 36.6 °C (97.8 °F) (Temporal)   Resp 17   Ht 1.524 m (5')   Wt 91.6 kg (202 lb)   SpO2 96%   BMI 39.45 kg/m²    Vitals and nursing note reviewed.   Constitutional:       Comments: Patient is lying in bed supine, pleasant, conversant, speaking in complete sentences   HENT:      Head: Normocephalic and atraumatic.   Eyes:      Extraocular Movements: Extraocular movements intact.      Conjunctiva/sclera: Conjunctivae normal.      Pupils: Pupils are equal, round, and reactive to light.   Cardiovascular:      Pulses: Normal pulses.      Comments: HR 56  Pulmonary:      Effort: Pulmonary effort is normal. No respiratory distress.   Musculoskeletal:      No midline L-spine tenderness to palpation  No right lower extremity lengthening, shortening, rotation  Tenderness to the right hip laterally  Distal affected extremity demonstrates intact sensation, motor, cap refill less than 2 seconds and 2+ pulses, warm and well perfused, no signs of tendon rupture, no crepitus on palpation.  General: No swelling. Normal range of motion.      Cervical back: Normal range of motion. No rigidity.   Skin:     General: Skin is warm and dry.      Capillary Refill: Capillary refill takes less than 2 seconds.   Neurological:      Mental Status: Alert.       RADIOLOGY/PROCEDURES   I have independently interpreted the diagnostic imaging associated with this visit and am waiting the final reading from the radiologist.   My preliminary interpretation is as follows: No fracture    Radiologist interpretation:  CT-HIP W/O PLUS RECONS RIGHT   Final Result      1.  Bilateral hip arthroplasties. No evidence of  fracture or displacement of the arthroplasty components.      2.  Degenerative change of the lumbar spine.      DX-HIP-UNILATERAL-W/O PELVIS-2/3 VIEWS RIGHT   Final Result      1.  No acute posttraumatic imaging findings.   2.  Stable postsurgical changes in the pelvis.          COURSE & MEDICAL DECISION MAKING    ASSESSMENT, COURSE AND PLAN  Care Narrative: No evidence of fracture, patient however in a significant amount of pain and cannot bear weight, CT imaging will be conducted to definitively rule out fracture.  Patient given Percocet for pain control.  No evidence of lumbar spine injury.  No evidence of neurovascular compromise.  No evidence of head or neck trauma.    Electronically signed by: Eric Mei M.D., 7/20/2025 2:43 PM    No fracture on x-ray or CT imaging.  Patient feeling better following oral Percocet.  Patient was walked by nursing staff.  Patient reports that she feels comfortable taking care of her activities of daily living per nursing staff and that her pain is well-controlled with the oral pain medication that she was given.  Patient discharged with NSAIDs and Percocet for symptom control.    Repeat physical exam benign.  I doubt any serious emergency process at this time.  Patient and/or family, friends given strict return precautions for worsening symptoms and care instructions. They have demonstrated understanding of discharge instructions through teach back mechanism. Advised PCP follow-up in 1-2 days.  Patient/family/friend expresses understanding and agrees to plan.    This dictation has been created using voice recognition software. I am continuously working with the software to minimize the number of voice recognition errors and I have made every attempt to manually correct the errors within my dictation. However errors  related to this voice recognition software may still exist and should be interpreted within the appropriate context.     Electronically signed by:  Eric Mei M.D., 7/20/2025 4:19 PM      Narcotics Script: In prescribing controlled substances to this patient, I certify that I have obtained and reviewed the medical history of Sharon Callahan. I have also made a good amanda effort to obtain applicable records from other providers who have treated the patient and records did not demonstrate any increased risk of substance abuse that would prevent me from prescribing controlled substances.     I have conducted a physical exam and documented it. I have reviewed Ms. Boyd’s prescription history as maintained by the [place] Prescription Monitoring Program.     I have assessed the patient’s risk for abuse, dependency, and addiction using the validated Opioid Risk Tool available at https://www.mdcalc.com/stfbec-ceex-ahpn-ort-narcotic-abuse.     Given the above, I believe the benefits of controlled substance therapy outweigh the risks. The reasons for prescribing controlled substances include in my professional opinion, controlled substances are the only reasonable choice for this patient because of severe breakthrough pain. Accordingly, I have discussed the risk and benefits, treatment plan, and alternative therapies with the patient.         DISPOSITION AND DISCUSSIONS  Escalation of care considered, and ultimately not performed:acute inpatient care management, however at this time, the patient is most appropriate for outpatient management        FINAL DIAGNOSIS  1. Acute right hip pain    2. History of total right hip arthroplasty         Electronically signed by: Eric Mei M.D., 7/20/2025 2:41 PM           [1]   Allergies  Allergen Reactions    Pcn [Penicillins] Anaphylaxis     Skin turns black  Tolerated ceftriaxone 10/8/24  Tolerated cefuroxime 10/10/24    Clindamycin Rash     Rash      Influenza Virus Vacc Rash     Red in face    Pneumococcal Vaccine Rash     Rash     Tetracycline Rash     Rash     Xarelto [Rivaroxaban] Rash     Rash      Hydrocodone Vomiting and Nausea

## 2025-07-20 NOTE — ED NOTES
Medication history reviewed with pt. Med rec is complete. Allergies reviewed, per pt  Interviewed pt with family at bedside with permission from pt.    Pt reports that she is not taking ASPIRIN 81MG, SPIRONOLACTONE 25MG,  or CYCLOBENZAPRINE 10MG in the last 30 days or longer.    Any outpatient anti-MICROBIAL in the last 30 days? NO    Pt is not on any anticoagulants

## 2025-07-23 ENCOUNTER — OFFICE VISIT (OUTPATIENT)
Dept: MEDICAL GROUP | Facility: LAB | Age: 85
End: 2025-07-23
Payer: MEDICARE

## 2025-07-23 VITALS
TEMPERATURE: 97.6 F | OXYGEN SATURATION: 96 % | HEIGHT: 60 IN | RESPIRATION RATE: 14 BRPM | SYSTOLIC BLOOD PRESSURE: 112 MMHG | BODY MASS INDEX: 39.66 KG/M2 | HEART RATE: 54 BPM | DIASTOLIC BLOOD PRESSURE: 72 MMHG | WEIGHT: 202 LBS

## 2025-07-23 DIAGNOSIS — M25.551 RIGHT HIP PAIN: Primary | ICD-10-CM

## 2025-07-23 DIAGNOSIS — R00.1 SINUS BRADYCARDIA: ICD-10-CM

## 2025-07-23 PROCEDURE — 3074F SYST BP LT 130 MM HG: CPT | Performed by: FAMILY MEDICINE

## 2025-07-23 PROCEDURE — 99214 OFFICE O/P EST MOD 30 MIN: CPT | Performed by: FAMILY MEDICINE

## 2025-07-23 PROCEDURE — 3078F DIAST BP <80 MM HG: CPT | Performed by: FAMILY MEDICINE

## 2025-07-23 ASSESSMENT — FIBROSIS 4 INDEX: FIB4 SCORE: 2.08

## 2025-07-23 NOTE — PROGRESS NOTES
Verbal consent was acquired by the patient to use [employer] ambient listening note generation during this visit Yes    Subjective:   Sharon Callahan is a 84 y.o. female here today for   Chief Complaint   Patient presents with    [facility]     History of Present Illness  The patient presents today to follow up after a recent ER visit on 07/20/2025 due to acute nontraumatic right hip pain.    She reports feeling better overall but experienced a severe episode of right hip pain. This occurred while she was sitting on a bar stool, during which she heard a crackling and popping sound, followed by intense pain. The pain was so severe that she was unable to walk or bear weight on her right leg. She describes the pain as being located in the back of her hip, with some radiation down her thigh. She also experienced numbness in her thigh and lower back. She did not fall or hit her hip. She attempted to move around using a cart, but this exacerbated her pain. She was unable to exit her car without assistance. She was taken to the ER where x-rays and a CT scan were performed, revealing no fractures but suggesting scar tissue buildup around the hip. She was prescribed oxycodone and naproxen, which she takes twice daily with food. She can take oxycodone every 4 hours, but she does not like to take it. Her condition improved yesterday, but today she feels slightly tender. She was advised to see a rehab doctor but declined, opting instead to return home.    She also mentions that her heart rate dropped to 52 during the ER visit, which caused concern among the medical staff. However, she notes that her heart rate has been low in the past.    PAST SURGICAL HISTORY:  - Hip replacement      Allergies[1]      Current medicines (including changes today)  Current Medications[2]  She  has a past medical history of Arrhythmia, Arthritis, BMI 38.0-38.9,adult (09/18/2013), Breast cancer (HCC), CATARACT,  Chronic nausea (01/12/2015), Chronic pain of both knees (01/09/2019), Cough, Depression with anxiety (05/03/2011), Heart burn, Heart valve disease, Hemothorax on right (01/04/2017), History of cholecystectomy, total knee replacement, Hyperlipidemia (04/04/2011), Hypertension, Indigestion, MYESHA (obstructive sleep apnea) (04/04/2011), Pain, Painful breathing, Pleural effusion, right (01/04/2017), Range of motion deficit, S/P bilateral mastectomy (04/04/2011), Sleep apnea, Snoring, and Sputum production.    She has no past medical history of Shortness of breath or Wheezing.    ROS   ROS  -See HPI     Objective:     Physical Exam:  /72 (BP Location: Right arm, Patient Position: Sitting, BP Cuff Size: Large adult)   Pulse (!) 54   Temp 36.4 °C (97.6 °F) (Temporal)   Resp 14   Ht 1.524 m (5')   Wt 91.6 kg (202 lb)   SpO2 96%  Body mass index is 39.45 kg/m².   Constitutional: Alert, no distress, well-groomed.  Skin: No rashes in visible areas.  Eye: Round. Conjunctiva clear, lids normal. No icterus.   ENMT: Lips pink without lesions, good dentition, moist mucous membranes. Phonation normal.  Neck: No masses, no thyromegaly. Moves freely without pain.  Respiratory: Unlabored respiratory effort, no cough or audible wheeze  Psych: Alert and oriented x3, normal affect and mood.     Results  - Imaging:    - X-ray of the right hip (07/20/2025): No break or fracture, possible scar tissue buildup    - CAT scan of the right hip (07/20/2025): No break or fracture, possible scar tissue buildup    Assessment and Plan:     Assessment & Plan  Acute nontraumatic right hip pain  The patient's hip replacements are intact and functioning well. Pain experienced could be due to a muscular issue or sciatica, rather than a problem originating from her back.  Diagnostic plan: Referral for physical therapy will be made.  Treatment plan: Advised to continue current medication regimen, including oxycodone and naproxen, but to use them  sparingly. If pain persists, rehabilitation, stretching, and strengthening exercises will be considered.    Sinus bradycardia  Heart rate is consistently low but stable. No arrhythmias detected; conduction is normal with occasional PACs or PVCs, which are common. Reviewed event monitor results from last fall, confirming sinus bradycardia.  No immediate concerns or interventions necessary at this time.    Orders:  1. Right hip pain  - Referral to Physical Therapy    2. Sinus bradycardia        Followup: No follow-ups on file.         PLEASE NOTE: This dictation was created using voice recognition and [employer] ambient listening software. I have made every reasonable attempt to correct obvious errors, but I expect that there are errors of grammar and possibly content that I did not discover before finalizing the note.           [1]   Allergies  Allergen Reactions    Pcn [Penicillins] Anaphylaxis     Skin turns black  Tolerated ceftriaxone 10/8/24  Tolerated cefuroxime 10/10/24    Clindamycin Rash     Rash      Influenza Virus Vacc Rash     Red in face    Pneumococcal Vaccine Rash     Rash     Tetracycline Rash     Rash     Xarelto [Rivaroxaban] Rash     Rash     Hydrocodone Vomiting and Nausea   [2]   Current Outpatient Medications   Medication Sig Dispense Refill    naproxen (NAPROSYN) 375 MG Tab Take 1 Tablet by mouth 2 times a day with meals for 7 days. 14 Tablet 0    oxyCODONE-acetaminophen (PERCOCET) 5-325 MG Tab Take 1 Tablet by mouth every four hours as needed for Severe Pain for up to 6 days. 15 Tablet 0    traZODone (DESYREL) 50 MG Tab TAKE 1 TABLET BY MOUTH EVERY EVENING  DAYS. 90 Tablet 3    amLODIPine (NORVASC) 5 MG Tab TAKE 1 TABLET BY MOUTH EVERY DAY 90 Tablet 3    furosemide (LASIX) 40 MG Tab Take 1 Tablet by mouth every day. 90 Tablet 3    acetaminophen (TYLENOL) 500 MG Tab Take 1,000 mg by mouth every 6 hours as needed. Indications: Pain      potassium Chloride ER (K-TAB) 20 MEQ Tab CR tablet  TAKE 1 TABLET BY MOUTH EVERY DAY (Patient taking differently: Take 20 mEq by mouth every day.) 90 Tablet 3    Cholecalciferol (D3 PO) Take 1 Capsule by mouth every day.      Cyanocobalamin (VITAMIN B-12 PO) Take 1 Tablet by mouth every day.       No current facility-administered medications for this visit.

## 2025-07-29 NOTE — Clinical Note
REFERRAL APPROVAL NOTICE         Sent on July 29, 2025                   Sharon Callahan  [address]  [address]  [place] [address]                   Dear Ms. Boyd,    After a careful review of the medical information and benefit coverage, [facility] has processed your referral. See below for additional details.    If applicable, you must be actively enrolled with your insurance for coverage of the authorized service. If you have any questions regarding your coverage, please contact your insurance directly.    REFERRAL INFORMATION   Referral #:  [id number]  Referred-To Department    Referred-By Provider:  Physical Therapy    Stephen Alcantara M.D.   Phys Therapy [facility]      [address]  [address]  [place] [address]  [phone] [address]  [place] [id number] [phone]    Referral Start Date:  07/23/2025  Referral End Date:   07/23/2026             SCHEDULING  If you do not already have an appointment, please call [phone] to make an appointment.     MORE INFORMATION  If you do not already have a [facility] [employer] account, sign up at: [email or web address]  You can access your medical information, make appointments, see lab results, billing information, and more.  If you have questions regarding this referral, please contact  the [facility] Referrals department at:             [phone]. Monday - Friday 8:00AM - 5:00PM.     Sincerely,    [facility]

## 2025-08-28 RX ORDER — POTASSIUM CHLORIDE 1500 MG/1
20 TABLET, EXTENDED RELEASE ORAL
Qty: 90 TABLET | Refills: 3 | Status: SHIPPED | OUTPATIENT
Start: 2025-08-28

## (undated) DEVICE — GLOVE SZ 7.5 LF PROTEXIS (50PR/BX)

## (undated) DEVICE — LENS/HOOD FOR SPACESUIT - (32/PK) PEEL AWAY FACE

## (undated) DEVICE — NEPTUNE 4 PORT MANIFOLD - (20/PK)

## (undated) DEVICE — BLADE SAGITTAL SAW DUAL CUT 25.0 X 90.0 X 1.27MM (1/EA)

## (undated) DEVICE — GLOVE BIOGEL INDICATOR SZ 8 SURGICAL PF LTX - (50/BX 4BX/CA)

## (undated) DEVICE — BANDAGE ELASTIC STERILE VELCRO 6 X 5 YDS (25EA/CA)

## (undated) DEVICE — CANISTER SUCTION RIGID RED 1500CC (40EA/CA)

## (undated) DEVICE — SUTURE 3-0 ETHILON FS-1 - (36/BX) 30 INCH

## (undated) DEVICE — SUTURE 2-0 MONOCRYL SH&UR-6 27 - (12/BX)

## (undated) DEVICE — ELECTRODE 850 FOAM ADHESIVE - HYDROGEL RADIOTRNSPRNT (50/PK)

## (undated) DEVICE — DRAPE U SPLIT IMP 54 X 76 - (24/CA)

## (undated) DEVICE — KIT ROOM DECONTAMINATION

## (undated) DEVICE — GLOVE, LITE (PAIR)

## (undated) DEVICE — SUTURE GENERAL

## (undated) DEVICE — TUBE CONNECTING SUCTION - CLEAR PLASTIC STERILE 72 IN (50EA/CA)

## (undated) DEVICE — SHAVER4.0 AGGRESSIVE + FORMLA (5EA/BX)

## (undated) DEVICE — DRILL BIT

## (undated) DEVICE — SENSOR SPO2 NEO LNCS ADHESIVE (20/BX) SEE USER NOTES

## (undated) DEVICE — DRESSING AQUACEL AG ADVANTAGE 3.5 X 10" (10EA/BX)"

## (undated) DEVICE — PAD UNIVERSAL MULTI USE (1/EA)

## (undated) DEVICE — SUCTION INSTRUMENT YANKAUER BULBOUS TIP W/O VENT (50EA/CA)

## (undated) DEVICE — GOWN WARMING STANDARD FLEX - (30/CA)

## (undated) DEVICE — SODIUM CHL IRRIGATION 0.9% 1000ML (12EA/CA)

## (undated) DEVICE — BLADE SAGITTAL SYSTEM 18MM

## (undated) DEVICE — GLOVE BIOGEL INDICATOR SZ 7.5 SURGICAL PF LTX - (50PR/BX 4BX/CA)

## (undated) DEVICE — LACTATED RINGERS INJ 1000 ML - (14EA/CA 60CA/PF)

## (undated) DEVICE — SUTURE 1 PDS-2 PLUS CTX - (24/BX)

## (undated) DEVICE — KIT ANESTHESIA W/CIRCUIT & 3/LT BAG W/FILTER (20EA/CA)

## (undated) DEVICE — BLADE SAGITTAL 6 SYSTEM 25MM

## (undated) DEVICE — HUMID-VENT HEAT AND MOISTURE EXCHANGE- (50/BX)

## (undated) DEVICE — BLADE RECIPROCATING 12.7 X 78.7 X 1.0MM (1/EA)

## (undated) DEVICE — SYS BN CMNT HI VAC KT MXR BWL - (MIX-E-VAC II)  (10EA/CA)

## (undated) DEVICE — SHAVER, 5.5 RESECTOR

## (undated) DEVICE — TOWEL STOP TIMEOUT SAFETY FLAG (40EA/CA)

## (undated) DEVICE — TOWELS CLOTH SURGICAL - (4/PK 20PK/CA)

## (undated) DEVICE — Device

## (undated) DEVICE — MASK ANESTHESIA ADULT  - (100/CA)

## (undated) DEVICE — DRAPETIBURON SHOULDER W/POUCH - (5EA/CA)

## (undated) DEVICE — STOCKINETTE IMPERVIOUS 12X48 - STERILELF (10/CA)"

## (undated) DEVICE — PACK TOTAL HIP - (1/CA)

## (undated) DEVICE — SPIDER SHOULDER HOLDER (12EA/BX)

## (undated) DEVICE — ABLATOR WAND SERFAS 90-S CRUISE

## (undated) DEVICE — ELECTRODE DUAL RETURN W/ CORD - (50/PK)

## (undated) DEVICE — PACK TOTAL KNEE  (1/CA)

## (undated) DEVICE — TUBING PUMP WITH CONNECTOR REDEUCE (1EA)

## (undated) DEVICE — PACK SHOULDER ARTHROSCOPY SM - (2EA/CA)

## (undated) DEVICE — SUTURE 1 VICRYL PLUS CTX - 8 X 18 INCH (12/BX)

## (undated) DEVICE — GLOVE BIOGEL SZ 7 SURGICAL PF LTX - (50PR/BX 4BX/CA)

## (undated) DEVICE — TUBING PATIENT W/CONNECTOR REDEUCE (1EA)

## (undated) DEVICE — HEAD HOLDER JUNIOR/ADULT

## (undated) DEVICE — HANDPIECE 10FT INTPLS SCT PLS IRRIGATION HAND CONTROL SET (6/PK)

## (undated) DEVICE — SUTURE 1 VICRYL PLUS CT-1 - 18 INCH (12/BX)

## (undated) DEVICE — DEVICE 45MM FIXATION SPEED PIN

## (undated) DEVICE — BAG SPONGE COUNT 10.25 X 32 - BLUE (250/CA)

## (undated) DEVICE — STAPLER SKIN DISP - (6/BX 10BX/CA) VISISTAT

## (undated) DEVICE — SUTURE PRELOADED #2 ULTRABRAID COBRAID (10EA/BX)

## (undated) DEVICE — PROTECTOR ULNA NERVE - (36PR/CA)

## (undated) DEVICE — CHLORAPREP 26 ML APPLICATOR - ORANGE TINT(25/CA)

## (undated) DEVICE — PIN TROCAR GEN 1/8X3 (4EA/BX)

## (undated) DEVICE — BLADE SAGITTAL DUAL 25MM

## (undated) DEVICE — WATER IRRIGATION STERILE 1000ML (12EA/CA)

## (undated) DEVICE — BLOCK

## (undated) DEVICE — BAG, SPONGE COUNT 50600

## (undated) DEVICE — TIP INTPLS HFLO ML ORFC BTRY - (12/CS)  FOR SURGILAV

## (undated) DEVICE — SODIUM CHL. IRRIGATION 0.9% 3000ML (4EA/CA 65CA/PF)

## (undated) DEVICE — PIN, GUIDE